# Patient Record
Sex: MALE | Race: WHITE | NOT HISPANIC OR LATINO | Employment: PART TIME | URBAN - METROPOLITAN AREA
[De-identification: names, ages, dates, MRNs, and addresses within clinical notes are randomized per-mention and may not be internally consistent; named-entity substitution may affect disease eponyms.]

---

## 2017-01-05 ENCOUNTER — TRANSFERRED RECORDS (OUTPATIENT)
Dept: HEALTH INFORMATION MANAGEMENT | Facility: CLINIC | Age: 37
End: 2017-01-05

## 2017-04-09 ENCOUNTER — TRANSFERRED RECORDS (OUTPATIENT)
Dept: HEALTH INFORMATION MANAGEMENT | Facility: CLINIC | Age: 37
End: 2017-04-09

## 2017-04-17 ENCOUNTER — TRANSFERRED RECORDS (OUTPATIENT)
Dept: HEALTH INFORMATION MANAGEMENT | Facility: CLINIC | Age: 37
End: 2017-04-17

## 2017-08-31 ENCOUNTER — TRANSFERRED RECORDS (OUTPATIENT)
Dept: HEALTH INFORMATION MANAGEMENT | Facility: CLINIC | Age: 37
End: 2017-08-31

## 2019-02-27 ENCOUNTER — APPOINTMENT (OUTPATIENT)
Dept: GENERAL RADIOLOGY | Facility: CLINIC | Age: 39
End: 2019-02-27
Attending: EMERGENCY MEDICINE
Payer: MEDICAID

## 2019-02-27 ENCOUNTER — APPOINTMENT (OUTPATIENT)
Dept: CT IMAGING | Facility: CLINIC | Age: 39
End: 2019-02-27
Attending: EMERGENCY MEDICINE
Payer: MEDICAID

## 2019-02-27 ENCOUNTER — HOSPITAL ENCOUNTER (INPATIENT)
Facility: CLINIC | Age: 39
LOS: 3 days | Discharge: HOME | End: 2019-03-02
Attending: EMERGENCY MEDICINE | Admitting: INTERNAL MEDICINE
Payer: MEDICAID

## 2019-02-27 DIAGNOSIS — F10.939 ALCOHOL WITHDRAWAL SYNDROME WITH COMPLICATION (H): ICD-10-CM

## 2019-02-27 DIAGNOSIS — G89.29 OTHER CHRONIC BACK PAIN: Primary | ICD-10-CM

## 2019-02-27 DIAGNOSIS — M54.89 OTHER CHRONIC BACK PAIN: Primary | ICD-10-CM

## 2019-02-27 DIAGNOSIS — A04.72 COLITIS DUE TO CLOSTRIDIUM DIFFICILE: ICD-10-CM

## 2019-02-27 DIAGNOSIS — R00.0 TACHYCARDIA: ICD-10-CM

## 2019-02-27 DIAGNOSIS — E87.6 HYPOKALEMIA: ICD-10-CM

## 2019-02-27 DIAGNOSIS — R07.9 CHEST PAIN, UNSPECIFIED TYPE: ICD-10-CM

## 2019-02-27 DIAGNOSIS — F10.930 ALCOHOL WITHDRAWAL SYNDROME WITHOUT COMPLICATION (H): ICD-10-CM

## 2019-02-27 LAB
ABO + RH BLD: NORMAL
ABO + RH BLD: NORMAL
ALBUMIN SERPL-MCNC: 3.5 G/DL (ref 3.4–5)
ALBUMIN UR-MCNC: 30 MG/DL
ALP SERPL-CCNC: 91 U/L (ref 40–150)
ALT SERPL W P-5'-P-CCNC: 26 U/L (ref 0–70)
AMMONIA PLAS-SCNC: 35 UMOL/L (ref 10–50)
ANION GAP SERPL CALCULATED.3IONS-SCNC: 16 MMOL/L (ref 3–14)
APPEARANCE UR: CLEAR
AST SERPL W P-5'-P-CCNC: 25 U/L (ref 0–45)
BASOPHILS # BLD AUTO: 0.1 10E9/L (ref 0–0.2)
BASOPHILS NFR BLD AUTO: 0.9 %
BILIRUB SERPL-MCNC: 0.2 MG/DL (ref 0.2–1.3)
BILIRUB UR QL STRIP: NEGATIVE
BLD GP AB SCN SERPL QL: NORMAL
BLOOD BANK CMNT PATIENT-IMP: NORMAL
BUN SERPL-MCNC: 11 MG/DL (ref 7–30)
CALCIUM SERPL-MCNC: 7.9 MG/DL (ref 8.5–10.1)
CHLORIDE SERPL-SCNC: 106 MMOL/L (ref 94–109)
CO2 SERPL-SCNC: 20 MMOL/L (ref 20–32)
COLOR UR AUTO: YELLOW
CREAT SERPL-MCNC: 0.55 MG/DL (ref 0.66–1.25)
DIFFERENTIAL METHOD BLD: NORMAL
EOSINOPHIL # BLD AUTO: 0 10E9/L (ref 0–0.7)
EOSINOPHIL NFR BLD AUTO: 0.1 %
ERYTHROCYTE [DISTWIDTH] IN BLOOD BY AUTOMATED COUNT: 13.9 % (ref 10–15)
ETHANOL SERPL-MCNC: 0.33 G/DL
GFR SERPL CREATININE-BSD FRML MDRD: >90 ML/MIN/{1.73_M2}
GLUCOSE SERPL-MCNC: 131 MG/DL (ref 70–99)
GLUCOSE UR STRIP-MCNC: NEGATIVE MG/DL
HCT VFR BLD AUTO: 49.9 % (ref 40–53)
HGB BLD-MCNC: 15.4 G/DL (ref 13.3–17.7)
HGB BLD-MCNC: 17 G/DL (ref 13.3–17.7)
HGB UR QL STRIP: NEGATIVE
HYALINE CASTS #/AREA URNS LPF: 41 /LPF (ref 0–2)
IMM GRANULOCYTES # BLD: 0 10E9/L (ref 0–0.4)
IMM GRANULOCYTES NFR BLD: 0.2 %
INTERPRETATION ECG - MUSE: NORMAL
INTERPRETATION ECG - MUSE: NORMAL
KETONES UR STRIP-MCNC: NEGATIVE MG/DL
LEUKOCYTE ESTERASE UR QL STRIP: NEGATIVE
LIPASE SERPL-CCNC: 194 U/L (ref 73–393)
LYMPHOCYTES # BLD AUTO: 5.1 10E9/L (ref 0.8–5.3)
LYMPHOCYTES NFR BLD AUTO: 47.1 %
MAGNESIUM SERPL-MCNC: 2 MG/DL (ref 1.6–2.3)
MCH RBC QN AUTO: 31.3 PG (ref 26.5–33)
MCHC RBC AUTO-ENTMCNC: 34.1 G/DL (ref 31.5–36.5)
MCV RBC AUTO: 92 FL (ref 78–100)
MONOCYTES # BLD AUTO: 0.5 10E9/L (ref 0–1.3)
MONOCYTES NFR BLD AUTO: 4.8 %
MUCOUS THREADS #/AREA URNS LPF: PRESENT /LPF
NEUTROPHILS # BLD AUTO: 5.1 10E9/L (ref 1.6–8.3)
NEUTROPHILS NFR BLD AUTO: 46.9 %
NITRATE UR QL: NEGATIVE
NRBC # BLD AUTO: 0 10*3/UL
NRBC BLD AUTO-RTO: 0 /100
PH UR STRIP: 6 PH (ref 5–7)
PLATELET # BLD AUTO: 416 10E9/L (ref 150–450)
PLATELET # BLD EST: NORMAL 10*3/UL
POTASSIUM SERPL-SCNC: 3 MMOL/L (ref 3.4–5.3)
POTASSIUM SERPL-SCNC: 4.4 MMOL/L (ref 3.4–5.3)
PROT SERPL-MCNC: 8 G/DL (ref 6.8–8.8)
RBC # BLD AUTO: 5.44 10E12/L (ref 4.4–5.9)
RBC #/AREA URNS AUTO: <1 /HPF (ref 0–2)
RBC MORPH BLD: NORMAL
SODIUM SERPL-SCNC: 142 MMOL/L (ref 133–144)
SOURCE: ABNORMAL
SP GR UR STRIP: 1.01 (ref 1–1.03)
SPECIMEN EXP DATE BLD: NORMAL
TROPONIN I SERPL-MCNC: <0.015 UG/L (ref 0–0.04)
TROPONIN I SERPL-MCNC: <0.015 UG/L (ref 0–0.04)
UROBILINOGEN UR STRIP-MCNC: 0 MG/DL (ref 0–2)
WBC # BLD AUTO: 10.8 10E9/L (ref 4–11)
WBC #/AREA URNS AUTO: 2 /HPF (ref 0–5)

## 2019-02-27 PROCEDURE — HZ2ZZZZ DETOXIFICATION SERVICES FOR SUBSTANCE ABUSE TREATMENT: ICD-10-PCS | Performed by: INTERNAL MEDICINE

## 2019-02-27 PROCEDURE — 36415 COLL VENOUS BLD VENIPUNCTURE: CPT | Performed by: EMERGENCY MEDICINE

## 2019-02-27 PROCEDURE — 25000128 H RX IP 250 OP 636: Performed by: EMERGENCY MEDICINE

## 2019-02-27 PROCEDURE — 25000132 ZZH RX MED GY IP 250 OP 250 PS 637: Performed by: EMERGENCY MEDICINE

## 2019-02-27 PROCEDURE — 70450 CT HEAD/BRAIN W/O DYE: CPT

## 2019-02-27 PROCEDURE — 96375 TX/PRO/DX INJ NEW DRUG ADDON: CPT

## 2019-02-27 PROCEDURE — 83690 ASSAY OF LIPASE: CPT | Performed by: EMERGENCY MEDICINE

## 2019-02-27 PROCEDURE — 84132 ASSAY OF SERUM POTASSIUM: CPT | Performed by: INTERNAL MEDICINE

## 2019-02-27 PROCEDURE — 36415 COLL VENOUS BLD VENIPUNCTURE: CPT | Performed by: INTERNAL MEDICINE

## 2019-02-27 PROCEDURE — 25000128 H RX IP 250 OP 636: Performed by: INTERNAL MEDICINE

## 2019-02-27 PROCEDURE — 25000128 H RX IP 250 OP 636

## 2019-02-27 PROCEDURE — 12000000 ZZH R&B MED SURG/OB

## 2019-02-27 PROCEDURE — 82140 ASSAY OF AMMONIA: CPT | Performed by: EMERGENCY MEDICINE

## 2019-02-27 PROCEDURE — 85025 COMPLETE CBC W/AUTO DIFF WBC: CPT | Performed by: EMERGENCY MEDICINE

## 2019-02-27 PROCEDURE — 80320 DRUG SCREEN QUANTALCOHOLS: CPT | Performed by: EMERGENCY MEDICINE

## 2019-02-27 PROCEDURE — 96366 THER/PROPH/DIAG IV INF ADDON: CPT

## 2019-02-27 PROCEDURE — 99285 EMERGENCY DEPT VISIT HI MDM: CPT | Mod: 25

## 2019-02-27 PROCEDURE — 84484 ASSAY OF TROPONIN QUANT: CPT | Performed by: EMERGENCY MEDICINE

## 2019-02-27 PROCEDURE — 96365 THER/PROPH/DIAG IV INF INIT: CPT

## 2019-02-27 PROCEDURE — 85018 HEMOGLOBIN: CPT | Performed by: EMERGENCY MEDICINE

## 2019-02-27 PROCEDURE — 71046 X-RAY EXAM CHEST 2 VIEWS: CPT

## 2019-02-27 PROCEDURE — 25000132 ZZH RX MED GY IP 250 OP 250 PS 637: Performed by: INTERNAL MEDICINE

## 2019-02-27 PROCEDURE — 86900 BLOOD TYPING SEROLOGIC ABO: CPT | Performed by: EMERGENCY MEDICINE

## 2019-02-27 PROCEDURE — 25000131 ZZH RX MED GY IP 250 OP 636 PS 637: Performed by: INTERNAL MEDICINE

## 2019-02-27 PROCEDURE — 86850 RBC ANTIBODY SCREEN: CPT | Performed by: EMERGENCY MEDICINE

## 2019-02-27 PROCEDURE — 25000125 ZZHC RX 250: Performed by: EMERGENCY MEDICINE

## 2019-02-27 PROCEDURE — 96361 HYDRATE IV INFUSION ADD-ON: CPT

## 2019-02-27 PROCEDURE — 83735 ASSAY OF MAGNESIUM: CPT | Performed by: INTERNAL MEDICINE

## 2019-02-27 PROCEDURE — 93005 ELECTROCARDIOGRAM TRACING: CPT | Mod: 76

## 2019-02-27 PROCEDURE — 80053 COMPREHEN METABOLIC PANEL: CPT | Performed by: EMERGENCY MEDICINE

## 2019-02-27 PROCEDURE — 81001 URINALYSIS AUTO W/SCOPE: CPT | Performed by: EMERGENCY MEDICINE

## 2019-02-27 PROCEDURE — C9113 INJ PANTOPRAZOLE SODIUM, VIA: HCPCS | Performed by: EMERGENCY MEDICINE

## 2019-02-27 PROCEDURE — 25800030 ZZH RX IP 258 OP 636: Performed by: EMERGENCY MEDICINE

## 2019-02-27 PROCEDURE — 96376 TX/PRO/DX INJ SAME DRUG ADON: CPT

## 2019-02-27 PROCEDURE — 99223 1ST HOSP IP/OBS HIGH 75: CPT | Mod: AI | Performed by: INTERNAL MEDICINE

## 2019-02-27 PROCEDURE — 93005 ELECTROCARDIOGRAM TRACING: CPT

## 2019-02-27 PROCEDURE — 86901 BLOOD TYPING SEROLOGIC RH(D): CPT | Performed by: EMERGENCY MEDICINE

## 2019-02-27 RX ORDER — LORAZEPAM 0.5 MG/1
0.5 TABLET ORAL EVERY 6 HOURS
Status: DISCONTINUED | OUTPATIENT
Start: 2019-02-27 | End: 2019-03-01

## 2019-02-27 RX ORDER — POTASSIUM CHLORIDE 7.45 MG/ML
10 INJECTION INTRAVENOUS
Status: DISCONTINUED | OUTPATIENT
Start: 2019-02-27 | End: 2019-03-02 | Stop reason: HOSPADM

## 2019-02-27 RX ORDER — ACETAMINOPHEN 325 MG/1
325-650 TABLET ORAL EVERY 12 HOURS PRN
COMMUNITY
End: 2019-03-12

## 2019-02-27 RX ORDER — SODIUM CHLORIDE, SODIUM LACTATE, POTASSIUM CHLORIDE, CALCIUM CHLORIDE 600; 310; 30; 20 MG/100ML; MG/100ML; MG/100ML; MG/100ML
1000 INJECTION, SOLUTION INTRAVENOUS CONTINUOUS
Status: DISCONTINUED | OUTPATIENT
Start: 2019-02-27 | End: 2019-02-27

## 2019-02-27 RX ORDER — POTASSIUM CL/LIDO/0.9 % NACL 10MEQ/0.1L
10 INTRAVENOUS SOLUTION, PIGGYBACK (ML) INTRAVENOUS
Status: DISCONTINUED | OUTPATIENT
Start: 2019-02-27 | End: 2019-03-02 | Stop reason: HOSPADM

## 2019-02-27 RX ORDER — NICOTINE 21 MG/24HR
1 PATCH, TRANSDERMAL 24 HOURS TRANSDERMAL ONCE
Status: COMPLETED | OUTPATIENT
Start: 2019-02-27 | End: 2019-02-27

## 2019-02-27 RX ORDER — PROCHLORPERAZINE MALEATE 5 MG
10 TABLET ORAL EVERY 6 HOURS PRN
Status: DISCONTINUED | OUTPATIENT
Start: 2019-02-27 | End: 2019-03-02 | Stop reason: HOSPADM

## 2019-02-27 RX ORDER — AMOXICILLIN 250 MG
2 CAPSULE ORAL 2 TIMES DAILY PRN
Status: DISCONTINUED | OUTPATIENT
Start: 2019-02-27 | End: 2019-03-02 | Stop reason: HOSPADM

## 2019-02-27 RX ORDER — ONDANSETRON 4 MG/1
4 TABLET, ORALLY DISINTEGRATING ORAL EVERY 6 HOURS PRN
Status: DISCONTINUED | OUTPATIENT
Start: 2019-02-27 | End: 2019-03-02 | Stop reason: HOSPADM

## 2019-02-27 RX ORDER — IBUPROFEN 600 MG/1
600 TABLET, FILM COATED ORAL EVERY 6 HOURS PRN
Status: DISCONTINUED | OUTPATIENT
Start: 2019-02-27 | End: 2019-03-02 | Stop reason: HOSPADM

## 2019-02-27 RX ORDER — LANOLIN ALCOHOL/MO/W.PET/CERES
100 CREAM (GRAM) TOPICAL DAILY
Status: DISCONTINUED | OUTPATIENT
Start: 2019-02-27 | End: 2019-03-02 | Stop reason: HOSPADM

## 2019-02-27 RX ORDER — POLYETHYLENE GLYCOL 3350 17 G/17G
17 POWDER, FOR SOLUTION ORAL DAILY PRN
Status: DISCONTINUED | OUTPATIENT
Start: 2019-02-27 | End: 2019-03-02 | Stop reason: HOSPADM

## 2019-02-27 RX ORDER — LOPERAMIDE HCL 2 MG
2 CAPSULE ORAL 4 TIMES DAILY PRN
Status: DISCONTINUED | OUTPATIENT
Start: 2019-02-27 | End: 2019-03-02 | Stop reason: HOSPADM

## 2019-02-27 RX ORDER — AMOXICILLIN 250 MG
1 CAPSULE ORAL 2 TIMES DAILY PRN
Status: DISCONTINUED | OUTPATIENT
Start: 2019-02-27 | End: 2019-03-02 | Stop reason: HOSPADM

## 2019-02-27 RX ORDER — ONDANSETRON 2 MG/ML
4 INJECTION INTRAMUSCULAR; INTRAVENOUS EVERY 6 HOURS PRN
Status: DISCONTINUED | OUTPATIENT
Start: 2019-02-27 | End: 2019-03-02 | Stop reason: HOSPADM

## 2019-02-27 RX ORDER — PROCHLORPERAZINE 25 MG
25 SUPPOSITORY, RECTAL RECTAL EVERY 12 HOURS PRN
Status: DISCONTINUED | OUTPATIENT
Start: 2019-02-27 | End: 2019-03-02 | Stop reason: HOSPADM

## 2019-02-27 RX ORDER — POTASSIUM CHLORIDE 1.5 G/1.58G
20-40 POWDER, FOR SOLUTION ORAL
Status: DISCONTINUED | OUTPATIENT
Start: 2019-02-27 | End: 2019-03-02 | Stop reason: HOSPADM

## 2019-02-27 RX ORDER — POTASSIUM CHLORIDE 1500 MG/1
40 TABLET, EXTENDED RELEASE ORAL ONCE
Status: COMPLETED | OUTPATIENT
Start: 2019-02-27 | End: 2019-02-27

## 2019-02-27 RX ORDER — LIDOCAINE 4 G/G
1 PATCH TOPICAL
Status: DISCONTINUED | OUTPATIENT
Start: 2019-02-27 | End: 2019-03-02 | Stop reason: HOSPADM

## 2019-02-27 RX ORDER — HYDRALAZINE HYDROCHLORIDE 20 MG/ML
10 INJECTION INTRAMUSCULAR; INTRAVENOUS EVERY 4 HOURS PRN
Status: DISCONTINUED | OUTPATIENT
Start: 2019-02-27 | End: 2019-02-27

## 2019-02-27 RX ORDER — ACETAMINOPHEN 500 MG
1000 TABLET ORAL ONCE
Status: COMPLETED | OUTPATIENT
Start: 2019-02-27 | End: 2019-02-27

## 2019-02-27 RX ORDER — LIDOCAINE 4 G/G
2 PATCH TOPICAL ONCE
Status: COMPLETED | OUTPATIENT
Start: 2019-02-27 | End: 2019-02-27

## 2019-02-27 RX ORDER — NALOXONE HYDROCHLORIDE 0.4 MG/ML
.1-.4 INJECTION, SOLUTION INTRAMUSCULAR; INTRAVENOUS; SUBCUTANEOUS
Status: DISCONTINUED | OUTPATIENT
Start: 2019-02-27 | End: 2019-03-02 | Stop reason: HOSPADM

## 2019-02-27 RX ORDER — LORAZEPAM 1 MG/1
1-2 TABLET ORAL EVERY 30 MIN PRN
Status: DISCONTINUED | OUTPATIENT
Start: 2019-02-27 | End: 2019-03-02 | Stop reason: HOSPADM

## 2019-02-27 RX ORDER — HYDRALAZINE HYDROCHLORIDE 20 MG/ML
10 INJECTION INTRAMUSCULAR; INTRAVENOUS EVERY 4 HOURS PRN
Status: DISCONTINUED | OUTPATIENT
Start: 2019-02-27 | End: 2019-03-02 | Stop reason: HOSPADM

## 2019-02-27 RX ORDER — LORAZEPAM 2 MG/ML
1 INJECTION INTRAMUSCULAR ONCE
Status: COMPLETED | OUTPATIENT
Start: 2019-02-27 | End: 2019-02-27

## 2019-02-27 RX ORDER — ACETAMINOPHEN 325 MG/1
650 TABLET ORAL EVERY 4 HOURS PRN
Status: DISCONTINUED | OUTPATIENT
Start: 2019-02-27 | End: 2019-03-02 | Stop reason: HOSPADM

## 2019-02-27 RX ORDER — LORAZEPAM 2 MG/ML
1-2 INJECTION INTRAMUSCULAR EVERY 30 MIN PRN
Status: DISCONTINUED | OUTPATIENT
Start: 2019-02-27 | End: 2019-03-02 | Stop reason: HOSPADM

## 2019-02-27 RX ORDER — ONDANSETRON 2 MG/ML
INJECTION INTRAMUSCULAR; INTRAVENOUS
Status: COMPLETED
Start: 2019-02-27 | End: 2019-02-27

## 2019-02-27 RX ORDER — POTASSIUM CHLORIDE 1500 MG/1
20-40 TABLET, EXTENDED RELEASE ORAL
Status: DISCONTINUED | OUTPATIENT
Start: 2019-02-27 | End: 2019-03-02 | Stop reason: HOSPADM

## 2019-02-27 RX ORDER — POTASSIUM CHLORIDE 29.8 MG/ML
20 INJECTION INTRAVENOUS
Status: DISCONTINUED | OUTPATIENT
Start: 2019-02-27 | End: 2019-03-02 | Stop reason: HOSPADM

## 2019-02-27 RX ORDER — LORAZEPAM 2 MG/ML
INJECTION INTRAMUSCULAR
Status: COMPLETED
Start: 2019-02-27 | End: 2019-02-27

## 2019-02-27 RX ORDER — MAGNESIUM SULFATE HEPTAHYDRATE 40 MG/ML
4 INJECTION, SOLUTION INTRAVENOUS EVERY 4 HOURS PRN
Status: DISCONTINUED | OUTPATIENT
Start: 2019-02-27 | End: 2019-03-02 | Stop reason: HOSPADM

## 2019-02-27 RX ORDER — LORAZEPAM 2 MG/ML
1 INJECTION INTRAMUSCULAR EVERY 30 MIN PRN
Status: DISCONTINUED | OUTPATIENT
Start: 2019-02-27 | End: 2019-02-27

## 2019-02-27 RX ORDER — ONDANSETRON 2 MG/ML
4 INJECTION INTRAMUSCULAR; INTRAVENOUS
Status: COMPLETED | OUTPATIENT
Start: 2019-02-27 | End: 2019-02-27

## 2019-02-27 RX ORDER — MULTIPLE VITAMINS W/ MINERALS TAB 9MG-400MCG
1 TAB ORAL DAILY
Status: DISCONTINUED | OUTPATIENT
Start: 2019-02-27 | End: 2019-03-02 | Stop reason: HOSPADM

## 2019-02-27 RX ORDER — FOLIC ACID 1 MG/1
1 TABLET ORAL DAILY
Status: DISCONTINUED | OUTPATIENT
Start: 2019-02-27 | End: 2019-03-02 | Stop reason: HOSPADM

## 2019-02-27 RX ORDER — IBUPROFEN 200 MG
200-400 TABLET ORAL EVERY 12 HOURS PRN
COMMUNITY
End: 2019-03-12

## 2019-02-27 RX ORDER — ONDANSETRON 2 MG/ML
4 INJECTION INTRAMUSCULAR; INTRAVENOUS ONCE
Status: COMPLETED | OUTPATIENT
Start: 2019-02-27 | End: 2019-02-27

## 2019-02-27 RX ORDER — SODIUM CHLORIDE AND POTASSIUM CHLORIDE 150; 900 MG/100ML; MG/100ML
INJECTION, SOLUTION INTRAVENOUS CONTINUOUS
Status: DISCONTINUED | OUTPATIENT
Start: 2019-02-27 | End: 2019-03-02

## 2019-02-27 RX ADMIN — LIDOCAINE 1 PATCH: 560 PATCH PERCUTANEOUS; TOPICAL; TRANSDERMAL at 19:45

## 2019-02-27 RX ADMIN — ACETAMINOPHEN 1000 MG: 500 TABLET, FILM COATED ORAL at 08:11

## 2019-02-27 RX ADMIN — LORAZEPAM 0.5 MG: 0.5 TABLET ORAL at 11:02

## 2019-02-27 RX ADMIN — ONDANSETRON 4 MG: 4 TABLET, ORALLY DISINTEGRATING ORAL at 14:26

## 2019-02-27 RX ADMIN — LORAZEPAM 1 MG: 2 INJECTION INTRAMUSCULAR; INTRAVENOUS at 04:08

## 2019-02-27 RX ADMIN — ACETAMINOPHEN 650 MG: 325 TABLET, FILM COATED ORAL at 14:26

## 2019-02-27 RX ADMIN — LORAZEPAM 1 MG: 2 INJECTION INTRAMUSCULAR; INTRAVENOUS at 10:04

## 2019-02-27 RX ADMIN — NICOTINE 1 PATCH: 21 PATCH, EXTENDED RELEASE TRANSDERMAL at 05:35

## 2019-02-27 RX ADMIN — LORAZEPAM 1 MG: 2 INJECTION INTRAMUSCULAR; INTRAVENOUS at 09:08

## 2019-02-27 RX ADMIN — FOLIC ACID 1 MG: 1 TABLET ORAL at 11:02

## 2019-02-27 RX ADMIN — SODIUM CHLORIDE, POTASSIUM CHLORIDE, SODIUM LACTATE AND CALCIUM CHLORIDE 1000 ML: 600; 310; 30; 20 INJECTION, SOLUTION INTRAVENOUS at 04:13

## 2019-02-27 RX ADMIN — ONDANSETRON 4 MG: 2 INJECTION INTRAMUSCULAR; INTRAVENOUS at 10:02

## 2019-02-27 RX ADMIN — FOLIC ACID: 5 INJECTION, SOLUTION INTRAMUSCULAR; INTRAVENOUS; SUBCUTANEOUS at 05:35

## 2019-02-27 RX ADMIN — Medication 100 MG: at 11:01

## 2019-02-27 RX ADMIN — LIDOCAINE 2 PATCH: 560 PATCH PERCUTANEOUS; TOPICAL; TRANSDERMAL at 08:11

## 2019-02-27 RX ADMIN — LORAZEPAM 1 MG: 1 TABLET ORAL at 10:44

## 2019-02-27 RX ADMIN — POTASSIUM CHLORIDE AND SODIUM CHLORIDE: 900; 150 INJECTION, SOLUTION INTRAVENOUS at 11:01

## 2019-02-27 RX ADMIN — LORAZEPAM 0.5 MG: 0.5 TABLET ORAL at 22:44

## 2019-02-27 RX ADMIN — FAMOTIDINE 20 MG: 10 INJECTION INTRAVENOUS at 07:42

## 2019-02-27 RX ADMIN — IBUPROFEN 600 MG: 600 TABLET ORAL at 19:45

## 2019-02-27 RX ADMIN — LORAZEPAM 1 MG: 1 TABLET ORAL at 18:22

## 2019-02-27 RX ADMIN — POTASSIUM CHLORIDE 40 MEQ: 1500 TABLET, EXTENDED RELEASE ORAL at 09:06

## 2019-02-27 RX ADMIN — POTASSIUM CHLORIDE 40 MEQ: 1500 TABLET, EXTENDED RELEASE ORAL at 07:41

## 2019-02-27 RX ADMIN — ACETAMINOPHEN 650 MG: 325 TABLET, FILM COATED ORAL at 16:32

## 2019-02-27 RX ADMIN — ONDANSETRON 4 MG: 2 INJECTION INTRAMUSCULAR; INTRAVENOUS at 04:07

## 2019-02-27 RX ADMIN — LORAZEPAM 0.5 MG: 0.5 TABLET ORAL at 16:32

## 2019-02-27 RX ADMIN — HYDRALAZINE HYDROCHLORIDE 10 MG: 20 INJECTION INTRAMUSCULAR; INTRAVENOUS at 12:33

## 2019-02-27 RX ADMIN — ONDANSETRON 4 MG: 2 INJECTION INTRAMUSCULAR; INTRAVENOUS at 18:21

## 2019-02-27 RX ADMIN — MULTIPLE VITAMINS W/ MINERALS TAB 1 TABLET: TAB at 11:01

## 2019-02-27 RX ADMIN — PANTOPRAZOLE SODIUM 40 MG: 40 INJECTION, POWDER, FOR SOLUTION INTRAVENOUS at 04:10

## 2019-02-27 RX ADMIN — SODIUM CHLORIDE, POTASSIUM CHLORIDE, SODIUM LACTATE AND CALCIUM CHLORIDE 1000 ML: 600; 310; 30; 20 INJECTION, SOLUTION INTRAVENOUS at 07:47

## 2019-02-27 RX ADMIN — IBUPROFEN 600 MG: 600 TABLET ORAL at 13:00

## 2019-02-27 ASSESSMENT — ACTIVITIES OF DAILY LIVING (ADL)
BATHING: 0-->INDEPENDENT
NUMBER_OF_TIMES_PATIENT_HAS_FALLEN_WITHIN_LAST_SIX_MONTHS: 1
SWALLOWING: 0-->SWALLOWS FOODS/LIQUIDS WITHOUT DIFFICULTY
DRESS: 0-->INDEPENDENT
RETIRED_COMMUNICATION: 0-->UNDERSTANDS/COMMUNICATES WITHOUT DIFFICULTY
ADLS_ACUITY_SCORE: 13
ADLS_ACUITY_SCORE: 13
AMBULATION: 0-->INDEPENDENT
TRANSFERRING: 0-->INDEPENDENT
WHICH_OF_THE_ABOVE_FUNCTIONAL_RISKS_HAD_A_RECENT_ONSET_OR_CHANGE?: AMBULATION;TRANSFERRING
TOILETING: 0-->INDEPENDENT
FALL_HISTORY_WITHIN_LAST_SIX_MONTHS: YES
COGNITION: 0 - NO COGNITION ISSUES REPORTED
ADLS_ACUITY_SCORE: 13
RETIRED_EATING: 0-->INDEPENDENT

## 2019-02-27 ASSESSMENT — ENCOUNTER SYMPTOMS
VOMITING: 1
BLOOD IN STOOL: 0
BACK PAIN: 1
FEVER: 0
HEADACHES: 1

## 2019-02-27 ASSESSMENT — MIFFLIN-ST. JEOR: SCORE: 1641.56

## 2019-02-27 NOTE — ED PROVIDER NOTES
Emergency department signout note    38-year-old male seen on the night shift by Dr. Bruce.  Signed out to me at shift change 7:30 AM    Has a history of alcoholism, with heavy drinking daily for years.  He did go through treatment and have a period of sobriety for a few months, last fall, approximately October.  This was apparently associated with it.  While he was on parole.  He does not use any other illicit drugs.  He typically drinks about 1.75 L of hard alcohol per day.  He has moved from his long-term home of [place] to [place] and is currently staying in a hotel with a girl.  He does not have contact with medical providers or any alcohol treatment here in [place].  He was apparently alone last night and drinking heavily.  He called 911 on himself because he desired to stop drinking and get treatment.    He has a history of seizures related to a cerebral aneurysm, but apparently has no longer had any seizures since repair of that aneurysm years ago.  No history of alcohol withdrawal seizures.    He also has chronic low back pain.    He had reported Dr. Fernandes that he had been vomiting some blood-tinged emesis, but serial hemoglobins are normal.    He also reported chest pain on the night shift but EKG and initial troponin are negative.  Signed out to me with his second troponin pending.  Second troponin came back at 808 and is normal at less than 0.015.    Patient also had intermittent sinus tachycardia.  He was not particularly tremulous or agitated but concern was that he might be developing early alcohol withdrawal.  He had received a dose of Ativan on the night shift, apparently with improvement in his heart rate.    I rechecked the patient at about 845.  He was awake.  He was complaining of worsening anxiety and feeling overall achy, especially his chronic low back pain.  His heart rate had come back up to the 120 range, maximum about 130 while I was talking with him.  He also had a mild tremor.      I have ordered additional benzodiazepines.    Patient was also found to be hypokalemic.  He had already received 40 hemoglobins of potassium on the night shift.  I ordered an additional 20.  Patient is tolerating his breakfast tray without vomiting.    Due to persistent, and increasing tachycardia as he is metabolizing his alcohol I have concerned that he is going into alcohol withdrawal.  With this degree of tachycardia I do not think he is a good candidate for transfer to detox or discharged home with plans for oral treatment.  We will admit him for monitoring and further alcohol treatment.  He will likely need psych evaluation and long-term, possibly inpatient or residential, alcohol treatment after he is through the withdrawal syndrome.    ***  ED Course:   0907 I spoke with Dr. Vicente of the hsopitalist service from [facility] regarding patient's presentation, findings, and plan of care.    Clinical impression   (F10.239) Alcohol withdrawal syndrome with complication (H)    (R00.0) Tachycardia    (R07.9) Chest pain, unspecified type    (E87.6) Hypokalemia

## 2019-02-27 NOTE — ED PROVIDER NOTES
History     Chief Complaint:  Alcohol Problem        HPI   Carlos Hamilton is a 38 year old male with a past medical history significant for alcohol abuse, chronic back pain and brain aneurysm who presents via EMS for evaluation of alcohol intoxication. Per the nurses report, the patient had an acquantance staying at a hotel and while he was visiting this acquaintance he called  911 stating that he would like help with his alcoholism and back pain. The patient reports that he is experiencing bilateral lower lumbar pain. He also states that he fell yesterday and now has a headache on the right side of his head. Patient has vomited twice and noticed some blood in his emesis as well.  No bright or dark blood in his stool. The patient denies fevers or any other drug use tonight. The patient has seen an Orthopedist for his back pain but does not have a primary care physician.      Allergies:  Toradol   Aleve  Naproxen    Medications:    The patient is not currently taking any prescribed medications.    Past Medical History:    The patient does not have any past pertinent medical history.    Past Surgical History:    History reviewed. No pertinent surgical history.    Family History:    History reviewed. No pertinent family history.     Social History:  Smoking status: Current every day smoker  Alcohol use: Yes  Marital Status:  Single       Review of Systems   Constitutional: Negative for fever.   Gastrointestinal: Positive for vomiting. Negative for blood in stool.   Musculoskeletal: Positive for back pain.   Neurological: Positive for headaches.   All other systems reviewed and are negative.  Although reliability uncertain due to confusion.     Physical Exam     Patient Vitals for the past 24 hrs:   BP Temp Temp src Pulse Heart Rate Resp SpO2 Weight   02/27/19 0645 (!) 161/96 -- -- 109 104 -- 98 % --   02/27/19 0630 132/87 -- -- 94 98 -- 96 % --   02/27/19 0620 (!) 130/96 -- -- 98 102 -- 98 % --   02/27/19 0545  147/88 -- -- 102 109 -- 95 % --   02/27/19 0515 119/54 -- -- 112 108 -- -- --   02/27/19 0505 (!) 146/95 -- -- 111 112 -- 96 % --   02/27/19 0445 (!) 147/107 -- -- 140 140 -- 97 % --   02/27/19 0435 (!) 154/106 -- -- 122 128 -- 96 % --   02/27/19 0324 (!) 161/117 98.4  F (36.9  C) Oral -- 123 20 96 % 70.3 kg (155 lb)         Physical Exam    Nursing note and vitals reviewed.    Constitutional: Pleasant and well groomed.          HENT:    Mouth/Throat: Oropharynx is without swelling or erythema. Oral mucosa moist.    Eyes: Conjunctivae are normal. No scleral icterus.    Neck: Neck supple. No midline cervical tenderness. Full range of motion.   Cardiovascular: Tachycardic regular rhythm  and intact distal pulses.    Pulmonary/Chest: Effort normal and breath sounds normal.   Abdominal: Soft.  No distension. There is no tenderness.   Musculoskeletal:  No edema, No calf tenderness. Chronic frontal skull deformity to the right side of his head.   Neurological:Alert. Coordination normal. Upper and lower extremity strength intact. Slurred speech. Not oriented to month/year. NO tremulousness/fasciculations.   Skin: Skin is warm and dry.   Psychiatric: Normal mood and affect.     Emergency Department Course   ECG (03:59:44):  Rate 115 bpm. TN interval 146. QRS duration 86. QT/QTc 342/448. P-R-T axes 60 59 55. Sinus tachycardia, Otherwise normal ECG,  Interpreted at 0419 by Gabby Bruce MD.    ECG (05:26:27):  Rate 114 bpm. TN interval 138. QRS duration 88. QT/QTc 342/471. P-R-T axes 63 71 56. Sinus tachycardia, otherwise normal ECG,  Interpreted at 0531 by Gabby Bruce MD.      Imaging:  Radiographic findings were communicated with the patient who voiced understanding of the findings.    CT head w/o contrast  IMPRESSION: No acute abnormality.  As read by radiology     Chest XR  IMPRESSION: No acute abnormality  As read by radiology     Laboratory:  Troponin I: <0.015  ABO/Rh type: A negative  CBC: WNL  (WBC 10.8, HGB 17.0, )  CMP: Potassium 3.0, Anion gap 16, Glucose 131, Creatinine 0.55,Calcium 7.9  Ammonia 35  Lipase 194  Alcohol ethyl 0.33    Interventions:  0535: Nicoderm patch  0410: Protonix 40 mg IV  0408: Ativan 1 mg IV  0407: Zofran 4 mg IV  0353: LR bolus 1000 ml IV  0741: Potassium chloride 40 mEq PO  0742: Pepcid 20 mg IV      Emergency Department Course:  Past medical records, nursing notes, and vitals reviewed.  0339: I performed an exam of the patient and obtained history, as documented above.    IV inserted and blood drawn.    The patient was sent for a CT head and Chest XR while in the emergency department, findings above.    0355:  The patient complained of chest pain so a EKG &  chest x-ray was obtained.     0600: Denies chest pain. Admits to some upper abdominal pain like when he has pancreatitis.  HR improved. Tolerating PO.     0738: Mental status improving as anticipated. The patient's heart rate has been improving but increased to the 120's when startled awake. Repeat trop, hemoglobin pending. Dr. Avelar has graciously agreed to follow up on lab results and vitals. No sign of alcohol withdrawal at this time but will monitor.     The patient was signed out to my partner, Dr. Avelar.       Impression & Plan      Medical Decision Making:  Carlos Hamilton is a 38 year old male who presents for evaluation of alcohol abuse and looking for help. On review of systems he reported vomiting and seeing some blood in his emesis.  He is intoxicated here in ED by blood work.  Blood work otherwise looks ok; no signs of alcoholic ketoacidosis, significant liver impairment or acute alcoholic hepatitis. He has no history of DT's or alcohol withdrawal seizures. There are no signs of co-ingestion including acetaminophen, drugs, medications, volatile alcohols. He reported head injury and some headache, although there were no acute signs of head injury a CT scan was obtained due to unreliable  history and mental status changes. This was negative for acute changes. During his time in the ED he reported chest pain. EKG was obtained which was negative for acute changes. Initial troponin negative. CP resolved after GI cocktail.  Consideration of GI bleed, dehydration, ACS, gastritis, PUD, alcohol intoxication, alcohol withdrawal. No emesis in the ED. No signs of GI bleeding. Awaiting sobriety and disposition planing. Dr. Avelar has graciously agreed to follow clinical course and repeat hgb and trop. REfer to Dr. Avelar's addendum for final diagnosis and disposition.         Preliminary Diagnosis:  1. Altered mental status  2. Alcohol intoxication    Disposition:  Patient signed out to my partner, Dr. Valentino Barnes  2/27/2019   [facility] EMERGENCY DEPARTMENT  Scribe Disclosure:  I, Vimal Barnes, am serving as a scribe at 3:39 AM on 2/27/2019 to document services personally performed by Gabby Bruce MD based on my observations and the provider's statements to me.          Gabby Bruce MD  03/01/19 [id number]

## 2019-02-27 NOTE — H&P
[facility]  Hospitalist Admission Note  Name: Carlos Hamilton    MRN: [id number]  YOB: 1980    Age: 38 year old  Date of admission: 2/27/2019  Primary care provider: No Ref-Primary, Physician    Chief Complaint:  Alcohol Abuse and Withdrawal    Assessment and Plan:     Carlos Hamilton is a 38 year old male with PMH including long standing alcohol dependence with many episodes of alcohol withdrawal requiring hospitalization (including alcohol withdrawal seizure), prior cocaine abuse, depression and prior brain aneurysm status post clipping who recently moved to [place] from [place] who was admitted 02/27/19 with acute alcohol withdrawal and a desire to quit drinking.    1.  Alcohol dependence with acute alcohol withdrawal: Patient has long-standing history of alcohol abuse.  He has been drinking heavily for the past week.  Drinking 1.75 L of vodka daily.  He called paramedics today because he wanted to quit drinking and does not feel that he can do this on his own.  Alcohol level was 0.33 today.  He Deonte has evidence of alcohol withdrawal including tachycardia and tremulousness.  He has been to treatment 3-4 times in the past.  His longest period of sobriety was 16 months.  -Oral vitamins  -CIWA protocol  -0.5 mg oral Ativan every 6 hours scheduled (hold for sedation) as he is at high risk for significant withdrawal  -Telemetry monitoring  -Consult to social work and chemical dependency    2.  Hypokalemia: Potassium was low at 3.  This is likely from alcohol intake, poor oral intake and vomiting.  Replace per protocol.    3.  Nausea and vomiting: Suspect this is due to alcohol intake.  We will continue on IV fluids and antiemetics will be available.    4.  Chronic back pain: Patient reports in the past he was on narcotics.  There is no indication for narcotics at this time, particularly given his alcohol abuse and withdrawal.  We will have Tylenol, ibuprofen, Voltaren gel  and Lidoderm patches available as needed.    5.  Depression: Patient reports that he was suicidal in December 2018 when he lost his job.  He does have fleeting thoughts of death but denies suicidal ideation or intent.  He is not on any medications for his depression at this time.  I suspect that his alcohol use is leading to worsening depression.    6.  History of cocaine abuse: Patient reports that he went on a villeda with cocaine when he lost his job at the end of last year.  He has not used cocaine since that time.    7.  History of brain aneurysm status post clipping: Patient reports this was done in 2013.  He does state he had a stroke after this but denies any deficits.  He is not on any medications for this and has not followed up since the clip.    8.  Chest pain: Patient has epigastric and chest pain.  EKG done in the emergency room showed no acute ischemic changes and troponin x2 is negative.  I suspect that his chest pain is related to emesis and/or gastritis related to drinking.  No further cardiac workup indicated.    Diet: Regular  DVT Prophylaxis: Pneumatic Compression Devices  Beach Catheter: not present  Code Status: Full code    Disposition Plan   Expected discharge: Admit inpatient status, recommended to Home versus treatment once Alcohol withdrawal has resolved.  Entered: Vandana Vicente MD 02/27/2019, 9:07 AM     The patient's care was discussed with the Patient.    Vandana Vicente MD  [facility]      History of Present Illness:  Carlos Hamilton is a 38 year old male with PMH including long standing alcohol dependence with many episodes of alcohol withdrawal requiring hospitalization (including alcohol withdrawal seizure), prior cocaine abuse, depression and prior brain aneurysm status post clipping who recently moved to [place] from [place] who was admitted 02/27/19 with acute alcohol withdrawal and a desire to quit drinking.  History was obtained through patient  interview, chart review and discussion with Dr. Avelar in the ER.    The patient reports that he moved from [place] on January 5.  He states that he moved to [place] because he wanted to go to rehab.  He had been working at [employer] and lost his job right before Christmas.  He reports that he lost his job because of alcohol issues.  At that time he was suicidal and reports that he tried to overdose with cocaine.  He was hospitalized and treated for withdrawal.  He states that since that time he has not used cocaine.  He had been drinking heavily about 1 day/week but over the past week has been drinking heavily on a daily basis.  He estimates that he drinks 1.75 L of vodka a day.  Last night he called the police because he wants to quit drinking and did not feel that he can do this on his own.  The paramedics brought him to the emergency room.    He states that he has chronic back pain and had been on pain medications for this.  He reports that he has had injections that have not helped.  He has not established care for his back pain since he came to [place].  There is no numbness or tingling or incontinence.  He has been taking ibuprofen, 6-8 tablets/day, for his pain.    Has been having nausea and vomiting.  He states he has seen a little bit of blood when he vomits.  He has some epigastric pain as well.  He reports that he has had pancreatitis and had to be hospitalized for this in the past.  He has not had diarrhea or constipation.    He states that he had a brain aneurysm clipped in 2013.  After that he reports he had a stroke.  When asked if he has any deficits related to this he states he was told by a physician that he lost 30% of the strength in his left leg from this.  He has not followed up with this and is not on any medications for this.    He states that he has been to treatment 3-4 times.  His last treatment was in December 2017.  He states the longest period of time he was sober was for 16  months.    He does want to quit drinking but does not feel that he can do this on his own.  He states he has passive thoughts of death but no suicidal ideation or intent.  He does feel safe at this time.     Past Medical History:  Past Medical History:   Diagnosis Date     Alcohol abuse      Brain aneurysm     s/p clip in 2013     Chronic back pain      Past Surgical History:  Past Surgical History:   Procedure Laterality Date     ARTHROPLASTY HIP Right      Social History:  Social History     Tobacco Use     Smoking status: Not on file   Substance Use Topics     Alcohol use: Not on file     Social History     Social History Narrative     Not on file   Patient moved from [place] in January of this year.  He is currently staying in Hotel.  He is drinking daily as above.    Family History:  Family History   Problem Relation Age of Onset     Alcoholism No family hx of      Allergies:  Allergies   Allergen Reactions     Naproxen Nausea and Vomiting     Medications:  1.  Ibuprofen    Review of Systems:  A Comprehensive greater than 10 system review of systems was carried out.  Pertinent positives and negatives are noted above.  Otherwise negative for contributory information.     Physical Exam:  Blood pressure 141/86, pulse 106, temperature 98.4  F (36.9  C), temperature source Oral, resp. rate 20, weight 70.3 kg (155 lb), SpO2 99 %.  Wt Readings from Last 1 Encounters:   02/27/19 70.3 kg (155 lb)     Exam:   General: Alert, awake, no acute distress.  HEENT: NC/AT, eyes anicteric and without injection, EOMI, face symmetric.  Dentition WNL, MMM.  Cardiac: Tachycardic with regular rhythm, normal S1, S2.  No murmurs/g/r.  No LE edema  Pulmonary: Normal chest rise, normal work of breathing.  Lungs CTAB without crackles or wheezing  Abdomen: soft, very mild tenderness in epigastric region, non-distended.  Normoactive BS.  No guarding or rebound tenderness.  Extremities: no deformities.  Warm, well perfused.  Skin: no rashes  or lesions noted.  Warm and Dry.  Neuro: No focal deficits noted.  Speech clear.  Coordination and strength grossly normal. Tremulous when hands are outstretched  Psych: Appropriate affect. Alert and oriented x3    Data Reviewed Today:  EKG:  No acute ischemic changes  Imaging:  Results for orders placed or performed during the hospital encounter of 02/27/19   CT Head w/o Contrast    Narrative    CT SCAN OF THE HEAD WITHOUT CONTRAST  2/27/2019 4:32 AM     HISTORY: Altered level of consciousness, unexplained.    TECHNIQUE: Axial images of the head and coronal reformations without  IV contrast material. Radiation dose for this scan was reduced using  automated exposure control, adjustment of the mA and/or kV according  to patient size, or iterative reconstruction technique.    COMPARISON: None.    FINDINGS: The ventricles are normal in size, shape and configuration.  The brain parenchyma and subarachnoid spaces are normal. There is no  evidence of intracranial hemorrhage, mass, acute infarct or anomaly.  Right temporal postoperative changes. Encephalomalacia in the right  parietal lobe.    Left maxillary sinus disease. There is no evidence of trauma.      Impression    IMPRESSION: No acute abnormality.    GLORIA GARCIA MD   Chest XR,  PA & LAT    Narrative    XR CHEST 2 VW   2/27/2019 6:07 AM     HISTORY: Chest pain.    COMPARISON: None.    FINDINGS: The heart size is normal. The lungs are clear. No  pneumothorax or pleural effusion.      Impression    IMPRESSION: No acute abnormality.    GLORIA GARCIA MD     Labs:  Recent Labs   Lab 02/27/19  0808 02/27/19  0358   WBC  --  10.8   HGB 15.4 17.0   HCT  --  49.9   MCV  --  92   PLT  --  416     Recent Labs   Lab 02/27/19  0358      POTASSIUM 3.0*   CHLORIDE 106   CO2 20   ANIONGAP 16*   *   BUN 11   CR 0.55*   GFRESTIMATED >90   GFRESTBLACK >90   TERESA 7.9*   PROTTOTAL 8.0   ALBUMIN 3.5   BILITOTAL 0.2   ALKPHOS 91   AST 25   ALT 26     Recent Labs   Lab  02/27/19  0358   LIPASE 194       Vandana Vicente MD  Hospitalist  [facility]

## 2019-02-27 NOTE — PHARMACY-ADMISSION MEDICATION HISTORY
Admission medication history interview status for this patient is complete. See [facility] admission navigator for allergy information, prior to admission medications and immunization status.     Medication history interview source(s):Patient  Medication history resources (including written lists, pill bottles, clinic record):None  Primary pharmacy:none at this time: he would prefer to use [facility] upon discharge    Changes made to PTA medication list:  Added: ibuprofen, acetaminophen  Deleted: none  Changed: none    Actions taken by pharmacist (provider contacted, etc):None     Additional medication history information:  Patient is suppose to be on Lisinopril (unknown dose) for blood pressure, however he stopped taking it about 1 year ago    Medication reconciliation/reorder completed by provider prior to medication history? No    Do you take OTC medications (eg tylenol, ibuprofen, fish oil, eye/ear drops, etc)? See list    For patients on insulin therapy:No      Prior to Admission medications    Medication Sig Last Dose Taking? Auth Provider   acetaminophen (TYLENOL) 325 MG tablet Take 325-650 mg by mouth every 12 hours as needed for mild pain Past Month at Unknown time Yes Unknown, Entered By History   ibuprofen (ADVIL/MOTRIN) 200 MG tablet Take 200-400 mg by mouth every 12 hours as needed for mild pain Past Month at Unknown time Yes Unknown, Entered By History

## 2019-02-27 NOTE — ED NOTES
[facility]  ED Nurse Handoff Report    Carlos Hamilton is a 38 year old male   ED Chief complaint: Alcohol Problem  . ED Diagnosis:   Final diagnoses:   Alcohol withdrawal syndrome with complication (H)   Tachycardia   Chest pain, unspecified type   Hypokalemia     Allergies:   Allergies   Allergen Reactions     Naproxen Nausea and Vomiting       Code Status: Full Code  Activity level - Baseline/Home:  Independent. Activity Level - Current:   Stand by assist. Lift room needed: No. Bariatric: No   Needed: No   Isolation: Yes. Infection: Not Applicable.     Vital Signs:   Vitals:    02/27/19 0715 02/27/19 0730 02/27/19 0745 02/27/19 0800   BP: 111/69 107/66 (!) 149/95 (!) 150/95   Pulse: 96 101 118 106   Resp:       Temp:       TempSrc:       SpO2:   97% 99%   Weight:           Cardiac Rhythm:  ,      Pain level: 0-10 Pain Scale: 5  Patient confused: Yes. Patient Falls Risk: Yes.   Elimination Status: Due to void  Patient Report - Initial Complaint: ETOH. Focused Assessment:  Psychiatric Symptoms / Stressors - Mood/Behavior: restless; cooperative  Chemical Abuse/Addictions - Alcohol: Daily Last Use:: 02/27/19 (0100 last use. Pt states he wants help for his drinking)  Security Measures - Patient Restraints:: None  Musculoskeletal - Musculoskeletal WDL: -WDL except Pain Body Location: back (low) CMS Intact: Yes Musculoskeletal Comment: pt has chronic back pain and states he wants help with the pain.    Tests Performed: Labs. Abnormal Results:   Labs Ordered and Resulted from Time of ED Arrival Up to the Time of Departure from the ED   ALCOHOL ETHYL - Abnormal; Notable for the following components:       Result Value    Ethanol g/dL 0.33 (*)     All other components within normal limits   COMPREHENSIVE METABOLIC PANEL - Abnormal; Notable for the following components:    Potassium 3.0 (*)     Anion Gap 16 (*)     Glucose 131 (*)     Creatinine 0.55 (*)     Calcium 7.9 (*)     All other  components within normal limits   ROUTINE UA WITH MICROSCOPIC - Abnormal; Notable for the following components:    Protein Albumin Urine 30 (*)     Mucous Urine Present (*)     Hyaline Casts 41 (*)     All other components within normal limits   CBC WITH PLATELETS DIFFERENTIAL   LIPASE   AMMONIA   TROPONIN I   HEMOGLOBIN   TROPONIN I   PULSE OXIMETRY NURSING   CARDIAC CONTINUOUS MONITORING   ABO/RH TYPE AND SCREEN        Treatments provided: See MAR  Family Comments: Not present  OBS brochure/video discussed/provided to patient:  TBD  ED Medications:   Medications   lactated ringers BOLUS 1,000 mL (0 mLs Intravenous Stopped 2/27/19 0535)     Followed by   lactated ringers infusion (not administered)   nicotine Patch in Place ( Transdermal Given 2/27/19 0536)   nicotine patch REMOVAL (not administered)   lactated ringers infusion (not administered)   Lidocaine (LIDOCARE) 4 % Patch 2 patch (2 patches Transdermal Given 2/27/19 0811)   dextrose 5% and 0.9% NaCl 1,000 mL with INFUVITE ADULT 10 mL, thiamine 100 mg, folic acid 1 mg, magnesium sulfate 2 g infusion ( Intravenous Stopped 2/27/19 0736)   ondansetron (ZOFRAN) injection 4 mg (4 mg Intravenous Given 2/27/19 0407)   LORazepam (ATIVAN) injection 1 mg (1 mg Intravenous Given 2/27/19 0408)   pantoprazole (PROTONIX) 40 mg IV push injection (40 mg Intravenous Given 2/27/19 0410)   nicotine (NICODERM CQ) 21 MG/24HR 24 hr patch 1 patch (1 patch Transdermal Given 2/27/19 0535)   potassium chloride ER (K-DUR/KLOR-CON M) CR tablet 40 mEq (40 mEq Oral Given 2/27/19 0741)   famotidine (PEPCID) injection 20 mg (20 mg Intravenous Given 2/27/19 0742)   lactated ringers BOLUS 1,000 mL (0 mLs Intravenous Stopped 2/27/19 0855)   acetaminophen (TYLENOL) tablet 1,000 mg (1,000 mg Oral Given 2/27/19 0811)     Drips infusing:  No  For the majority of the shift, the patient's behavior Green. Interventions performed were Call light within reach.     Severe Sepsis OR Septic Shock  Diagnosis Present: No      ED Nurse Name/Phone Number: Nadine Ingram,   8:56 AM    RECEIVING UNIT ED HANDOFF REVIEW    Above ED Nurse Handoff Report was reviewed: Yes  Reviewed by: Hanh Elaine on February 27, 2019 at 9:41 AM

## 2019-02-27 NOTE — ED TRIAGE NOTES
Pt arrives via EMS with back pain and wanting to get treatment for alcohol use.  Last drink 2 hours PTA.  Pt states he has chronic back pain

## 2019-02-28 LAB
ANION GAP SERPL CALCULATED.3IONS-SCNC: 5 MMOL/L (ref 3–14)
BUN SERPL-MCNC: 7 MG/DL (ref 7–30)
C DIFF TOX B STL QL: POSITIVE
CALCIUM SERPL-MCNC: 8.7 MG/DL (ref 8.5–10.1)
CHLORIDE SERPL-SCNC: 108 MMOL/L (ref 94–109)
CO2 SERPL-SCNC: 26 MMOL/L (ref 20–32)
CREAT SERPL-MCNC: 0.56 MG/DL (ref 0.66–1.25)
ERYTHROCYTE [DISTWIDTH] IN BLOOD BY AUTOMATED COUNT: 13.9 % (ref 10–15)
GFR SERPL CREATININE-BSD FRML MDRD: >90 ML/MIN/{1.73_M2}
GLUCOSE SERPL-MCNC: 92 MG/DL (ref 70–99)
HCT VFR BLD AUTO: 40.5 % (ref 40–53)
HGB BLD-MCNC: 13.6 G/DL (ref 13.3–17.7)
MCH RBC QN AUTO: 31.7 PG (ref 26.5–33)
MCHC RBC AUTO-ENTMCNC: 33.6 G/DL (ref 31.5–36.5)
MCV RBC AUTO: 94 FL (ref 78–100)
PLATELET # BLD AUTO: 253 10E9/L (ref 150–450)
POTASSIUM SERPL-SCNC: 4.2 MMOL/L (ref 3.4–5.3)
RBC # BLD AUTO: 4.29 10E12/L (ref 4.4–5.9)
SODIUM SERPL-SCNC: 139 MMOL/L (ref 133–144)
SPECIMEN SOURCE: ABNORMAL
WBC # BLD AUTO: 6.8 10E9/L (ref 4–11)

## 2019-02-28 PROCEDURE — 36415 COLL VENOUS BLD VENIPUNCTURE: CPT | Performed by: INTERNAL MEDICINE

## 2019-02-28 PROCEDURE — 87493 C DIFF AMPLIFIED PROBE: CPT | Performed by: INTERNAL MEDICINE

## 2019-02-28 PROCEDURE — 25000132 ZZH RX MED GY IP 250 OP 250 PS 637: Performed by: INTERNAL MEDICINE

## 2019-02-28 PROCEDURE — 80048 BASIC METABOLIC PNL TOTAL CA: CPT | Performed by: INTERNAL MEDICINE

## 2019-02-28 PROCEDURE — 85027 COMPLETE CBC AUTOMATED: CPT | Performed by: INTERNAL MEDICINE

## 2019-02-28 PROCEDURE — 12000000 ZZH R&B MED SURG/OB

## 2019-02-28 PROCEDURE — 99233 SBSQ HOSP IP/OBS HIGH 50: CPT | Performed by: INTERNAL MEDICINE

## 2019-02-28 PROCEDURE — 25000128 H RX IP 250 OP 636: Performed by: INTERNAL MEDICINE

## 2019-02-28 PROCEDURE — 25000132 ZZH RX MED GY IP 250 OP 250 PS 637: Performed by: STUDENT IN AN ORGANIZED HEALTH CARE EDUCATION/TRAINING PROGRAM

## 2019-02-28 RX ORDER — HYDROMORPHONE HYDROCHLORIDE 1 MG/ML
.3-.5 INJECTION, SOLUTION INTRAMUSCULAR; INTRAVENOUS; SUBCUTANEOUS EVERY 4 HOURS PRN
Status: DISCONTINUED | OUTPATIENT
Start: 2019-02-28 | End: 2019-03-01

## 2019-02-28 RX ORDER — POLYETHYLENE GLYCOL 3350 17 G
2-4 POWDER IN PACKET (EA) ORAL
Status: DISCONTINUED | OUTPATIENT
Start: 2019-02-28 | End: 2019-02-28 | Stop reason: ALTCHOICE

## 2019-02-28 RX ORDER — TRAMADOL HYDROCHLORIDE 50 MG/1
100 TABLET ORAL ONCE
Status: COMPLETED | OUTPATIENT
Start: 2019-02-28 | End: 2019-02-28

## 2019-02-28 RX ORDER — CLONIDINE HYDROCHLORIDE 0.1 MG/1
0.1 TABLET ORAL EVERY 4 HOURS PRN
Status: DISCONTINUED | OUTPATIENT
Start: 2019-02-28 | End: 2019-03-02 | Stop reason: HOSPADM

## 2019-02-28 RX ORDER — VANCOMYCIN HYDROCHLORIDE 50 MG/ML
125 KIT ORAL 4 TIMES DAILY
Status: DISCONTINUED | OUTPATIENT
Start: 2019-02-28 | End: 2019-03-02 | Stop reason: HOSPADM

## 2019-02-28 RX ORDER — NICOTINE 21 MG/24HR
1 PATCH, TRANSDERMAL 24 HOURS TRANSDERMAL DAILY
Status: DISCONTINUED | OUTPATIENT
Start: 2019-02-28 | End: 2019-03-02 | Stop reason: HOSPADM

## 2019-02-28 RX ORDER — LACTOBACILLUS RHAMNOSUS GG 10B CELL
1 CAPSULE ORAL 2 TIMES DAILY
Status: DISCONTINUED | OUTPATIENT
Start: 2019-02-28 | End: 2019-03-02 | Stop reason: HOSPADM

## 2019-02-28 RX ORDER — MIRTAZAPINE 15 MG/1
15 TABLET, FILM COATED ORAL
Status: COMPLETED | OUTPATIENT
Start: 2019-02-28 | End: 2019-02-28

## 2019-02-28 RX ORDER — PANTOPRAZOLE SODIUM 40 MG/1
40 TABLET, DELAYED RELEASE ORAL
Status: DISCONTINUED | OUTPATIENT
Start: 2019-02-28 | End: 2019-03-02 | Stop reason: HOSPADM

## 2019-02-28 RX ADMIN — VANCOMYCIN HYDROCHLORIDE 125 MG: KIT at 22:30

## 2019-02-28 RX ADMIN — HYDRALAZINE HYDROCHLORIDE 10 MG: 20 INJECTION INTRAMUSCULAR; INTRAVENOUS at 03:34

## 2019-02-28 RX ADMIN — Medication 0.5 MG: at 23:23

## 2019-02-28 RX ADMIN — IBUPROFEN 600 MG: 600 TABLET ORAL at 14:00

## 2019-02-28 RX ADMIN — HYDRALAZINE HYDROCHLORIDE 10 MG: 20 INJECTION INTRAMUSCULAR; INTRAVENOUS at 12:52

## 2019-02-28 RX ADMIN — Medication 0.5 MG: at 11:05

## 2019-02-28 RX ADMIN — VANCOMYCIN HYDROCHLORIDE 125 MG: KIT at 18:20

## 2019-02-28 RX ADMIN — CLONIDINE HYDROCHLORIDE 0.1 MG: 0.1 TABLET ORAL at 15:55

## 2019-02-28 RX ADMIN — FOLIC ACID 1 MG: 1 TABLET ORAL at 08:00

## 2019-02-28 RX ADMIN — Medication 0.5 MG: at 19:21

## 2019-02-28 RX ADMIN — NICOTINE POLACRILEX 4 MG: 2 GUM, CHEWING ORAL at 15:46

## 2019-02-28 RX ADMIN — POTASSIUM CHLORIDE AND SODIUM CHLORIDE: 900; 150 INJECTION, SOLUTION INTRAVENOUS at 04:03

## 2019-02-28 RX ADMIN — LORAZEPAM 0.5 MG: 0.5 TABLET ORAL at 15:37

## 2019-02-28 RX ADMIN — TRAMADOL HYDROCHLORIDE 100 MG: 50 TABLET, COATED ORAL at 18:20

## 2019-02-28 RX ADMIN — Medication 100 MG: at 08:00

## 2019-02-28 RX ADMIN — LORAZEPAM 0.5 MG: 0.5 TABLET ORAL at 09:28

## 2019-02-28 RX ADMIN — ACETAMINOPHEN 650 MG: 325 TABLET, FILM COATED ORAL at 14:00

## 2019-02-28 RX ADMIN — LORAZEPAM 0.5 MG: 0.5 TABLET ORAL at 04:40

## 2019-02-28 RX ADMIN — LORAZEPAM 0.5 MG: 0.5 TABLET ORAL at 22:30

## 2019-02-28 RX ADMIN — LOPERAMIDE HYDROCHLORIDE 2 MG: 2 CAPSULE ORAL at 08:00

## 2019-02-28 RX ADMIN — HYDRALAZINE HYDROCHLORIDE 10 MG: 20 INJECTION INTRAMUSCULAR; INTRAVENOUS at 08:00

## 2019-02-28 RX ADMIN — Medication 0.5 MG: at 15:37

## 2019-02-28 RX ADMIN — LORAZEPAM 1 MG: 2 INJECTION INTRAMUSCULAR; INTRAVENOUS at 03:30

## 2019-02-28 RX ADMIN — HYDRALAZINE HYDROCHLORIDE 10 MG: 20 INJECTION INTRAMUSCULAR; INTRAVENOUS at 15:55

## 2019-02-28 RX ADMIN — POTASSIUM CHLORIDE AND SODIUM CHLORIDE: 900; 150 INJECTION, SOLUTION INTRAVENOUS at 11:32

## 2019-02-28 RX ADMIN — LIDOCAINE 1 PATCH: 560 PATCH PERCUTANEOUS; TOPICAL; TRANSDERMAL at 19:23

## 2019-02-28 RX ADMIN — Medication 1 CAPSULE: at 22:30

## 2019-02-28 RX ADMIN — MULTIPLE VITAMINS W/ MINERALS TAB 1 TABLET: TAB at 08:00

## 2019-02-28 RX ADMIN — ONDANSETRON 4 MG: 2 INJECTION INTRAMUSCULAR; INTRAVENOUS at 17:36

## 2019-02-28 RX ADMIN — LORAZEPAM 2 MG: 2 INJECTION INTRAMUSCULAR; INTRAVENOUS at 17:36

## 2019-02-28 RX ADMIN — NICOTINE 1 PATCH: 14 PATCH, EXTENDED RELEASE TRANSDERMAL at 15:37

## 2019-02-28 RX ADMIN — PANTOPRAZOLE SODIUM 40 MG: 40 TABLET, DELAYED RELEASE ORAL at 09:27

## 2019-02-28 RX ADMIN — MIRTAZAPINE 15 MG: 15 TABLET, FILM COATED ORAL at 23:23

## 2019-02-28 RX ADMIN — DICLOFENAC 2 G: 10 GEL TOPICAL at 13:38

## 2019-02-28 RX ADMIN — IBUPROFEN 600 MG: 600 TABLET ORAL at 03:37

## 2019-02-28 ASSESSMENT — ACTIVITIES OF DAILY LIVING (ADL)
ADLS_ACUITY_SCORE: 12
ADLS_ACUITY_SCORE: 12
ADLS_ACUITY_SCORE: 13
ADLS_ACUITY_SCORE: 12

## 2019-02-28 NOTE — PLAN OF CARE
Presentation/Diagnosis:pt here for ETOH. Pt has struggled with this in the past. Has gone to many different treatments and plans to go to [facility] after discharge. Pt has Chronic back pain, clipped aneurism, and hip surgery.    Labs/Protocols:standard mag and K creat 0.56, K 4.2 Positive C-diff, pt was not having loose stools before coming to the hospital.   Vitals:BP is elevated, hydrolozine given   Cardiac:tachy   Telemetry:  Respiratory:WDL RA  Neuro:WDL   GI/ pt having frequent loose stools, sample sent down for c diff.   Skin:wdl   LDAs:PIV in left arm, NS 20K 125ml/hr  Diet:Reg.   Activity:SBA  Teaching:Pt educated on plan of care   Plan:CD, psych, SW are following pt. Plan is to continue to monitor and check CIWA scores.  CIWA scores were 3 and 4.

## 2019-02-28 NOTE — CONSULTS
"Care Transition Initial Assessment - SW     Met with: Patient  Active Problems:    Alcohol withdrawal (H)       DATA  Lives With: friend(s)      Quality of Family Relationships: helpful, supportive  Description of Support System: Supportive, Involved  Who is your support system?: Other (specify)(Friends, extended family)  Support Assessment: Limited social contact and support.   Identified issues/concerns regarding health management: Pt was admitted for alcohol withdrawal. He recently moved in January from [place] to [place]. He has been staying with friends since moving here. His friend/significant other Татьяна has been a support to pt, but pt reports that their relationship at times causes stress with her 3 children, mental health issues, and her occasional drinking. Pt had a history of suicidal thoughts in December 2018 due to a job loss which resulted from alcohol use concerns. Pt is not currently employed. He plans to get insurance through his employer once he finds a job, and will establish primary care after that. He has been residing with friends. Pt reports that when living in [place], he was drinking 1 liter to 1/2 gallon/day. Since moving to [place] in January, he reports only drinking 3-4 times since this \"villeda\", which he reports lasting for 3 days. He has a history of Cocaine use, but is not currently using. He reports being to treatment 3-4 times, both inpatient and outpatient, as well as AA. He reports that his longest stretch of sobriety has been 16 months, and he reports he's had the most success with remaining sober when not participating in any CD treatment. He has established AA meeting locations nearby and has bus tokens to get to these meetings. He reports currently searching for sober housing apartments that he can rent, and declined any additional resources/assistance with this. SW aware of CD consult placed. SW provided CD resources pamphlet to him.        Quality of Family Relationships: " helpful, supportive. Pt has family in [place], but reports some extended family in [place] that are helpful and supportive as needed.     ASSESSMENT  Cognitive Status:  awake, alert and oriented  Concerns to be addressed: CD resources.     PLAN  Financial costs for the patient includes: Undetermined at this time.  Patient given options and choices for discharge Yes.  Patient/family is agreeable to the plan?  N/A  Patient Goals and Preferences: Pt prefers to return home and attend AA meetings regularly.   Patient anticipates discharging to:  TBD-CD to see and assess patient.

## 2019-02-28 NOTE — PLAN OF CARE
BP elevated, received IV Hydralazine once this shift; tele displaying SR; continues to c/o low backpain, using  Lidocaine patch,heating pad, Ibuprofen and aromatherapy; scoring 5-10 on CIWA scale, received Ativan 1 mg IV x1 and the scheduled 0.5 po once this shift; bed alarm on for  safety

## 2019-02-28 NOTE — CONSULTS
CD consult acknowledged. Per chart review and ANTHONY pascal check, patient has no funding. He will need Rule 25 funding for chem dep services through his county of residence. Social work can provide resources.

## 2019-02-28 NOTE — PROGRESS NOTES
[facility]    Hospitalist Progress Note    Date of Service (when I saw the patient): 02/28/2019    Assessment & Plan   Carlos Hamilton is a 38 year old male who was admitted on 2/27/2019.  Assessment and Plan:      Carlos Hamilton is a 38 year old male with PMH including long standing alcohol dependence with many episodes of alcohol withdrawal requiring hospitalization (including alcohol withdrawal seizure), prior cocaine abuse, depression and prior brain aneurysm status post clipping who recently moved to [place] from [place] who was admitted 02/27/19 with acute alcohol withdrawal and a desire to quit drinking.     1.  Alcohol dependence with acute alcohol withdrawal: Patient has long-standing history of alcohol abuse.  He has been drinking heavily for the past week.  Drinking 1.75 L of vodka daily.  He called paramedics today because he wanted to quit drinking and does not feel that he can do this on his own.  Alcohol level was 0.33 today.  He Deonte has evidence of alcohol withdrawal including tachycardia and tremulousness.  He has been to treatment 3-4 times in the past.  His longest period of sobriety was 16 months.  -Oral vitamins  -CIWA protocol. Getting ativan PRn per CIWA protocol   -0.5 mg oral Ativan every 6 hours scheduled (hold for sedation) as he is at high risk for significant withdrawal  -Telemetry monitoring  -Consult placed social work and chemical dependency     2.  Hypokalemia: Potassium was low at 3.  This is likely from alcohol intake, poor oral intake and vomiting.  Replace per protocol.     3.  Nausea and vomiting: Suspect this is due to alcohol intake.  We will continue on IV fluids and antiemetics will be available.     4.  Chronic back pain: Patient reports in the past he was on narcotics.  There is no indication for narcotics at this time, particularly given his alcohol abuse and withdrawal.  We will have Tylenol, ibuprofen, Voltaren gel and Lidoderm patches  available as needed.     5.  Depression: Patient reports that he was suicidal in December 2018 when he lost his job.  He does have fleeting thoughts of death but denies suicidal ideation or intent.  He is not on any medications for his depression at this time.  I suspect that his alcohol use is leading to worsening depression.  Will place psych consult today      6.  History of cocaine abuse: Patient reports that he went on a villeda with cocaine when he lost his job at the end of last year.  He has not used cocaine since that time.     7.  History of brain aneurysm status post clipping: Patient reports this was done in 2013.  He does state he had a stroke after this but denies any deficits.  He is not on any medications for this and has not followed up since the clip.     8.  Chest pain: Patient has epigastric and chest pain.  EKG done in the emergency room showed no acute ischemic changes and troponin x2 is negative.  I suspect that his chest pain is related to emesis and/or gastritis related to drinking.  No further cardiac workup indicated.  Started on protonix for possible gastritis and GERD      Diet: Regular  DVT Prophylaxis: Pneumatic Compression Devices  Beach Catheter: not present  Code Status: Full code        Disposition Plan  In the next 1-2days once withdrawal improves       Cheri Bethea MD  [phone] (P)      Interval History   C/o back pain. No N/v. Going through withdrawal. Hypertensive due to withdrawal     -Data reviewed today: I reviewed all new labs and imaging results over the last 24 hours. I personally reviewed no images or EKG's today.    Physical Exam   Temp: 97.5  F (36.4  C) Temp src: Oral BP: (!) 172/118 Pulse: 104 Heart Rate: 100 Resp: 18 SpO2: 97 % O2 Device: None (Room air)    Vitals:    02/27/19 0324 02/27/19 1018   Weight: 70.3 kg (155 lb) 70.9 kg (156 lb 6.4 oz)     Vital Signs with Ranges  Temp:  [97.3  F (36.3  C)-98  F (36.7  C)] 97.5  F (36.4  C)  Pulse:  [104-109]  104  Heart Rate:  [] 100  Resp:  [16-18] 18  BP: (141-179)/() 172/118  SpO2:  [97 %-99 %] 97 %  I/O last 3 completed shifts:  In: 810 [P.O.:390; I.V.:420]  Out: -     Constitutional: Awake, alert, cooperative, no apparent distress  Respiratory: Clear to auscultation bilaterally, no crackles or wheezing  Cardiovascular: Regular rate and rhythm, normal S1 and S2, and no murmur noted  GI: Normal bowel sounds, soft, non-distended, non-tender  Skin/Integumen: No rashes, no cyanosis, no edema  Other:     Medications     0.9% sodium chloride + KCl 20 mEq/L 125 mL/hr at 02/28/19 1132       folic acid  1 mg Oral Daily     lidocaine  1 patch Transdermal Q24h    And     lidocaine   Transdermal Q24H    And     lidocaine   Transdermal Q8H     LORazepam  0.5 mg Oral Q6H     multivitamin w/minerals  1 tablet Oral Daily     pantoprazole  40 mg Oral QAM AC     vitamin B1  100 mg Oral Daily       Data   Recent Labs   Lab 02/28/19  0705 02/27/19  1257 02/27/19  0808 02/27/19  0537 02/27/19  0358   WBC 6.8  --   --   --  10.8   HGB 13.6  --  15.4  --  17.0   MCV 94  --   --   --  92     --   --   --  416     --   --   --  142   POTASSIUM 4.2 4.4  --   --  3.0*   CHLORIDE 108  --   --   --  106   CO2 26  --   --   --  20   BUN 7  --   --   --  11   CR 0.56*  --   --   --  0.55*   ANIONGAP 5  --   --   --  16*   TERESA 8.7  --   --   --  7.9*   GLC 92  --   --   --  131*   ALBUMIN  --   --   --   --  3.5   PROTTOTAL  --   --   --   --  8.0   BILITOTAL  --   --   --   --  0.2   ALKPHOS  --   --   --   --  91   ALT  --   --   --   --  26   AST  --   --   --   --  25   LIPASE  --   --   --   --  194   TROPI  --   --  <0.015 <0.015  --        No results found for this or any previous visit (from the past 24 hour(s)).

## 2019-02-28 NOTE — PLAN OF CARE
Pt received 2 mg of ativan for the shift. Is appropriate In his room and up independently. Complains of chronic pain to his back. Taking tylenol, ibuprofen and heating pad with minimal relief. Pt scored 5,13, and 4 for the CIWA.

## 2019-03-01 LAB
ANION GAP SERPL CALCULATED.3IONS-SCNC: 3 MMOL/L (ref 3–14)
BUN SERPL-MCNC: 10 MG/DL (ref 7–30)
CALCIUM SERPL-MCNC: 8.6 MG/DL (ref 8.5–10.1)
CHLORIDE SERPL-SCNC: 108 MMOL/L (ref 94–109)
CO2 SERPL-SCNC: 27 MMOL/L (ref 20–32)
CREAT SERPL-MCNC: 0.61 MG/DL (ref 0.66–1.25)
GFR SERPL CREATININE-BSD FRML MDRD: >90 ML/MIN/{1.73_M2}
GLUCOSE SERPL-MCNC: 85 MG/DL (ref 70–99)
POTASSIUM SERPL-SCNC: 4.4 MMOL/L (ref 3.4–5.3)
SODIUM SERPL-SCNC: 138 MMOL/L (ref 133–144)

## 2019-03-01 PROCEDURE — 80048 BASIC METABOLIC PNL TOTAL CA: CPT | Performed by: INTERNAL MEDICINE

## 2019-03-01 PROCEDURE — 25000132 ZZH RX MED GY IP 250 OP 250 PS 637: Performed by: INTERNAL MEDICINE

## 2019-03-01 PROCEDURE — 12000000 ZZH R&B MED SURG/OB

## 2019-03-01 PROCEDURE — 25000128 H RX IP 250 OP 636: Performed by: INTERNAL MEDICINE

## 2019-03-01 PROCEDURE — 90791 PSYCH DIAGNOSTIC EVALUATION: CPT | Performed by: PSYCHOLOGIST

## 2019-03-01 PROCEDURE — 99233 SBSQ HOSP IP/OBS HIGH 50: CPT | Performed by: INTERNAL MEDICINE

## 2019-03-01 PROCEDURE — 36415 COLL VENOUS BLD VENIPUNCTURE: CPT | Performed by: INTERNAL MEDICINE

## 2019-03-01 RX ORDER — HYDROMORPHONE HYDROCHLORIDE 1 MG/ML
0.2 INJECTION, SOLUTION INTRAMUSCULAR; INTRAVENOUS; SUBCUTANEOUS EVERY 6 HOURS PRN
Status: DISCONTINUED | OUTPATIENT
Start: 2019-03-01 | End: 2019-03-02 | Stop reason: HOSPADM

## 2019-03-01 RX ORDER — LISINOPRIL 10 MG/1
10 TABLET ORAL DAILY
Status: DISCONTINUED | OUTPATIENT
Start: 2019-03-01 | End: 2019-03-02 | Stop reason: HOSPADM

## 2019-03-01 RX ADMIN — MULTIPLE VITAMINS W/ MINERALS TAB 1 TABLET: TAB at 08:15

## 2019-03-01 RX ADMIN — LORAZEPAM 1 MG: 1 TABLET ORAL at 16:14

## 2019-03-01 RX ADMIN — LORAZEPAM 0.5 MG: 0.5 TABLET ORAL at 04:00

## 2019-03-01 RX ADMIN — Medication 0.5 MG: at 12:55

## 2019-03-01 RX ADMIN — LORAZEPAM 0.5 MG: 0.5 TABLET ORAL at 10:45

## 2019-03-01 RX ADMIN — VANCOMYCIN HYDROCHLORIDE 125 MG: KIT at 08:15

## 2019-03-01 RX ADMIN — Medication 1 CAPSULE: at 10:44

## 2019-03-01 RX ADMIN — Medication 0.2 MG: at 19:25

## 2019-03-01 RX ADMIN — POTASSIUM CHLORIDE AND SODIUM CHLORIDE: 900; 150 INJECTION, SOLUTION INTRAVENOUS at 11:37

## 2019-03-01 RX ADMIN — Medication 1 CAPSULE: at 19:26

## 2019-03-01 RX ADMIN — PANTOPRAZOLE SODIUM 40 MG: 40 TABLET, DELAYED RELEASE ORAL at 06:47

## 2019-03-01 RX ADMIN — VANCOMYCIN HYDROCHLORIDE 125 MG: KIT at 21:36

## 2019-03-01 RX ADMIN — HYDRALAZINE HYDROCHLORIDE 10 MG: 20 INJECTION INTRAMUSCULAR; INTRAVENOUS at 01:18

## 2019-03-01 RX ADMIN — FOLIC ACID 1 MG: 1 TABLET ORAL at 08:15

## 2019-03-01 RX ADMIN — Medication 1 MG: at 21:35

## 2019-03-01 RX ADMIN — Medication 0.5 MG: at 08:15

## 2019-03-01 RX ADMIN — Medication 100 MG: at 08:15

## 2019-03-01 RX ADMIN — VANCOMYCIN HYDROCHLORIDE 125 MG: KIT at 18:29

## 2019-03-01 RX ADMIN — LIDOCAINE 1 PATCH: 560 PATCH PERCUTANEOUS; TOPICAL; TRANSDERMAL at 21:35

## 2019-03-01 RX ADMIN — LISINOPRIL 10 MG: 10 TABLET ORAL at 16:14

## 2019-03-01 RX ADMIN — NICOTINE 1 PATCH: 14 PATCH, EXTENDED RELEASE TRANSDERMAL at 08:13

## 2019-03-01 RX ADMIN — LORAZEPAM 1 MG: 1 TABLET ORAL at 18:29

## 2019-03-01 RX ADMIN — IBUPROFEN 600 MG: 600 TABLET ORAL at 15:59

## 2019-03-01 RX ADMIN — NICOTINE POLACRILEX 4 MG: 2 GUM, CHEWING ORAL at 18:30

## 2019-03-01 RX ADMIN — VANCOMYCIN HYDROCHLORIDE 125 MG: KIT at 12:56

## 2019-03-01 RX ADMIN — Medication 0.5 MG: at 04:00

## 2019-03-01 RX ADMIN — ACETAMINOPHEN 650 MG: 325 TABLET, FILM COATED ORAL at 15:59

## 2019-03-01 RX ADMIN — POTASSIUM CHLORIDE AND SODIUM CHLORIDE: 900; 150 INJECTION, SOLUTION INTRAVENOUS at 03:08

## 2019-03-01 ASSESSMENT — ACTIVITIES OF DAILY LIVING (ADL)
ADLS_ACUITY_SCORE: 12

## 2019-03-01 NOTE — PROVIDER NOTIFICATION
Paged dr arce: pt has dilaudid ordered PRN and has been getting it, your note states he shouldn't be. please dc if you want to stop the dilaudid. Thanks.    Order modified.

## 2019-03-01 NOTE — PROGRESS NOTES
Infection Prevention:    Patient placed on Enteric precautions because of Cdiff. Please contact Infection Prevention with any questions/concerns at *[phone].    Heather Hawkins, ICP

## 2019-03-01 NOTE — PLAN OF CARE
Vss, no co cp/sob, PRN dilaudid given with reduction in back pain x2 this shift.   Tele ST.  CIWA 2, 4, 6.  Scheduled ativan given, K+ 4.4, IVF decreased to 75/hr, psyc seen, po vanco for cdiff, enteric isolation.  Possible dc in am. Continue poc and monitoring.

## 2019-03-01 NOTE — PROGRESS NOTES
**Brief sw note:    Sw aware of new consult ordered on 3/1 for financial/insurance issues.    Please see sw note from 2/28 for complete assessment and consult note.  Sw discussed insurance and CD with the pt.    Sw spoke with financial counseling who met with the pt and they filled out a MA river with the pt.

## 2019-03-01 NOTE — PROGRESS NOTES
[facility]    Hospitalist Progress Note    Date of Service (when I saw the patient): 03/01/2019    Assessment & Plan   Carlos Hamilton is a 38 year old male who was admitted on 2/27/2019.  Assessment and Plan:      Carlos Hamilton is a 38 year old male with PMH including long standing alcohol dependence with many episodes of alcohol withdrawal requiring hospitalization (including alcohol withdrawal seizure), prior cocaine abuse, depression and prior brain aneurysm status post clipping who recently moved to [place] from [place] who was admitted 02/27/19 with acute alcohol withdrawal and a desire to quit drinking.     1.  Alcohol dependence with acute alcohol withdrawal: Patient has long-standing history of alcohol abuse.  He has been drinking heavily for the past week.  Drinking 1.75 L of vodka daily.  He called paramedics today because he wanted to quit drinking and does not feel that he can do this on his own.  Alcohol level was 0.33 today.  He Deonte has evidence of alcohol withdrawal including tachycardia and tremulousness.  He has been to treatment 3-4 times in the past.  His longest period of sobriety was 16 months.  -Oral vitamins  -CIWA protocol. Getting ativan PRn per CIWA protocol   -0.5 mg oral Ativan every 6 hours scheduled . Will stop scheduled ativan today and use PRn ciwa protocol   -Telemetry monitoring  -Consult placed social work and chemical dependency  Chem dep has not seen him due to financial issues recommended Rule 25 on discharge      2.  Hypokalemia: Potassium was low at 3.  This is likely from alcohol intake, poor oral intake and vomiting.  Replace per protocol.     3.  Nausea and vomiting: Suspect this is due to alcohol intake.    Improved   4. C.diff colitis:  Pt was having diarrhoea on 2/28 so c.diff checked returned positive  Started on oral vancomycin 125mg PO QID   culturelle ordered      4.  Chronic back pain: Patient reports in the past he was on narcotics.   There is no indication for narcotics at this time, particularly given his alcohol abuse and withdrawal.  We will have Tylenol, ibuprofen, Voltaren gel and Lidoderm patches available as needed.     5.  Depression: Patient reports that he was suicidal in December 2018 when he lost his job.  He does have fleeting thoughts of death but denies suicidal ideation or intent.  He is not on any medications for his depression at this time.  I suspect that his alcohol use is leading to worsening depression.  Psychiatry consulted      6.  History of cocaine abuse: Patient reports that he went on a villeda with cocaine when he lost his job at the end of last year.  He has not used cocaine since that time.     7.  History of brain aneurysm status post clipping: Patient reports this was done in 2013.  He does state he had a stroke after this but denies any deficits.  He is not on any medications for this and has not followed up since the clip.     8.  Chest pain: Patient has epigastric and chest pain.  EKG done in the emergency room showed no acute ischemic changes and troponin x2 is negative.  I suspect that his chest pain is related to emesis and/or gastritis related to drinking.  No further cardiac workup indicated.  Started on protonix for possible gastritis and GERD      Diet: Regular  DVT Prophylaxis: Pneumatic Compression Devices  Beach Catheter: not present  Code Status: Full code        Disposition Plan  In the next 1-2days once withdrawal improves       Cheri Bethea MD  [phone] (P)      Interval History    No N/v. Going through withdrawal. diarrhoea improved.     -Data reviewed today: I reviewed all new labs and imaging results over the last 24 hours. I personally reviewed no images or EKG's today.    Physical Exam   Temp: 97.5  F (36.4  C) Temp src: Oral BP: (!) 141/103   Heart Rate: 93 Resp: 18 SpO2: 99 % O2 Device: None (Room air)    Vitals:    02/27/19 0324 02/27/19 1018   Weight: 70.3 kg (155 lb) 70.9 kg (156  lb 6.4 oz)     Vital Signs with Ranges  Temp:  [97.4  F (36.3  C)-97.7  F (36.5  C)] 97.5  F (36.4  C)  Heart Rate:  [83-93] 93  Resp:  [16-18] 18  BP: (132-184)/() 141/103  SpO2:  [99 %] 99 %  I/O last 3 completed shifts:  In: 550 [P.O.:550]  Out: -     Constitutional: Awake, alert, cooperative, no apparent distress  Respiratory: Clear to auscultation bilaterally, no crackles or wheezing  Cardiovascular: Regular rate and rhythm, normal S1 and S2, and no murmur noted  GI: Normal bowel sounds, soft, non-distended, non-tender  Skin/Integumen: No rashes, no cyanosis, no edema  Other:     Medications     0.9% sodium chloride + KCl 20 mEq/L 125 mL/hr at 03/01/19 1137       folic acid  1 mg Oral Daily     lactobacillus rhamnosus (GG)  1 capsule Oral BID     lidocaine  1 patch Transdermal Q24h    And     lidocaine   Transdermal Q24H    And     lidocaine   Transdermal Q8H     LORazepam  0.5 mg Oral Q6H     multivitamin w/minerals  1 tablet Oral Daily     nicotine  1 patch Transdermal Daily     nicotine   Transdermal Q8H     nicotine   Transdermal Daily     pantoprazole  40 mg Oral QAM AC     vancomycin  125 mg Oral 4x Daily     vitamin B1  100 mg Oral Daily       Data   Recent Labs   Lab 03/01/19  0643 02/28/19  0705 02/27/19  1257 02/27/19  0808 02/27/19  0537 02/27/19  0358   WBC  --  6.8  --   --   --  10.8   HGB  --  13.6  --  15.4  --  17.0   MCV  --  94  --   --   --  92   PLT  --  253  --   --   --  416    139  --   --   --  142   POTASSIUM 4.4 4.2 4.4  --   --  3.0*   CHLORIDE 108 108  --   --   --  106   CO2 27 26  --   --   --  20   BUN 10 7  --   --   --  11   CR 0.61* 0.56*  --   --   --  0.55*   ANIONGAP 3 5  --   --   --  16*   TERESA 8.6 8.7  --   --   --  7.9*   GLC 85 92  --   --   --  131*   ALBUMIN  --   --   --   --   --  3.5   PROTTOTAL  --   --   --   --   --  8.0   BILITOTAL  --   --   --   --   --  0.2   ALKPHOS  --   --   --   --   --  91   ALT  --   --   --   --   --  26   AST  --   --    --   --   --  25   LIPASE  --   --   --   --   --  194   TROPI  --   --   --  <0.015 <0.015  --        No results found for this or any previous visit (from the past 24 hour(s)).

## 2019-03-01 NOTE — CONSULTS
"Patient is a 38 year old male admitted with long standing alcohol dependence admitted with acute alcohol withdrawal and desire ot quit drinking.  Psychiatric consult requested by Dr. Bethea due to concerns regarding depression in addition to his alcohol dependency.  This is an initial consultation. Consultation, including meeting with patient and discussion with hospital staff totaled 45 minutes.    HPI: History and physical exam were reviewed prior to patient being seen.    Past psychiatric history:  Patient has extensive history of chemical dependency.  He also expressed that he experiences depression around the holiday season.  Most recently, he had suicidal ideation around December when he was working at [employer] in [place].  He states he was worked \"to death\", became depressed, started drinking and was terminated from his job.  He has a history of drug usage when he was a teenager and in his early 20's.  At present he is not on any psychotropic medication.      Medication history and current medications:  Not currently on any psychotropic medications.  He has been treated for pain in [place] and has been on medications for pain in the past.  He has a history of hip replacement.  He has been treated for chemical dependency on an in patient basis in the past, most recently in December, 2017.    Social and family history:  Patient was born in [place] and raised in [facility] through 8th grade.  He was living in [place] up until recently when he returned to [place].  He currently lives with a female friend who has three children.  He is unemployed but would like to find employment  He has aunts and uncles in [place].  His father has dementia and is in a nursing home in [place].  His mother lives in [place], and he has two brother there and one in [place].    Strengths and liabilities:  Patient related in an open manner and was agreeable to recommendation made to him.  Liabilities include lack of employment and " history of chemical dependency which recurs as stress increases.    Mental Status Exam:    Patient was oriented to time, place and person.  Both recent and past memory appear intact.  He was able to adequately attend to and concentrate to topics being discussed.  His language and fund of knowledge are average.  His mood was neutral and affect appropriate.  He expressed a desire to make changes in his life.  He denies current feelings of depression and rates his mood as 8 out of 10.    Diagnosis:    Alcoholism  Depression    Disposition:    Patient denies current thoughts of suicide or depression.  He has been through chemical dependency treatment several times in the past.  He is open to following through with attending AA after he is discharged.      Patient also expresses a need for pain management treatment once he leaves the hospital.    He feels he has a supportive environment he will return to and has some family members and friends who provide support to him.    It is felt that when he is medically cleared, he can return to his present living environment with the understanding that he will attend AA meetings and find a sponsor there. In addition, he will need to find a pain management clinic to receive services.  These recommendations were shared with him, and he agreed that they were appropriate and that he will follow through upon discharge.   No further mental health services are indicated.  However, he will monitor his feelings, especially around the holiday time next winter, and seek help if his depression recurs.

## 2019-03-01 NOTE — PLAN OF CARE
Tele SR hr 87.  A&O x4,  Independent in the room.  Enteric precautions for possible c-diff.  NS & 20k running at 125mL/hr.  CIWAS 7,6.  Lower back pain relieved with dilaudid x1.  's when ambulating.  Reg diet.  Continue plan of care.  Possible discharge in 1-2 days once withdrawal symptoms improve.

## 2019-03-02 VITALS
OXYGEN SATURATION: 99 % | BODY MASS INDEX: 21.9 KG/M2 | HEART RATE: 83 BPM | TEMPERATURE: 97.6 F | WEIGHT: 156.4 LBS | HEIGHT: 71 IN | DIASTOLIC BLOOD PRESSURE: 90 MMHG | SYSTOLIC BLOOD PRESSURE: 127 MMHG | RESPIRATION RATE: 16 BRPM

## 2019-03-02 PROCEDURE — 99207 ZZC CDG-CODE INCORRECT PER BILLING BASED ON TIME: CPT | Performed by: INTERNAL MEDICINE

## 2019-03-02 PROCEDURE — 25000132 ZZH RX MED GY IP 250 OP 250 PS 637: Performed by: INTERNAL MEDICINE

## 2019-03-02 PROCEDURE — 25000128 H RX IP 250 OP 636: Performed by: INTERNAL MEDICINE

## 2019-03-02 PROCEDURE — 99239 HOSP IP/OBS DSCHRG MGMT >30: CPT | Performed by: INTERNAL MEDICINE

## 2019-03-02 RX ORDER — VANCOMYCIN HYDROCHLORIDE 50 MG/ML
125 KIT ORAL 4 TIMES DAILY
Qty: 120 ML | Refills: 0 | Status: SHIPPED | OUTPATIENT
Start: 2019-03-02 | End: 2019-03-12

## 2019-03-02 RX ORDER — MULTIPLE VITAMINS W/ MINERALS TAB 9MG-400MCG
1 TAB ORAL DAILY
Qty: 30 TABLET | Refills: 0 | Status: SHIPPED | OUTPATIENT
Start: 2019-03-03 | End: 2019-03-12

## 2019-03-02 RX ADMIN — FOLIC ACID 1 MG: 1 TABLET ORAL at 08:27

## 2019-03-02 RX ADMIN — LISINOPRIL 10 MG: 10 TABLET ORAL at 08:27

## 2019-03-02 RX ADMIN — VANCOMYCIN HYDROCHLORIDE 125 MG: KIT at 08:27

## 2019-03-02 RX ADMIN — Medication 0.2 MG: at 15:01

## 2019-03-02 RX ADMIN — IBUPROFEN 600 MG: 600 TABLET ORAL at 08:27

## 2019-03-02 RX ADMIN — PANTOPRAZOLE SODIUM 40 MG: 40 TABLET, DELAYED RELEASE ORAL at 07:02

## 2019-03-02 RX ADMIN — MULTIPLE VITAMINS W/ MINERALS TAB 1 TABLET: TAB at 08:27

## 2019-03-02 RX ADMIN — LORAZEPAM 1 MG: 1 TABLET ORAL at 03:48

## 2019-03-02 RX ADMIN — POTASSIUM CHLORIDE AND SODIUM CHLORIDE: 900; 150 INJECTION, SOLUTION INTRAVENOUS at 03:03

## 2019-03-02 RX ADMIN — Medication 0.2 MG: at 02:57

## 2019-03-02 RX ADMIN — VANCOMYCIN HYDROCHLORIDE 125 MG: KIT at 12:43

## 2019-03-02 RX ADMIN — ACETAMINOPHEN 650 MG: 325 TABLET, FILM COATED ORAL at 12:43

## 2019-03-02 RX ADMIN — Medication 1 CAPSULE: at 07:02

## 2019-03-02 RX ADMIN — Medication 0.2 MG: at 09:01

## 2019-03-02 RX ADMIN — NICOTINE 1 PATCH: 14 PATCH, EXTENDED RELEASE TRANSDERMAL at 08:28

## 2019-03-02 RX ADMIN — Medication 100 MG: at 08:28

## 2019-03-02 ASSESSMENT — ACTIVITIES OF DAILY LIVING (ADL)
ADLS_ACUITY_SCORE: 12

## 2019-03-02 NOTE — PLAN OF CARE
Patient FRANCESWA scores this am 3-4. Scoring for tremor and mild anxiety. Does not receive ativan unless he scores a 7. Patient anxious to return home where he can take bathes for chronic pain. Plan for possible discharge to home if not requiring ativan for ETOH withdrawal. Ambulating in the halls IND. Tele SR. VSS

## 2019-03-02 NOTE — PLAN OF CARE
VSS. Alert and oriented x4. C/O back pain, PRN Ibuprofen and tylenol given with no relief, PRN IV Dilaudid given with some relief, pt now resting comfortably.Tele Sinus rhythm, can be Sinus tachycardic with activity. No complaints of SOB, on room air. On regular diet, good appetite; On PO vanco for cdiff. Voiding well. PIV running NS+20 mEq at 75 ml/hr. Walked the halls x2 with staff standby. K 4.4. CIWA scores 9, 6, 12, and 5; ativan PRN total this PM 2 mg.

## 2019-03-02 NOTE — PLAN OF CARE
Please see flowsheets for detailed vital signs and assessments.   Neuro: A&O, CIWAs 3, 10 & 2 this shift  Vital Signs: stable  Pain: c/o chronic back pain, gave IV dilaudid x1 and pt rested quietly  O2: mid 90s RA  Tele: SR  Lungs: WDL  GI: WDL  : voiding w/o difficulty    Skin: bruised  Activity: independent  IVF: NS + 20 K 75 ml/hour  Notable labs: K 4.4, trops  negative, c. Diff toxin  positive  Consults: SW, Psych, chemdep  Plan: CIWAs, fluids, multivitamin, PO vanco  Discharge: 1-2 days

## 2019-03-02 NOTE — DISCHARGE SUMMARY
[facility]    Discharge Summary  Hospitalist    Date of Admission:  2/27/2019  Date of Discharge:  3/2/2019  Discharging Provider: Vipul Fuller MD  Date of Service (when I saw the patient): 03/02/19    Discharge Diagnoses     1.  Alcohol dependence with acute alcohol withdrawal     2.  Hypokalemia: Replaced     3.  Nausea and vomiting    4.  Chronic back pain    5.  Depression     6.  History of cocaine abuse     7.  History of brain aneurysm status post clipping     8.  Chest pain: Patient has epigastric and chest pain.    History of Present Illness   Carlos Hamilton is an 38 year old male who presented with alcohol withdrawal symptoms.  He was treated for alcohol withdrawal during his hospital course.  Please see H&P and hospital course for details.    Hospital Course      Carlos Hamilton is a 38 year old male with PMH including long standing alcohol dependence with many episodes of alcohol withdrawal requiring hospitalization (including alcohol withdrawal seizure), prior cocaine abuse, depression and prior brain aneurysm status post clipping who recently moved to [place] from [place] who was admitted 02/27/19 with acute alcohol withdrawal and a desire to quit drinking.     1.  Alcohol dependence with acute alcohol withdrawal: Patient has long-standing history of alcohol abuse.  He has been drinking heavily for the past week.  Drinking 1.75 L of vodka daily.  He called paramedics today because he wanted to quit drinking and does not feel that he can do this on his own.  Alcohol level was 0.33 today.  He Deonte has evidence of alcohol withdrawal including tachycardia and tremulousness.  He has been to treatment 3-4 times in the past.  His longest period of sobriety was 16 months.  He was put on vitamins.  He was also started on CIWA protocol.  He was monitored on telemetry.   was consulted for chemical dependency and he was provided resources for chem dep treatment  as outpatient.  His withdrawal symptoms improved during hospital course.    2.  Hypokalemia: Potassium was low at 3.  This is likely from alcohol intake, poor oral intake and vomiting.  Replace per protocol.     3.  Nausea and vomiting: Suspect this is due to alcohol intake.  Improved     4. C.diff colitis:  Pt was having diarrhoea on 2/28 so c.diff checked returned positive  Started on oral vancomycin 125mg PO QID. on discharge, he was put on vancomycin 125 mg p.o. 4 times daily for 12 more days.      5.  Chronic back pain: Patient reports in the past he was on narcotics.  There is no indication for narcotics at this time, particularly given his alcohol abuse and withdrawal.  We will have Tylenol, ibuprofen, Voltaren gel and Lidoderm patches available as needed.  On discharge he was recommended to continue Voltaren gel as needed and Tylenol.  He was advised to follow-up with pain clinic as outpatient.     6.  Depression: Patient reports that he was suicidal in December 2018 when he lost his job.  He does have fleeting thoughts of death but denies suicidal ideation or intent.  He is not on any medications for his depression at this time.  I suspect that his alcohol use is leading to worsening depression.  He was seen by psychiatry and was recommended to follow-up as outpatient.     7.  History of cocaine abuse: Patient reports that he went on a villeda with cocaine when he lost his job at the end of last year.  He has not used cocaine since that time.     8.  History of brain aneurysm status post clipping: Patient reports this was done in 2013.  He does state he had a stroke after this but denies any deficits.  He is not on any medications for this and has not followed up since the clip.     9.  Epigastric pain: Improved.  Patient has epigastric and chest pain. EKG done in the emergency room showed no acute ischemic changes and troponin x2 is negative.  There was no evidence of acute coronary syndrome.  Was treated  with Protonix during hospital course.  He was advised to get Prilosec over-the-counter for 1 more month.    Remained stable during hospital course.  He was discharged in stable condition.    Significant Results and Procedures   Results for orders placed or performed during the hospital encounter of 02/27/19   CT Head w/o Contrast    Narrative    CT SCAN OF THE HEAD WITHOUT CONTRAST  2/27/2019 4:32 AM     HISTORY: Altered level of consciousness, unexplained.    TECHNIQUE: Axial images of the head and coronal reformations without  IV contrast material. Radiation dose for this scan was reduced using  automated exposure control, adjustment of the mA and/or kV according  to patient size, or iterative reconstruction technique.    COMPARISON: None.    FINDINGS: The ventricles are normal in size, shape and configuration.  The brain parenchyma and subarachnoid spaces are normal. There is no  evidence of intracranial hemorrhage, mass, acute infarct or anomaly.  Right temporal postoperative changes. Encephalomalacia in the right  parietal lobe.    Left maxillary sinus disease. There is no evidence of trauma.      Impression    IMPRESSION: No acute abnormality.    GLORIA GARCIA MD   Chest XR,  PA & LAT    Narrative    XR CHEST 2 VW   2/27/2019 6:07 AM     HISTORY: Chest pain.    COMPARISON: None.    FINDINGS: The heart size is normal. The lungs are clear. No  pneumothorax or pleural effusion.      Impression    IMPRESSION: No acute abnormality.    GLORIA GARCIA MD         Pending Results   None  Code Status   Full Code       Primary Care Physician   Physician No Ref-Primary        Discharge Disposition   Discharged to home  Condition at discharge: Stable    Consultations This Hospital Stay   SOCIAL WORK IP CONSULT  CHEMICAL DEPENDENCY IP CONSULT  PSYCHIATRY IP CONSULT  SOCIAL WORK IP CONSULT    Time Spent on this Encounter   Vipul BAILON MD, personally saw the patient today and spent greater than 30 minutes  discharging this patient.    Discharge Orders      Reason for your hospital stay    Alcohol withdrawal     Follow-up and recommended labs and tests     Follow up with primary care provider, Physician No Ref-Primary, within 7 days  Outpatient chem dep treatment  Outpatient pain management clinic follow up     Activity    Your activity upon discharge: activity as tolerated     Diet    Follow this diet upon discharge: Orders Placed This Encounter      Combination Diet Regular Diet Adult     Discharge Medications   Current Discharge Medication List      START taking these medications    Details   diclofenac (VOLTAREN) 1 % topical gel Place 2 g onto the skin 4 times daily as needed for moderate pain  Qty: 100 g, Refills: 0    Associated Diagnoses: Other chronic back pain      multivitamin w/minerals (THERA-VIT-M) tablet Take 1 tablet by mouth daily  Qty: 30 tablet, Refills: 0    Associated Diagnoses: Alcohol withdrawal syndrome without complication (H)      vancomycin (FIRVANQ) 50 MG/ML oral solution Take 2.5 mLs (125 mg) by mouth 4 times daily for 12 days  Qty: 120 mL, Refills: 0    Associated Diagnoses: Colitis due to Clostridium difficile         CONTINUE these medications which have NOT CHANGED    Details   acetaminophen (TYLENOL) 325 MG tablet Take 325-650 mg by mouth every 12 hours as needed for mild pain      ibuprofen (ADVIL/MOTRIN) 200 MG tablet Take 200-400 mg by mouth every 12 hours as needed for mild pain               Allergies   Allergies   Allergen Reactions     Naproxen Swelling     Toradol [Ketorolac] Itching     Data   Most Recent 3 CBC's:  Recent Labs   Lab Test 02/28/19  0705 02/27/19  0808 02/27/19  0358   WBC 6.8  --  10.8   HGB 13.6 15.4 17.0   MCV 94  --  92     --  416      Most Recent 3 BMP's:  Recent Labs   Lab Test 03/01/19  0643 02/28/19  0705 02/27/19  1257 02/27/19  0358    139  --  142   POTASSIUM 4.4 4.2 4.4 3.0*   CHLORIDE 108 108  --  106   CO2 27 26  --  20   BUN 10 7  --   11   CR 0.61* 0.56*  --  0.55*   ANIONGAP 3 5  --  16*   TERESA 8.6 8.7  --  7.9*   GLC 85 92  --  131*     Most Recent 2 LFT's:  Recent Labs   Lab Test 02/27/19  0358   AST 25   ALT 26   ALKPHOS 91   BILITOTAL 0.2     Most Recent INR's and Anticoagulation Dosing History:  Anticoagulation Dose History     There is no flowsheet data to display.        Most Recent 3 Troponin's:  Recent Labs   Lab Test 02/27/19  0808 02/27/19  0537   TROPI <0.015 <0.015     Most Recent Cholesterol Panel:No lab results found.  Most Recent 6 Bacteria Isolates From Any Culture (See EPIC Reports for Culture Details):No lab results found.  Most Recent TSH, T4 and A1c Labs:No lab results found.

## 2019-03-02 NOTE — PROVIDER NOTIFICATION
Web based page to hospitalist:   Pt removed IV on accident; DC 1-2 days, CIWA scoring 3-5, ok to leave IV out? If so, need PO pain medication--thank you!

## 2019-03-02 NOTE — PLAN OF CARE
Patient discharged to home. Verbalized understanding of discharge instructions. Meds sent with patient.

## 2019-03-12 ENCOUNTER — APPOINTMENT (OUTPATIENT)
Dept: GENERAL RADIOLOGY | Facility: CLINIC | Age: 39
End: 2019-03-12
Attending: NURSE PRACTITIONER
Payer: MEDICAID

## 2019-03-12 ENCOUNTER — HOSPITAL ENCOUNTER (EMERGENCY)
Facility: CLINIC | Age: 39
Discharge: OTHER INSTITUTION | End: 2019-03-12
Attending: NURSE PRACTITIONER | Admitting: NURSE PRACTITIONER
Payer: MEDICAID

## 2019-03-12 VITALS
OXYGEN SATURATION: 100 % | TEMPERATURE: 98.6 F | DIASTOLIC BLOOD PRESSURE: 101 MMHG | HEART RATE: 96 BPM | SYSTOLIC BLOOD PRESSURE: 147 MMHG

## 2019-03-12 DIAGNOSIS — F10.10 ALCOHOL ABUSE: ICD-10-CM

## 2019-03-12 DIAGNOSIS — F10.929 ALCOHOL INTOXICATION (H): ICD-10-CM

## 2019-03-12 LAB
ALBUMIN SERPL-MCNC: 3.8 G/DL (ref 3.4–5)
ALP SERPL-CCNC: 91 U/L (ref 40–150)
ALT SERPL W P-5'-P-CCNC: 38 U/L (ref 0–70)
ANION GAP SERPL CALCULATED.3IONS-SCNC: 13 MMOL/L (ref 3–14)
AST SERPL W P-5'-P-CCNC: 38 U/L (ref 0–45)
BILIRUB SERPL-MCNC: 0.2 MG/DL (ref 0.2–1.3)
BUN SERPL-MCNC: 11 MG/DL (ref 7–30)
CALCIUM SERPL-MCNC: 8.3 MG/DL (ref 8.5–10.1)
CHLORIDE SERPL-SCNC: 102 MMOL/L (ref 94–109)
CO2 SERPL-SCNC: 24 MMOL/L (ref 20–32)
CREAT SERPL-MCNC: 0.63 MG/DL (ref 0.66–1.25)
ERYTHROCYTE [DISTWIDTH] IN BLOOD BY AUTOMATED COUNT: 13.4 % (ref 10–15)
ETHANOL SERPL-MCNC: 0.35 G/DL
GFR SERPL CREATININE-BSD FRML MDRD: >90 ML/MIN/{1.73_M2}
GLUCOSE SERPL-MCNC: 145 MG/DL (ref 70–99)
HCT VFR BLD AUTO: 41.2 % (ref 40–53)
HGB BLD-MCNC: 14 G/DL (ref 13.3–17.7)
LIPASE SERPL-CCNC: 91 U/L (ref 73–393)
MCH RBC QN AUTO: 31.2 PG (ref 26.5–33)
MCHC RBC AUTO-ENTMCNC: 34 G/DL (ref 31.5–36.5)
MCV RBC AUTO: 92 FL (ref 78–100)
PLATELET # BLD AUTO: 384 10E9/L (ref 150–450)
POTASSIUM SERPL-SCNC: 3.1 MMOL/L (ref 3.4–5.3)
PROT SERPL-MCNC: 8.1 G/DL (ref 6.8–8.8)
RBC # BLD AUTO: 4.49 10E12/L (ref 4.4–5.9)
SODIUM SERPL-SCNC: 139 MMOL/L (ref 133–144)
TROPONIN I SERPL-MCNC: <0.015 UG/L (ref 0–0.04)
WBC # BLD AUTO: 8.2 10E9/L (ref 4–11)

## 2019-03-12 PROCEDURE — 99285 EMERGENCY DEPT VISIT HI MDM: CPT | Mod: 25

## 2019-03-12 PROCEDURE — 83690 ASSAY OF LIPASE: CPT | Performed by: NURSE PRACTITIONER

## 2019-03-12 PROCEDURE — 25000128 H RX IP 250 OP 636: Performed by: NURSE PRACTITIONER

## 2019-03-12 PROCEDURE — 93005 ELECTROCARDIOGRAM TRACING: CPT

## 2019-03-12 PROCEDURE — 84484 ASSAY OF TROPONIN QUANT: CPT | Performed by: NURSE PRACTITIONER

## 2019-03-12 PROCEDURE — 71046 X-RAY EXAM CHEST 2 VIEWS: CPT

## 2019-03-12 PROCEDURE — 85027 COMPLETE CBC AUTOMATED: CPT | Performed by: NURSE PRACTITIONER

## 2019-03-12 PROCEDURE — 80053 COMPREHEN METABOLIC PANEL: CPT | Performed by: NURSE PRACTITIONER

## 2019-03-12 PROCEDURE — 80320 DRUG SCREEN QUANTALCOHOLS: CPT | Performed by: NURSE PRACTITIONER

## 2019-03-12 RX ADMIN — SODIUM CHLORIDE 1000 ML: 9 INJECTION, SOLUTION INTRAVENOUS at 17:46

## 2019-03-12 ASSESSMENT — ENCOUNTER SYMPTOMS
COUGH: 0
CHILLS: 0
SEIZURES: 0
FEVER: 0

## 2019-03-12 NOTE — ED NOTES
Bed: ED19  Expected date: 3/12/19  Expected time: 4:00 PM  Means of arrival: Ambulance  Comments:  Huma Dao

## 2019-03-12 NOTE — ED TRIAGE NOTES
Patient alert and oriented. ABCs intact. Pt arrives via EMS after he was found at the laundry mat causing issues. Pt states to RN that he was kicked out of the hotel and that he needed somewhere to go so he went to the laundry mat. Pt also c/o right sided chest pain that started 2 hours ago. Pt states that it feels like someone is stabbing him in the chest. States that he had chest pain like this before.

## 2019-03-12 NOTE — ED PROVIDER NOTES
"  History     Chief Complaint:  Alcohol Intoxication    HPI   Carlos Hamilton is a 38 year old male with a history of alcohol abuse and alcohol withdrawal who presents to the Emergency Department today for evaluation of chest pain and alcohol intoxication. Patient reports a centralized area began several hours ago. He is also here because he drank \"too much\" today. He reports his last drink was several hours ago. Patient was last in treatment for his alcohol abuse three or four months ago and is looking to get help for his drinking again. No cough. No self-harm. No fall or use of street drugs.    Allergies:  Naproxen: swelling  Toradol: itching      Medications:    The patient is not currently taking any prescribed medications.     Past Medical History:    Alcohol abuse  Alcohol withdrawal  Brain aneurysm  Chronic back pain    Past Surgical History:    Right hip arthroplasty    Family History:    History reviewed. No pertinent family history.     Social History:  Smoking status: Current every day smoker, 1.00 packs/day  Alcohol use: Yes  Marital Status:  Single [1]     Review of Systems   Constitutional: Negative for chills and fever.   Respiratory: Negative for cough.    Cardiovascular: Positive for chest pain.   Neurological: Negative for seizures.   Psychiatric/Behavioral: Negative for self-injury and suicidal ideas.   All other systems reviewed and are negative.    Physical Exam     Patient Vitals for the past 24 hrs:   BP Temp Temp src Heart Rate SpO2   03/12/19 1700 (!) 137/97 -- -- 87 98 %   03/12/19 1619 (!) 163/121 98.6  F (37  C) Oral 104 98 %       Physical Exam    General: Alert, No obvious discomfort, well kept  Eyes: PERRL, conjunctivae pink no scleral icterus or conjunctival injection  ENT:   Moist mucus membranes, posterior oropharynx clear without erythema or exudates, No lymphadenopathy, Normal voice  Resp:  Lungs clear to auscultation bilaterally, no crackles/rubs/wheezes. Good air " movement  CV:  Normal rate and rhythm, no murmurs/rubs/gallops  GI:  Abdomen soft and non-distended.  Normoactive BS.  No tenderness, guarding or rebound, No masses  Skin:  Warm, dry.  No rashes or petechiae  Musculoskeletal: Point tenderness right upper rib just lateral to sternum. No peripheral edema or calf tenderness, Normal gross ROM   Neuro: Alert and oriented to person/place/time, normal sensation  Psychiatric: Normal affect, cooperative, good eye contact    Emergency Department Course     ECG (17:02:27):  Rate 90 bpm. OK interval 144. QRS duration 94. QT/QTc 354/433. P-R-T axes 58 68 60. Normal sinus rhythm. Normal ECG. Interpreted at 1719 by Reji Jasso MD.    Imaging:  Radiographic findings were communicated with the patient who voiced understanding of the findings.  XR Chest 2 Views  Pending    Laboratory:  CBC: WNL (WBC 8.2, HGB 14.0, )  CMP: Potassium 3.1 (L), Glucose 145 (H), Creatinine 0.63 (L), Calcium 8.3 (L) o/w WNL  Lipase: 91    Alcohol ethyl: 0.35 (H)    Troponin I: <0.015    Interventions:  1746: NS 1L IV Bolus    Emergency Department Course:  Past medical records, nursing notes, and vitals reviewed.  1653: I performed an exam of the patient and obtained history, as documented above.    IV inserted and blood drawn.    The patient was sent for a chest x-ray while in the emergency department, findings above.    1803: Patient signed out to my partner, Dr. Sevilla, for further evaluation and treatment.    Impression & Plan      Medical Decision Making:  Carlos Hamilton is a 38 year old male who presents today for evaluation of alcohol intoxication.  Patient reports that he has been drinking for several days that kicked out of the hotel he was staying at therefore went to a long treatment where he was causing a disturbance and police were called he was then transported here via EMS.  He complains of some chest wall pain.  His physical exam is consistent with chest wall pain.  I did  obtain x-ray and EKG as well as troponin that shows no signs of ACS, pneumothorax, rib injury, or other significant source of discomfort.  This appears to be musculoskeletal in nature.  Patient reports occasional history of alcohol withdrawal seizure and has had multiple courses of treatment for his alcohol abuse.  He would like help with his alcohol abuse.  His evaluation today shows significantly elevated alcohol level at 0.35.  The remainder of his laboratory studies are noncontributory.  He is ambulatory around the department.  He has not had any signs or symptoms of withdrawal at this point.  He appears to be safe and appropriate for detox.  He will be transported to [facility].  Please see my partner Dr. Sevilla's note for final disposition.    Diagnosis:    ICD-10-CM    1. Alcohol abuse F10.10    2. Alcohol intoxication (H) F10.929        Disposition:  Signed out to my partner    Mary Wong  3/12/2019   [facility] [place] EMERGENCY DEPARTMENT  Scribe Disclosure:  I, Mary Wong, am serving as a scribe at 4:53 PM on 3/12/2019 to document services personally performed by Jordan Monahan APRN based on my observations and the provider's statements to me.        Jordan Monahan APRN CNP  03/12/19 [id number]

## 2019-03-13 LAB — INTERPRETATION ECG - MUSE: NORMAL

## 2019-04-02 ENCOUNTER — HOSPITAL ENCOUNTER (OUTPATIENT)
Dept: ADMINISTRATIVE | Facility: OTHER | Age: 39
Discharge: HOME | End: 2019-04-02

## 2019-04-25 NOTE — PROGRESS NOTES
SUBJECTIVE:  Carlos Hamilton, a 38 year old male, here today for an appointment to establish care and to discuss the following issues:    1. Looking for referral for Suboxone    From [place], Tomorrow back to [facility] to  Suboxone until referral goes through    Living at treatment center [facility] in [facility], currently nurses giving his meds will be there for at least 90 days . Sober since 40 days     2. Anxiety, controlled with propanolol, noted depression and SI in his chart he denies currently  recently hospitalized in March noted pt was on a legal hold with alcohol and SI and history of substance abuse   Good support family in [place] and here  Current smoker    3. Insomnia well controlled with trazodone    Taking Tizanidine, wondering if he will have enough, has about 30 left he thinks   Pt reports care at a pain clinic in past, stopped opioids and takes tizanidine rarely for joint pain, history of aneurysm no HA or symptoms since had clipped but never did follow up     HPI:    Where was your previous physician's office?: [place]  Physician's Name: unsure  Last physical: Unsure, probably quite a while ago      Past Medical History:   Diagnosis Date     Alcohol abuse      Brain aneurysm     s/p clip in 2013     Chronic back pain        Past Surgical History:   Procedure Laterality Date     ARTHROPLASTY HIP Right        Family History   Problem Relation Age of Onset     Alcoholism No family hx of        Social History     Tobacco Use     Smoking status: Current Every Day Smoker     Packs/day: 1.00     Smokeless tobacco: Former User     Tobacco comment: get drinking under control first   Substance Use Topics     Alcohol use: Yes     Comment: Half gallon vodka per day, everyday of week       ROS:  Constitutional, eye, ENT, skin, respiratory, cardiac, GI, MSK, neuro, and allergy are normal except as otherwise noted.     OBJECTIVE:    /77   Pulse 72   Temp 97.9  F (36.6  C)  "(Oral)   Resp 14   Ht 1.803 m (5' 11\")   Wt 78 kg (171 lb 14.4 oz)   SpO2 99%   BMI 23.98 kg/m      EXAM:  GENERAL APPEARANCE: healthy, alert and no distress  EYES: EOMI,  PERRL  RESP: lungs clear to auscultation - no rales, rhonchi or wheezes  CV: regular rates and rhythm, normal S1 S2, no S3 or S4 and no murmur, click or rub -  ABDOMEN:  soft, nontender, no HSM or masses and bowel sounds normal  SKIN: no suspicious lesions or rashes  NEURO: Normal strength and tone, sensory exam grossly normal, mentation intact and speech normal  PSYCH: mentation appears normal and affect normal/bright    ASSESSMENT/PLAN:    ICD-10-CM    1. Alcohol dependence in remission (H) F10.21 ADDICTION MEDICINE REFERRAL   2. Insomnia, unspecified type G47.00 traZODone (DESYREL) 100 MG tablet   3. MARY (generalized anxiety disorder) F41.1 propranolol (INDERAL) 10 MG tablet   4. Substance abuse in remission (H) F19.11    5. Anxiety state F41.1     established care today, complex psych pt from [place] and recently hospitalized in March noted pt was on a legal hold with alcohol and SI and history of substance abuse and needing suboxone referral for continued care. Referral placed     Insomnia well controlled with trazodone and anxiety controlled with propanolol, will refill for 30 days and will need follow up visit to address in more detail, see patient instructions pt also has history of ashtma. Would recommend Psych and counseling if not yet set up where he is currently, pt will send us records     Pt reports care at a pain clinic in past, stopped opioids and takes tizanidine rarely, has at home. Would need visit for this if he wants to continue this    Also will need asthma and health maintenance exam recommended he schedule at his soonest convenience, denies any urgent issues or current symptoms, pt declines flu shot today, smoking cessation recommended and pt declined     Took chyna with him as he needs to get names of places he used to go " to, thinks shots are utd     Patient Instructions   Schedule an appointment with Addiction medicine and I'll see you for your physical we can  where we left off    I did refill trazodone and propanolol and we might need to revisit your medications if they get in the way of your asthma           Carol Serrano APRN CNP

## 2019-04-29 ENCOUNTER — TELEPHONE (OUTPATIENT)
Dept: FAMILY MEDICINE | Facility: CLINIC | Age: 39
End: 2019-04-29

## 2019-04-29 ENCOUNTER — OFFICE VISIT (OUTPATIENT)
Dept: FAMILY MEDICINE | Facility: CLINIC | Age: 39
End: 2019-04-29
Payer: MEDICAID

## 2019-04-29 VITALS
RESPIRATION RATE: 14 BRPM | BODY MASS INDEX: 24.06 KG/M2 | TEMPERATURE: 97.9 F | DIASTOLIC BLOOD PRESSURE: 77 MMHG | HEART RATE: 72 BPM | WEIGHT: 171.9 LBS | SYSTOLIC BLOOD PRESSURE: 122 MMHG | OXYGEN SATURATION: 99 % | HEIGHT: 71 IN

## 2019-04-29 DIAGNOSIS — F10.21 ALCOHOL DEPENDENCE IN REMISSION (H): Primary | ICD-10-CM

## 2019-04-29 DIAGNOSIS — M51.379 DEGENERATION OF LUMBAR/LUMBOSACRAL DISC WITHOUT MYELOPATHY: ICD-10-CM

## 2019-04-29 DIAGNOSIS — G47.00 INSOMNIA, UNSPECIFIED TYPE: ICD-10-CM

## 2019-04-29 DIAGNOSIS — F19.11 SUBSTANCE ABUSE IN REMISSION (H): ICD-10-CM

## 2019-04-29 DIAGNOSIS — M54.50 CHRONIC MIDLINE LOW BACK PAIN WITHOUT SCIATICA: Primary | ICD-10-CM

## 2019-04-29 DIAGNOSIS — G89.29 CHRONIC MIDLINE LOW BACK PAIN WITHOUT SCIATICA: Primary | ICD-10-CM

## 2019-04-29 DIAGNOSIS — F41.1 ANXIETY STATE: ICD-10-CM

## 2019-04-29 DIAGNOSIS — F41.1 GAD (GENERALIZED ANXIETY DISORDER): ICD-10-CM

## 2019-04-29 PROBLEM — F10.939 ALCOHOL WITHDRAWAL (H): Status: RESOLVED | Noted: 2019-02-27 | Resolved: 2019-04-29

## 2019-04-29 PROBLEM — F10.20 SEVERE ALCOHOL USE DISORDER (H): Status: ACTIVE | Noted: 2019-03-28

## 2019-04-29 PROBLEM — F10.20 SEVERE ALCOHOL USE DISORDER (H): Status: RESOLVED | Noted: 2019-03-28 | Resolved: 2019-04-29

## 2019-04-29 PROCEDURE — 99204 OFFICE O/P NEW MOD 45 MIN: CPT | Performed by: NURSE PRACTITIONER

## 2019-04-29 RX ORDER — TRAZODONE HYDROCHLORIDE 100 MG/1
100 TABLET ORAL
COMMUNITY
Start: 2019-04-25 | End: 2019-04-29

## 2019-04-29 RX ORDER — LIDOCAINE 50 MG/G
1 PATCH TOPICAL
COMMUNITY
End: 2019-07-10

## 2019-04-29 RX ORDER — ACETAMINOPHEN 325 MG/1
650 TABLET ORAL EVERY 6 HOURS PRN
COMMUNITY

## 2019-04-29 RX ORDER — PROPRANOLOL HYDROCHLORIDE 10 MG/1
10 TABLET ORAL
COMMUNITY
Start: 2019-04-25 | End: 2019-04-29

## 2019-04-29 RX ORDER — BUPRENORPHINE AND NALOXONE 2; .5 MG/1; MG/1
1 FILM, SOLUBLE BUCCAL; SUBLINGUAL
COMMUNITY
Start: 2019-04-25 | End: 2019-07-10

## 2019-04-29 RX ORDER — TRAZODONE HYDROCHLORIDE 100 MG/1
100 TABLET ORAL AT BEDTIME
Qty: 30 TABLET | Refills: 0 | Status: SHIPPED | OUTPATIENT
Start: 2019-04-29 | End: 2019-06-21

## 2019-04-29 RX ORDER — PROPRANOLOL HYDROCHLORIDE 10 MG/1
10 TABLET ORAL 3 TIMES DAILY
Qty: 90 TABLET | Refills: 0 | Status: SHIPPED | OUTPATIENT
Start: 2019-04-29 | End: 2019-06-21

## 2019-04-29 RX ORDER — ALBUTEROL SULFATE 90 UG/1
2 AEROSOL, METERED RESPIRATORY (INHALATION)
COMMUNITY
Start: 2019-04-25 | End: 2019-08-01

## 2019-04-29 RX ORDER — BUDESONIDE AND FORMOTEROL FUMARATE DIHYDRATE 160; 4.5 UG/1; UG/1
2 AEROSOL RESPIRATORY (INHALATION)
COMMUNITY
Start: 2019-04-25 | End: 2019-07-30

## 2019-04-29 ASSESSMENT — MIFFLIN-ST. JEOR: SCORE: 1711.86

## 2019-04-29 NOTE — PATIENT INSTRUCTIONS
Schedule an appointment with Addiction medicine and I'll see you for your physical we can  where we left off    I did refill trazodone and propanolol and we might need to revisit your medications if they get in the way of your asthma

## 2019-04-29 NOTE — TELEPHONE ENCOUNTER
Carol,    Patient is requesting a pain management referral. Referral cued. Please review/sign or advise.    Ena Zavala RN  [facility]

## 2019-04-29 NOTE — TELEPHONE ENCOUNTER
Reason for call:  Order   Order or referral being requested: pain management referral  Reason for request: pain  Date needed: as soon as possible  Has the patient been seen by the PCP for this problem? YES    Additional comments: n/a    Phone number to reach patient:  Home number on file [phone] (home)    Best Time:  n/a    Can we leave a detailed message on this number?  YES

## 2019-04-30 ENCOUNTER — OFFICE VISIT - HEALTHEAST (OUTPATIENT)
Dept: BEHAVIORAL HEALTH | Facility: CLINIC | Age: 39
End: 2019-04-30

## 2019-04-30 ENCOUNTER — TELEPHONE (OUTPATIENT)
Dept: FAMILY MEDICINE | Facility: CLINIC | Age: 39
End: 2019-04-30

## 2019-04-30 ENCOUNTER — TELEPHONE (OUTPATIENT)
Dept: ADDICTION MEDICINE | Facility: CLINIC | Age: 39
End: 2019-04-30

## 2019-04-30 DIAGNOSIS — G89.29 CHRONIC MIDLINE LOW BACK PAIN WITHOUT SCIATICA: ICD-10-CM

## 2019-04-30 DIAGNOSIS — F10.20 SEVERE ALCOHOL USE DISORDER (H): ICD-10-CM

## 2019-04-30 DIAGNOSIS — M54.50 CHRONIC MIDLINE LOW BACK PAIN WITHOUT SCIATICA: ICD-10-CM

## 2019-04-30 DIAGNOSIS — M51.379 DEGENERATION OF LUMBAR/LUMBOSACRAL DISC WITHOUT MYELOPATHY: ICD-10-CM

## 2019-04-30 DIAGNOSIS — F11.20 OPIOID USE DISORDER, MODERATE, DEPENDENCE (H): ICD-10-CM

## 2019-04-30 LAB
AMPHETAMINES UR QL SCN: NORMAL
BARBITURATES UR QL: NORMAL
BENZODIAZ UR QL: NORMAL
CANNABINOIDS UR QL SCN: NORMAL
COCAINE UR QL: NORMAL
CREAT UR-MCNC: 29 MG/DL
METHADONE UR QL SCN: NORMAL
OPIATES UR QL SCN: NORMAL
OXYCODONE UR QL: NORMAL
PCP UR QL SCN: NORMAL

## 2019-04-30 ASSESSMENT — MIFFLIN-ST. JEOR: SCORE: 1693.24

## 2019-04-30 NOTE — TELEPHONE ENCOUNTER
Called patient and notified him of provider message from Carol GAN Pt verbalized understanding. He stated that he will fax VIDAL to us, and will call back to schedule OV for physical with Carol.    Ena Zavala RN  [facility]

## 2019-04-30 NOTE — TELEPHONE ENCOUNTER
Reason for call:  Other   Patient called regarding (reason for call): Pt has a couple questions from yesterdays visit  Additional comments: n/a    Phone number to reach patient:  Home number on file [phone] (home)    Best Time:  anytime    Can we leave a detailed message on this number?  YES

## 2019-04-30 NOTE — TELEPHONE ENCOUNTER
Carol,  Patient returned call to clinic. Patient state he was seen at [facility] today. Patient states he was under the impression that he would only be getting a bridge of Suboxone.     Patient states when he was seen today, they stated they will continue to prescribe him medication. He has other follow up appointments scheduled there as well. Patient states they told him it would covered by his insurance.    Patient is wondering if he needs to establish with [facility] or if he can continue to see provider at [facility]?    Patient is also in the process of filling our VIDAL. Patient wondering what specific documentation you need    Patient does have follow up physical scheduled 05/06/2019.     Routed to referral specialist to help determine if coverage at [facility] is within patient's network    Please advise    Thank You!  Gina Burciaga RN  Triage Nurse

## 2019-04-30 NOTE — TELEPHONE ENCOUNTER
Writer reached out to pt. Pt stated that he was very confused and wanted to talk to Carol Serrano prior to scheduling anything with [facility]. Pt stated that if Carol wants him to come to [facility] that he will but he already has something established at [facility]. Pt stated he will call tomorrow and schedule an appt with Carol for a physical and will discuss this with her at that time. Writer will follow up with 2 more attempts to reach pt. Pt did request that calls to him be made after 12:00.    Courtney Almodovar  [facility]

## 2019-04-30 NOTE — TELEPHONE ENCOUNTER
Please schedule appointment for patient with Addiction Medicine provider for transfer of care for Suboxone

## 2019-04-30 NOTE — TELEPHONE ENCOUNTER
I am waiting for his records from [place], once they are in I can review for appropriateness of this request--otherwise pt could schedule an appointment to discuss testing he has had done and diagnoses of his pain, etc    Pt is planning on coming back for his health maintenance exam and might be able to do it then if we have his records by then    Thanks  Carol HERNANDEZ CNP

## 2019-05-01 NOTE — TELEPHONE ENCOUNTER
No we specifically discussed he will get in with addiction medicine whenever their next avail appointment is, and he will need to continue getting it where he does now until then.     They can help him with chyna there or can do at his next appointment here    Thanks  Carol HERNANDEZ CNP

## 2019-05-02 NOTE — TELEPHONE ENCOUNTER
Called patient. Notified pt of provider message. Pt verbalized understanding. He stated that he did not make appt with [facility], he has enough suboxone to last him for awhile. He will discuss and clarify with Carol at upcoming appt.    Ena Zavala RN  [facility]

## 2019-05-03 LAB
ETHYL GLUCURONIDE UR CFM-MCNC: <100 NG/ML
ETHYL SULFATE UR CFM-MCNC: <100 NG/ML

## 2019-05-03 NOTE — TELEPHONE ENCOUNTER
Writer reached out to pt and spoke with him. Pt is scheduled to see Carol Serrano ( Pt's PCP) 5/6/19 @ 2:40. Pt stated that he would talk w/ Carol and call [facility] after that appt. Writer will f/u w/ pt if he doesn't call on Monday.    Courtney Almodovar  [facility]

## 2019-05-06 ENCOUNTER — PATIENT OUTREACH (OUTPATIENT)
Dept: CARE COORDINATION | Facility: CLINIC | Age: 39
End: 2019-05-06

## 2019-05-06 ENCOUNTER — OFFICE VISIT (OUTPATIENT)
Dept: FAMILY MEDICINE | Facility: CLINIC | Age: 39
End: 2019-05-06
Payer: COMMERCIAL

## 2019-05-06 VITALS
TEMPERATURE: 97 F | DIASTOLIC BLOOD PRESSURE: 77 MMHG | BODY MASS INDEX: 23.88 KG/M2 | HEIGHT: 71 IN | OXYGEN SATURATION: 98 % | WEIGHT: 170.6 LBS | HEART RATE: 107 BPM | RESPIRATION RATE: 14 BRPM | SYSTOLIC BLOOD PRESSURE: 114 MMHG

## 2019-05-06 DIAGNOSIS — R10.13 EPIGASTRIC PAIN: ICD-10-CM

## 2019-05-06 DIAGNOSIS — M25.551 HIP PAIN, RIGHT: ICD-10-CM

## 2019-05-06 DIAGNOSIS — M22.2X1 PATELLOFEMORAL ARTHRALGIA OF BOTH KNEES: ICD-10-CM

## 2019-05-06 DIAGNOSIS — M54.41 BILATERAL LOW BACK PAIN WITH RIGHT-SIDED SCIATICA, UNSPECIFIED CHRONICITY: ICD-10-CM

## 2019-05-06 DIAGNOSIS — M22.2X2 PATELLOFEMORAL ARTHRALGIA OF BOTH KNEES: ICD-10-CM

## 2019-05-06 DIAGNOSIS — Z00.00 ROUTINE GENERAL MEDICAL EXAMINATION AT A HEALTH CARE FACILITY: Primary | ICD-10-CM

## 2019-05-06 DIAGNOSIS — F10.21 ALCOHOL DEPENDENCE IN REMISSION (H): ICD-10-CM

## 2019-05-06 DIAGNOSIS — F19.11 SUBSTANCE ABUSE IN REMISSION (H): ICD-10-CM

## 2019-05-06 PROCEDURE — 99396 PREV VISIT EST AGE 40-64: CPT | Performed by: NURSE PRACTITIONER

## 2019-05-06 PROCEDURE — 99214 OFFICE O/P EST MOD 30 MIN: CPT | Mod: 25 | Performed by: NURSE PRACTITIONER

## 2019-05-06 ASSESSMENT — ACTIVITIES OF DAILY LIVING (ADL): DEPENDENT_IADLS:: INDEPENDENT

## 2019-05-06 ASSESSMENT — MIFFLIN-ST. JEOR: SCORE: 1705.97

## 2019-05-06 NOTE — PROGRESS NOTES
SUBJECTIVE:   CC: Carlos Hamilton is an 38 year old male who presents for preventive health visit.     Healthy Habits:    Do you get at least three servings of calcium containing foods daily (dairy, green leafy vegetables, etc.)? yes    Amount of exercise or daily activities, outside of work: Walks every day. Not exerting himself    Problems taking medications regularly No    Medication side effects: Yes Sour stomach, appetite suppression, dry mouth    Have you had an eye exam in the past two years? yes    Do you see a dentist twice per year? no    Do you have sleep apnea, excessive snoring or daytime drowsiness? No        Pt with many things to discuss, his first priority is doing physical today     From [place],  Living at treatment center [facility] in [facility], currently nurses giving his meds will be there for at least 90 days .    Now taking 8mg twice daily on the Suboxone    C diff in Feb and was treated there and that's been fine and normal stools now      Anxiety, reports is controlled with propanolol, noted depression and SI in his chart he denies currently  recently hospitalized in March noted pt was on a legal hold with alcohol and SI and history of substance abuse   Good support family in [place] and here  Insomnia well controlled with trazodone     Taking Tizanidine, wondering if he will have enough, has about 30 left he thinks   Pt reports care at a pain clinic in past, 2 years ago, stopped opioids and takes tizanidine rarely for joint pain,   Nerve blocks for chronic pain due to djd back of hip right hip was redone and pain in deep right hip and crotch, bad knees now clicking and popping when doing stairs, hit his left knee and Xray  Stretching at home no knee work now      history of aneurysm no HA or symptoms since had clipped but never did follow up   Current smoker     GI upset, last time in rehab this happened too-low appetite. Sour stomach with eating pepcid, doesn't really help    Lipase in March was normal , does have   ECHO and leg testing of vessels no DVT    Pt very talkative and history tangential  Insurance issues?  campral they recommended at [facility] ....    EXAM: XR LUMBAR SPINE 2 OR 3 VWS  LOCATION:   DATE/TIME: 3/16/2019 3:12 AM    INDICATION: Fall, pain  COMPARISON: None.    FINDINGS: No fracture.  Normal vertebral heights and alignment.  Normal disc spaces and facets for age.  Normal extraspinal structures. A very slight rightward curve centered at L3-L4.   Other Result Information   Interface, Rad Results In - 03/16/2019  3:31 AM CDT  EXAM: XR LUMBAR SPINE 2 OR 3 VWS  LOCATION:   DATE/TIME: 3/16/2019 3:12 AM    INDICATION: Fall, pain  COMPARISON: None.    FINDINGS: No fracture.  Normal vertebral heights and alignment.  Normal disc spaces and facets for age.  Normal extraspinal structures. A very slight rightward curve centered at L3-L4.           Today's PHQ-2 Score:   PHQ-2 ( 1999 Pf[employer]) 5/6/2019   Q1: Little interest or pleasure in doing things 0   Q2: Feeling down, depressed or hopeless 0   PHQ-2 Score 0       Abuse: Current or Past(Physical, Sexual or Emotional)- No  Do you feel safe in your environment? Yes    Social History     Tobacco Use     Smoking status: Current Every Day Smoker     Packs/day: 1.00     Smokeless tobacco: Former User     Tobacco comment: get drinking under control first   Substance Use Topics     Alcohol use: Yes     Comment: Half gallon vodka per day, everyday of week     If you drink alcohol do you typically have >3 drinks per day or >7 drinks per week? No                      Last PSA: No results found for: PSA    Reviewed orders with patient. Reviewed health maintenance and updated orders accordingly - Yes  Lab work is in process  Labs reviewed in EPIC  BP Readings from Last 3 Encounters:   05/06/19 114/77   04/29/19 122/77   03/12/19 (!) 147/101    Wt Readings from Last 3 Encounters:   05/06/19 77.4 kg  (170 lb 9.6 oz)   04/29/19 78 kg (171 lb 14.4 oz)   02/27/19 70.9 kg (156 lb 6.4 oz)                  Patient Active Problem List   Diagnosis     Degeneration of lumbar/lumbosacral disc without myelopathy     Chronic midline low back pain without sciatica     Anxiety state     MARY (generalized anxiety disorder)     Insomnia, unspecified type     Alcohol dependence in remission (H)     Substance abuse in remission (H)     Past Surgical History:   Procedure Laterality Date     ARTHROPLASTY HIP Right        Social History     Tobacco Use     Smoking status: Current Every Day Smoker     Packs/day: 1.00     Smokeless tobacco: Former User     Tobacco comment: get drinking under control first   Substance Use Topics     Alcohol use: Yes     Comment: Half gallon vodka per day, everyday of week     Family History   Problem Relation Age of Onset     Alcoholism No family hx of          Current Outpatient Medications   Medication Sig Dispense Refill     acetaminophen (TYLENOL) 325 MG tablet Take 325-650 mg by mouth       albuterol (PROVENTIL HFA) 108 (90 Base) MCG/ACT inhaler Inhale 2 puffs into the lungs       budesonide-formoterol (SYMBICORT) 160-4.5 MCG/ACT Inhaler Inhale 2 puffs into the lungs       buprenorphine HCl-naloxone HCl (SUBOXONE) 2-0.5 MG per film Place 1 Film under the tongue       lidocaine (LIDODERM) 5 % patch Place 1 patch onto the skin       nicotine (NICODERM CQ) 7 MG/24HR 24 hr patch Place 1 patch onto the skin       propranolol (INDERAL) 10 MG tablet Take 1 tablet (10 mg) by mouth 3 times daily 90 tablet 0     tiZANidine (ZANAFLEX) 4 MG tablet Take 4 mg by mouth       traZODone (DESYREL) 100 MG tablet Take 1 tablet (100 mg) by mouth At Bedtime 30 tablet 0     Allergies   Allergen Reactions     Diphenhydramine Swelling     Per patient, tongue/lips swelling, itchy eyes with both generic diphenhydramine and brand Benadryl      Naproxen Swelling     Pistachio Nut Extract Skin Test      Toradol [Ketorolac]  "Itching     Recent Labs   Lab Test 03/12/19  1632 03/01/19  0643  02/27/19  0358   ALT 38  --   --  26   CR 0.63* 0.61*   < > 0.55*   GFRESTIMATED >90 >90   < > >90   GFRESTBLACK >90 >90   < > >90   POTASSIUM 3.1* 4.4   < > 3.0*    < > = values in this interval not displayed.        Reviewed and updated as needed this visit by clinical staff  Tobacco  Allergies  Meds         Reviewed and updated as needed this visit by Provider        Past Medical History:   Diagnosis Date     Alcohol abuse      Brain aneurysm     s/p clip in 2013     Chronic back pain       Past Surgical History:   Procedure Laterality Date     ARTHROPLASTY HIP Right        ROS:  Constitutional, eye, ENT, skin, respiratory, cardiac, GI, , MSK, neuro, Psych, and allergy are normal except as otherwise noted.     OBJECTIVE:   /77   Pulse 107   Temp 97  F (36.1  C) (Oral)   Resp 14   Ht 1.803 m (5' 11\")   Wt 77.4 kg (170 lb 9.6 oz)   SpO2 98%   BMI 23.79 kg/m    EXAM:  GENERAL: healthy, alert and no distress  EYES: Eyes grossly normal to inspection, PERRL and conjunctivae and sclerae normal  HENT: ear canals and TM's normal, nose and mouth without ulcers or lesions  NECK: no adenopathy, no asymmetry, masses, or scars and thyroid normal to palpation  RESP: lungs clear to auscultation - no rales, rhonchi or wheezes  CV: regular rate and rhythm, normal S1 S2, no S3 or S4, no murmur, click or rub, no peripheral edema and peripheral pulses strong  ABDOMEN: soft, mild epigastric tenderness, no hepatosplenomegaly, no masses and bowel sounds normal, no McBurney or rebound tenderness, negative Rahman's     (male): normal male genitalia without lesions or urethral discharge, no hernia  MS: no gross musculoskeletal defects noted, no edema, knees tender on extension and flexion otherwise normal appearing exam of joints with full rom and slightly painful on right hip adduction and abduction stable, paralumbar muscles and sciatic tenderness on " right. Normal rom of spine   SKIN: no suspicious lesions or rashes  NEURO: Normal strength and tone, mentation intact and speech normal  PSYCH: MENTAL STATUS EXAM  Appearance: appropriate  Attitude: cooperative  Behavior: very talkative   Eye Contact: normal  Speech: normal, fast sometimes pressured   Orientation: oriented to person , place, time and situation  Mood: states his mood has been stable/ good   Affect: Anxious otherwise appropriate  Thought Process: tangential     Diagnostic Test Results:  Future orders     ASSESSMENT/PLAN:       ICD-10-CM    1. Routine general medical examination at a health care facility Z00.00    2. Alcohol dependence in remission (H) F10.21 CARE COORDINATION REFERRAL     OFFICE/OUTPT VISIT,EST,LEVL IV   3. Substance abuse in remission (H) F19.11 CARE COORDINATION REFERRAL     OFFICE/OUTPT VISIT,EST,LEVL IV   4. Hip pain, right M25.551 [facility], [facility], AND CHIROPRACTIC REFERRAL     OFFICE/OUTPT VISIT,EST,LEVL IV   5. Bilateral low back pain with right-sided sciatica, unspecified chronicity M54.41 [facility], HAND, AND CHIROPRACTIC REFERRAL     CARE COORDINATION REFERRAL     OFFICE/OUTPT VISIT,EST,LEVL IV   6. Patellofemoral arthralgia of both knees M22.2X1 [facility], HAND, AND CHIROPRACTIC REFERRAL    M22.2X2 OFFICE/OUTPT VISIT,EST,LEVL IV   7. Epigastric pain R10.13 CARE COORDINATION REFERRAL     OFFICE/OUTPT VISIT,EST,LEVL IV     CANCELED: H Pylori antigen, stool   pt with substance and alcohol abuse from [place] originally established care last visit and wanted physical today; however had many things he did end up wanting addressed again this visit and is a difficult historian due to anxiety. We did do health maintenance exam as this was his first priority.   Needs another appointment, he agrees to give PT a try for hip and knee pain. Recommended conservative measures-RICE. May need pain clinic again or further work-up, will get records    Stomach pain likely exacerbated by anxiety and recent  "sobriety, rule out h pylori and schedule follow up visit, work on good self care and relaxation/meditation and treatment program with counseling and groups    Pt wants fasting lab work done, will do at next visit, come fasting     COUNSELING:  Reviewed preventive health counseling, as reflected in patient instructions    BP Readings from Last 1 Encounters:   05/06/19 114/77     Estimated body mass index is 23.79 kg/m  as calculated from the following:    Height as of this encounter: 1.803 m (5' 11\").    Weight as of this encounter: 77.4 kg (170 lb 9.6 oz).           reports that he has been smoking.  He has been smoking about 1.00 pack per day. He has quit using smokeless tobacco.  Tobacco Cessation Action Plan: Information offered: Patient not interested at this time    Counseling Resources:  ATP IV Guidelines  Pooled Cohorts Equation Calculator  FRAX Risk Assessment  ICSI Preventive Guidelines  Dietary Guidelines for Americans, 2010  USDA's MyPlate  ASA Prophylaxis  Lung CA Screening    DAVID Bernard CNP  [facility]  "

## 2019-05-07 ENCOUNTER — OFFICE VISIT - HEALTHEAST (OUTPATIENT)
Dept: BEHAVIORAL HEALTH | Facility: CLINIC | Age: 39
End: 2019-05-07

## 2019-05-07 DIAGNOSIS — F41.1 ANXIETY STATE: ICD-10-CM

## 2019-05-07 DIAGNOSIS — F11.20 OPIOID USE DISORDER, MODERATE, DEPENDENCE (H): ICD-10-CM

## 2019-05-07 DIAGNOSIS — G89.29 CHRONIC MIDLINE LOW BACK PAIN WITHOUT SCIATICA: ICD-10-CM

## 2019-05-07 DIAGNOSIS — F10.20 SEVERE ALCOHOL USE DISORDER (H): ICD-10-CM

## 2019-05-07 DIAGNOSIS — M54.50 CHRONIC MIDLINE LOW BACK PAIN WITHOUT SCIATICA: ICD-10-CM

## 2019-05-07 DIAGNOSIS — M51.379 DEGENERATION OF LUMBAR/LUMBOSACRAL DISC WITHOUT MYELOPATHY: ICD-10-CM

## 2019-05-07 LAB
AMPHETAMINES UR QL SCN: NORMAL
BARBITURATES UR QL: NORMAL
BENZODIAZ UR QL: NORMAL
BUPRENORPHINE QUAL URINE LHE: ABNORMAL
CANNABINOIDS UR QL SCN: NORMAL
COCAINE UR QL: NORMAL
CREAT UR-MCNC: 12.6 MG/DL
CREAT UR-MCNC: 12.8 MG/DL
METHADONE UR QL SCN: NORMAL
OPIATES UR QL SCN: NORMAL
OXYCODONE UR QL: NORMAL
PCP UR QL SCN: NORMAL

## 2019-05-07 ASSESSMENT — MIFFLIN-ST. JEOR: SCORE: 1697.78

## 2019-05-07 NOTE — PROGRESS NOTES
Clinic Care Coordination Contact    Clinic Care Coordination Contact  OUTREACH    Referral Information:  Referral Source: PCP         Chief Complaint   Patient presents with     Clinic Care Coordination - Face To Face     MAU     Clinic Care Coordination - Initial     MAU        Universal Utilization:   Clinic Utilization  Difficulty keeping appointments:: No  Compliance Concerns: No  No-Show Concerns: No  No PCP office visit in Past Year: No  Utilization    Last refreshed: 5/6/2019  6:47 PM:  Hospital Admissions 1           Last refreshed: 5/6/2019  6:47 PM:  ED Visits 1           Last refreshed: 5/6/2019  6:47 PM:  No Show Count (past year) 0              Current as of: 5/6/2019  6:47 PM              Clinical Concerns:  Current Medical Concerns:  Pt discussed that he was recently in treatment and now he is living in a [facility] house. Pt discussed that he is in need to find additional resources about getting glasses with his MA.   Current Behavioral Concerns: Pt discussed he would like to start getting back to work eventually as it is hard for him to just sit and not do anything. Pt reports treatment has been great for him but he reports each day continues to be challenging.   Education Provided to patient: MAU printed off a medical opinion form so Pt's PCP could fill out the form to apply for GA. Pt is hoping to start looking for employment soon. Pt is looking to find a place that donates toilet items as he repots he has very little finances to buy them. SW suggested a few churches in [place] that may offer these items. Pt asked SW if MA would cover getting prescription glasses. SW suggested contacting the  number on the back of his insurance card to discuss this with the insurance company.        Health Maintenance Reviewed: Due/Overdue   Health Maintenance Due   Topic Date Due     PREVENTIVE CARE VISIT  1978     URINE DRUG SCREEN Q1 YR  10/17/1993     HIV SCREEN (SYSTEM ASSIGNED)  10/17/1996      DTAP/TDAP/TD IMMUNIZATION (1 - Tdap) 10/17/2003     LIPID SCREEN Q5 YR MALE (SYSTEM ASSIGNED)  10/17/2013       Clinical Pathway: None    Medication Management:   Pt reports managing his own medications.     Functional Status:  Dependent ADLs:: Independent  Dependent IADLs:: Independent  Mobility Status: Independent    Living Situation:  Current living arrangement:: Other(jail house)  Type of residence:: Other    Diet/Exercise/Sleep:       Transportation:     Transportation means:: Medical transport     Psychosocial:  Mental health DX:: Yes  Mental health DX how managed:: Medication  Mental health management concern (GOAL):: No  Informal Support system:: Friends     Financial/Insurance:   Financial/Insurance concerns (GOAL):: Yes  Looking to receive GA until he can obtain a job.      Resources and Interventions:  Current Resources:    ;   Community Resources: Transportation Services, Drug Abuse  Supplies used at home:: None  Equipment Currently Used at Home: none               Goals:   Goals        General    #1 Financial Wellbeing (pt-stated)     Notes - Note created  5/7/2019  9:31 AM by Alia Saab BSW    Goal Statement: I need assistance applying for General Assistance   Measure of Success: Pt will apply for General Assistance   Supportive Steps to Achieve: care coordination  Barriers: unsure if he qualifies   Strengths: Asking questions regarding GA  Date to Achieve By: 5/31/19  Patient expressed understanding of goal: yes                    Patient/Caregiver understanding: Pt reports good understanding.     Outreach Frequency: monthly      Plan: 1) Pt will submit the medical opinion form when filling out the GA application.   2) Pt will contact churches to see if they have donations.   3) PT will contact [facility] to see if they cover prescription glasses.   4) SW will follow up with pt in 1 month.     MICHELLE Ambrosio  Clinic Care Coordinator   [facility]   [phone]

## 2019-05-08 NOTE — TELEPHONE ENCOUNTER
Second attempt to reach pt; no answer. LVM requesting a call back for an appt. A final attempt will be made.     Donnie Hodges

## 2019-05-11 LAB
ETHYL GLUCURONIDE UR CFM-MCNC: <100 NG/ML
ETHYL SULFATE UR CFM-MCNC: <100 NG/ML

## 2019-05-13 ENCOUNTER — APPOINTMENT (OUTPATIENT)
Dept: LAB | Facility: CLINIC | Age: 39
End: 2019-05-13
Attending: NURSE PRACTITIONER
Payer: COMMERCIAL

## 2019-05-13 NOTE — TELEPHONE ENCOUNTER
Pt has started care at [facility] and will continue care there. Pt informed that his referral is good for 1 year. Routing to referring provider. Closing encounter as no further f/u is needed.     Courtney Almodovar  [facility]

## 2019-05-14 ENCOUNTER — TELEPHONE (OUTPATIENT)
Dept: FAMILY MEDICINE | Facility: CLINIC | Age: 39
End: 2019-05-14

## 2019-05-14 ENCOUNTER — OFFICE VISIT - HEALTHEAST (OUTPATIENT)
Dept: BEHAVIORAL HEALTH | Facility: CLINIC | Age: 39
End: 2019-05-14

## 2019-05-14 DIAGNOSIS — F10.20 SEVERE ALCOHOL USE DISORDER (H): ICD-10-CM

## 2019-05-14 DIAGNOSIS — R10.13 EPIGASTRIC PAIN: Primary | ICD-10-CM

## 2019-05-14 DIAGNOSIS — R10.13 EPIGASTRIC PAIN: ICD-10-CM

## 2019-05-14 DIAGNOSIS — G89.29 CHRONIC MIDLINE LOW BACK PAIN WITHOUT SCIATICA: ICD-10-CM

## 2019-05-14 DIAGNOSIS — F11.20 OPIOID USE DISORDER, MODERATE, DEPENDENCE (H): ICD-10-CM

## 2019-05-14 DIAGNOSIS — F41.1 ANXIETY STATE: ICD-10-CM

## 2019-05-14 DIAGNOSIS — M54.50 CHRONIC MIDLINE LOW BACK PAIN WITHOUT SCIATICA: ICD-10-CM

## 2019-05-14 LAB
AMPHETAMINES UR QL SCN: NORMAL
BARBITURATES UR QL: NORMAL
BENZODIAZ UR QL: NORMAL
CANNABINOIDS UR QL SCN: NORMAL
COCAINE UR QL: NORMAL
CREAT UR-MCNC: 21.7 MG/DL
METHADONE UR QL SCN: NORMAL
OPIATES UR QL SCN: NORMAL
OXYCODONE UR QL: NORMAL
PCP UR QL SCN: NORMAL

## 2019-05-14 PROCEDURE — 87338 HPYLORI STOOL AG IA: CPT | Performed by: NURSE PRACTITIONER

## 2019-05-14 ASSESSMENT — MIFFLIN-ST. JEOR: SCORE: 1706.85

## 2019-05-14 NOTE — TELEPHONE ENCOUNTER
Left VM for pt to return call to clinic    Order entered into epic, verbal order from Carol SILVEIRA  Epigastric pain [R10.13]     Sharyn Mendiola RN   [facility]

## 2019-05-14 NOTE — TELEPHONE ENCOUNTER
I replied to lab yesterday and also to message below, not sure why staff message sent but starting encounter as well in case further questions arise.     Pt needs h pylori test NOT FIT test so please ensure pt received this message as well     Cinda Florentino, DAVID Jeter CNP   Cc: Maximiliano Lopez; Griselda Contreras   Phone Number: [phone]             We received a sample for the FIT([id number]) test for colorectal cancer screening on this patient.     The paperwork sent with was for the H Pylori stool test. We cannot run that test on this sample we received.     Did you want the colorectal cancer screen? If so, we need an order placed in Epic       Thank you

## 2019-05-14 NOTE — TELEPHONE ENCOUNTER
Spoke with pt, he had picked up the lab supplies and will drop the sample off to lab when he obtains it    Sharyn Mendiola RN   [facility]

## 2019-05-15 LAB
H PYLORI AG STL QL IA: NORMAL
SPECIMEN SOURCE: NORMAL

## 2019-05-19 LAB
ETHYL GLUCURONIDE UR CFM-MCNC: <100 NG/ML
ETHYL SULFATE UR CFM-MCNC: <100 NG/ML

## 2019-05-21 ENCOUNTER — OFFICE VISIT - HEALTHEAST (OUTPATIENT)
Dept: BEHAVIORAL HEALTH | Facility: CLINIC | Age: 39
End: 2019-05-21

## 2019-05-21 DIAGNOSIS — M51.379 DEGENERATION OF LUMBAR/LUMBOSACRAL DISC WITHOUT MYELOPATHY: ICD-10-CM

## 2019-05-21 DIAGNOSIS — G89.29 CHRONIC MIDLINE LOW BACK PAIN WITHOUT SCIATICA: ICD-10-CM

## 2019-05-21 DIAGNOSIS — F10.20 SEVERE ALCOHOL USE DISORDER (H): ICD-10-CM

## 2019-05-21 DIAGNOSIS — F41.1 ANXIETY STATE: ICD-10-CM

## 2019-05-21 DIAGNOSIS — F11.20 OPIOID USE DISORDER, MODERATE, DEPENDENCE (H): ICD-10-CM

## 2019-05-21 DIAGNOSIS — M54.50 CHRONIC MIDLINE LOW BACK PAIN WITHOUT SCIATICA: ICD-10-CM

## 2019-05-21 LAB
AMPHETAMINES UR QL SCN: NORMAL
BARBITURATES UR QL: NORMAL
BENZODIAZ UR QL: NORMAL
CANNABINOIDS UR QL SCN: NORMAL
COCAINE UR QL: NORMAL
CREAT UR-MCNC: 22.4 MG/DL
METHADONE UR QL SCN: NORMAL
OPIATES UR QL SCN: NORMAL
OXYCODONE UR QL: NORMAL
PCP UR QL SCN: NORMAL

## 2019-05-21 ASSESSMENT — MIFFLIN-ST. JEOR: SCORE: 1711.39

## 2019-05-23 ENCOUNTER — THERAPY VISIT (OUTPATIENT)
Dept: PHYSICAL THERAPY | Facility: CLINIC | Age: 39
End: 2019-05-23
Payer: COMMERCIAL

## 2019-05-23 DIAGNOSIS — M22.2X1 PATELLOFEMORAL ARTHRALGIA OF BOTH KNEES: ICD-10-CM

## 2019-05-23 DIAGNOSIS — M22.2X2 PATELLOFEMORAL ARTHRALGIA OF BOTH KNEES: ICD-10-CM

## 2019-05-23 DIAGNOSIS — M54.41 BILATERAL LOW BACK PAIN WITH RIGHT-SIDED SCIATICA, UNSPECIFIED CHRONICITY: Primary | ICD-10-CM

## 2019-05-23 DIAGNOSIS — M25.551 HIP PAIN, RIGHT: ICD-10-CM

## 2019-05-23 PROCEDURE — 97110 THERAPEUTIC EXERCISES: CPT | Mod: GP | Performed by: PHYSICAL THERAPIST

## 2019-05-23 PROCEDURE — 97162 PT EVAL MOD COMPLEX 30 MIN: CPT | Mod: GP | Performed by: PHYSICAL THERAPIST

## 2019-05-23 ASSESSMENT — ACTIVITIES OF DAILY LIVING (ADL)
WALKING_15_MINUTES_OR_GREATER: UNABLE TO DO
HOW_WOULD_YOU_RATE_THE_OVERALL_FUNCTION_OF_YOUR_KNEE_DURING_YOUR_USUAL_DAILY_ACTIVITIES?: NEARLY NORMAL
WEAKNESS: THE SYMPTOM AFFECTS MY ACTIVITY MODERATELY
GO UP STAIRS: ACTIVITY IS MINIMALLY DIFFICULT
LIMPING: I DO NOT HAVE THE SYMPTOM
HOS_ADL_COUNT: 16
DEEP_SQUATTING: MODERATE DIFFICULTY
SIT WITH YOUR KNEE BENT: ACTIVITY IS NOT DIFFICULT
GIVING WAY, BUCKLING OR SHIFTING OF KNEE: I HAVE THE SYMPTOM BUT IT DOES NOT AFFECT MY ACTIVITY
STANDING_FOR_15_MINUTES: UNABLE TO DO
SQUAT: ACTIVITY IS MINIMALLY DIFFICULT
PUTTING_ON_SOCKS_AND_SHOES: EXTREME DIFFICULTY
HOS_ADL_SCORE(%): 15.62
TWISTING/PIVOTING_ON_INVOLVED_LEG: MODERATE DIFFICULTY
WALKING_APPROXIMATELY_10_MINUTES: UNABLE TO DO
GO DOWN STAIRS: ACTIVITY IS MINIMALLY DIFFICULT
GETTING_INTO_AND_OUT_OF_A_BATHTUB: UNABLE TO DO
GOING_UP_1_FLIGHT_OF_STAIRS: UNABLE TO DO
ROLLING_OVER_IN_BED: EXTREME DIFFICULTY
GOING_DOWN_1_FLIGHT_OF_STAIRS: UNABLE TO DO
HOS_ADL_HIGHEST_POTENTIAL_SCORE: 64
AS_A_RESULT_OF_YOUR_KNEE_INJURY,_HOW_WOULD_YOU_RATE_YOUR_CURRENT_LEVEL_OF_DAILY_ACTIVITY?: NEARLY NORMAL
WALKING_DOWN_STEEP_HILLS: UNABLE TO DO
HOS_ADL_ITEM_SCORE_TOTAL: 10
RAW_SCORE: 58
WALKING_UP_STEEP_HILLS: UNABLE TO DO
STEPPING_UP_AND_DOWN_CURBS: UNABLE TO DO
RECREATIONAL_ACTIVITIES: SLIGHT DIFFICULTY
STAND: ACTIVITY IS MINIMALLY DIFFICULT
LIGHT_TO_MODERATE_WORK: EXTREME DIFFICULTY
GETTING_INTO_AND_OUT_OF_AN_AVERAGE_CAR: EXTREME DIFFICULTY
RISE FROM A CHAIR: ACTIVITY IS MINIMALLY DIFFICULT
KNEE_ACTIVITY_OF_DAILY_LIVING_SUM: 58
KNEEL ON THE FRONT OF YOUR KNEE: ACTIVITY IS MINIMALLY DIFFICULT
PAIN: I HAVE THE SYMPTOM BUT IT DOES NOT AFFECT MY ACTIVITY
HOW_WOULD_YOU_RATE_THE_CURRENT_FUNCTION_OF_YOUR_KNEE_DURING_YOUR_USUAL_DAILY_ACTIVITIES_ON_A_SCALE_FROM_0_TO_100_WITH_100_BEING_YOUR_LEVEL_OF_KNEE_FUNCTION_PRIOR_TO_YOUR_INJURY_AND_0_BEING_THE_INABILITY_TO_PERFORM_ANY_OF_YOUR_USUAL_DAILY_ACTIVITIES?: 75
SITTING_FOR_15_MINUTES: MODERATE DIFFICULTY
SWELLING: I DO NOT HAVE THE SYMPTOM
WALKING_INITIALLY: UNABLE TO DO
WALK: ACTIVITY IS NOT DIFFICULT
STIFFNESS: I HAVE THE SYMPTOM BUT IT DOES NOT AFFECT MY ACTIVITY
KNEE_ACTIVITY_OF_DAILY_LIVING_SCORE: 82.86

## 2019-05-23 NOTE — PROGRESS NOTES
"[facility] Initial Evaluation  Subjective:    Carlos Hamilton is a 38 year old male with a lumbar condition.  Condition occurred with:  Insidious onset.    This is a chronic and recurrent condition     Patient reports pain:  Lumbar spine right and upper lumbar spine.  Radiates to: pain radiates to the R groin area.  Pain is described as aching and sharp and is constant and reported as 3/10.  Associated with: reports some numbness occasionally in both feet and hands, feels this is more due to smoking. Pain is worse during the night.  Exacerbated by: sitting down, sleeping. and relieved by activity/movement (standing, walking).            Pertinent medical history includes:  Asthma, high blood pressure, implanted device, migraines/headaches, numbness/tingling, osteoporosis, seizures and stroke (chemical dependency, chest pain, cold/hot extremity, severe headaches, pain at night/rest, significant weakness).    Surgical history: R hip JESSICA, L knee scope, aneurysm clip.  Current medications:  Anti-inflammatory, muscle relaxants, pain medication and sleep medication.      Primary job tasks include:  Prolonged sitting.            History of LBP for about 20 years, would like to avoid taking medication for his pain. Nothing recent for the low back with regards to trauma. His R hip occasionally gets an immediate sharp pain that goes away occasionally. He is getting some pain on the L side as well. He has a history of a R hip microfracture which led to a total hip replacement. No surgery on the L hip, but a similar type of pain. R knee history of arthroscopic surgery. He used to have a lot of popping in his knees bilaterally, but now they are feeling stiff and \"snapping\" in both hips when going up and down stairs. The pain is not significant in the knees, but more pressure.     Objective:    Gait:  Lateral trunk lean to R, limited trunk and lumbar rotation during gait    Gait Type:  Antalgic   "                  Lumbar/SI Evaluation  ROM:    AROM Lumbar:   Flexion:        Hands to mid tibia, limited, no increase in pain  Ext:                    Very limited, no increase in pain   Side Bend:        Left:     Right:   Rotation:           Left:     Right:   Side Glide:        Left:     Right:               Cord Signs:  normal    Lumbar Dermtomes:  normal                Neural Tension/Mobility:    Left side:  SLR and SLR w/DF positive.   Right side:   SLR w/DF and SLR positive.    Functional Tests:  Core strength and proprioception lumbar: Pain with squatting - pt utilizes hip hinge rather than segmental lumbar recrutment.                                              Hip Evaluation  HIP AROM:    Flexion: Left: 90    Right:  90          Internal Rotation: Left: 38    Right: 18  External Rotation: Left: 5    Right: 36        Hip Strength:    Flexion:   Left: 3-/5   Pain:  Right: 3-/5   Pain:                                        Hip Special Testing:    Left hip positive for the following special tests:  SLR and Andrae   Right hip positive for the following special tests:  LUCIANR and Andrae                 General     ROS    Assessment/Plan:    Patient is a 38 year old male with thoracic, lumbar and both sides hip complaints.    Patient has the following significant findings with corresponding treatment plan.                Diagnosis 1:  Low back pain  Diagnosis 2:  BL hip pain    Diagnosis 3:  R knee pain  Pain -  hot/cold therapy  Decreased ROM/flexibility - manual therapy and therapeutic exercise  Decreased joint mobility - manual therapy and therapeutic exercise  Decreased strength - therapeutic exercise and therapeutic activities  Impaired gait - gait training  Impaired muscle performance - neuro re-education  Decreased function - therapeutic activities  Impaired posture - neuro re-education     Therapy Evaluation Codes:   1) History comprised of:   Personal factors that impact the plan of care:      Time since  onset of symptoms.    Comorbidity factors that impact the plan of care are:      See list of comorbidities.     Medications impacting care: Anti-inflammatory, Muscle relaxant, Pain and Sleep.  2) Examination of Body Systems comprised of:   Body structures and functions that impact the plan of care:      Hip, Knee and Lumbar spine.   Activity limitations that impact the plan of care are:      Sitting.  3) Clinical presentation characteristics are:   Stable/Uncomplicated.  4) Decision-Making    Moderate complexity using standardized patient assessment instrument and/or measureable assessment of functional outcome.  Cumulative Therapy Evaluation is: Moderate complexity.    Previous and current functional limitations:  (See Goal Flow Sheet for this information)    Short term and Long term goals: (See Goal Flow Sheet for this information)     Communication ability:  Patient appears to be able to clearly communicate and understand verbal and written communication and follow directions correctly.  Treatment Explanation - The following has been discussed with the patient:   RX ordered/plan of care  Anticipated outcomes  Possible risks and side effects  This patient would benefit from PT intervention to resume normal activities.   Rehab potential is fair.    Frequency:  1 X week, once daily  Duration:  for 12 weeks  Discharge Plan:  Achieve all LTG.  Independent in home treatment program.  Reach maximal therapeutic benefit.    Please refer to the daily flowsheet for treatment today, total treatment time and time spent performing 1:1 timed codes.

## 2019-05-25 LAB
ETHYL GLUCURONIDE UR CFM-MCNC: <100 NG/ML
ETHYL SULFATE UR CFM-MCNC: <100 NG/ML

## 2019-06-05 ENCOUNTER — THERAPY VISIT (OUTPATIENT)
Dept: PHYSICAL THERAPY | Facility: CLINIC | Age: 39
End: 2019-06-05
Payer: COMMERCIAL

## 2019-06-05 DIAGNOSIS — M54.41 BILATERAL LOW BACK PAIN WITH RIGHT-SIDED SCIATICA, UNSPECIFIED CHRONICITY: Primary | ICD-10-CM

## 2019-06-05 DIAGNOSIS — M25.551 HIP PAIN, RIGHT: ICD-10-CM

## 2019-06-05 PROCEDURE — 97110 THERAPEUTIC EXERCISES: CPT | Mod: GP | Performed by: PHYSICAL THERAPIST

## 2019-06-11 ENCOUNTER — OFFICE VISIT - HEALTHEAST (OUTPATIENT)
Dept: BEHAVIORAL HEALTH | Facility: CLINIC | Age: 39
End: 2019-06-11

## 2019-06-11 DIAGNOSIS — K21.9 GASTROESOPHAGEAL REFLUX DISEASE, ESOPHAGITIS PRESENCE NOT SPECIFIED: ICD-10-CM

## 2019-06-11 DIAGNOSIS — M54.50 CHRONIC MIDLINE LOW BACK PAIN WITHOUT SCIATICA: ICD-10-CM

## 2019-06-11 DIAGNOSIS — F10.20 SEVERE ALCOHOL USE DISORDER (H): ICD-10-CM

## 2019-06-11 DIAGNOSIS — F41.1 ANXIETY STATE: ICD-10-CM

## 2019-06-11 DIAGNOSIS — G89.29 CHRONIC MIDLINE LOW BACK PAIN WITHOUT SCIATICA: ICD-10-CM

## 2019-06-11 DIAGNOSIS — F11.20 OPIOID USE DISORDER, MODERATE, DEPENDENCE (H): ICD-10-CM

## 2019-06-11 DIAGNOSIS — M51.379 DEGENERATION OF LUMBAR/LUMBOSACRAL DISC WITHOUT MYELOPATHY: ICD-10-CM

## 2019-06-11 LAB
AMPHETAMINES UR QL SCN: NORMAL
BARBITURATES UR QL: NORMAL
BENZODIAZ UR QL: NORMAL
CANNABINOIDS UR QL SCN: NORMAL
COCAINE UR QL: NORMAL
CREAT UR-MCNC: 23.5 MG/DL
METHADONE UR QL SCN: NORMAL
OPIATES UR QL SCN: NORMAL
OXYCODONE UR QL: NORMAL
PCP UR QL SCN: NORMAL

## 2019-06-11 ASSESSMENT — MIFFLIN-ST. JEOR: SCORE: 1747.68

## 2019-06-14 ENCOUNTER — PATIENT OUTREACH (OUTPATIENT)
Dept: CARE COORDINATION | Facility: CLINIC | Age: 39
End: 2019-06-14

## 2019-06-14 ASSESSMENT — ACTIVITIES OF DAILY LIVING (ADL): DEPENDENT_IADLS:: INDEPENDENT

## 2019-06-14 NOTE — PROGRESS NOTES
Clinic Care Coordination Contact    Clinic Care Coordination Contact  OUTREACH    Referral Information:  Referral Source: PCP         Chief Complaint   Patient presents with     Clinic Care Coordination - Follow-up             [place] Utilization: [facility], [facility], [facility]   Clinic Utilization  Difficulty keeping appointments:: No  Compliance Concerns: No  No-Show Concerns: No  No PCP office visit in Past Year: No  Utilization    Last refreshed: 6/14/2019  2:25 AM:  Hospital Admissions 1           Last refreshed: 6/14/2019  2:25 AM:  ED Visits 1           Last refreshed: 6/14/2019  2:25 AM:  No Show Count (past year) 0              Current as of: 6/14/2019  2:25 AM              Clinical Concerns:  Current Medical Concerns: No reported medical concerns   Current Behavioral Concerns: Pt stated he is feeling much better about things. He stated the General Assistance was approved and he feels good.       Education Provided to patient: SW called to check in on what has changed over the last month. Pt discussed that he is going to take his prescription to [facility]       Health Maintenance Reviewed: Due/Overdue   Health Maintenance Due   Topic Date Due     URINE DRUG SCREEN  1978     HIV SCREENING  10/17/1993     DTAP/TDAP/TD IMMUNIZATION (1 - Tdap) 10/17/2003     LIPID  10/17/2013       Clinical Pathway: None    Medication Management:  Independently.     Functional Status:  Dependent ADLs:: Independent  Dependent IADLs:: Independent  Mobility Status: Independent    Living Situation:  Current living arrangement:: Other([facility] [facility])  Type of residence:: Other    Diet/Exercise/Sleep:       Transportation:  Transportation concerns (GOAL):: No  Transportation means:: Medical transport     Psychosocial:  Mental health DX:: Yes  Mental health DX how managed:: Medication  Mental health management concern (GOAL):: No  Informal Support system:: Friends     Financial/Insurance:    Financial/Insurance concerns (GOAL):: Yes       Resources and Interventions:  Current Resources:    ;   Community Resources: Transportation Services, Drug Abuse  Supplies used at home:: None  Equipment Currently Used at Home: none               Goals:   Goals        General    #1 Financial Wellbeing (pt-stated)     Notes - Note created  5/7/2019  9:31 AM by Alia Saab BSW    Goal Statement: I need assistance applying for General Assistance   Measure of Success: Pt will apply for General Assistance   Supportive Steps to Achieve: care coordination  Barriers: unsure if he qualifies   Strengths: Asking questions regarding GA  Date to Achieve By: 5/31/19  Patient expressed understanding of goal: yes                    Patient/Caregiver understanding: Pt reports good understanding.     Outreach Frequency: monthly  Future Appointments              In 6 days Eric Decker, PT [facility] [place], [facility]          Plan: 1) PT will contact SW with any other needs.   2) SW will continue to follow up with SW in 1 month.   3) PT will go to [facility] and get new glasses.     MICHELLE Ambrosio  Clinic Care Coordinator   [facility]   [phone]

## 2019-06-20 ENCOUNTER — THERAPY VISIT (OUTPATIENT)
Dept: PHYSICAL THERAPY | Facility: CLINIC | Age: 39
End: 2019-06-20
Payer: COMMERCIAL

## 2019-06-20 ENCOUNTER — MYC REFILL (OUTPATIENT)
Dept: FAMILY MEDICINE | Facility: CLINIC | Age: 39
End: 2019-06-20

## 2019-06-20 DIAGNOSIS — M54.41 BILATERAL LOW BACK PAIN WITH RIGHT-SIDED SCIATICA, UNSPECIFIED CHRONICITY: Primary | ICD-10-CM

## 2019-06-20 DIAGNOSIS — M25.551 HIP PAIN, RIGHT: ICD-10-CM

## 2019-06-20 DIAGNOSIS — F10.21 ALCOHOL DEPENDENCE IN REMISSION (H): ICD-10-CM

## 2019-06-20 DIAGNOSIS — F41.1 GAD (GENERALIZED ANXIETY DISORDER): ICD-10-CM

## 2019-06-20 DIAGNOSIS — F19.11 SUBSTANCE ABUSE IN REMISSION (H): Primary | ICD-10-CM

## 2019-06-20 DIAGNOSIS — G47.00 INSOMNIA, UNSPECIFIED TYPE: ICD-10-CM

## 2019-06-20 PROCEDURE — 97110 THERAPEUTIC EXERCISES: CPT | Mod: GP | Performed by: PHYSICAL THERAPIST

## 2019-06-20 PROCEDURE — 97112 NEUROMUSCULAR REEDUCATION: CPT | Mod: GP | Performed by: PHYSICAL THERAPIST

## 2019-06-20 RX ORDER — PROPRANOLOL HYDROCHLORIDE 10 MG/1
10 TABLET ORAL 3 TIMES DAILY
Qty: 90 TABLET | Refills: 0 | Status: CANCELLED | OUTPATIENT
Start: 2019-06-20

## 2019-06-20 RX ORDER — TRAZODONE HYDROCHLORIDE 100 MG/1
100 TABLET ORAL AT BEDTIME
Qty: 30 TABLET | Refills: 0 | Status: CANCELLED | OUTPATIENT
Start: 2019-06-20

## 2019-06-20 NOTE — TELEPHONE ENCOUNTER
"Requested Prescriptions   Pending Prescriptions Disp Refills     propranolol (INDERAL) 10 MG tablet 90 tablet 0     Sig: Take 1 tablet (10 mg) by mouth 3 times daily       Beta-Blockers Protocol Passed - 6/20/2019  9:23 AM        Passed - Blood pressure under 140/90 in past 12 months     BP Readings from Last 3 Encounters:   05/06/19 114/77   04/29/19 122/77   03/12/19 (!) 147/101                 Passed - Patient is age 6 or older        Passed - Recent (12 mo) or future (30 days) visit within the authorizing provider's specialty     Patient had office visit in the last 12 months or has a visit in the next 30 days with authorizing provider or within the authorizing provider's specialty.  See \"Patient Info\" tab in inbasket, or \"Choose Columns\" in Meds & Orders section of the refill encounter.              Passed - Medication is active on med list        traZODone (DESYREL) 100 MG tablet 30 tablet 0     Sig: Take 1 tablet (100 mg) by mouth At Bedtime       Serotonin Modulators Passed - 6/20/2019  9:23 AM        Passed - Recent (12 mo) or future (30 days) visit within the authorizing provider's specialty     Patient had office visit in the last 12 months or has a visit in the next 30 days with authorizing provider or within the authorizing provider's specialty.  See \"Patient Info\" tab in inbasket, or \"Choose Columns\" in Meds & Orders section of the refill encounter.              Passed - Medication is active on med list        Passed - Patient is age 18 or older          "

## 2019-06-20 NOTE — TELEPHONE ENCOUNTER
See 05/06/2019 office visit note, patient due for follow up    MyChart message sent to patient    Gina Burciaga, RN  Triage Nurse

## 2019-06-20 NOTE — TELEPHONE ENCOUNTER
Carol,    Spoke with patient over the phone. He requested that we confirm with you that he needs an OV for further refills of his medications. He said that he was under the impression that he did not. Please advise.    Pt stated that he will be out of his medications in the next day or two, but if it is necessary, he can try to make it in next week. Advised pt that once we have a response, we will notify him and can send a nguyễn refill if needed. Pt verbalized understanding.    Ena Zavala RN  [facility]

## 2019-06-20 NOTE — TELEPHONE ENCOUNTER
Pt can also see Psych for complete med review, will refer to collaborative care psych and I can refill after that.     Can bridge these until then if needed, ask which pharmacy    Thanks,  ,janme

## 2019-06-21 RX ORDER — TRAZODONE HYDROCHLORIDE 100 MG/1
100 TABLET ORAL AT BEDTIME
Qty: 30 TABLET | Refills: 0 | Status: SHIPPED | OUTPATIENT
Start: 2019-06-21 | End: 2019-07-02

## 2019-06-21 RX ORDER — PROPRANOLOL HYDROCHLORIDE 10 MG/1
10 TABLET ORAL 3 TIMES DAILY
Qty: 90 TABLET | Refills: 0 | Status: SHIPPED | OUTPATIENT
Start: 2019-06-21 | End: 2019-07-02

## 2019-06-21 NOTE — TELEPHONE ENCOUNTER
VINCENT Medina: Spoke with patient, patient states he does not feel he needs to be referred elsewhere.    Patient states none of these things were discussed with him at his office visit. Patient states he will make an appointment in early July (was in class at the moment so unable to schedule) and will discuss further medication management with you    Yovana fill provided as patient is out of medication    Thank You!  Gina Burciaga, RN  Triage Nurse

## 2019-06-27 ENCOUNTER — THERAPY VISIT (OUTPATIENT)
Dept: PHYSICAL THERAPY | Facility: CLINIC | Age: 39
End: 2019-06-27
Payer: COMMERCIAL

## 2019-06-27 DIAGNOSIS — M22.2X2 PATELLOFEMORAL ARTHRALGIA OF BOTH KNEES: ICD-10-CM

## 2019-06-27 DIAGNOSIS — M22.2X1 PATELLOFEMORAL ARTHRALGIA OF BOTH KNEES: ICD-10-CM

## 2019-06-27 DIAGNOSIS — M25.551 HIP PAIN, RIGHT: ICD-10-CM

## 2019-06-27 DIAGNOSIS — M54.41 BILATERAL LOW BACK PAIN WITH RIGHT-SIDED SCIATICA, UNSPECIFIED CHRONICITY: Primary | ICD-10-CM

## 2019-06-27 PROCEDURE — 97112 NEUROMUSCULAR REEDUCATION: CPT | Mod: GP | Performed by: PHYSICAL THERAPIST

## 2019-06-27 PROCEDURE — 97110 THERAPEUTIC EXERCISES: CPT | Mod: GP | Performed by: PHYSICAL THERAPIST

## 2019-07-01 RX ORDER — OMEPRAZOLE 40 MG/1
CAPSULE, DELAYED RELEASE ORAL
Refills: 2 | COMMUNITY
Start: 2019-06-11 | End: 2022-07-27

## 2019-07-01 RX ORDER — ATORVASTATIN CALCIUM 10 MG/1
10 TABLET, FILM COATED ORAL
COMMUNITY
End: 2019-07-10

## 2019-07-01 NOTE — PROGRESS NOTES
Subjective     Carlos Hamilton is a 38 year old male who presents to clinic today for the following health issues:    HPI   Coming back to get blood work. Fasted over night, only drinking water    Wants to talk about where he is suppose to go for care for mental health    Wants to let Carol know his left hip has been hurting, nothing bad. It locks up. Started about a month and a half ago.  Pain in left groin, left sciatica and has had MRI right hip [facility]  Now left is worse, did seem to worsen as his alcohol use was more severe but also wasn't taking care of himself that well   Did not do chiro, did PT in the past   Osteonecrosis avascular, right one tried to save with surgery drilled hole up to the ball  Hip PT had every week and doing exercises at home sporadically    Set up an appointment for yesterday will be next week [facility], going in for an evaluation   Sober few months living [facility], out 7/28 at least cpoule week extension hopefully. Wants to get ack to work and live at sober living will   No CM or LSW now    Alia from here checked in last week  No [facility] worker   Thinks mental health was alcohol induced  Feels more calm   M_F mornings  Going well      Trazodone working well for sleep   propanolol works well for anxiety   At end of visit, mentions will run out of asthma medications    Is fasting for lab work     Patient Active Problem List   Diagnosis     Degeneration of lumbar/lumbosacral disc without myelopathy     Chronic midline low back pain without sciatica     Anxiety state     MARY (generalized anxiety disorder)     Insomnia, unspecified type     Alcohol dependence in remission (H)     Substance abuse in remission (H)     Mixed dyslipidemia     Mild persistent asthma, unspecified whether complicated     Acute pain of both hips     Hx of avascular necrosis of capital femoral epiphysis     Iron deficiency anemia secondary to inadequate  dietary iron intake     Past Surgical History:   Procedure Laterality Date     ARTHROPLASTY HIP Right        Social History     Tobacco Use     Smoking status: Current Every Day Smoker     Packs/day: 1.00     Smokeless tobacco: Former User     Tobacco comment: get drinking under control first   Substance Use Topics     Alcohol use: Yes     Comment: Half gallon vodka per day, everyday of week     Family History   Problem Relation Age of Onset     Alcoholism No family hx of          Current Outpatient Medications   Medication Sig Dispense Refill     albuterol (PROVENTIL HFA) 108 (90 Base) MCG/ACT inhaler Inhale 2 puffs into the lungs       budesonide-formoterol (SYMBICORT) 160-4.5 MCG/ACT Inhaler Inhale 2 puffs into the lungs       omeprazole (PRILOSEC) 40 MG DR capsule Take 1 capsule (40 mg total) by mouth daily before breakfast.  2     tiZANidine (ZANAFLEX) 4 MG tablet Take 4 mg by mouth       traZODone (DESYREL) 100 MG tablet Take 1 tablet (100 mg) by mouth At Bedtime 90 tablet 0     acetaminophen (TYLENOL) 325 MG tablet Take 325-650 mg by mouth       atorvastatin (LIPITOR) 10 MG tablet Take 1 tablet (10 mg) by mouth daily 90 tablet 3     buprenorphine HCl-naloxone HCl (SUBOXONE) 8-2 MG per film Place 1 Film under the tongue       cyanocobalamin (VITAMIN B-12) 100 MCG tablet Take 1 tablet (100 mcg) by mouth daily 90 tablet 0     ferrous sulfate (FEROSUL) 325 (65 Fe) MG tablet Take 1 tablet (325 mg) by mouth daily (with breakfast) 90 tablet 0     gabapentin (NEURONTIN) 300 MG capsule Take 300 mg by mouth       vitamin B-12 (CYANOCOBALAMIN) 50 MCG tablet Take 1 tablet (50 mcg) by mouth daily 90 tablet 0     Allergies   Allergen Reactions     Diphenhydramine Swelling     Per patient, tongue/lips swelling, itchy eyes with both generic diphenhydramine and brand Benadryl      Naproxen Swelling     P[place]io Nut Extract Skin Test      Toradol [Ketorolac] Itching       PROBLEMS TO ADD ON...above   Reviewed and updated as  "needed this visit by Provider         Review of Systems   ROS COMP: Constitutional, HEENT, cardiovascular, pulmonary, GI, , musculoskeletal, neuro, skin, endocrine and psych systems are negative, except as otherwise noted.      Objective    /60   Pulse 78   Temp 97.5  F (36.4  C) (Temporal)   Resp 18   Ht 1.803 m (5' 11\")   Wt 82.7 kg (182 lb 4.8 oz)   SpO2 98%   BMI 25.43 kg/m    Body mass index is 25.43 kg/m .  Physical Exam   GENERAL: healthy, alert and no distress  RESP: lungs clear to auscultation - no rales, rhonchi or wheezes  CV: regular rate and rhythm, normal S1 S2, no S3 or S4, no murmur, click or rub, no peripheral edema and peripheral pulses strong  ABDOMEN: soft, nontender, no hepatosplenomegaly, no masses and bowel sounds normal  MS: no gross musculoskeletal defects noted, no edema  SKIN: no suspicious lesions or rashes  NEURO: Normal strength and tone, mentation intact and speech normal  Comprehensive back pain exam:  Tenderness of right sciatic, right anterior hip, Range of motion not limited by pain, Lower extremity strength functional and equal on both sides, Lower extremity reflexes within normal limits bilaterally, Lower extremity sensation normal and equal on both sides and Straight leg raise negative bilaterally  PSYCH: mentation appears normal, affect normal/bright and anxious, tangential but not as severe as last visit     Diagnostic Test Results:  Labs reviewed in Epic  No results found for this or any previous visit (from the past 24 hour(s)).        Assessment & Plan       ICD-10-CM    1. History of avascular necrosis of capital femoral epiphysis Z87.39 ORTHOPEDICS ADULT REFERRAL   2. Bilateral hip pain M25.551 ORTHOPEDICS ADULT REFERRAL    M25.552    3. Alcohol dependence in remission (H) F10.21    4. Substance abuse in remission (H) F19.11    5. Insomnia, unspecified type G47.00 TSH with free T4 reflex     CBC with platelets differential     traZODone (DESYREL) 100 MG " tablet   6. MARY (generalized anxiety disorder) F41.1 Comprehensive metabolic panel     TSH with free T4 reflex     CBC with platelets differential     DISCONTINUED: propranolol (INDERAL) 10 MG tablet   7. Screen for STD (sexually transmitted disease) Z11.3 HIV Antigen Antibody Combo     Treponema Abs w Reflex to RPR and Titer     NEISSERIA GONORRHOEA PCR     CHLAMYDIA TRACHOMATIS PCR   8. Screening for diabetes mellitus Z13.1 Comprehensive metabolic panel   9. Lipid screening Z13.220 Lipid panel reflex to direct LDL Fasting   pt with ongoing hip pain and history above, will refer to specialist to get appointment within the next 1-2 weeks. No new injuries or symptoms but does have history of avascular necrosis and is sober now from alcohol, pt agrees with plan. Will leave up to specialist to decide if/when can restart therapies. Continue NSAID prn as tolerated, ice and heat, rest when painful.     Lab work done, pt will Return to Clinic for results review, checking CBC for possible infection or anemai markers that could be contributing to hip pain or mental health, thyroid hasn't been checked for sleep or anxiety that could be symptoms of this.     Insomnia controlled on trazodone, refilled continue.     Not clear if pt is going to see Psych or counseling, might need referral, he will send us reports after his visit, Care Coordination involved, might need [facility] worker or other resources to help him navigate care    Will need asthma visit, we had too many things to discussed today and will again request records. Of note pt taking propanolol for anxiety, see patient instructions          Tobacco Cessation:   reports that he has been smoking.  He has been smoking about 1.00 pack per day. He has quit using smokeless tobacco.  Tobacco Cessation Action Plan: Information offered: Patient not interested at this time      BMI:   Estimated body mass index is 25.43 kg/m  as calculated from the following:    Height as of this  "encounter: 1.803 m (5' 11\").    Weight as of this encounter: 82.7 kg (182 lb 4.8 oz).           Patient Instructions   Let me know if your Appointment is for counseling and assessment only or if it is a Psychiatrist who will be prescribing your medications. Send the records with the result please (Fax)    Schedule the Ortho appointment     Please return at your soonest convenience for the asthma visit.     As we discussed today, propanolol may not be the best medication for anxiety if you have asthma--because it can interact with your asthma medications (can make them not work)    We did lab work today and results will be in mychart, we can also go over them at your next visit with me here.                       Return in about 1 week (around 7/9/2019) for asthma visit .    DAVID Bernard [facility]      "

## 2019-07-02 ENCOUNTER — OFFICE VISIT (OUTPATIENT)
Dept: FAMILY MEDICINE | Facility: CLINIC | Age: 39
End: 2019-07-02
Payer: COMMERCIAL

## 2019-07-02 VITALS
HEIGHT: 71 IN | WEIGHT: 182.3 LBS | DIASTOLIC BLOOD PRESSURE: 60 MMHG | OXYGEN SATURATION: 98 % | BODY MASS INDEX: 25.52 KG/M2 | SYSTOLIC BLOOD PRESSURE: 102 MMHG | RESPIRATION RATE: 18 BRPM | HEART RATE: 78 BPM | TEMPERATURE: 97.5 F

## 2019-07-02 DIAGNOSIS — G47.00 INSOMNIA, UNSPECIFIED TYPE: ICD-10-CM

## 2019-07-02 DIAGNOSIS — F19.11 SUBSTANCE ABUSE IN REMISSION (H): ICD-10-CM

## 2019-07-02 DIAGNOSIS — Z13.1 SCREENING FOR DIABETES MELLITUS: ICD-10-CM

## 2019-07-02 DIAGNOSIS — Z11.3 SCREEN FOR STD (SEXUALLY TRANSMITTED DISEASE): ICD-10-CM

## 2019-07-02 DIAGNOSIS — M25.551 BILATERAL HIP PAIN: ICD-10-CM

## 2019-07-02 DIAGNOSIS — M25.552 BILATERAL HIP PAIN: ICD-10-CM

## 2019-07-02 DIAGNOSIS — Z87.39 HISTORY OF AVASCULAR NECROSIS OF CAPITAL FEMORAL EPIPHYSIS: Primary | ICD-10-CM

## 2019-07-02 DIAGNOSIS — F10.21 ALCOHOL DEPENDENCE IN REMISSION (H): ICD-10-CM

## 2019-07-02 DIAGNOSIS — F41.1 GAD (GENERALIZED ANXIETY DISORDER): ICD-10-CM

## 2019-07-02 DIAGNOSIS — Z13.220 LIPID SCREENING: ICD-10-CM

## 2019-07-02 LAB
BASOPHILS # BLD AUTO: 0.1 10E9/L (ref 0–0.2)
BASOPHILS NFR BLD AUTO: 0.8 %
DIFFERENTIAL METHOD BLD: ABNORMAL
EOSINOPHIL # BLD AUTO: 0.1 10E9/L (ref 0–0.7)
EOSINOPHIL NFR BLD AUTO: 1.1 %
ERYTHROCYTE [DISTWIDTH] IN BLOOD BY AUTOMATED COUNT: 14.1 % (ref 10–15)
HCT VFR BLD AUTO: 40.6 % (ref 40–53)
HGB BLD-MCNC: 13.2 G/DL (ref 13.3–17.7)
LYMPHOCYTES # BLD AUTO: 3.1 10E9/L (ref 0.8–5.3)
LYMPHOCYTES NFR BLD AUTO: 33.7 %
MCH RBC QN AUTO: 29.3 PG (ref 26.5–33)
MCHC RBC AUTO-ENTMCNC: 32.5 G/DL (ref 31.5–36.5)
MCV RBC AUTO: 90 FL (ref 78–100)
MONOCYTES # BLD AUTO: 0.7 10E9/L (ref 0–1.3)
MONOCYTES NFR BLD AUTO: 7.4 %
NEUTROPHILS # BLD AUTO: 5.2 10E9/L (ref 1.6–8.3)
NEUTROPHILS NFR BLD AUTO: 57 %
PLATELET # BLD AUTO: 345 10E9/L (ref 150–450)
RBC # BLD AUTO: 4.51 10E12/L (ref 4.4–5.9)
WBC # BLD AUTO: 9 10E9/L (ref 4–11)

## 2019-07-02 PROCEDURE — 86780 TREPONEMA PALLIDUM: CPT | Performed by: NURSE PRACTITIONER

## 2019-07-02 PROCEDURE — 87591 N.GONORRHOEAE DNA AMP PROB: CPT | Performed by: NURSE PRACTITIONER

## 2019-07-02 PROCEDURE — 87491 CHLMYD TRACH DNA AMP PROBE: CPT | Performed by: NURSE PRACTITIONER

## 2019-07-02 PROCEDURE — 99214 OFFICE O/P EST MOD 30 MIN: CPT | Performed by: NURSE PRACTITIONER

## 2019-07-02 PROCEDURE — 85025 COMPLETE CBC W/AUTO DIFF WBC: CPT | Performed by: NURSE PRACTITIONER

## 2019-07-02 PROCEDURE — 84443 ASSAY THYROID STIM HORMONE: CPT | Performed by: NURSE PRACTITIONER

## 2019-07-02 PROCEDURE — 36415 COLL VENOUS BLD VENIPUNCTURE: CPT | Performed by: NURSE PRACTITIONER

## 2019-07-02 PROCEDURE — 80053 COMPREHEN METABOLIC PANEL: CPT | Performed by: NURSE PRACTITIONER

## 2019-07-02 PROCEDURE — 80061 LIPID PANEL: CPT | Performed by: NURSE PRACTITIONER

## 2019-07-02 PROCEDURE — 87389 HIV-1 AG W/HIV-1&-2 AB AG IA: CPT | Performed by: NURSE PRACTITIONER

## 2019-07-02 RX ORDER — PROPRANOLOL HYDROCHLORIDE 10 MG/1
10 TABLET ORAL 3 TIMES DAILY
Qty: 90 TABLET | Refills: 0 | Status: SHIPPED | OUTPATIENT
Start: 2019-07-02 | End: 2019-07-10

## 2019-07-02 RX ORDER — TRAZODONE HYDROCHLORIDE 100 MG/1
100 TABLET ORAL AT BEDTIME
Qty: 90 TABLET | Refills: 0 | Status: SHIPPED | OUTPATIENT
Start: 2019-07-02 | End: 2019-10-24

## 2019-07-02 ASSESSMENT — MIFFLIN-ST. JEOR: SCORE: 1759.04

## 2019-07-02 NOTE — PATIENT INSTRUCTIONS
Let me know if your Appointment is for counseling and assessment only or if it is a Psychiatrist who will be prescribing your medications. Send the records with the result please (Fax)    Schedule the Ortho appointment     Please return at your soonest convenience for the asthma visit.     As we discussed today, propanolol may not be the best medication for anxiety if you have asthma--because it can interact with your asthma medications (can make them not work)    We did lab work today and results will be in [facility], we can also go over them at your next visit with me here.

## 2019-07-03 ENCOUNTER — THERAPY VISIT (OUTPATIENT)
Dept: PHYSICAL THERAPY | Facility: CLINIC | Age: 39
End: 2019-07-03
Payer: COMMERCIAL

## 2019-07-03 DIAGNOSIS — M54.41 BILATERAL LOW BACK PAIN WITH RIGHT-SIDED SCIATICA, UNSPECIFIED CHRONICITY: Primary | ICD-10-CM

## 2019-07-03 DIAGNOSIS — M25.551 HIP PAIN, RIGHT: ICD-10-CM

## 2019-07-03 LAB
ALBUMIN SERPL-MCNC: 3.8 G/DL (ref 3.4–5)
ALP SERPL-CCNC: 107 U/L (ref 40–150)
ALT SERPL W P-5'-P-CCNC: 27 U/L (ref 0–70)
ANION GAP SERPL CALCULATED.3IONS-SCNC: 10 MMOL/L (ref 3–14)
AST SERPL W P-5'-P-CCNC: 20 U/L (ref 0–45)
BILIRUB SERPL-MCNC: 0.5 MG/DL (ref 0.2–1.3)
BUN SERPL-MCNC: 8 MG/DL (ref 7–30)
CALCIUM SERPL-MCNC: 9.2 MG/DL (ref 8.5–10.1)
CHLORIDE SERPL-SCNC: 101 MMOL/L (ref 94–109)
CHOLEST SERPL-MCNC: 220 MG/DL
CO2 SERPL-SCNC: 25 MMOL/L (ref 20–32)
CREAT SERPL-MCNC: 0.62 MG/DL (ref 0.66–1.25)
GFR SERPL CREATININE-BSD FRML MDRD: >90 ML/MIN/{1.73_M2}
GLUCOSE SERPL-MCNC: 72 MG/DL (ref 70–99)
HDLC SERPL-MCNC: 38 MG/DL
HIV 1+2 AB+HIV1 P24 AG SERPL QL IA: NONREACTIVE
LDLC SERPL CALC-MCNC: 150 MG/DL
NONHDLC SERPL-MCNC: 182 MG/DL
POTASSIUM SERPL-SCNC: 3.9 MMOL/L (ref 3.4–5.3)
PROT SERPL-MCNC: 8.5 G/DL (ref 6.8–8.8)
SODIUM SERPL-SCNC: 136 MMOL/L (ref 133–144)
TRIGL SERPL-MCNC: 158 MG/DL
TSH SERPL DL<=0.005 MIU/L-ACNC: 1.45 MU/L (ref 0.4–4)

## 2019-07-03 PROCEDURE — 97110 THERAPEUTIC EXERCISES: CPT | Mod: GP | Performed by: PHYSICAL THERAPIST

## 2019-07-03 ASSESSMENT — ACTIVITIES OF DAILY LIVING (ADL)
WALKING_15_MINUTES_OR_GREATER: NO DIFFICULTY AT ALL
HOS_ADL_HIGHEST_POTENTIAL_SCORE: 64
PUTTING_ON_SOCKS_AND_SHOES: MODERATE DIFFICULTY
WALKING_UP_STEEP_HILLS: NO DIFFICULTY AT ALL
HOW_WOULD_YOU_RATE_YOUR_CURRENT_LEVEL_OF_FUNCTION_DURING_YOUR_USUAL_ACTIVITIES_OF_DAILY_LIVING_FROM_0_TO_100_WITH_100_BEING_YOUR_LEVEL_OF_FUNCTION_PRIOR_TO_YOUR_HIP_PROBLEM_AND_0_BEING_THE_INABILITY_TO_PERFORM_ANY_OF_YOUR_USUAL_DAILY_ACTIVITIES?: 75
HEAVY_WORK: SLIGHT DIFFICULTY
DEEP_SQUATTING: MODERATE DIFFICULTY
STANDING_FOR_15_MINUTES: NO DIFFICULTY AT ALL
GETTING_INTO_AND_OUT_OF_AN_AVERAGE_CAR: SLIGHT DIFFICULTY
RECREATIONAL_ACTIVITIES: NO DIFFICULTY AT ALL
HOS_ADL_ITEM_SCORE_TOTAL: 57
SITTING_FOR_15_MINUTES: SLIGHT DIFFICULTY
WALKING_DOWN_STEEP_HILLS: NO DIFFICULTY AT ALL
HOS_ADL_SCORE(%): 89.06
GETTING_INTO_AND_OUT_OF_A_BATHTUB: NO DIFFICULTY AT ALL
WALKING_APPROXIMATELY_10_MINUTES: NO DIFFICULTY AT ALL
WALKING_INITIALLY: NO DIFFICULTY AT ALL
HOS_ADL_COUNT: 16
ROLLING_OVER_IN_BED: SLIGHT DIFFICULTY
STEPPING_UP_AND_DOWN_CURBS: NO DIFFICULTY AT ALL
TWISTING/PIVOTING_ON_INVOLVED_LEG: SLIGHT DIFFICULTY
LIGHT_TO_MODERATE_WORK: NO DIFFICULTY AT ALL
GOING_UP_1_FLIGHT_OF_STAIRS: SLIGHT DIFFICULTY

## 2019-07-03 NOTE — PROGRESS NOTES
"Subjective:  HPI                    Objective:  System    Physical Exam    General     ROS    Assessment/Plan:    PROGRESS  REPORT    Progress reporting period is from 5/23/19 to 7/3/19.       SUBJECTIVE  Subjective changes noted by patient:  Pt says that he feels there is some improvement, but with that there have been some additional aches and pains. It has not been significant pain, but expected pinches that come with new exercises and stretches. Overall he feels he is about 50-60% back to a \"normal,\" or being able to do some weight lifting. He also feels like he has more mental apprehension that limits him from getting back to more physical activity.          Current pain level is 4/10  .     Previous pain level was  6/10  .   Changes in function:  Yes (See Goal flowsheet attached for changes in current functional level)  Adverse reaction to treatment or activity: None    OBJECTIVE  Changes noted in objective findings:     *Lumbar AROM:  -FB: Hands to floor, no pain  -BB: 50%, small pinch, no pain  -SBR/L: hands to latera joint line, no pain    *Hip AROM:  -Flexion: 100 R, 110 L  -ER: 10 R, 33 L  -IR: 50 R, 39 L    *Andrae Test:  + BL - hip flexors     *Hip Strength (via manual muscle testing):  -Abduction: 3+/5 BL, no pain  -Extension: 4/5 BL    ASSESSMENT/PLAN  Updated problem list and treatment plan: Diagnosis 1:  Low back and hip pain  Pain -  hot/cold therapy  Decreased ROM/flexibility - manual therapy and therapeutic exercise  Decreased joint mobility - manual therapy and therapeutic exercise  Decreased strength - therapeutic exercise and therapeutic activities  Decreased proprioception - neuro re-education and therapeutic activities  Impaired muscle performance - neuro re-education  Decreased function - therapeutic activities  Impaired posture - neuro re-education  STG/LTGs have been met or progress has been made towards goals:  Yes (See Goal flow sheet completed today.)  Assessment of Progress: The " patient's condition is improving.  The patient's condition has potential to improve.  Self Management Plans:  Patient has been instructed in a home treatment program.  I have re-evaluated this patient and find that the nature, scope, duration and intensity of the therapy is appropriate for the medical condition of the patient.  Carlos continues to require the following intervention to meet STG and LTG's:  PT    Recommendations:  This patient would benefit from continued therapy.     Frequency:  2 X a month, once daily  Duration:  for 2 months    Please refer to the daily flowsheet for treatment today, total treatment time and time spent performing 1:1 timed codes.

## 2019-07-04 LAB
C TRACH DNA SPEC QL NAA+PROBE: NEGATIVE
N GONORRHOEA DNA SPEC QL NAA+PROBE: NEGATIVE
SPECIMEN SOURCE: NORMAL
SPECIMEN SOURCE: NORMAL

## 2019-07-05 LAB — T PALLIDUM AB SER QL: NONREACTIVE

## 2019-07-09 ENCOUNTER — OFFICE VISIT - HEALTHEAST (OUTPATIENT)
Dept: BEHAVIORAL HEALTH | Facility: CLINIC | Age: 39
End: 2019-07-09

## 2019-07-09 DIAGNOSIS — G89.29 CHRONIC MIDLINE LOW BACK PAIN WITHOUT SCIATICA: ICD-10-CM

## 2019-07-09 DIAGNOSIS — F11.20 OPIOID USE DISORDER, MODERATE, DEPENDENCE (H): ICD-10-CM

## 2019-07-09 DIAGNOSIS — F10.20 SEVERE ALCOHOL USE DISORDER (H): ICD-10-CM

## 2019-07-09 DIAGNOSIS — M54.50 CHRONIC MIDLINE LOW BACK PAIN WITHOUT SCIATICA: ICD-10-CM

## 2019-07-09 DIAGNOSIS — F41.1 ANXIETY STATE: ICD-10-CM

## 2019-07-09 LAB
AMPHETAMINES UR QL SCN: NORMAL
BARBITURATES UR QL: NORMAL
BENZODIAZ UR QL: NORMAL
CANNABINOIDS UR QL SCN: NORMAL
COCAINE UR QL: NORMAL
CREAT UR-MCNC: 12.7 MG/DL
METHADONE UR QL SCN: NORMAL
OPIATES UR QL SCN: NORMAL
OXYCODONE UR QL: NORMAL
PCP UR QL SCN: NORMAL

## 2019-07-09 ASSESSMENT — MIFFLIN-ST. JEOR: SCORE: 1765.82

## 2019-07-10 ENCOUNTER — OFFICE VISIT (OUTPATIENT)
Dept: FAMILY MEDICINE | Facility: CLINIC | Age: 39
End: 2019-07-10
Payer: COMMERCIAL

## 2019-07-10 ENCOUNTER — TELEPHONE (OUTPATIENT)
Dept: FAMILY MEDICINE | Facility: CLINIC | Age: 39
End: 2019-07-10

## 2019-07-10 VITALS
TEMPERATURE: 98.5 F | WEIGHT: 187.6 LBS | BODY MASS INDEX: 26.26 KG/M2 | OXYGEN SATURATION: 97 % | SYSTOLIC BLOOD PRESSURE: 103 MMHG | HEART RATE: 64 BPM | DIASTOLIC BLOOD PRESSURE: 71 MMHG | HEIGHT: 71 IN | RESPIRATION RATE: 16 BRPM

## 2019-07-10 DIAGNOSIS — M25.551 ACUTE PAIN OF BOTH HIPS: ICD-10-CM

## 2019-07-10 DIAGNOSIS — F19.11 SUBSTANCE ABUSE IN REMISSION (H): ICD-10-CM

## 2019-07-10 DIAGNOSIS — J45.30 MILD PERSISTENT ASTHMA, UNSPECIFIED WHETHER COMPLICATED: Primary | ICD-10-CM

## 2019-07-10 DIAGNOSIS — F10.21 ALCOHOL DEPENDENCE IN REMISSION (H): ICD-10-CM

## 2019-07-10 DIAGNOSIS — G47.00 INSOMNIA, UNSPECIFIED TYPE: ICD-10-CM

## 2019-07-10 DIAGNOSIS — D50.8 IRON DEFICIENCY ANEMIA SECONDARY TO INADEQUATE DIETARY IRON INTAKE: ICD-10-CM

## 2019-07-10 DIAGNOSIS — F41.1 GAD (GENERALIZED ANXIETY DISORDER): ICD-10-CM

## 2019-07-10 DIAGNOSIS — E78.2 MIXED DYSLIPIDEMIA: ICD-10-CM

## 2019-07-10 DIAGNOSIS — M25.552 ACUTE PAIN OF BOTH HIPS: ICD-10-CM

## 2019-07-10 DIAGNOSIS — Z87.39 HX OF AVASCULAR NECROSIS OF CAPITAL FEMORAL EPIPHYSIS: ICD-10-CM

## 2019-07-10 PROCEDURE — 99214 OFFICE O/P EST MOD 30 MIN: CPT | Performed by: NURSE PRACTITIONER

## 2019-07-10 RX ORDER — MULTIVITAMIN WITH IRON
50 TABLET ORAL DAILY
Qty: 90 TABLET | Refills: 0 | Status: SHIPPED | OUTPATIENT
Start: 2019-07-10 | End: 2021-08-16

## 2019-07-10 RX ORDER — GABAPENTIN 300 MG/1
300 CAPSULE ORAL
COMMUNITY
Start: 2019-07-09 | End: 2022-02-23

## 2019-07-10 RX ORDER — FERROUS SULFATE 325(65) MG
325 TABLET ORAL
Qty: 90 TABLET | Refills: 0 | Status: SHIPPED | OUTPATIENT
Start: 2019-07-10 | End: 2019-10-07

## 2019-07-10 RX ORDER — ATORVASTATIN CALCIUM 10 MG/1
10 TABLET, FILM COATED ORAL DAILY
Qty: 90 TABLET | Refills: 3 | Status: SHIPPED | OUTPATIENT
Start: 2019-07-10 | End: 2021-10-11

## 2019-07-10 RX ORDER — BUPRENORPHINE AND NALOXONE 8; 2 MG/1; MG/1
1 FILM, SOLUBLE BUCCAL; SUBLINGUAL
COMMUNITY
Start: 2019-07-09 | End: 2021-08-02

## 2019-07-10 ASSESSMENT — MIFFLIN-ST. JEOR: SCORE: 1783.08

## 2019-07-10 NOTE — PROGRESS NOTES
Subjective     Carlos Hamilton is a 38 year old male who presents to clinic today for the following health issues:    HPI   Asthma Follow-Up    Was ACT completed today?  Yes    ACT Total Scores 7/10/2019   ACT TOTAL SCORE (Goal Greater than or Equal to 20) 12   In the past 12 months, how many times did you visit the emergency room for your asthma without being admitted to the hospital? 0   In the past 12 months, how many times were you hospitalized overnight because of your asthma? 0       How many days per week do you miss taking your asthma controller medication?  Takes occ. Thought Symbicort was for just when sick. Started doing it twice daily for about 2 weeks    Please describe any recent triggers for your asthma: allergies    Have you had any Emergency Room Visits, Urgent Care Visits, or Hospital Admissions since your last office visit?  No     Didn't want to do prednisone so they did symbicort   Asthma has been worse since moving her thinks due to allergies different than [place]  Pt with limited asthma knowledge, little knowledge of initial diagnosis     Smoking, not ready to quit, goal for next year    Sobriety continues    Gabapentin made him feel really weird this morning like crawling out of skin after taking this    Taking propanolol for anxiety but willing to trial other, anxiety has been much improved     Also wants to go over lab results from last visit  Anxiety follow up     Results for orders placed or performed in visit on 07/02/19   Lipid panel reflex to direct LDL Fasting   Result Value Ref Range    Cholesterol 220 (H) <200 mg/dL    Triglycerides 158 (H) <150 mg/dL    HDL Cholesterol 38 (L) >39 mg/dL    LDL Cholesterol Calculated 150 (H) <100 mg/dL    Non HDL Cholesterol 182 (H) <130 mg/dL   Comprehensive metabolic panel   Result Value Ref Range    Sodium 136 133 - 144 mmol/L    Potassium 3.9 3.4 - 5.3 mmol/L    Chloride 101 94 - 109 mmol/L    Carbon Dioxide 25 20 - 32 mmol/L    Anion Gap 10 3 -  14 mmol/L    Glucose 72 70 - 99 mg/dL    Urea Nitrogen 8 7 - 30 mg/dL    Creatinine 0.62 (L) 0.66 - 1.25 mg/dL    GFR Estimate >90 >60 mL/min/[1.73_m2]    GFR Estimate If Black >90 >60 mL/min/[1.73_m2]    Calcium 9.2 8.5 - 10.1 mg/dL    Bilirubin Total 0.5 0.2 - 1.3 mg/dL    Albumin 3.8 3.4 - 5.0 g/dL    Protein Total 8.5 6.8 - 8.8 g/dL    Alkaline Phosphatase 107 40 - 150 U/L    ALT 27 0 - 70 U/L    AST 20 0 - 45 U/L   TSH with free T4 reflex   Result Value Ref Range    TSH 1.45 0.40 - 4.00 mU/L   CBC with platelets differential   Result Value Ref Range    WBC 9.0 4.0 - 11.0 10e9/L    RBC Count 4.51 4.4 - 5.9 10e12/L    Hemoglobin 13.2 (L) 13.3 - 17.7 g/dL    Hematocrit 40.6 40.0 - 53.0 %    MCV 90 78 - 100 fl    MCH 29.3 26.5 - 33.0 pg    MCHC 32.5 31.5 - 36.5 g/dL    RDW 14.1 10.0 - 15.0 %    Platelet Count 345 150 - 450 10e9/L    % Neutrophils 57.0 %    % Lymphocytes 33.7 %    % Monocytes 7.4 %    % Eosinophils 1.1 %    % Basophils 0.8 %    Absolute Neutrophil 5.2 1.6 - 8.3 10e9/L    Absolute Lymphocytes 3.1 0.8 - 5.3 10e9/L    Absolute Monocytes 0.7 0.0 - 1.3 10e9/L    Absolute Eosinophils 0.1 0.0 - 0.7 10e9/L    Absolute Basophils 0.1 0.0 - 0.2 10e9/L    Diff Method Automated Method    HIV Antigen Antibody Combo   Result Value Ref Range    HIV Antigen Antibody Combo Nonreactive NR^Nonreactive       Treponema Abs w Reflex to RPR and Titer   Result Value Ref Range    Treponema Antibodies Nonreactive NR^Nonreactive   NEISSERIA GONORRHOEA PCR   Result Value Ref Range    Specimen Descrip Urine     N Gonorrhea PCR Negative NEG^Negative   CHLAMYDIA TRACHOMATIS PCR   Result Value Ref Range    Specimen Description Urine     Chlamydia Trachomatis PCR Negative NEG^Negative      Patient Active Problem List   Diagnosis     Degeneration of lumbar/lumbosacral disc without myelopathy     Chronic midline low back pain without sciatica     Anxiety state     MARY (generalized anxiety disorder)     Insomnia, unspecified type      Alcohol dependence in remission (H)     Substance abuse in remission (H)     Mixed dyslipidemia     Mild persistent asthma, unspecified whether complicated     Acute pain of both hips     Hx of avascular necrosis of capital femoral epiphysis     Iron deficiency anemia secondary to inadequate dietary iron intake     Past Surgical History:   Procedure Laterality Date     ARTHROPLASTY HIP Right        Social History     Tobacco Use     Smoking status: Current Every Day Smoker     Packs/day: 1.00     Smokeless tobacco: Former User     Tobacco comment: get drinking under control first   Substance Use Topics     Alcohol use: Yes     Comment: Half gallon vodka per day, everyday of week     Family History   Problem Relation Age of Onset     Alcoholism No family hx of          Current Outpatient Medications   Medication Sig Dispense Refill     acetaminophen (TYLENOL) 325 MG tablet Take 325-650 mg by mouth       albuterol (PROVENTIL HFA) 108 (90 Base) MCG/ACT inhaler Inhale 2 puffs into the lungs       atorvastatin (LIPITOR) 10 MG tablet Take 1 tablet (10 mg) by mouth daily 90 tablet 3     budesonide-formoterol (SYMBICORT) 160-4.5 MCG/ACT Inhaler Inhale 2 puffs into the lungs       buprenorphine HCl-naloxone HCl (SUBOXONE) 8-2 MG per film Place 1 Film under the tongue       ferrous sulfate (FEROSUL) 325 (65 Fe) MG tablet Take 1 tablet (325 mg) by mouth daily (with breakfast) 90 tablet 0     gabapentin (NEURONTIN) 300 MG capsule Take 300 mg by mouth       omeprazole (PRILOSEC) 40 MG DR capsule Take 1 capsule (40 mg total) by mouth daily before breakfast.  2     tiZANidine (ZANAFLEX) 4 MG tablet Take 4 mg by mouth       traZODone (DESYREL) 100 MG tablet Take 1 tablet (100 mg) by mouth At Bedtime 90 tablet 0     vitamin B-12 (CYANOCOBALAMIN) 50 MCG tablet Take 1 tablet (50 mcg) by mouth daily 90 tablet 0     cyanocobalamin (VITAMIN B-12) 100 MCG tablet Take 1 tablet (100 mcg) by mouth daily 90 tablet 0     Allergies   Allergen  "Reactions     Diphenhydramine Swelling     Per patient, tongue/lips swelling, itchy eyes with both generic diphenhydramine and brand Benadryl      Naproxen Swelling     Pistachio Nut Extract Skin Test      Toradol [Ketorolac] Itching       PROBLEMS TO ADD ON...  Reviewed and updated as needed this visit by Provider         Review of Systems   ROS COMP: Constitutional, HEENT, cardiovascular, pulmonary, GI, , musculoskeletal, neuro, skin, endocrine and psych systems are negative, except as otherwise noted.      Objective    /71   Pulse 64   Temp 98.5  F (36.9  C) (Oral)   Resp 16   Ht 1.803 m (5' 11\")   Wt 85.1 kg (187 lb 9.6 oz)   SpO2 97%   BMI 26.16 kg/m    Body mass index is 26.16 kg/m .  Physical Exam   GENERAL: healthy, alert and no distress  EYES: Eyes grossly normal to inspection, PERRL and conjunctivae and sclerae normal  HENT: ear canals and TM's normal, nose and mouth without ulcers or lesions  NECK: no adenopathy, no asymmetry, masses, or scars and thyroid normal to palpation  RESP: lungs clear to auscultation - no rales, rhonchi or wheezes  CV: regular rate and rhythm, normal S1 S2, no S3 or S4, no murmur, click or rub, no peripheral edema and peripheral pulses strong  SKIN: no suspicious lesions or rashes  NEURO: Normal strength and tone, mentation intact and speech normal  PSYCH: mentation appears normal, affect normal/bright seems more calm     Diagnostic Test Results:  Labs reviewed in Epic        Assessment & Plan       ICD-10-CM    1. Mild persistent asthma, unspecified whether complicated J45.30    2. Mixed dyslipidemia E78.2 atorvastatin (LIPITOR) 10 MG tablet   3. Iron deficiency anemia secondary to inadequate dietary iron intake D50.8 ferrous sulfate (FEROSUL) 325 (65 Fe) MG tablet   4. Hx of avascular necrosis of capital femoral epiphysis Z87.39 ORTHOPEDICS ADULT REFERRAL     vitamin B-12 (CYANOCOBALAMIN) 50 MCG tablet   5. Acute pain of both hips M25.551 ORTHOPEDICS ADULT " REFERRAL    M25.552 vitamin B-12 (CYANOCOBALAMIN) 50 MCG tablet   6. MARY (generalized anxiety disorder) F41.1 MENTAL HEALTH REFERRAL  - Adult; Psychiatry and Medication Management; Psychiatry; [facility]: [facility] [phone].  Medication management & future refills will be returned to [facility] PCP upon completion of evaluation; We lynette...   7. Insomnia, unspecified type G47.00 MENTAL HEALTH REFERRAL  - Adult; Psychiatry and Medication Management; Psychiatry; [facility]: [facility] [phone].  Medication management & future refills will be returned to [facility] PCP upon completion of evaluation; We lynette...   8. Substance abuse in remission (H) F19.11 MENTAL HEALTH REFERRAL  - Adult; Psychiatry and Medication Management; Psychiatry; [facility]: [facility] [phone].  Medication management & future refills will be returned to [facility] PCP upon completion of evaluation; We lynette...   9. Alcohol dependence in remission (H) F10.21 MENTAL HEALTH REFERRAL  - Adult; Psychiatry and Medication Management; Psychiatry; [facility]: [facility] [phone].  Medication management & future refills will be returned to [facility] PCP upon completion of evaluation; We lynette...   here for asthma visit and results visit  Unclear asthma history and pt needed much teaching today, reviewed basic asthma medications and how to take, monitor for triggers, push water intake, smoking cessation strongly recommended. Will need follow up within 2-3 months    Discussed dyslipidemia and pt prefers to do statin, will start low dose and notify if any side effects    Pt needed new ortho referral for hip pain, done, start B12 and iron taking discussed for iron deficiency anemia     Continue sobriety and treatment. Referred to psych for med review, stopped propanolol since pt with asthma. May need to consider an alternative.     Insomnia controlled on trazodone, continue. Work on good  "self care       Tobacco Cessation:   reports that he has been smoking.  He has been smoking about 1.00 pack per day. He has quit using smokeless tobacco.  Tobacco Cessation Action Plan: Self help information given to patient      BMI:   Estimated body mass index is 26.16 kg/m  as calculated from the following:    Height as of this encounter: 1.803 m (5' 11\").    Weight as of this encounter: 85.1 kg (187 lb 9.6 oz).   Weight management plan: Discussed healthy diet and exercise guidelines        Patient Instructions       Patient Education     Medicine for Cholesterol Control  Cholesterol is a waxy substance in your bloodstream. If there is too much of it in your blood, it can build up in the walls of your arteries. Over time, this buildup can lead to coronary disease. Coronary disease can put you at risk for a heart attack or stroke. It can also put you at risk for disease of the arteries in your legs and other places in your body. Medicine can give you the extra help you need to control your cholesterol.    How medicine helps  Different kinds of medicines help with cholesterol levels. Some help lower your LDL (bad cholesterol). Some help raise your HDL (good cholesterol). Other medicines lower your triglyceride levels. And some do all three. It may take some time to find the right medicine for you. Taking medicine will be only one part of your cholesterol control plan. You will still need to eat right and get regular exercise.  Talk with your healthcare provider to find out your risks for having a heart attack. Your provider can tell you what goals to use to see if your treatment is working. These goals may vary based on your health issues or family history. Also ask your provider how often your cholesterol should be checked as part of your treatment plan. You may need to fast before getting your cholesterol checked.  Taking your medicine  It is important to:    Tell your healthcare provider about any other " medicines you take. This includes over-the-counter medicines. It also includes vitamins and herbs.    Take your medicine exactly as directed. This helps make sure that it works as it should.    Don't skip a dose.    Don't stop taking it if you feel better.    Don't stop taking it when your cholesterol numbers improve.    Order your refill before your medicine runs out.  Side effects  Medicines can cause side effects. These often occur at the start of taking a new medicine. Side effects can include headache and upset stomach. Rarely you can have muscle aches. Tell your healthcare provider about any side effects you have.  When to call your healthcare provider  When taking your medicine, let your healthcare provider know if you have:    Yellowing of the whites of eyes    Blurred vision    Muscle aches    Trouble breathing   High-risk groups  Some people may need to take medicines called statins to control their cholesterol. This is in addition to eating a healthy diet and getting regular exercise.  Statins can help you stay healthy. They can also help prevent a heart attack or stroke. You may need to take a statin if you are in one of these groups:    Adults who have had a heart attack or stroke. Or adults who have had peripheral vascular disease, a ministroke (transient ischemic attack), or stable or unstable angina. This group also includes people who have had a procedure to restore blood flow through a blocked artery. These procedures include percutaneous coronary intervention, angioplasty, stent, and open-heart bypass surgery.    Adults who have diabetes. Or adults who are at higher risk of having a heart attack or stroke and have an LDL cholesterol level of 70 to 189 mg/dL    Adults who are 21 years old or older and have an LDL cholesterol level of 190 mg/dL or higher.  If you are in a high-risk group, talk with your healthcare provider about your treatment goals. Make sure you understand why these goals are  important, based on your own health history and your family history of heart disease or high cholesterol.  Make a plan to have regular cholesterol checks. You may need to fast before getting this test. Also ask your provider about any side effects your medicines may cause. Let your provider know about any side effects you have. You may need to take more than one medicine to reach the cholesterol goals that you and your provider decide on.  Date Last Reviewed: 10/1/2016    8353-2148 [employer]. [address] [place] [address]. All rights reserved. This information is not intended as a substitute for professional medical care. Always follow your healthcare professional's instructions.           Patient Education     Lifestyle Changes to Control Cholesterol  You can control your cholesterol through diet, exercise, weight management, quitting smoking, stress management, and taking your medicines right. These things can also lower your risk for cardiovascular disease.    Eating healthy  Your healthcare provider will give you information on diet changes you may need to make. Your provider may recommend that you see a registered dietitian for help with diet changes. Changes may include:    Cutting back on the amount of fat and cholesterol in your meals    Eating less salt (sodium). This is especially important if you have high blood pressure.    Eating more fresh vegetables and fruits    Eating lean proteins such as fish, poultry, beans, and peas    Eating less red meat and processed meats    Using low-fat dairy products    Using vegetable and nut oils in limited amounts    Limiting how many sweets and processed foods like chips, cookies, and baked goods that you eat     Limiting how many sugar-sweetened beverages you drink    Limiting how often you eat out  Getting exercise  Regular exercise is a good way to help your body control cholesterol. Regular exercise can help in many ways. It  can:    Raise your good cholesterol    Help lower your bad cholesterol    Let blood flow better through your body    Give more oxygen to your muscles and tissues    Help you manage your weight    Help your heart pump better    Lower your blood pressure  Your healthcare provider may recommend that you get more physical activity if you haven't been active. Your provider may recommend that you get moderate to vigorous physical activity for at least 40 minutes each day. You should do this for at least 3 to 4 days each week. A few examples of moderate to vigorous activity are:    Walking at a brisk pace. This is about 3 to 4 miles per hour.    Jogging or running    Swimming or water aerobics    Hiking    Dancing    Martial arts    Tennis    Riding a bicycle or stationary bike    Dancing  Managing your weight  If you are overweight or obese, your healthcare provider will work with you to help you lose weight and lower your BMI (body mass index). Making diet changes and getting more physical activity can help. Changing your diet will help you lose weight more easily than adding exercise.  Quitting smoking  Smoking and other tobacco use can raise cholesterol and make it harder to control. Quitting is tough. But millions of people have given up tobacco for good. You can quit, too! Think about some of the reasons below to quit smoking. Do any of them make you think twice about your smoking habit?  Stop smoking because it:    Keeps your cholesterol high, even if you make all the other changes you re supposed to    Damages your body. It especially harms your heart, lungs, skin, and blood vessels.    Makes you more likely to have a heart attack (acute myocardial infarction), stroke, or cancer    Stains your teeth    Makes your skin, clothes, and breath smell bad    Costs a lot of money  Controlling stress   Learn ways to control stress. This will help you deal with stress in your home and work life. Controlling stress can  greatly lower your risk of getting cardiovascular disease.  Making the most of medicines  Healthy eating and exercise are a good start to keeping your cholesterol down. But you may need some extra help from medicine. If your doctor prescribes medicine, be sure to take it exactly as directed. Remember:    Tell your healthcare provider about all other medicines you take. This includes vitamins and herbs.    Tell your healthcare provider if you have any side effects after starting to take a medicine. Examples of side effects to watch for include muscle aches, weakness, blurred vision, rust-colored urine, yellowing of eyes or skin (jaundice), and headache.    Don t skip a dose or stop taking your medicine because you feel better or because your cholesterol numbers go down. Never stop taking your medicine unless your healthcare provider has told you it s OK.    Ask your healthcare provider if you have any questions about your medicines.  High risk groups  Some people may need to take medicines called statins to control their cholesterol. This is in addition to eating a healthy diet and getting regular exercise.  Statins can help you stay healthy. They can also help prevent a heart attack or stroke. You may need to take a statin if you are in one of these groups:    Adults who have had a heart attack or stroke. Or adults who have had peripheral vascular disease, a ministroke (transient ischemic attack), or stable or unstable angina. This group also includes people who have had a procedure to restore blood flow through a blocked artery. These procedures include percutaneous coronary intervention, angioplasty, stent, and open-heart bypass surgery.    Adults who have diabetes. Or adults who are at higher risk of having a heart attack or stroke and have an LDL cholesterol level of 70 to 189 mg/dL    Adults who are 21 years old or older and have an LDL cholesterol level of 190 mg/dL or higher.  If you are in a high-risk group,  talk with your healthcare provider about your treatment goals. Make sure you understand why these goals are important, based on your own health history and your family history of heart disease or high cholesterol.  Make a plan to have regular cholesterol checks. You may need to fast before getting this test. Also ask your provider about any side effects your medicines may cause. Let your provider know about any side effects you have. You may need to take more than one medicine to reach the cholesterol goals that you and your provider decide on.  Date Last Reviewed: 10/1/2016    2921-1984 [employer]. [address] [place] [address]. All rights reserved. This information is not intended as a substitute for professional medical care. Always follow your healthcare professional's instructions.           Patient Education     Iron-Deficiency Anemia (Adult)  Red blood cells carry oxygen to the tissues of your body. Anemia is a condition in which you have too few red blood cells. You need iron to make red cells. Anemia makes you feel tired and run down. When anemia becomes severe, your skin becomes pale. You may feel short of breath after physical activity. Other symptoms include:    Headaches    Dizziness    Leg cramps with physical activity    Drowsiness    Restless legs  Your anemia is caused by not having enough iron in your body. This may be because of:    Loss of blood. This can be caused by heavy menstrual periods. It can also be caused by bleeding from the stomach or intestines.    Poor diet. You may not be eating enough foods that contain iron.    Inability to absorb iron from the foods you eat    Pregnancy  If your blood count is low enough, your healthcare provider may prescribe an iron supplement. It usually takes about 2 to 3 months of treatment with iron supplements to correct anemia. Severe cases of anemia need a blood transfusion to quickly ease symptoms and deliver more oxygen to  the cells.  Home care  Follow these guidelines when caring for yourself at home:    Eat foods high in iron. This will boost the amount of iron stored in your body. It is a natural way to build up the number of blood cells. Good sources of iron include beef, liver, spinach and other dark green leafy vegetables, whole grains, beans, and nuts.    Don't overexert yourself.    Talk with your healthcare provider before traveling by air or traveling to high altitudes.  Follow-up care  Follow up with your healthcare provider in 2 months, or as advised. This is to have another red blood cell count to be sure your anemia has been fixed.  Call 911  Call 911 or seek immediate medical care if any of these occur:    Shortness of breath or chest pain    Dizziness or fainting    Vomiting blood or passing red or black-colored stool   Date Last Reviewed: 3/1/2018    3709-2070 [employer]: turn off the lights. [address] [place] [address]. All rights reserved. This information is not intended as a substitute for professional medical care. Always follow your healthcare professional's instructions.               Return in about 3 months (around 10/10/2019) for next annual exam or as needed.    DAVID Bernard [facility]

## 2019-07-10 NOTE — PATIENT INSTRUCTIONS
Patient Education     Medicine for Cholesterol Control  Cholesterol is a waxy substance in your bloodstream. If there is too much of it in your blood, it can build up in the walls of your arteries. Over time, this buildup can lead to coronary disease. Coronary disease can put you at risk for a heart attack or stroke. It can also put you at risk for disease of the arteries in your legs and other places in your body. Medicine can give you the extra help you need to control your cholesterol.    How medicine helps  Different kinds of medicines help with cholesterol levels. Some help lower your LDL (bad cholesterol). Some help raise your HDL (good cholesterol). Other medicines lower your triglyceride levels. And some do all three. It may take some time to find the right medicine for you. Taking medicine will be only one part of your cholesterol control plan. You will still need to eat right and get regular exercise.  Talk with your healthcare provider to find out your risks for having a heart attack. Your provider can tell you what goals to use to see if your treatment is working. These goals may vary based on your health issues or family history. Also ask your provider how often your cholesterol should be checked as part of your treatment plan. You may need to fast before getting your cholesterol checked.  Taking your medicine  It is important to:    Tell your healthcare provider about any other medicines you take. This includes over-the-counter medicines. It also includes vitamins and herbs.    Take your medicine exactly as directed. This helps make sure that it works as it should.    Don't skip a dose.    Don't stop taking it if you feel better.    Don't stop taking it when your cholesterol numbers improve.    Order your refill before your medicine runs out.  Side effects  Medicines can cause side effects. These often occur at the start of taking a new medicine. Side effects can include headache and upset stomach.  Rarely you can have muscle aches. Tell your healthcare provider about any side effects you have.  When to call your healthcare provider  When taking your medicine, let your healthcare provider know if you have:    Yellowing of the whites of eyes    Blurred vision    Muscle aches    Trouble breathing   High-risk groups  Some people may need to take medicines called statins to control their cholesterol. This is in addition to eating a healthy diet and getting regular exercise.  Statins can help you stay healthy. They can also help prevent a heart attack or stroke. You may need to take a statin if you are in one of these groups:    Adults who have had a heart attack or stroke. Or adults who have had peripheral vascular disease, a ministroke (transient ischemic attack), or stable or unstable angina. This group also includes people who have had a procedure to restore blood flow through a blocked artery. These procedures include percutaneous coronary intervention, angioplasty, stent, and open-heart bypass surgery.    Adults who have diabetes. Or adults who are at higher risk of having a heart attack or stroke and have an LDL cholesterol level of 70 to 189 mg/dL    Adults who are 21 years old or older and have an LDL cholesterol level of 190 mg/dL or higher.  If you are in a high-risk group, talk with your healthcare provider about your treatment goals. Make sure you understand why these goals are important, based on your own health history and your family history of heart disease or high cholesterol.  Make a plan to have regular cholesterol checks. You may need to fast before getting this test. Also ask your provider about any side effects your medicines may cause. Let your provider know about any side effects you have. You may need to take more than one medicine to reach the cholesterol goals that you and your provider decide on.  Date Last Reviewed: 10/1/2016    2381-0602 [employer]. [address] [place] [address]  [facility] [place] [address]. All rights reserved. This information is not intended as a substitute for professional medical care. Always follow your healthcare professional's instructions.           Patient Education     Lifestyle Changes to Control Cholesterol  You can control your cholesterol through diet, exercise, weight management, quitting smoking, stress management, and taking your medicines right. These things can also lower your risk for cardiovascular disease.    Eating healthy  Your healthcare provider will give you information on diet changes you may need to make. Your provider may recommend that you see a registered dietitian for help with diet changes. Changes may include:    Cutting back on the amount of fat and cholesterol in your meals    Eating less salt (sodium). This is especially important if you have high blood pressure.    Eating more fresh vegetables and fruits    Eating lean proteins such as fish, poultry, beans, and peas    Eating less red meat and processed meats    Using low-fat dairy products    Using vegetable and nut oils in limited amounts    Limiting how many sweets and processed foods like chips, cookies, and baked goods that you eat     Limiting how many sugar-sweetened beverages you drink    Limiting how often you eat out  Getting exercise  Regular exercise is a good way to help your body control cholesterol. Regular exercise can help in many ways. It can:    Raise your good cholesterol    Help lower your bad cholesterol    Let blood flow better through your body    Give more oxygen to your muscles and tissues    Help you manage your weight    Help your heart pump better    Lower your blood pressure  Your healthcare provider may recommend that you get more physical activity if you haven't been active. Your provider may recommend that you get moderate to vigorous physical activity for at least 40 minutes each day. You should do this for at least 3 to 4 days each week. A few examples of  moderate to vigorous activity are:    Walking at a brisk pace. This is about 3 to 4 miles per hour.    Jogging or running    Swimming or water aerobics    Hiking    Dancing    Martial arts    Tennis    Riding a bicycle or stationary bike    Dancing  Managing your weight  If you are overweight or obese, your healthcare provider will work with you to help you lose weight and lower your BMI (body mass index). Making diet changes and getting more physical activity can help. Changing your diet will help you lose weight more easily than adding exercise.  Quitting smoking  Smoking and other tobacco use can raise cholesterol and make it harder to control. Quitting is tough. But millions of people have given up tobacco for good. You can quit, too! Think about some of the reasons below to quit smoking. Do any of them make you think twice about your smoking habit?  Stop smoking because it:    Keeps your cholesterol high, even if you make all the other changes you re supposed to    Damages your body. It especially harms your heart, lungs, skin, and blood vessels.    Makes you more likely to have a heart attack (acute myocardial infarction), stroke, or cancer    Stains your teeth    Makes your skin, clothes, and breath smell bad    Costs a lot of money  Controlling stress   Learn ways to control stress. This will help you deal with stress in your home and work life. Controlling stress can greatly lower your risk of getting cardiovascular disease.  Making the most of medicines  Healthy eating and exercise are a good start to keeping your cholesterol down. But you may need some extra help from medicine. If your doctor prescribes medicine, be sure to take it exactly as directed. Remember:    Tell your healthcare provider about all other medicines you take. This includes vitamins and herbs.    Tell your healthcare provider if you have any side effects after starting to take a medicine. Examples of side effects to watch for  include muscle aches, weakness, blurred vision, rust-colored urine, yellowing of eyes or skin (jaundice), and headache.    Don t skip a dose or stop taking your medicine because you feel better or because your cholesterol numbers go down. Never stop taking your medicine unless your healthcare provider has told you it s OK.    Ask your healthcare provider if you have any questions about your medicines.  High risk groups  Some people may need to take medicines called statins to control their cholesterol. This is in addition to eating a healthy diet and getting regular exercise.  Statins can help you stay healthy. They can also help prevent a heart attack or stroke. You may need to take a statin if you are in one of these groups:    Adults who have had a heart attack or stroke. Or adults who have had peripheral vascular disease, a ministroke (transient ischemic attack), or stable or unstable angina. This group also includes people who have had a procedure to restore blood flow through a blocked artery. These procedures include percutaneous coronary intervention, angioplasty, stent, and open-heart bypass surgery.    Adults who have diabetes. Or adults who are at higher risk of having a heart attack or stroke and have an LDL cholesterol level of 70 to 189 mg/dL    Adults who are 21 years old or older and have an LDL cholesterol level of 190 mg/dL or higher.  If you are in a high-risk group, talk with your healthcare provider about your treatment goals. Make sure you understand why these goals are important, based on your own health history and your family history of heart disease or high cholesterol.  Make a plan to have regular cholesterol checks. You may need to fast before getting this test. Also ask your provider about any side effects your medicines may cause. Let your provider know about any side effects you have. You may need to take more than one medicine to reach the cholesterol goals that you and your provider  decide on.  Date Last Reviewed: 10/1/2016    0526-7637 [employer]. [address] [facility], [place] [address]. All rights reserved. This information is not intended as a substitute for professional medical care. Always follow your healthcare professional's instructions.           Patient Education     Iron-Deficiency Anemia (Adult)  Red blood cells carry oxygen to the tissues of your body. Anemia is a condition in which you have too few red blood cells. You need iron to make red cells. Anemia makes you feel tired and run down. When anemia becomes severe, your skin becomes pale. You may feel short of breath after physical activity. Other symptoms include:    Headaches    Dizziness    Leg cramps with physical activity    Drowsiness    Restless legs  Your anemia is caused by not having enough iron in your body. This may be because of:    Loss of blood. This can be caused by heavy menstrual periods. It can also be caused by bleeding from the stomach or intestines.    Poor diet. You may not be eating enough foods that contain iron.    Inability to absorb iron from the foods you eat    Pregnancy  If your blood count is low enough, your healthcare provider may prescribe an iron supplement. It usually takes about 2 to 3 months of treatment with iron supplements to correct anemia. Severe cases of anemia need a blood transfusion to quickly ease symptoms and deliver more oxygen to the cells.  Home care  Follow these guidelines when caring for yourself at home:    Eat foods high in iron. This will boost the amount of iron stored in your body. It is a natural way to build up the number of blood cells. Good sources of iron include beef, liver, spinach and other dark green leafy vegetables, whole grains, beans, and nuts.    Don't overexert yourself.    Talk with your healthcare provider before traveling by air or traveling to high altitudes.  Follow-up care  Follow up with your healthcare provider in 2 months, or as  advised. This is to have another red blood cell count to be sure your anemia has been fixed.  Call 911  Call 911 or seek immediate medical care if any of these occur:    Shortness of breath or chest pain    Dizziness or fainting    Vomiting blood or passing red or black-colored stool   Date Last Reviewed: 3/1/2018    2552-9135 The [employer]. [address] [place] [address]. All rights reserved. This information is not intended as a substitute for professional medical care. Always follow your healthcare professional's instructions.

## 2019-07-10 NOTE — TELEPHONE ENCOUNTER
Carol,    Please clarify the vit d order did you want just 50mcg daily for pt, [facility] pharmacy      [facility]     Pharmacy cued    Sharyn Mendiola, RN   [facility][note 3]

## 2019-07-11 RX ORDER — UBIDECARENONE 75 MG
100 CAPSULE ORAL DAILY
Qty: 90 TABLET | Refills: 0 | Status: SHIPPED | OUTPATIENT
Start: 2019-07-11 | End: 2019-10-08

## 2019-07-11 RX ORDER — MULTIVITAMIN WITH IRON
TABLET ORAL DAILY
Refills: 0 | Status: CANCELLED | OUTPATIENT
Start: 2019-07-11

## 2019-07-11 ASSESSMENT — ASTHMA QUESTIONNAIRES: ACT_TOTALSCORE: 12

## 2019-07-11 NOTE — TELEPHONE ENCOUNTER
Called [facility] pharmacy and informed them new Rx has been updated and sent to pharmacy    Gina Burciaga, RN  Triage Nurse

## 2019-07-11 NOTE — TELEPHONE ENCOUNTER
Carol,  Spoke with pharmacy. They need clarification on vitamin B-12 (CYANOCOBALAMIN) 50 MCG tablet    Pharmacist was questioning as dose prescribed is very low. Usual doses of vitamin B-12 include: 100 mcg; 250 mcg; 500 mcg; 1000 mcg    Please advise    Thank You!  Gina Burciaga, RN  Triage Nurse

## 2019-07-11 NOTE — TELEPHONE ENCOUNTER
Sure that is fine, it equals 2000 international unit(s) just FYI they are slowly changing over how we order Vit D     Thanks  Carol HERNANDEZ CNP

## 2019-07-15 NOTE — TELEPHONE ENCOUNTER
RECORDS RECEIVED FROM: bilat hip pain/hx of AVN rt hip, JESSICA on rt side/left side now showing same symptoms/referred by Dr Carol Serrano at [facility], recs and referral internal/appt sched per pt   DATE RECEIVED: Aug 1, 2019    NOTES STATUS DETAILS   OFFICE NOTE from referring provider Internal 7/10/19 Zach GAMBOA   OFFICE NOTE from other specialist N/A    DISCHARGE SUMMARY from hospital N/A    DISCHARGE REPORT from the ER Care Everywhere 3/16/19   OPERATIVE REPORT N/A    MEDICATION LIST Internal    IMPLANT RECORD/STICKER N/A    LABS     CBC/DIFF N/A    CULTURES N/A    INJECTIONS DONE IN RADIOLOGY N/A    MRI Received  [facility] 5/12/16, 9/26/16   CT SCAN Received  4/9/17   XRAYS (IMAGES & REPORTS) Received  [facility] 3/16/19     [facility] 4/9/17   TUMOR     PATHOLOGY  Slides & report N/A      07/15/19   3:06 PM  LVM with [facility] film room requesting images/call back.  3:07 PM Called [facility]    07/17/19   10:58 AM  Called [facility] and faxed request for images  12:19 PM per fax from ANTHONY Driver mailed today.    07/18/19   3:36 PM  Disc received from [facility]

## 2019-07-24 ENCOUNTER — PATIENT OUTREACH (OUTPATIENT)
Dept: CARE COORDINATION | Facility: CLINIC | Age: 39
End: 2019-07-24

## 2019-07-24 ASSESSMENT — ACTIVITIES OF DAILY LIVING (ADL): DEPENDENT_IADLS:: INDEPENDENT

## 2019-07-24 NOTE — PROGRESS NOTES
Clinic Care Coordination Contact  Follow Up Progress Note      Assessment: PT reports that everything is going well. He stated that he has been in treatment and would like to continue sober living. Pt stated that he need to make another appointment with his primary care provider. Pt stated he is in the works of figuring out if he needs to apply for disability or figure out how he can get a decent paying job with his disability.      Goals:   Goals        General    #1 Financial Wellbeing (pt-stated)     Notes - Note created  5/7/2019  9:31 AM by Alia Saab BSW    Goal Statement: I need assistance applying for General Assistance   Measure of Success: Pt will apply for General Assistance   Supportive Steps to Achieve: care coordination  Barriers: unsure if he qualifies   Strengths: Asking questions regarding GA  Date to Achieve By: 5/31/19  Patient expressed understanding of goal: yes                    Goal Progression: As of today's date 7/24/2019 goal is met at 51 - 75%.   Goal Status:  Ongoing      Intervention/Education provided during outreach: SW talked with Pt over the phone. Pt discussed interest in getting help from SW at next in visit appointment to talk about disability.      Outreach Frequency: monthly.      Plan:   Patient will make an appointment with his PCP.    Care Coordinator will follow up in one month and discuss options of disability with pt.     MICHELLE Ambrosio  Clinic Care Coordinator   [facility]   [phone]

## 2019-07-29 ENCOUNTER — TELEPHONE (OUTPATIENT)
Dept: FAMILY MEDICINE | Facility: CLINIC | Age: 39
End: 2019-07-29

## 2019-07-29 ENCOUNTER — MYC MEDICAL ADVICE (OUTPATIENT)
Dept: FAMILY MEDICINE | Facility: CLINIC | Age: 39
End: 2019-07-29

## 2019-07-29 NOTE — TELEPHONE ENCOUNTER
Panel Management Review      Patient has the following on his problem list:     Asthma review     ACT Total Scores 7/10/2019   ACT TOTAL SCORE (Goal Greater than or Equal to 20) 12   In the past 12 months, how many times did you visit the emergency room for your asthma without being admitted to the hospital? 0   In the past 12 months, how many times were you hospitalized overnight because of your asthma? 0      1. Is Asthma diagnosis on the Problem List? Yes    2. Is Asthma listed on Health Maintenance? Yes    3. Patient is due for:  ACT      Composite cancer screening  Chart review shows that this patient is due/due soon for the following None  Summary:    Patient is due/failing the following:   ACT    Action needed:   Patient needs to do ACT.    Type of outreach:    Sent [facility] [facility] message.    Questions for provider review:    None                                                                                                                                    Derian Granda    [facility]     Chart routed to Care Team .

## 2019-07-30 DIAGNOSIS — J45.30 MILD PERSISTENT ASTHMA, UNSPECIFIED WHETHER COMPLICATED: Primary | ICD-10-CM

## 2019-07-30 ASSESSMENT — ENCOUNTER SYMPTOMS
EYE WATERING: 0
INSOMNIA: 0
SORE THROAT: 0
SMELL DISTURBANCE: 0
ORTHOPNEA: 0
HEADACHES: 0
SEIZURES: 0
EXERCISE INTOLERANCE: 1
TASTE DISTURBANCE: 0
MEMORY LOSS: 0
DISTURBANCES IN COORDINATION: 0
EYE PAIN: 0
NUMBNESS: 1
DYSURIA: 0
LIGHT-HEADEDNESS: 0
HOARSE VOICE: 1
HYPERTENSION: 0
COUGH DISTURBING SLEEP: 0
DIFFICULTY URINATING: 0
MUSCLE CRAMPS: 0
LEG PAIN: 1
NECK PAIN: 0
EYE REDNESS: 0
EYE IRRITATION: 0
SPUTUM PRODUCTION: 1
JOINT SWELLING: 0
COUGH: 0
SINUS CONGESTION: 1
DOUBLE VISION: 0
TREMORS: 0
DYSPNEA ON EXERTION: 1
NERVOUS/ANXIOUS: 1
DEPRESSION: 0
STIFFNESS: 1
HEMATURIA: 0
BACK PAIN: 1
NECK MASS: 0
ARTHRALGIAS: 1
DECREASED CONCENTRATION: 1
PANIC: 0
TINGLING: 1
SNORES LOUDLY: 0
DIZZINESS: 0
WEAKNESS: 1
PALPITATIONS: 0
MUSCLE WEAKNESS: 1
POSTURAL DYSPNEA: 0
LOSS OF CONSCIOUSNESS: 0
SLEEP DISTURBANCES DUE TO BREATHING: 0
SHORTNESS OF BREATH: 1
MYALGIAS: 1
WHEEZING: 0
SYNCOPE: 0
HEMOPTYSIS: 0
SPEECH CHANGE: 0
PARALYSIS: 0
HYPOTENSION: 0
FLANK PAIN: 0

## 2019-07-30 ASSESSMENT — ACTIVITIES OF DAILY LIVING (ADL)
ADL_MEAN: 1.7
ADL_SUM: 29
ADL_SUBSCALE_SCORE: 57.35

## 2019-07-30 ASSESSMENT — HOOS S4: HOW SEVERE IS YOUR HIP JOINT STIFFNESS AFTER FIRST WAKENING IN THE MORNING?: MODERATE

## 2019-07-30 NOTE — TELEPHONE ENCOUNTER
"Requested Prescriptions   Pending Prescriptions Disp Refills     albuterol (PROVENTIL HFA) 108 (90 Base) MCG/ACT inhaler       Sig: Inhale 2 puffs into the lungs       Asthma Maintenance Inhalers - Anticholinergics Failed - 7/30/2019 12:55 PM        Failed - Asthma control assessment score within normal limits in last 6 months     Please review ACT score.           Passed - Patient is age 12 years or older        Passed - Medication is active on med list        Passed - Recent (6 mo) or future (30 days) visit within the authorizing provider's specialty     Patient had office visit in the last 6 months or has a visit in the next 30 days with authorizing provider or within the authorizing provider's specialty.  See \"Patient Info\" tab in inbasket, or \"Choose Columns\" in Meds & Orders section of the refill encounter.            budesonide-formoterol (SYMBICORT) 160-4.5 MCG/ACT Inhaler       Sig: Inhale 2 puffs into the lungs       Inhaled Steroids Protocol Failed - 7/30/2019 12:55 PM        Failed - Asthma control assessment score within normal limits in last 6 months     Please review ACT score.           Passed - Patient is age 12 or older        Passed - Medication is active on med list        Passed - Recent (6 mo) or future (30 days) visit within the authorizing provider's specialty     Patient had office visit in the last 6 months or has a visit in the next 30 days with authorizing provider or within the authorizing provider's specialty.  See \"Patient Info\" tab in inbasket, or \"Choose Columns\" in Meds & Orders section of the refill encounter.              "

## 2019-07-30 NOTE — TELEPHONE ENCOUNTER
Per LOV notes 07/10/19 when last ACT done, pt to f/u 10/2019.    Routing to PCP because meds listed as historical only. (no active prescription on file) so Triage not able to sign.    Ena Zavala RN  [facility]

## 2019-08-01 ENCOUNTER — ANCILLARY PROCEDURE (OUTPATIENT)
Dept: GENERAL RADIOLOGY | Facility: CLINIC | Age: 39
End: 2019-08-01
Attending: ORTHOPAEDIC SURGERY
Payer: COMMERCIAL

## 2019-08-01 ENCOUNTER — PRE VISIT (OUTPATIENT)
Dept: ORTHOPEDICS | Facility: CLINIC | Age: 39
End: 2019-08-01

## 2019-08-01 ENCOUNTER — OFFICE VISIT (OUTPATIENT)
Dept: ORTHOPEDICS | Facility: CLINIC | Age: 39
End: 2019-08-01
Attending: NURSE PRACTITIONER
Payer: COMMERCIAL

## 2019-08-01 VITALS — BODY MASS INDEX: 26.18 KG/M2 | WEIGHT: 187 LBS | HEIGHT: 71 IN

## 2019-08-01 DIAGNOSIS — M25.552 LEFT HIP PAIN: ICD-10-CM

## 2019-08-01 DIAGNOSIS — M25.552 LEFT HIP PAIN: Primary | ICD-10-CM

## 2019-08-01 RX ORDER — DIAZEPAM 5 MG
TABLET ORAL
Qty: 2 TABLET | Refills: 0 | Status: SHIPPED | OUTPATIENT
Start: 2019-08-01 | End: 2021-08-16

## 2019-08-01 RX ORDER — BUDESONIDE AND FORMOTEROL FUMARATE DIHYDRATE 160; 4.5 UG/1; UG/1
2 AEROSOL RESPIRATORY (INHALATION) DAILY
Qty: 10 G | Refills: 1 | Status: SHIPPED | OUTPATIENT
Start: 2019-08-01 | End: 2022-11-17

## 2019-08-01 RX ORDER — ALBUTEROL SULFATE 90 UG/1
2 AEROSOL, METERED RESPIRATORY (INHALATION) EVERY 4 HOURS PRN
Qty: 2 INHALER | Refills: 1 | Status: SHIPPED | OUTPATIENT
Start: 2019-08-01 | End: 2022-06-20

## 2019-08-01 ASSESSMENT — MIFFLIN-ST. JEOR: SCORE: 1780.36

## 2019-08-01 NOTE — PROGRESS NOTES
I have personally examined this patient and have reviewed the clinical presentation and progress note with the resident. I agree with the treatment plan as outlined. The plan was formulated with the resident on the day of the resident's dictation.      Answers for HPI/ROS submitted by the patient on 7/30/2019   General Symptoms: No  Skin Symptoms: No  HENT Symptoms: Yes  EYE SYMPTOMS: Yes  HEART SYMPTOMS: Yes  LUNG SYMPTOMS: Yes  INTESTINAL SYMPTOMS: No  URINARY SYMPTOMS: Yes  REPRODUCTIVE SYMPTOMS: No  SKELETAL SYMPTOMS: Yes  BLOOD SYMPTOMS: No  NERVOUS SYSTEM SYMPTOMS: Yes  MENTAL HEALTH SYMPTOMS: Yes  Ear pain: No  Ear discharge: No  Hearing loss: No  Tinnitus: No  Nosebleeds: No  Congestion: Yes   Voice hoarseness: Yes  Mouth sores: No  Sore throat: No  Tooth pain: Yes  Gum tenderness: No  Bleeding gums: No  Change in taste: No  Change in sense of smell: No  Dry mouth: Yes  Hearing aid used: No  Neck lump: No  Eye pain: No  Vision loss: No  Dry eyes: Yes  Watery eyes: No  Eye bulging: No  Double vision: No  Flashing of lights: No  Spots: No  Floaters: Yes  Redness: No  Crossed eyes: No  Tunnel Vision: No  Yellowing of eyes: No  Eye irritation: No  Cough: No  Sputum or phlegm: Yes  Coughing up blood: No  Difficulty breating or shortness of breath: Yes  Snoring: No  Wheezing: No  Difficulty breathing on exertion: Yes  Nighttime Cough: No  Difficulty breathing when lying flat: No  Chest pain or pressure: No  Fast or irregular heartbeat: No  Pain in legs with walking: Yes  Trouble breathing while lying down: No  Fingers or toes appear blue: No  High blood pressure: No  Low blood pressure: No  Fainting: No  Murmurs: No  Pacemaker: No  Varicose veins: No  Edema or swelling: No  Wake up at night with shortness of breath: No  Light-headedness: No  Exercise intolerance: Yes  Trouble holding urine or incontinence: Yes  Pain or burning: No  Trouble starting or stopping: No  Increased frequency of urination: No  Blood in  urine: No  Decreased frequency of urination: No  Frequent nighttime urination: Yes  Flank pain: No  Difficulty emptying bladder: No  Back pain: Yes  Muscle aches: Yes  Neck pain: No  Swollen joints: No  Joint pain: Yes  Bone pain: Yes  Muscle cramps: No  Muscle weakness: Yes  Joint stiffness: Yes  Bone fracture: No  Trouble with coordination: No  Dizziness or trouble with balance: No  Fainting or black-out spells: No  Memory loss: No  Headache: No  Seizures: No  Speech problems: No  Tingling: Yes  Tremor: No  Weakness: Yes  Difficulty walking: Yes  Paralysis: No  Numbness: Yes  Nervous or Anxious: Yes  Depression: No  Trouble sleeping: No  Trouble thinking or concentrating: Yes  Mood changes: No  Panic attacks: No

## 2019-08-01 NOTE — LETTER
8/1/2019       RE: Carlos Hamilton  [address]  [place] [address]     Dear Colleague,    Thank you for referring your patient, Carlos Hamilton, [facility] Please see a copy of my visit note below.    Chief Complaint: Consult (bilateral hip pain)    Physician:  Carol Serrano    HPI: Carlos Hamilton is a 38 year old male who presents today for evaluation of his left hip, concern for AVN. 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 Believes he is having similar symptoms with his left hip.     Location of symptoms:  groin  Onset:insidious  Duration of symptoms: 1 year  Quality of symptoms: progressive, constant   Severity: moderate, non-radiating   Alleviating: activity modification  Exacerbating: activities  Previous Treatments: Previous treatments include activity modification, oral pain medication, physical therapy (1.5-2 months). Never had an injection     Current Status:  Results of the patient s Hip Disability and Osteoarthritis Outcome Score (HOOS)  are as follows (0-100 scales with 100 being the theoretical best):  Pain: 62.5  Symptoms: 60  ADLs: 57.35  Sports/Recreation: 56.25  Quality of Life: 31.25  (http://koos.nu/)  [facility] Activity Score: 4    MEDICAL HISTORY:   Past Medical History:   Diagnosis Date     Alcohol abuse      Brain aneurysm     s/p clip in 2013     Chronic back pain        Pertinent negatives:  Patient has no history of DVT or PE. Discussed risk factors.    Medications:     Current Outpatient Medications:      acetaminophen (TYLENOL) 325 MG tablet, Take 325-650 mg by mouth, Disp: , Rfl:      albuterol (PROVENTIL HFA) 108 (90 Base) MCG/ACT inhaler, Inhale 2 puffs into the lungs every 4 hours as needed for shortness of breath / dyspnea or wheezing, Disp: 2 Inhaler, Rfl: 1     atorvastatin (LIPITOR) 10 MG tablet, Take 1 tablet (10 mg) by mouth  daily, Disp: 90 tablet, Rfl: 3     budesonide-formoterol (SYMBICORT) 160-4.5 MCG/ACT Inhaler, Inhale 2 puffs into the lungs daily, Disp: 10 g, Rfl: 1     buprenorphine HCl-naloxone HCl (SUBOXONE) 8-2 MG per film, Place 1 Film under the tongue, Disp: , Rfl:      cyanocobalamin (VITAMIN B-12) 100 MCG tablet, Take 1 tablet (100 mcg) by mouth daily, Disp: 90 tablet, Rfl: 0     ferrous sulfate (FEROSUL) 325 (65 Fe) MG tablet, Take 1 tablet (325 mg) by mouth daily (with breakfast), Disp: 90 tablet, Rfl: 0     gabapentin (NEURONTIN) 300 MG capsule, Take 300 mg by mouth, Disp: , Rfl:      omeprazole (PRILOSEC) 40 MG DR capsule, Take 1 capsule (40 mg total) by mouth daily before breakfast., Disp: , Rfl: 2     tiZANidine (ZANAFLEX) 4 MG tablet, Take 4 mg by mouth, Disp: , Rfl:      traZODone (DESYREL) 100 MG tablet, Take 1 tablet (100 mg) by mouth At Bedtime, Disp: 90 tablet, Rfl: 0     vitamin B-12 (CYANOCOBALAMIN) 50 MCG tablet, Take 1 tablet (50 mcg) by mouth daily, Disp: 90 tablet, Rfl: 0    Allergies: Diphenhydramine; Naproxen; Pistachio nut extract skin test; and Toradol [ketorolac]    SURGICAL HISTORY:   Past Surgical History:   Procedure Laterality Date     ARTHROPLASTY HIP Right        HISTORY:   Family History   Problem Relation Age of Onset     Alcoholism No family hx of        SOCIAL HISTORY:   Social History     Tobacco Use     Smoking status: Current Every Day Smoker     Packs/day: 1.00     Smokeless tobacco: Former User     Tobacco comment: get drinking under control first   Substance Use Topics     Alcohol use: Yes     Comment: Half gallon vodka per day, everyday of week   Currently looking for a job  Recently moved from [place] 1/2019  Has not used alcohol since 4/2019     REVIEW OF SYSTEMS:  The comprehensive review of systems from the intake form was reviewed with the patient.  No fever, weight change or fatigue. No dry eyes. No oral ulcers, sore throat or voice change. No palpitations, syncope, angina or  "edema.  No chest pain, excessive sleepiness, shortness of breath or hemoptysis.   No abdominal pain, nausea, vomiting, diarrhea or heartburn.  No skin rash. No focal weakness or numbness. No bleeding or lymphadenopathy. No rhinitis or hives.     Exam:  On physical examination the patient appears the stated age, is in no acute distress, alert and oriented, affect is appropriate, and breathing is non-labored.  Vitals are documented in the EMR and have been reviewed:    Ht 1.803 m (5' 11\")   Wt 84.8 kg (187 lb)   BMI 26.08 kg/m     5' 11\"  Body mass index is 26.08 kg/m .    Rises from chair: with use of assistance   Gait: mildly antalgic   Gains the exam table: with use of assistance    RIGHT hip subjective:  Abd: 25   Add: 10  PFC:  Flexion: 100 sore (back)  IRF: 10  ERF: 30  Impingement test: negative     LEFT hip subjective:  Abd: 30   Add: 10   PFC:  Flexion: 110 sore (back)  IRF: 15   ERF: 30   Impingement test: negative     Distal the circulatory, motor, and sensation exam is intact with 5/5 EHL, gastroc-soleus, and tibialis anterior.  Sensation to light touch is intact.  Dorsalis pedis and posterior tibialis pulses are palpable.  There are no sores on the feet, no bruising, and no lymphedema.    X-rays:   XR HIP LT 1 VW  8/1/2019 2:19 PM   FINDINGS: Single frog-leg lateral view of the left hip was obtained.  Radiograph were compared to the prior examination dated 3/16/2019. The  femoral acetabular joint space appears preserved. There is no evidence  of acute osseous abnormalities.     The pubic symphysis is intact. The SI joint appears unremarkable.                                                                    IMPRESSION:   1. Preserved joint space of the left hip.   2. No evidence of acute osseous abnormalities.    Assessment: This is a 38 year old who presents with concern for AVN of his left hip. 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 However, he has substantial back pain which does make it difficult to completely evaluate his hip.    Plan:    To further evaluate his left hip for AVN we will obtain a MRI. We will call him with the results. In the instance he does not have AVN, we will refer him to non-operative spine clinic for his back pain. Return to clinic as needed.    I have personally examined this patient and have reviewed the clinical presentation and progress note with the resident. I agree with the treatment plan as outlined. The plan was formulated with the resident on the day of the resident's dictation.    Again, thank you for allowing me to participate in the care of your patient.      Sincerely,    Alex Ordoñez MD

## 2019-08-01 NOTE — NURSING NOTE
"Reason For Visit:   Chief Complaint   Patient presents with     Consult     bilateral hip pain       Ht 1.803 m (5' 11\")   Wt 84.8 kg (187 lb)   BMI 26.08 kg/m      Pain Assessment  Patient Currently in Pain: Yes  0-10 Pain Scale: 1(adductor )    Bg Causey ATC  "

## 2019-08-01 NOTE — TELEPHONE ENCOUNTER
Done thanks! Not sure if nurses manage his meds, give them heads up at his treatment center if appropriate

## 2019-08-01 NOTE — PROGRESS NOTES
Chief Complaint: Consult (bilateral hip pain)      Physician:  Carol Serrano    HPI: Carlos Hamilton is a 38 year old male who presents today for evaluation of his left hip, concern for AVN. 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 Believes he is having similar symptoms with his left hip.     Location of symptoms:  groin  Onset:insidious  Duration of symptoms: 1 year  Quality of symptoms: progressive, constant   Severity: moderate, non-radiating   Alleviating: activity modification  Exacerbating: activities  Previous Treatments: Previous treatments include activity modification, oral pain medication, physical therapy (1.5-2 months). Never had an injection     Current Status:  Results of the patient s Hip Disability and Osteoarthritis Outcome Score (HOOS)  are as follows (0-100 scales with 100 being the theoretical best):  Pain: 62.5  Symptoms: 60  ADLs: 57.35  Sports/Recreation: 56.25  Quality of Life: 31.25  (http://koos.nu/)  [facility] Activity Score: 4    MEDICAL HISTORY:   Past Medical History:   Diagnosis Date     Alcohol abuse      Brain aneurysm     s/p clip in 2013     Chronic back pain        Pertinent negatives:  Patient has no history of DVT or PE. Discussed risk factors.    Medications:     Current Outpatient Medications:      acetaminophen (TYLENOL) 325 MG tablet, Take 325-650 mg by mouth, Disp: , Rfl:      albuterol (PROVENTIL HFA) 108 (90 Base) MCG/ACT inhaler, Inhale 2 puffs into the lungs every 4 hours as needed for shortness of breath / dyspnea or wheezing, Disp: 2 Inhaler, Rfl: 1     atorvastatin (LIPITOR) 10 MG tablet, Take 1 tablet (10 mg) by mouth daily, Disp: 90 tablet, Rfl: 3     budesonide-formoterol (SYMBICORT) 160-4.5 MCG/ACT Inhaler, Inhale 2 puffs into the lungs daily, Disp: 10 g, Rfl: 1     buprenorphine HCl-naloxone HCl (SUBOXONE) 8-2 MG per film, Place 1 Film under the tongue, Disp: , Rfl:      cyanocobalamin (VITAMIN B-12)  100 MCG tablet, Take 1 tablet (100 mcg) by mouth daily, Disp: 90 tablet, Rfl: 0     ferrous sulfate (FEROSUL) 325 (65 Fe) MG tablet, Take 1 tablet (325 mg) by mouth daily (with breakfast), Disp: 90 tablet, Rfl: 0     gabapentin (NEURONTIN) 300 MG capsule, Take 300 mg by mouth, Disp: , Rfl:      omeprazole (PRILOSEC) 40 MG DR capsule, Take 1 capsule (40 mg total) by mouth daily before breakfast., Disp: , Rfl: 2     tiZANidine (ZANAFLEX) 4 MG tablet, Take 4 mg by mouth, Disp: , Rfl:      traZODone (DESYREL) 100 MG tablet, Take 1 tablet (100 mg) by mouth At Bedtime, Disp: 90 tablet, Rfl: 0     vitamin B-12 (CYANOCOBALAMIN) 50 MCG tablet, Take 1 tablet (50 mcg) by mouth daily, Disp: 90 tablet, Rfl: 0    Allergies: Diphenhydramine; Naproxen; Pistachio nut extract skin test; and Toradol [ketorolac]    SURGICAL HISTORY:   Past Surgical History:   Procedure Laterality Date     ARTHROPLASTY HIP Right        HISTORY:   Family History   Problem Relation Age of Onset     Alcoholism No family hx of        SOCIAL HISTORY:   Social History     Tobacco Use     Smoking status: Current Every Day Smoker     Packs/day: 1.00     Smokeless tobacco: Former User     Tobacco comment: get drinking under control first   Substance Use Topics     Alcohol use: Yes     Comment: Half gallon vodka per day, everyday of week   Currently looking for a job  Recently moved from [place] 1/2019  Has not used alcohol since 4/2019     REVIEW OF SYSTEMS:  The comprehensive review of systems from the intake form was reviewed with the patient.  No fever, weight change or fatigue. No dry eyes. No oral ulcers, sore throat or voice change. No palpitations, syncope, angina or edema.  No chest pain, excessive sleepiness, shortness of breath or hemoptysis.   No abdominal pain, nausea, vomiting, diarrhea or heartburn.  No skin rash. No focal weakness or numbness. No bleeding or lymphadenopathy. No rhinitis or hives.     Exam:  On physical examination the patient  "appears the stated age, is in no acute distress, alert and oriented, affect is appropriate, and breathing is non-labored.  Vitals are documented in the EMR and have been reviewed:    Ht 1.803 m (5' 11\")   Wt 84.8 kg (187 lb)   BMI 26.08 kg/m    5' 11\"  Body mass index is 26.08 kg/m .    Rises from chair: with use of assistance   Gait: mildly antalgic   Gains the exam table: with use of assistance    RIGHT hip subjective:  Abd: 25   Add: 10  PFC:  Flexion: 100 sore (back)  IRF: 10  ERF: 30  Impingement test: negative     LEFT hip subjective:  Abd: 30   Add: 10   PFC:  Flexion: 110 sore (back)  IRF: 15   ERF: 30   Impingement test: negative     Distal the circulatory, motor, and sensation exam is intact with 5/5 EHL, gastroc-soleus, and tibialis anterior.  Sensation to light touch is intact.  Dorsalis pedis and posterior tibialis pulses are palpable.  There are no sores on the feet, no bruising, and no lymphedema.    X-rays:   XR HIP LT 1 VW  8/1/2019 2:19 PM   FINDINGS: Single frog-leg lateral view of the left hip was obtained.  Radiograph were compared to the prior examination dated 3/16/2019. The  femoral acetabular joint space appears preserved. There is no evidence  of acute osseous abnormalities.     The pubic symphysis is intact. The SI joint appears unremarkable.                                                                    IMPRESSION:   1. Preserved joint space of the left hip.   2. No evidence of acute osseous abnormalities.    Assessment: This is a 38 year old who presents with concern for AVN of his left hip. 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 However, he has substantial back pain which does make it difficult to completely evaluate his hip.    Plan:    To further evaluate his left hip for AVN we will obtain a MRI. We will call him with the results. In the instance he does not " have AVN, we will refer him to non-operative spine clinic for his back pain. Return to clinic as needed.

## 2019-08-06 ENCOUNTER — OFFICE VISIT - HEALTHEAST (OUTPATIENT)
Dept: BEHAVIORAL HEALTH | Facility: CLINIC | Age: 39
End: 2019-08-06

## 2019-08-06 ENCOUNTER — COMMUNICATION - HEALTHEAST (OUTPATIENT)
Dept: BEHAVIORAL HEALTH | Facility: CLINIC | Age: 39
End: 2019-08-06

## 2019-08-06 DIAGNOSIS — F11.20 OPIOID USE DISORDER, MODERATE, DEPENDENCE (H): ICD-10-CM

## 2019-08-06 DIAGNOSIS — F41.1 ANXIETY STATE: ICD-10-CM

## 2019-08-06 DIAGNOSIS — M54.50 CHRONIC MIDLINE LOW BACK PAIN WITHOUT SCIATICA: ICD-10-CM

## 2019-08-06 DIAGNOSIS — F10.20 SEVERE ALCOHOL USE DISORDER (H): ICD-10-CM

## 2019-08-06 DIAGNOSIS — G89.29 CHRONIC MIDLINE LOW BACK PAIN WITHOUT SCIATICA: ICD-10-CM

## 2019-08-06 LAB
AMPHETAMINES UR QL SCN: NORMAL
BARBITURATES UR QL: NORMAL
BENZODIAZ UR QL: NORMAL
BUPRENORPHINE QUAL URINE LHE: ABNORMAL
CANNABINOIDS UR QL SCN: NORMAL
COCAINE UR QL: NORMAL
CREAT UR-MCNC: 17.6 MG/DL
CREAT UR-MCNC: 18.7 MG/DL
METHADONE UR QL SCN: NORMAL
OPIATES UR QL SCN: NORMAL
OXYCODONE UR QL: NORMAL
PCP UR QL SCN: NORMAL

## 2019-08-06 ASSESSMENT — MIFFLIN-ST. JEOR: SCORE: 1783.96

## 2019-08-07 ENCOUNTER — ANCILLARY PROCEDURE (OUTPATIENT)
Dept: MRI IMAGING | Facility: CLINIC | Age: 39
End: 2019-08-07
Attending: ORTHOPAEDIC SURGERY
Payer: COMMERCIAL

## 2019-08-07 DIAGNOSIS — M25.552 LEFT HIP PAIN: ICD-10-CM

## 2019-08-12 ENCOUNTER — TELEPHONE (OUTPATIENT)
Dept: FAMILY MEDICINE | Facility: CLINIC | Age: 39
End: 2019-08-12

## 2019-08-12 ENCOUNTER — OFFICE VISIT (OUTPATIENT)
Dept: FAMILY MEDICINE | Facility: CLINIC | Age: 39
End: 2019-08-12
Payer: COMMERCIAL

## 2019-08-12 VITALS
WEIGHT: 191 LBS | TEMPERATURE: 98.1 F | BODY MASS INDEX: 26.74 KG/M2 | SYSTOLIC BLOOD PRESSURE: 108 MMHG | HEIGHT: 71 IN | HEART RATE: 74 BPM | DIASTOLIC BLOOD PRESSURE: 68 MMHG | OXYGEN SATURATION: 98 %

## 2019-08-12 DIAGNOSIS — F10.21 ALCOHOL DEPENDENCE IN REMISSION (H): ICD-10-CM

## 2019-08-12 DIAGNOSIS — G89.29 CHRONIC MIDLINE LOW BACK PAIN WITHOUT SCIATICA: ICD-10-CM

## 2019-08-12 DIAGNOSIS — M51.379 DEGENERATION OF LUMBAR/LUMBOSACRAL DISC WITHOUT MYELOPATHY: Primary | ICD-10-CM

## 2019-08-12 DIAGNOSIS — J45.30 MILD PERSISTENT ASTHMA, UNSPECIFIED WHETHER COMPLICATED: ICD-10-CM

## 2019-08-12 DIAGNOSIS — M54.50 CHRONIC MIDLINE LOW BACK PAIN WITHOUT SCIATICA: ICD-10-CM

## 2019-08-12 PROCEDURE — 99214 OFFICE O/P EST MOD 30 MIN: CPT | Performed by: NURSE PRACTITIONER

## 2019-08-12 ASSESSMENT — MIFFLIN-ST. JEOR: SCORE: 1798.5

## 2019-08-12 NOTE — PROGRESS NOTES
Subjective     Carlos Hamilton is a 38 year old male who presents to clinic today for the following health issues:    Medical Opinion Form, would like to look over medications to see if he needs refills.    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 No new symptoms     HPI     PROBLEMS TO ADD ON...  above    Patient Active Problem List   Diagnosis     Degeneration of lumbar/lumbosacral disc without myelopathy     Chronic midline low back pain without sciatica     Anxiety state     MARY (generalized anxiety disorder)     Insomnia, unspecified type     Alcohol dependence in remission (H)     Substance abuse in remission (H)     Mixed dyslipidemia     Mild persistent asthma, unspecified whether complicated     Acute pain of both hips     Hx of avascular necrosis of capital femoral epiphysis     Iron deficiency anemia secondary to inadequate dietary iron intake     Past Surgical History:   Procedure Laterality Date     ARTHROPLASTY HIP Right        Social History     Tobacco Use     Smoking status: Current Every Day Smoker     Packs/day: 1.00     Smokeless tobacco: Former User     Tobacco comment: get drinking under control first   Substance Use Topics     Alcohol use: Yes     Comment: Half gallon vodka per day, everyday of week     Family History   Problem Relation Age of Onset     Alcoholism No family hx of          Current Outpatient Medications   Medication Sig Dispense Refill     acetaminophen (TYLENOL) 325 MG tablet Take 325-650 mg by mouth       albuterol  (PROVENTIL HFA) 108 (90 Base) MCG/ACT inhaler Inhale 2 puffs into the lungs every 4 hours as needed for shortness of breath / dyspnea or wheezing 2 Inhaler 1     atorvastatin (LIPITOR) 10 MG tablet Take 1 tablet (10 mg) by mouth daily 90 tablet 3     budesonide-formoterol (SYMBICORT) 160-4.5 MCG/ACT Inhaler Inhale 2 puffs into the lungs daily 10 g 1     buprenorphine HCl-naloxone HCl (SUBOXONE) 8-2 MG per film Place 1 Film under the tongue       cyanocobalamin (VITAMIN B-12) 100 MCG tablet Take 1 tablet (100 mcg) by mouth daily 90 tablet 0     ferrous sulfate (FEROSUL) 325 (65 Fe) MG tablet Take 1 tablet (325 mg) by mouth daily (with breakfast) 90 tablet 0     gabapentin (NEURONTIN) 300 MG capsule Take 300 mg by mouth       omeprazole (PRILOSEC) 40 MG DR capsule Take 1 capsule (40 mg total) by mouth daily before breakfast.  2     order for DME Equipment being ordered: adult mask for neb machine with tubing 1 each 0     tiZANidine (ZANAFLEX) 4 MG tablet Take 4 mg by mouth       traZODone (DESYREL) 100 MG tablet Take 1 tablet (100 mg) by mouth At Bedtime 90 tablet 0     vitamin B-12 (CYANOCOBALAMIN) 50 MCG tablet Take 1 tablet (50 mcg) by mouth daily 90 tablet 0     diazepam (VALIUM) 5 MG tablet Take 1 tablet (5 mg) by mouth at the direction of the radiologist. May repeat x1 per radiology discretion. (Patient not taking: Reported on 8/12/2019) 2 tablet 0     Allergies   Allergen Reactions     Diphenhydramine Swelling     Per patient, tongue/lips swelling, itchy eyes with both generic diphenhydramine and brand Benadryl      Naproxen Swelling     Pistachio Nut Extract Skin Test      Toradol [Ketorolac] Itching     BP Readings from Last 3 Encounters:   08/12/19 108/68   07/10/19 103/71   07/02/19 102/60    Wt Readings from Last 3 Encounters:   08/12/19 86.6 kg (191 lb)   08/01/19 84.8 kg (187 lb)   07/10/19 85.1 kg (187 lb 9.6 oz)                      Reviewed and updated as needed this visit by Provider         Review  "of Systems   ROS COMP: Constitutional, HEENT, cardiovascular, pulmonary, GI, , musculoskeletal, neuro, skin, endocrine and psych systems are negative, except as otherwise noted.      Objective    /68   Pulse 74   Temp 98.1  F (36.7  C) (Temporal)   Ht 1.803 m (5' 11\")   Wt 86.6 kg (191 lb)   SpO2 98%   BMI 26.64 kg/m    Body mass index is 26.64 kg/m .  Physical Exam   GENERAL: healthy, alert and no distress  EYES: Eyes grossly normal to inspection, PERRL and conjunctivae and sclerae normal  NECK: no adenopathy, no asymmetry, masses, or scars and thyroid normal to palpation  RESP: lungs clear to auscultation - no rales, rhonchi or wheezes  CV: regular rate and rhythm, normal S1 S2, no S3 or S4, no murmur, click or rub, no peripheral edema and peripheral pulses strong  ABDOMEN: soft, nontender, no hepatosplenomegaly, no masses and bowel sounds normal  MS: no gross musculoskeletal defects noted, no edema, painful rom bilateral hips and low back, tenderness to palpation of lumbar and paralumbar spine, normal gait  SKIN: no suspicious lesions or rashes  NEURO: Normal strength and tone, mentation intact and speech normal  PSYCH: mentation appears normal, affect normal/bright    Diagnostic Test Results:  Labs reviewed in Epic        Assessment & Plan       ICD-10-CM    1. Degeneration of lumbar/lumbosacral disc without myelopathy M51.37 [facility]   2. Chronic midline low back pain without sciatica M54.5 [facility]    G89.29    3. Mild persistent asthma, unspecified whether complicated J45.30 order for DME   4. Alcohol dependence in remission (H) F10.21    start PT, medical opinion form signed and good ergonomics at work, don't do anything that cuases pain until PT can help guide him  Continue treatment  Mask for neb use, suspect COPD vs asthma is more severe than pt was aware of, declines quit plan at this time, strongly recommended cessation      " "  Tobacco Cessation:   reports that he has been smoking.  He has been smoking about 1.00 pack per day. He has quit using smokeless tobacco.  Tobacco Cessation Action Plan: Self help information given to patient      BMI:   Estimated body mass index is 26.64 kg/m  as calculated from the following:    Height as of this encounter: 1.803 m (5' 11\").    Weight as of this encounter: 86.6 kg (191 lb).   Weight management plan: Discussed healthy diet and exercise guidelines        There are no Patient Instructions on file for this visit.    No follow-ups on file.    DAVID Bernard [facility]        "

## 2019-08-12 NOTE — TELEPHONE ENCOUNTER
Patient called back. Went over ACT via phone and [employer]. Score was 23. Has OV later today with JJ. Pt also requesting nebulizer mask. Will discuss with provider in visit.    Yogesh White CMA.

## 2019-08-13 ENCOUNTER — PATIENT OUTREACH (OUTPATIENT)
Dept: CARE COORDINATION | Facility: CLINIC | Age: 39
End: 2019-08-13

## 2019-08-13 ASSESSMENT — ASTHMA QUESTIONNAIRES: ACT_TOTALSCORE: 23

## 2019-08-14 ASSESSMENT — ACTIVITIES OF DAILY LIVING (ADL): DEPENDENT_IADLS:: INDEPENDENT

## 2019-08-14 NOTE — PROGRESS NOTES
Clinic Care Coordination Contact    Follow Up Progress Note      Assessment: Pt stated that he continues to live in sober housing. Pt reports that he is overall doing well, yet is still financially struggling. Pt stated that he would like help looking into applying for disability. Pt discussed that he was able to apply for General Assistance, but is still unsure what the outcome is on it.     Goals addressed this encounter:   Goals Addressed                 This Visit's Progress       Patient Stated      COMPLETED: #1 Financial Wellbeing (pt-stated)        Goal Statement: I need assistance applying for General Assistance   Measure of Success: Pt will apply for General Assistance   Supportive Steps to Achieve: care coordination  Barriers: unsure if he qualifies   Strengths: Asking questions regarding GA  Date to Achieve By: 5/31/19  Patient expressed understanding of goal: yes              #1 Financial Wellbeing (pt-stated)        Goal Statement: I want to apply for disability   Measure of Success: PT will apply for disability.   Supportive Steps to Achieve: care coordination/county   Barriers: Not being accepted the first time   Strengths: Filling out paperwork independently   Date to Achieve By: 9/30/19  Patient expressed understanding of goal: yes                 Intervention/Education provided during outreach: SW provided pt with information on where to go fill out paperwork for disability. SW suggested filling out the application either over the phone or in the county office. SW will continue to follow up with Pt to assist in achieving goals.     Outreach Frequency: monthly    Plan:   Pt will call or go to the county office to apply for disability.   Pt will contact SW with any questions or concerns.   Care Coordinator will follow up with pt in 3 weeks.     MICHELLE Ambrosio  Clinic Care Coordinator   [facility]   [phone]

## 2019-08-18 ENCOUNTER — COMMUNICATION - HEALTHEAST (OUTPATIENT)
Dept: BEHAVIORAL HEALTH | Facility: CLINIC | Age: 39
End: 2019-08-18

## 2019-08-22 ENCOUNTER — COMMUNICATION - HEALTHEAST (OUTPATIENT)
Dept: BEHAVIORAL HEALTH | Facility: CLINIC | Age: 39
End: 2019-08-22

## 2019-08-26 ENCOUNTER — COMMUNICATION - HEALTHEAST (OUTPATIENT)
Dept: BEHAVIORAL HEALTH | Facility: CLINIC | Age: 39
End: 2019-08-26

## 2019-08-26 DIAGNOSIS — F11.20 OPIOID USE DISORDER, MODERATE, DEPENDENCE (H): ICD-10-CM

## 2019-09-10 ENCOUNTER — OFFICE VISIT - HEALTHEAST (OUTPATIENT)
Dept: BEHAVIORAL HEALTH | Facility: CLINIC | Age: 39
End: 2019-09-10

## 2019-09-10 ENCOUNTER — COMMUNICATION - HEALTHEAST (OUTPATIENT)
Dept: BEHAVIORAL HEALTH | Facility: CLINIC | Age: 39
End: 2019-09-10

## 2019-09-10 DIAGNOSIS — G89.29 CHRONIC MIDLINE LOW BACK PAIN WITHOUT SCIATICA: ICD-10-CM

## 2019-09-10 DIAGNOSIS — K21.9 GASTROESOPHAGEAL REFLUX DISEASE, ESOPHAGITIS PRESENCE NOT SPECIFIED: ICD-10-CM

## 2019-09-10 DIAGNOSIS — F11.20 OPIOID USE DISORDER, MODERATE, DEPENDENCE (H): ICD-10-CM

## 2019-09-10 DIAGNOSIS — F41.1 ANXIETY STATE: ICD-10-CM

## 2019-09-10 DIAGNOSIS — M54.50 CHRONIC MIDLINE LOW BACK PAIN WITHOUT SCIATICA: ICD-10-CM

## 2019-09-10 DIAGNOSIS — F10.20 SEVERE ALCOHOL USE DISORDER (H): ICD-10-CM

## 2019-09-10 LAB
AMPHETAMINES UR QL SCN: NORMAL
BARBITURATES UR QL: NORMAL
BENZODIAZ UR QL: NORMAL
CANNABINOIDS UR QL SCN: NORMAL
COCAINE UR QL: NORMAL
CREAT UR-MCNC: 123.7 MG/DL
METHADONE UR QL SCN: NORMAL
OPIATES UR QL SCN: NORMAL
OXYCODONE UR QL: NORMAL
PCP UR QL SCN: NORMAL

## 2019-09-10 ASSESSMENT — ANXIETY QUESTIONNAIRES
IF YOU CHECKED OFF ANY PROBLEMS ON THIS QUESTIONNAIRE, HOW DIFFICULT HAVE THESE PROBLEMS MADE IT FOR YOU TO DO YOUR WORK, TAKE CARE OF THINGS AT HOME, OR GET ALONG WITH OTHER PEOPLE: NOT DIFFICULT AT ALL
GAD7 TOTAL SCORE: 6
4. TROUBLE RELAXING: SEVERAL DAYS
2. NOT BEING ABLE TO STOP OR CONTROL WORRYING: SEVERAL DAYS
5. BEING SO RESTLESS THAT IT IS HARD TO SIT STILL: SEVERAL DAYS
1. FEELING NERVOUS, ANXIOUS, OR ON EDGE: SEVERAL DAYS
7. FEELING AFRAID AS IF SOMETHING AWFUL MIGHT HAPPEN: NOT AT ALL
6. BECOMING EASILY ANNOYED OR IRRITABLE: NOT AT ALL
3. WORRYING TOO MUCH ABOUT DIFFERENT THINGS: MORE THAN HALF THE DAYS

## 2019-09-10 ASSESSMENT — PATIENT HEALTH QUESTIONNAIRE - PHQ9: SUM OF ALL RESPONSES TO PHQ QUESTIONS 1-9: 2

## 2019-09-10 ASSESSMENT — MIFFLIN-ST. JEOR: SCORE: 1774.89

## 2019-09-12 ENCOUNTER — COMMUNICATION - HEALTHEAST (OUTPATIENT)
Dept: BEHAVIORAL HEALTH | Facility: CLINIC | Age: 39
End: 2019-09-12

## 2019-09-12 DIAGNOSIS — K21.9 GASTROESOPHAGEAL REFLUX DISEASE, ESOPHAGITIS PRESENCE NOT SPECIFIED: ICD-10-CM

## 2019-09-13 ENCOUNTER — PATIENT OUTREACH (OUTPATIENT)
Dept: CARE COORDINATION | Facility: CLINIC | Age: 39
End: 2019-09-13

## 2019-09-13 NOTE — LETTER
Health Care Home - Access Care Plan    About Me:    Patient Name:  Carlos Hamilton    YOB: 1980  Age:                      38 year old   Pat MRN:     [id number] Telephone Information:   Home Phone [phone]   Mobile [phone]       Address:  [address]  [place] [address] Email address:  [email or web address]      Emergency Contact(s)   Name Relationship Lgl Grd Work Phone Home Phone Mobile Phone   1. NONE PER PT 3/* Other No [phone]             Health Maintenance: Routine Health maintenance Reviewed: Due/Overdue   Health Maintenance Due   Topic Date Due     URINE DRUG SCREEN  1980     ASTHMA ACTION PLAN  1980     DTAP/TDAP/TD IMMUNIZATION (1 - Tdap) 10/17/2003     INFLUENZA VACCINE (1) 09/01/2019         My Access Plan  Medical Emergency 911   Questions or concerns during clinic hours Primary Clinic Line, I will call the clinic directly: [facility] [phone]   24 Hour Appointment Line [phone] or  [phone] [phone]) (toll free)   24 Hour Nurse Line [phone] (toll free)   Questions or concerns outside clinic hours 24 Hour Appointment Line, I will call the after-hours on-call line:   [facility] [phone] or [phone]) (toll-free)   Preferred Urgent Care     Preferred Hospital     Preferred Pharmacy [address]     Behavioral Health Crisis Line The National Suicide Prevention Lifeline at 1-110.176.1512 or 911                     My Care Team Members  Patient Care Team       Relationship Specialty Notifications Start End    Carol Serrano APRN CNP PCP - General Nurse Practitioner - Family  4/30/19     Phone: [phone] Fax: [phone] [address] [address]    Carol Serrano APRN CNP Assigned PCP   4/5/19     Phone: [phone] Fax: [phone] [address] [facility] [address]    Alia Saab, BSW Lead Care Coordinator Primary Care - CC Admissions 5/6/19     Phone: [phone]

## 2019-09-13 NOTE — PROGRESS NOTES
Clinic Care Coordination Contact  [facility]/Voicemail       Clinical Data: Care Coordinator Outreach  Outreach attempted x 1.  Left message on patient's voicemail with call back information and requested return call.  Plan: Care Coordinator sent care coordination introduction letter on 9/13/19 via mail. Care Coordinator will try to reach patient again in 3-5 business days.    MICHELLE Ambrosio  Clinic Care Coordinator   [facility]   [phone]

## 2019-09-13 NOTE — LETTER
[place] [facility]ARE COORDIN[facility]  [address]   [facility] [address]  September 13, 2019    Carlos Hamilton  [address]  [place] [address]      Dear Carlos,    I am a clinic care coordinator who works with DAVID Bernard CNP at [facility]. I recently tried to call and was unable to reach you. I wanted to introduce myself and provide you with my contact information so that you can call me with questions or concerns about your health care. Below is a description of clinic care coordination and how I can further assist you.     The clinic care coordinator is a registered nurse and/or  who understand the health care system. The goal of clinic care coordination is to help you manage your health and improve access to the [facility] system in the most efficient manner. The registered nurse can assist you in meeting your health care goals by providing education, coordinating services, and strengthening the communication among your providers. The  can assist you with financial, behavioral, psychosocial, chemical dependency, counseling, and/or psychiatric resources.    Please feel free to contact me at [phone], with any questions or concerns. We at [facility] are focused on providing you with the highest-quality healthcare experience possible and that all starts with you.     Sincerely,     Alia Saab, BSW    Enclosed: I have enclosed a copy of a 24 Hour Access Plan. This has helpful phone numbers for you to call when needed. Please keep this in an easy to access place to use as needed.

## 2019-09-17 ENCOUNTER — THERAPY VISIT (OUTPATIENT)
Dept: PHYSICAL THERAPY | Facility: CLINIC | Age: 39
End: 2019-09-17
Attending: NURSE PRACTITIONER
Payer: COMMERCIAL

## 2019-09-17 DIAGNOSIS — M54.50 CHRONIC MIDLINE LOW BACK PAIN WITHOUT SCIATICA: ICD-10-CM

## 2019-09-17 DIAGNOSIS — G89.29 CHRONIC MIDLINE LOW BACK PAIN WITHOUT SCIATICA: ICD-10-CM

## 2019-09-17 DIAGNOSIS — M51.379 DEGENERATION OF LUMBAR/LUMBOSACRAL DISC WITHOUT MYELOPATHY: ICD-10-CM

## 2019-09-17 PROCEDURE — 97530 THERAPEUTIC ACTIVITIES: CPT | Mod: GP | Performed by: PHYSICAL THERAPIST

## 2019-09-17 PROCEDURE — 97110 THERAPEUTIC EXERCISES: CPT | Mod: GP | Performed by: PHYSICAL THERAPIST

## 2019-09-17 NOTE — PROGRESS NOTES
Subjective:  HPI                    Objective:  System    Physical Exam    General     ROS    Assessment/Plan:    PROGRESS  REPORT       SUBJECTIVE  Pt presents to PT with persistent back pain.  It's been 2 months since he has last followed up for this problem.  It reports the back locks up every morning around 4am.    Pt reports things feel worse since the last time he was in.     Pt reports moderate compliance with the HEP.   He reports he would like to focus more on strengthening.    He reports he is afraid of doing something that could mess up his hip replacement.    OBJECTIVE  Lumbar motion painful all directions    Hip motion WFL.    Moderate R hip weakness throughout      ASSESSMENT/PLAN  Updated problem list and treatment plan: Diagnosis 1:  Back and hip pain  Pain -  hot/cold therapy  Decreased ROM/flexibility - manual therapy and therapeutic exercise  Decreased joint mobility - manual therapy and therapeutic exercise  Decreased strength - therapeutic exercise and therapeutic activities  Impaired muscle performance - neuro re-education  Decreased function - therapeutic activities  Impaired posture - neuro re-education  STG/LTGs have been met or progress has been made towards goals:  Yes (See Goal flow sheet completed today.)  Assessment of Progress: The patient's condition has potential to improve.  Self Management Plans:  Patient has been instructed in a home treatment program.  I have re-evaluated this patient and find that the nature, scope, duration and intensity of the therapy is appropriate for the medical condition of the patient.  Carlos continues to require the following intervention to meet STG and LTG's:  PT    Recommendations:  This patient would benefit from continued therapy.     Frequency:  1 X week, once daily  Duration:  for 6 weeks        Please refer to the daily flowsheet for treatment today, total treatment time and time spent performing 1:1 timed codes.

## 2019-09-20 ENCOUNTER — COMMUNICATION - HEALTHEAST (OUTPATIENT)
Dept: BEHAVIORAL HEALTH | Facility: CLINIC | Age: 39
End: 2019-09-20

## 2019-09-20 DIAGNOSIS — G89.29 CHRONIC MIDLINE LOW BACK PAIN WITHOUT SCIATICA: ICD-10-CM

## 2019-09-20 DIAGNOSIS — M54.50 CHRONIC MIDLINE LOW BACK PAIN WITHOUT SCIATICA: ICD-10-CM

## 2019-10-02 ENCOUNTER — PATIENT OUTREACH (OUTPATIENT)
Dept: CARE COORDINATION | Facility: CLINIC | Age: 39
End: 2019-10-02

## 2019-10-02 ASSESSMENT — ACTIVITIES OF DAILY LIVING (ADL): DEPENDENT_IADLS:: INDEPENDENT

## 2019-10-02 NOTE — PROGRESS NOTES
Clinic Care Coordination Contact    Follow Up Progress Note      Assessment: SW talked with Pt over the phone. Pt stated that he has been doing well. Pt reports having a medication change and states it has been a positive change for him. Pt stated he started working two part time jobs and is really excited about this opportunity. Pt stated he has some concerns about losing his MA, but he stated if he has questions regarding his insurance coverage he will contact SW.     Goals addressed this encounter:   Goals Addressed                 This Visit's Progress       Patient Stated      COMPLETED: #1 Financial Wellbeing (pt-stated)        Goal Statement: I want to apply for disability   Measure of Success: PT will apply for disability.   Supportive Steps to Achieve: care coordination/county   Barriers: Not being accepted the first time   Strengths: Filling out paperwork independently   Date to Achieve By: 9/30/19  Patient expressed understanding of goal: yes                 Intervention/Education provided during outreach: SW provided intervention by letting pt know he can continue to reach out with any other questions or concerns. Pt stated he doesn't feel he needs anything else at this time, but will reach out as needed.             Plan:   Pt will contact SW as needed.   Care Coordinator will no longer follow up with Pt.     MICHELLE Ambrosio  Clinic Care Coordinator   [facility]   [phone]

## 2019-10-07 DIAGNOSIS — Z87.39 HX OF AVASCULAR NECROSIS OF CAPITAL FEMORAL EPIPHYSIS: ICD-10-CM

## 2019-10-07 DIAGNOSIS — D50.8 IRON DEFICIENCY ANEMIA SECONDARY TO INADEQUATE DIETARY IRON INTAKE: ICD-10-CM

## 2019-10-07 RX ORDER — FERROUS SULFATE 325(65) MG
325 TABLET ORAL
Qty: 90 TABLET | Refills: 0 | Status: SHIPPED | OUTPATIENT
Start: 2019-10-07 | End: 2021-08-16

## 2019-10-07 NOTE — TELEPHONE ENCOUNTER
"Requested Prescriptions   Pending Prescriptions Disp Refills     ferrous sulfate (FEROSUL) 325 (65 Fe) MG tablet 90 tablet 0     Sig: Take 1 tablet (325 mg) by mouth daily (with breakfast)  Last Written Prescription Date:  07/10/2019  Last Fill Quantity: 90,  # refills: 0   Last office visit: 8/12/2019 with prescribing provider:  08/12/2019   Future Office Visit:         Iron Supplements Passed - 10/7/2019 10:46 AM        Passed - Patient is 12 years of age or older        Passed - Recent (12 mo) or future (30 days) visit within the authorizing provider's specialty     Patient has had an office visit with the authorizing provider or a provider within the authorizing providers department within the previous 12 mos or has a future within next 30 days. See \"Patient Info\" tab in inbasket, or \"Choose Columns\" in Meds & Orders section of the refill encounter.              Passed - Hgb OR Hct on record within the past 12 mos.     Patient need only have had a HGB or HCT on file in the past 12 mos. That result does not need to be normal.    Recent Labs   Lab Test 07/02/19  1546 03/12/19  1632 02/28/19  0705   HGB 13.2* 14.0 13.6     Recent Labs   Lab Test 07/02/19  1546 03/12/19  1632 02/28/19  0705   HCT 40.6 41.2 40.5       Please verify a HGB or HCT has been checked SINCE THE LAST DOSE CHANGE.            Passed - Medication is active on med list        cyanocobalamin (VITAMIN B-12) 100 MCG tablet 90 tablet 0     Sig: Take 1 tablet (100 mcg) by mouth daily  Last Written Prescription Date:  07/11/2019  Last Fill Quantity: 90,  # refills: 0   Last office visit: 8/12/2019 with prescribing provider:  08/12/2019   Future Office Visit:         Vitamin Supplements (Adult) Protocol Failed - 10/7/2019 10:46 AM        Failed - Normal Hgb on file in past 12 mos     Recent Labs   Lab Test 07/02/19  1546   HGB 13.2*               Passed - High dose Vitamin D not ordered        Passed - Recent (12 mo) or future (30 days) visit within " "the authorizing provider's specialty     Patient has had an office visit with the authorizing provider or a provider within the authorizing providers department within the previous 12 mos or has a future within next 30 days. See \"Patient Info\" tab in inbasket, or \"Choose Columns\" in Meds & Orders section of the refill encounter.              Passed - Medication is active on med list        "

## 2019-10-07 NOTE — TELEPHONE ENCOUNTER
Carol    Med failed protocol, cyanocobalamin  HGB 13.2    Prescription approved per [facility] Refill Protocol. Ferrous sulfate    Sharyn Mendiola RN   [facility]

## 2019-10-08 ENCOUNTER — OFFICE VISIT - HEALTHEAST (OUTPATIENT)
Dept: BEHAVIORAL HEALTH | Facility: CLINIC | Age: 39
End: 2019-10-08

## 2019-10-08 DIAGNOSIS — G89.29 CHRONIC MIDLINE LOW BACK PAIN WITHOUT SCIATICA: ICD-10-CM

## 2019-10-08 DIAGNOSIS — F11.20 OPIOID USE DISORDER, MODERATE, DEPENDENCE (H): ICD-10-CM

## 2019-10-08 DIAGNOSIS — K21.9 GASTROESOPHAGEAL REFLUX DISEASE, ESOPHAGITIS PRESENCE NOT SPECIFIED: ICD-10-CM

## 2019-10-08 DIAGNOSIS — M54.50 CHRONIC MIDLINE LOW BACK PAIN WITHOUT SCIATICA: ICD-10-CM

## 2019-10-08 DIAGNOSIS — F41.1 ANXIETY STATE: ICD-10-CM

## 2019-10-08 DIAGNOSIS — F10.20 SEVERE ALCOHOL USE DISORDER (H): ICD-10-CM

## 2019-10-08 LAB
AMPHETAMINES UR QL SCN: NORMAL
BARBITURATES UR QL: NORMAL
BENZODIAZ UR QL: NORMAL
CANNABINOIDS UR QL SCN: NORMAL
COCAINE UR QL: NORMAL
CREAT UR-MCNC: 19.8 MG/DL
METHADONE UR QL SCN: NORMAL
OPIATES UR QL SCN: NORMAL
OXYCODONE UR QL: NORMAL
PCP UR QL SCN: NORMAL

## 2019-10-08 RX ORDER — UBIDECARENONE 75 MG
100 CAPSULE ORAL DAILY
Qty: 90 TABLET | Refills: 0 | Status: SHIPPED | OUTPATIENT
Start: 2019-10-08 | End: 2021-08-16

## 2019-10-08 ASSESSMENT — ANXIETY QUESTIONNAIRES
GAD7 TOTAL SCORE: 3
1. FEELING NERVOUS, ANXIOUS, OR ON EDGE: SEVERAL DAYS
5. BEING SO RESTLESS THAT IT IS HARD TO SIT STILL: SEVERAL DAYS
IF YOU CHECKED OFF ANY PROBLEMS ON THIS QUESTIONNAIRE, HOW DIFFICULT HAVE THESE PROBLEMS MADE IT FOR YOU TO DO YOUR WORK, TAKE CARE OF THINGS AT HOME, OR GET ALONG WITH OTHER PEOPLE: NOT DIFFICULT AT ALL
2. NOT BEING ABLE TO STOP OR CONTROL WORRYING: NOT AT ALL
6. BECOMING EASILY ANNOYED OR IRRITABLE: NOT AT ALL
7. FEELING AFRAID AS IF SOMETHING AWFUL MIGHT HAPPEN: NOT AT ALL
4. TROUBLE RELAXING: NOT AT ALL
3. WORRYING TOO MUCH ABOUT DIFFERENT THINGS: SEVERAL DAYS

## 2019-10-08 ASSESSMENT — PATIENT HEALTH QUESTIONNAIRE - PHQ9: SUM OF ALL RESPONSES TO PHQ QUESTIONS 1-9: 1

## 2019-10-08 ASSESSMENT — MIFFLIN-ST. JEOR: SCORE: 1770.36

## 2019-10-16 ENCOUNTER — COMMUNICATION - HEALTHEAST (OUTPATIENT)
Dept: BEHAVIORAL HEALTH | Facility: CLINIC | Age: 39
End: 2019-10-16

## 2019-10-16 DIAGNOSIS — M54.50 CHRONIC MIDLINE LOW BACK PAIN WITHOUT SCIATICA: ICD-10-CM

## 2019-10-16 DIAGNOSIS — G89.29 CHRONIC MIDLINE LOW BACK PAIN WITHOUT SCIATICA: ICD-10-CM

## 2019-10-24 DIAGNOSIS — G47.00 INSOMNIA, UNSPECIFIED TYPE: ICD-10-CM

## 2019-10-24 RX ORDER — TRAZODONE HYDROCHLORIDE 100 MG/1
100 TABLET ORAL AT BEDTIME
Qty: 90 TABLET | Refills: 2 | Status: SHIPPED | OUTPATIENT
Start: 2019-10-24 | End: 2021-08-16

## 2019-10-24 NOTE — TELEPHONE ENCOUNTER
Trazodone used for insomnia.   Refilled per [facility] refill policy.    Lilian Avelar RN

## 2019-10-24 NOTE — TELEPHONE ENCOUNTER
"Requested Prescriptions   Pending Prescriptions Disp Refills     traZODone (DESYREL) 100 MG tablet 90 tablet 0     Sig: Take 1 tablet (100 mg) by mouth At Bedtime  Last Written Prescription Date:  07/02/2019  Last Fill Quantity: 90,  # refills: 0   Last office visit: 8/12/2019 with prescribing provider:  08/12/2019   Future Office Visit:         Serotonin Modulators Passed - 10/24/2019 12:21 PM        Passed - Recent (12 mo) or future (30 days) visit within the authorizing provider's specialty     Patient has had an office visit with the authorizing provider or a provider within the authorizing providers department within the previous 12 mos or has a future within next 30 days. See \"Patient Info\" tab in inbasket, or \"Choose Columns\" in Meds & Orders section of the refill encounter.              Passed - Medication is active on med list        Passed - Patient is age 18 or older        "

## 2019-11-05 ENCOUNTER — OFFICE VISIT - HEALTHEAST (OUTPATIENT)
Dept: BEHAVIORAL HEALTH | Facility: CLINIC | Age: 39
End: 2019-11-05

## 2019-11-05 ENCOUNTER — HEALTH MAINTENANCE LETTER (OUTPATIENT)
Age: 39
End: 2019-11-05

## 2019-11-05 DIAGNOSIS — F41.1 ANXIETY STATE: ICD-10-CM

## 2019-11-05 DIAGNOSIS — F10.20 SEVERE ALCOHOL USE DISORDER (H): ICD-10-CM

## 2019-11-05 DIAGNOSIS — M54.50 CHRONIC MIDLINE LOW BACK PAIN WITHOUT SCIATICA: ICD-10-CM

## 2019-11-05 DIAGNOSIS — K21.9 GASTROESOPHAGEAL REFLUX DISEASE, ESOPHAGITIS PRESENCE NOT SPECIFIED: ICD-10-CM

## 2019-11-05 DIAGNOSIS — G89.29 CHRONIC MIDLINE LOW BACK PAIN WITHOUT SCIATICA: ICD-10-CM

## 2019-11-05 DIAGNOSIS — F11.20 OPIOID USE DISORDER, MODERATE, DEPENDENCE (H): ICD-10-CM

## 2019-11-05 LAB
AMPHETAMINES UR QL SCN: NORMAL
BARBITURATES UR QL: NORMAL
BENZODIAZ UR QL: NORMAL
CANNABINOIDS UR QL SCN: NORMAL
COCAINE UR QL: NORMAL
CREAT UR-MCNC: 12 MG/DL
METHADONE UR QL SCN: NORMAL
OPIATES UR QL SCN: NORMAL
OXYCODONE UR QL: NORMAL
PCP UR QL SCN: NORMAL

## 2019-11-05 ASSESSMENT — MIFFLIN-ST. JEOR: SCORE: 1793.04

## 2019-11-05 ASSESSMENT — ANXIETY QUESTIONNAIRES
IF YOU CHECKED OFF ANY PROBLEMS ON THIS QUESTIONNAIRE, HOW DIFFICULT HAVE THESE PROBLEMS MADE IT FOR YOU TO DO YOUR WORK, TAKE CARE OF THINGS AT HOME, OR GET ALONG WITH OTHER PEOPLE: NOT DIFFICULT AT ALL
GAD7 TOTAL SCORE: 4
4. TROUBLE RELAXING: SEVERAL DAYS
6. BECOMING EASILY ANNOYED OR IRRITABLE: NOT AT ALL
3. WORRYING TOO MUCH ABOUT DIFFERENT THINGS: NOT AT ALL
7. FEELING AFRAID AS IF SOMETHING AWFUL MIGHT HAPPEN: NOT AT ALL
1. FEELING NERVOUS, ANXIOUS, OR ON EDGE: SEVERAL DAYS
5. BEING SO RESTLESS THAT IT IS HARD TO SIT STILL: MORE THAN HALF THE DAYS
2. NOT BEING ABLE TO STOP OR CONTROL WORRYING: NOT AT ALL

## 2019-11-05 ASSESSMENT — PATIENT HEALTH QUESTIONNAIRE - PHQ9: SUM OF ALL RESPONSES TO PHQ QUESTIONS 1-9: 2

## 2019-12-03 ENCOUNTER — OFFICE VISIT - HEALTHEAST (OUTPATIENT)
Dept: BEHAVIORAL HEALTH | Facility: CLINIC | Age: 39
End: 2019-12-03

## 2019-12-03 DIAGNOSIS — M54.50 CHRONIC MIDLINE LOW BACK PAIN WITHOUT SCIATICA: ICD-10-CM

## 2019-12-03 DIAGNOSIS — F10.20 SEVERE ALCOHOL USE DISORDER (H): ICD-10-CM

## 2019-12-03 DIAGNOSIS — F41.1 ANXIETY STATE: ICD-10-CM

## 2019-12-03 DIAGNOSIS — G89.29 CHRONIC MIDLINE LOW BACK PAIN WITHOUT SCIATICA: ICD-10-CM

## 2019-12-03 DIAGNOSIS — F11.20 OPIOID USE DISORDER, MODERATE, DEPENDENCE (H): ICD-10-CM

## 2019-12-03 DIAGNOSIS — K21.9 GASTROESOPHAGEAL REFLUX DISEASE, ESOPHAGITIS PRESENCE NOT SPECIFIED: ICD-10-CM

## 2019-12-03 LAB
AMPHETAMINES UR QL SCN: NORMAL
BARBITURATES UR QL: NORMAL
BENZODIAZ UR QL: NORMAL
BUPRENORPHINE QUAL URINE LHE: ABNORMAL
CANNABINOIDS UR QL SCN: NORMAL
COCAINE UR QL: NORMAL
CREAT UR-MCNC: 19.6 MG/DL
CREAT UR-MCNC: 20.1 MG/DL
METHADONE UR QL SCN: NORMAL
OPIATES UR QL SCN: NORMAL
OXYCODONE UR QL: NORMAL
PCP UR QL SCN: NORMAL

## 2019-12-03 ASSESSMENT — MIFFLIN-ST. JEOR: SCORE: 1806.64

## 2019-12-03 ASSESSMENT — ANXIETY QUESTIONNAIRES
2. NOT BEING ABLE TO STOP OR CONTROL WORRYING: SEVERAL DAYS
3. WORRYING TOO MUCH ABOUT DIFFERENT THINGS: SEVERAL DAYS
4. TROUBLE RELAXING: SEVERAL DAYS
5. BEING SO RESTLESS THAT IT IS HARD TO SIT STILL: SEVERAL DAYS
IF YOU CHECKED OFF ANY PROBLEMS ON THIS QUESTIONNAIRE, HOW DIFFICULT HAVE THESE PROBLEMS MADE IT FOR YOU TO DO YOUR WORK, TAKE CARE OF THINGS AT HOME, OR GET ALONG WITH OTHER PEOPLE: NOT DIFFICULT AT ALL
6. BECOMING EASILY ANNOYED OR IRRITABLE: NOT AT ALL
1. FEELING NERVOUS, ANXIOUS, OR ON EDGE: SEVERAL DAYS
7. FEELING AFRAID AS IF SOMETHING AWFUL MIGHT HAPPEN: NOT AT ALL
GAD7 TOTAL SCORE: 5

## 2019-12-03 ASSESSMENT — PATIENT HEALTH QUESTIONNAIRE - PHQ9: SUM OF ALL RESPONSES TO PHQ QUESTIONS 1-9: 4

## 2019-12-05 LAB
ETHYL GLUCURONIDE UR CFM-MCNC: <100 NG/ML
ETHYL SULFATE UR CFM-MCNC: <100 NG/ML

## 2019-12-06 ENCOUNTER — COMMUNICATION - HEALTHEAST (OUTPATIENT)
Dept: BEHAVIORAL HEALTH | Facility: CLINIC | Age: 39
End: 2019-12-06

## 2019-12-09 ENCOUNTER — COMMUNICATION - HEALTHEAST (OUTPATIENT)
Dept: BEHAVIORAL HEALTH | Facility: CLINIC | Age: 39
End: 2019-12-09

## 2019-12-10 ENCOUNTER — COMMUNICATION - HEALTHEAST (OUTPATIENT)
Dept: BEHAVIORAL HEALTH | Facility: CLINIC | Age: 39
End: 2019-12-10

## 2019-12-12 ENCOUNTER — COMMUNICATION - HEALTHEAST (OUTPATIENT)
Dept: BEHAVIORAL HEALTH | Facility: CLINIC | Age: 39
End: 2019-12-12

## 2019-12-12 ENCOUNTER — AMBULATORY - HEALTHEAST (OUTPATIENT)
Dept: BEHAVIORAL HEALTH | Facility: CLINIC | Age: 39
End: 2019-12-12

## 2019-12-12 DIAGNOSIS — G89.29 CHRONIC MIDLINE LOW BACK PAIN WITHOUT SCIATICA: ICD-10-CM

## 2019-12-12 DIAGNOSIS — M54.50 CHRONIC MIDLINE LOW BACK PAIN WITHOUT SCIATICA: ICD-10-CM

## 2019-12-30 ENCOUNTER — COMMUNICATION - HEALTHEAST (OUTPATIENT)
Dept: BEHAVIORAL HEALTH | Facility: CLINIC | Age: 39
End: 2019-12-30

## 2019-12-30 ENCOUNTER — OFFICE VISIT - HEALTHEAST (OUTPATIENT)
Dept: BEHAVIORAL HEALTH | Facility: CLINIC | Age: 39
End: 2019-12-30

## 2019-12-30 DIAGNOSIS — F11.20 OPIOID USE DISORDER, MODERATE, DEPENDENCE (H): ICD-10-CM

## 2019-12-30 DIAGNOSIS — K21.9 GASTROESOPHAGEAL REFLUX DISEASE, ESOPHAGITIS PRESENCE NOT SPECIFIED: ICD-10-CM

## 2019-12-30 DIAGNOSIS — M54.50 CHRONIC MIDLINE LOW BACK PAIN WITHOUT SCIATICA: ICD-10-CM

## 2019-12-30 DIAGNOSIS — F10.20 SEVERE ALCOHOL USE DISORDER (H): ICD-10-CM

## 2019-12-30 DIAGNOSIS — G89.29 CHRONIC MIDLINE LOW BACK PAIN WITHOUT SCIATICA: ICD-10-CM

## 2019-12-30 LAB
AMPHETAMINES UR QL SCN: NORMAL
BARBITURATES UR QL: NORMAL
BENZODIAZ UR QL: NORMAL
CANNABINOIDS UR QL SCN: NORMAL
COCAINE UR QL: NORMAL
CREAT UR-MCNC: 71.3 MG/DL
METHADONE UR QL SCN: NORMAL
OPIATES UR QL SCN: NORMAL
OXYCODONE UR QL: NORMAL
PCP UR QL SCN: NORMAL

## 2019-12-30 ASSESSMENT — ANXIETY QUESTIONNAIRES
2. NOT BEING ABLE TO STOP OR CONTROL WORRYING: SEVERAL DAYS
4. TROUBLE RELAXING: SEVERAL DAYS
5. BEING SO RESTLESS THAT IT IS HARD TO SIT STILL: SEVERAL DAYS
6. BECOMING EASILY ANNOYED OR IRRITABLE: SEVERAL DAYS
1. FEELING NERVOUS, ANXIOUS, OR ON EDGE: SEVERAL DAYS
GAD7 TOTAL SCORE: 6
IF YOU CHECKED OFF ANY PROBLEMS ON THIS QUESTIONNAIRE, HOW DIFFICULT HAVE THESE PROBLEMS MADE IT FOR YOU TO DO YOUR WORK, TAKE CARE OF THINGS AT HOME, OR GET ALONG WITH OTHER PEOPLE: NOT DIFFICULT AT ALL
7. FEELING AFRAID AS IF SOMETHING AWFUL MIGHT HAPPEN: NOT AT ALL
3. WORRYING TOO MUCH ABOUT DIFFERENT THINGS: SEVERAL DAYS

## 2019-12-30 ASSESSMENT — PATIENT HEALTH QUESTIONNAIRE - PHQ9: SUM OF ALL RESPONSES TO PHQ QUESTIONS 1-9: 2

## 2019-12-30 ASSESSMENT — MIFFLIN-ST. JEOR: SCORE: 1820.25

## 2020-01-01 LAB
ETHYL GLUCURONIDE UR CFM-MCNC: <100 NG/ML
ETHYL SULFATE UR CFM-MCNC: <100 NG/ML

## 2020-01-27 ENCOUNTER — OFFICE VISIT - HEALTHEAST (OUTPATIENT)
Dept: BEHAVIORAL HEALTH | Facility: CLINIC | Age: 40
End: 2020-01-27

## 2020-01-27 DIAGNOSIS — F10.20 SEVERE ALCOHOL USE DISORDER (H): ICD-10-CM

## 2020-01-27 DIAGNOSIS — K21.9 GASTROESOPHAGEAL REFLUX DISEASE, ESOPHAGITIS PRESENCE NOT SPECIFIED: ICD-10-CM

## 2020-01-27 DIAGNOSIS — M54.50 CHRONIC MIDLINE LOW BACK PAIN WITHOUT SCIATICA: ICD-10-CM

## 2020-01-27 DIAGNOSIS — F11.20 OPIOID USE DISORDER, MODERATE, DEPENDENCE (H): ICD-10-CM

## 2020-01-27 DIAGNOSIS — G89.29 CHRONIC MIDLINE LOW BACK PAIN WITHOUT SCIATICA: ICD-10-CM

## 2020-01-27 DIAGNOSIS — F41.1 ANXIETY STATE: ICD-10-CM

## 2020-01-27 LAB
AMPHETAMINES UR QL SCN: NORMAL
BARBITURATES UR QL: NORMAL
BENZODIAZ UR QL: NORMAL
CANNABINOIDS UR QL SCN: NORMAL
COCAINE UR QL: NORMAL
CREAT UR-MCNC: 127.7 MG/DL
METHADONE UR QL SCN: NORMAL
OPIATES UR QL SCN: NORMAL
OXYCODONE UR QL: NORMAL
PCP UR QL SCN: NORMAL

## 2020-01-27 ASSESSMENT — ANXIETY QUESTIONNAIRES
4. TROUBLE RELAXING: SEVERAL DAYS
3. WORRYING TOO MUCH ABOUT DIFFERENT THINGS: SEVERAL DAYS
1. FEELING NERVOUS, ANXIOUS, OR ON EDGE: SEVERAL DAYS
5. BEING SO RESTLESS THAT IT IS HARD TO SIT STILL: MORE THAN HALF THE DAYS
GAD7 TOTAL SCORE: 7
7. FEELING AFRAID AS IF SOMETHING AWFUL MIGHT HAPPEN: NOT AT ALL
6. BECOMING EASILY ANNOYED OR IRRITABLE: SEVERAL DAYS
2. NOT BEING ABLE TO STOP OR CONTROL WORRYING: SEVERAL DAYS

## 2020-01-27 ASSESSMENT — MIFFLIN-ST. JEOR: SCORE: 1829.32

## 2020-01-27 ASSESSMENT — PATIENT HEALTH QUESTIONNAIRE - PHQ9: SUM OF ALL RESPONSES TO PHQ QUESTIONS 1-9: 5

## 2020-02-24 ENCOUNTER — OFFICE VISIT - HEALTHEAST (OUTPATIENT)
Dept: BEHAVIORAL HEALTH | Facility: CLINIC | Age: 40
End: 2020-02-24

## 2020-02-24 ENCOUNTER — COMMUNICATION - HEALTHEAST (OUTPATIENT)
Dept: BEHAVIORAL HEALTH | Facility: CLINIC | Age: 40
End: 2020-02-24

## 2020-02-24 DIAGNOSIS — F11.20 OPIOID USE DISORDER, MODERATE, DEPENDENCE (H): ICD-10-CM

## 2020-02-24 DIAGNOSIS — F10.20 SEVERE ALCOHOL USE DISORDER (H): ICD-10-CM

## 2020-02-24 DIAGNOSIS — G89.29 CHRONIC MIDLINE LOW BACK PAIN WITHOUT SCIATICA: ICD-10-CM

## 2020-02-24 DIAGNOSIS — M54.50 CHRONIC MIDLINE LOW BACK PAIN WITHOUT SCIATICA: ICD-10-CM

## 2020-02-24 DIAGNOSIS — K21.9 GASTROESOPHAGEAL REFLUX DISEASE, ESOPHAGITIS PRESENCE NOT SPECIFIED: ICD-10-CM

## 2020-02-24 LAB
AMPHETAMINES UR QL SCN: NORMAL
BARBITURATES UR QL: NORMAL
BENZODIAZ UR QL: NORMAL
CANNABINOIDS UR QL SCN: NORMAL
COCAINE UR QL: NORMAL
CREAT UR-MCNC: 116.4 MG/DL
METHADONE UR QL SCN: NORMAL
OPIATES UR QL SCN: NORMAL
OXYCODONE UR QL: NORMAL
PCP UR QL SCN: NORMAL

## 2020-02-24 ASSESSMENT — ANXIETY QUESTIONNAIRES
2. NOT BEING ABLE TO STOP OR CONTROL WORRYING: SEVERAL DAYS
6. BECOMING EASILY ANNOYED OR IRRITABLE: NOT AT ALL
4. TROUBLE RELAXING: SEVERAL DAYS
7. FEELING AFRAID AS IF SOMETHING AWFUL MIGHT HAPPEN: NOT AT ALL
5. BEING SO RESTLESS THAT IT IS HARD TO SIT STILL: SEVERAL DAYS
IF YOU CHECKED OFF ANY PROBLEMS ON THIS QUESTIONNAIRE, HOW DIFFICULT HAVE THESE PROBLEMS MADE IT FOR YOU TO DO YOUR WORK, TAKE CARE OF THINGS AT HOME, OR GET ALONG WITH OTHER PEOPLE: NOT DIFFICULT AT ALL
3. WORRYING TOO MUCH ABOUT DIFFERENT THINGS: SEVERAL DAYS
1. FEELING NERVOUS, ANXIOUS, OR ON EDGE: SEVERAL DAYS
GAD7 TOTAL SCORE: 5

## 2020-02-24 ASSESSMENT — PATIENT HEALTH QUESTIONNAIRE - PHQ9: SUM OF ALL RESPONSES TO PHQ QUESTIONS 1-9: 6

## 2020-02-27 LAB
ETHYL GLUCURONIDE UR CFM-MCNC: 170 NG/ML
ETHYL SULFATE UR CFM-MCNC: <100 NG/ML

## 2020-03-23 ENCOUNTER — OFFICE VISIT - HEALTHEAST (OUTPATIENT)
Dept: BEHAVIORAL HEALTH | Facility: CLINIC | Age: 40
End: 2020-03-23

## 2020-03-23 DIAGNOSIS — F10.20 SEVERE ALCOHOL USE DISORDER (H): ICD-10-CM

## 2020-03-23 DIAGNOSIS — G89.29 CHRONIC MIDLINE LOW BACK PAIN WITHOUT SCIATICA: ICD-10-CM

## 2020-03-23 DIAGNOSIS — F41.1 ANXIETY STATE: ICD-10-CM

## 2020-03-23 DIAGNOSIS — F11.20 OPIOID USE DISORDER, MODERATE, DEPENDENCE (H): ICD-10-CM

## 2020-03-23 DIAGNOSIS — M54.50 CHRONIC MIDLINE LOW BACK PAIN WITHOUT SCIATICA: ICD-10-CM

## 2020-03-23 DIAGNOSIS — J44.9 CHRONIC OBSTRUCTIVE PULMONARY DISEASE, UNSPECIFIED COPD TYPE (H): ICD-10-CM

## 2020-03-23 DIAGNOSIS — K21.9 GASTROESOPHAGEAL REFLUX DISEASE, ESOPHAGITIS PRESENCE NOT SPECIFIED: ICD-10-CM

## 2020-03-24 ENCOUNTER — COMMUNICATION - HEALTHEAST (OUTPATIENT)
Dept: BEHAVIORAL HEALTH | Facility: CLINIC | Age: 40
End: 2020-03-24

## 2020-03-24 DIAGNOSIS — G89.29 CHRONIC MIDLINE LOW BACK PAIN WITHOUT SCIATICA: ICD-10-CM

## 2020-03-24 DIAGNOSIS — M54.50 CHRONIC MIDLINE LOW BACK PAIN WITHOUT SCIATICA: ICD-10-CM

## 2020-03-30 ENCOUNTER — COMMUNICATION - HEALTHEAST (OUTPATIENT)
Dept: BEHAVIORAL HEALTH | Facility: CLINIC | Age: 40
End: 2020-03-30

## 2020-03-30 DIAGNOSIS — M54.50 CHRONIC MIDLINE LOW BACK PAIN WITHOUT SCIATICA: ICD-10-CM

## 2020-03-30 DIAGNOSIS — G89.29 CHRONIC MIDLINE LOW BACK PAIN WITHOUT SCIATICA: ICD-10-CM

## 2020-04-20 ENCOUNTER — COMMUNICATION - HEALTHEAST (OUTPATIENT)
Dept: BEHAVIORAL HEALTH | Facility: CLINIC | Age: 40
End: 2020-04-20

## 2020-04-20 ENCOUNTER — OFFICE VISIT - HEALTHEAST (OUTPATIENT)
Dept: BEHAVIORAL HEALTH | Facility: CLINIC | Age: 40
End: 2020-04-20

## 2020-04-20 DIAGNOSIS — M54.50 CHRONIC MIDLINE LOW BACK PAIN WITHOUT SCIATICA: ICD-10-CM

## 2020-04-20 DIAGNOSIS — F11.20 OPIOID USE DISORDER, MODERATE, DEPENDENCE (H): ICD-10-CM

## 2020-04-20 DIAGNOSIS — F10.20 SEVERE ALCOHOL USE DISORDER (H): ICD-10-CM

## 2020-04-20 DIAGNOSIS — F41.1 ANXIETY STATE: ICD-10-CM

## 2020-04-20 DIAGNOSIS — G89.29 CHRONIC MIDLINE LOW BACK PAIN WITHOUT SCIATICA: ICD-10-CM

## 2020-04-20 ASSESSMENT — ANXIETY QUESTIONNAIRES
7. FEELING AFRAID AS IF SOMETHING AWFUL MIGHT HAPPEN: NOT AT ALL
5. BEING SO RESTLESS THAT IT IS HARD TO SIT STILL: MORE THAN HALF THE DAYS
IF YOU CHECKED OFF ANY PROBLEMS ON THIS QUESTIONNAIRE, HOW DIFFICULT HAVE THESE PROBLEMS MADE IT FOR YOU TO DO YOUR WORK, TAKE CARE OF THINGS AT HOME, OR GET ALONG WITH OTHER PEOPLE: NOT DIFFICULT AT ALL
1. FEELING NERVOUS, ANXIOUS, OR ON EDGE: SEVERAL DAYS
6. BECOMING EASILY ANNOYED OR IRRITABLE: NOT AT ALL
GAD7 TOTAL SCORE: 4
4. TROUBLE RELAXING: NOT AT ALL
3. WORRYING TOO MUCH ABOUT DIFFERENT THINGS: SEVERAL DAYS
2. NOT BEING ABLE TO STOP OR CONTROL WORRYING: NOT AT ALL

## 2020-04-20 ASSESSMENT — PATIENT HEALTH QUESTIONNAIRE - PHQ9: SUM OF ALL RESPONSES TO PHQ QUESTIONS 1-9: 2

## 2020-05-18 ENCOUNTER — OFFICE VISIT - HEALTHEAST (OUTPATIENT)
Dept: BEHAVIORAL HEALTH | Facility: CLINIC | Age: 40
End: 2020-05-18

## 2020-05-18 DIAGNOSIS — G89.29 CHRONIC MIDLINE LOW BACK PAIN WITHOUT SCIATICA: ICD-10-CM

## 2020-05-18 DIAGNOSIS — F10.20 SEVERE ALCOHOL USE DISORDER (H): ICD-10-CM

## 2020-05-18 DIAGNOSIS — M54.50 CHRONIC MIDLINE LOW BACK PAIN WITHOUT SCIATICA: ICD-10-CM

## 2020-05-18 DIAGNOSIS — F11.20 OPIOID USE DISORDER, MODERATE, DEPENDENCE (H): ICD-10-CM

## 2020-05-18 ASSESSMENT — ANXIETY QUESTIONNAIRES
4. TROUBLE RELAXING: NOT AT ALL
1. FEELING NERVOUS, ANXIOUS, OR ON EDGE: NOT AT ALL
2. NOT BEING ABLE TO STOP OR CONTROL WORRYING: NOT AT ALL
5. BEING SO RESTLESS THAT IT IS HARD TO SIT STILL: SEVERAL DAYS
6. BECOMING EASILY ANNOYED OR IRRITABLE: NOT AT ALL
GAD7 TOTAL SCORE: 2
3. WORRYING TOO MUCH ABOUT DIFFERENT THINGS: SEVERAL DAYS
7. FEELING AFRAID AS IF SOMETHING AWFUL MIGHT HAPPEN: NOT AT ALL

## 2020-05-18 ASSESSMENT — MIFFLIN-ST. JEOR: SCORE: 1829.32

## 2020-05-18 ASSESSMENT — PATIENT HEALTH QUESTIONNAIRE - PHQ9: SUM OF ALL RESPONSES TO PHQ QUESTIONS 1-9: 2

## 2020-06-19 ENCOUNTER — COMMUNICATION - HEALTHEAST (OUTPATIENT)
Dept: BEHAVIORAL HEALTH | Facility: CLINIC | Age: 40
End: 2020-06-19

## 2020-06-19 DIAGNOSIS — K21.9 GASTROESOPHAGEAL REFLUX DISEASE, ESOPHAGITIS PRESENCE NOT SPECIFIED: ICD-10-CM

## 2020-07-08 ENCOUNTER — OFFICE VISIT (OUTPATIENT)
Dept: OPHTHALMOLOGY | Facility: CLINIC | Age: 40
End: 2020-07-08
Attending: OPHTHALMOLOGY
Payer: COMMERCIAL

## 2020-07-08 ENCOUNTER — COMMUNICATION - HEALTHEAST (OUTPATIENT)
Dept: SCHEDULING | Facility: CLINIC | Age: 40
End: 2020-07-08

## 2020-07-08 ENCOUNTER — TELEPHONE (OUTPATIENT)
Dept: OPHTHALMOLOGY | Facility: CLINIC | Age: 40
End: 2020-07-08

## 2020-07-08 DIAGNOSIS — H20.9 UVEITIS OF LEFT EYE: Primary | ICD-10-CM

## 2020-07-08 PROCEDURE — G0463 HOSPITAL OUTPT CLINIC VISIT: HCPCS | Mod: ZF

## 2020-07-08 RX ORDER — TROPICAMIDE 10 MG/ML
1-2 SOLUTION/ DROPS OPHTHALMIC 4 TIMES DAILY
Qty: 2 ML | Refills: 0 | Status: SHIPPED | OUTPATIENT
Start: 2020-07-08 | End: 2021-08-16

## 2020-07-08 RX ORDER — PREDNISOLONE ACETATE 10 MG/ML
1-2 SUSPENSION/ DROPS OPHTHALMIC 4 TIMES DAILY
Qty: 5 ML | Refills: 0 | Status: SHIPPED | OUTPATIENT
Start: 2020-07-08 | End: 2020-07-11

## 2020-07-08 ASSESSMENT — CONF VISUAL FIELD
OD_NORMAL: 1
METHOD: COUNTING FINGERS
OS_NORMAL: 1

## 2020-07-08 ASSESSMENT — EXTERNAL EXAM - RIGHT EYE: OD_EXAM: NORMAL

## 2020-07-08 ASSESSMENT — TONOMETRY
OD_IOP_MMHG: 14
IOP_METHOD: ICARE
OS_IOP_MMHG: 09

## 2020-07-08 ASSESSMENT — REFRACTION_WEARINGRX
OS_AXIS: 095
OS_SPHERE: -4.25
SPECS_TYPE: SVL
OS_CYLINDER: +0.75
OD_AXIS: 079
OD_CYLINDER: +0.75
OD_SPHERE: -4.00

## 2020-07-08 ASSESSMENT — VISUAL ACUITY
CORRECTION_TYPE: GLASSES
OS_CC: 20/25
OS_CC+: +1
OD_CC: 20/20
METHOD: SNELLEN - LINEAR

## 2020-07-08 ASSESSMENT — EXTERNAL EXAM - LEFT EYE: OS_EXAM: NORMAL

## 2020-07-08 ASSESSMENT — SLIT LAMP EXAM - LIDS
COMMENTS: NORMAL
COMMENTS: NORMAL

## 2020-07-08 NOTE — PROGRESS NOTES
CC -  Left eye pain and redness    INTERVAL HISTORY - Initial visit    HPI -   Carlos Hamilton is a  39 year old year-old patient presenting for left eye pain, redness, FBS, light sensitivity since 2-3 days ago. Getting worse. Wears CL, now wearing glasses.  Needs to take frequent pain meds for some relief.  Hx of back pain and stiffness. Multiple painful joints.     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 Uveitis of left eye     Presented with pain and light sensitivity   2+ flare in AC but no significant cells   Small fibrin coagulum in inferior chamber; no hx of eye  Trauma. 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 Ingram      Complete documentation of historical and exam elements from today's encounter can be found in the full encounter summary report (not reduplicated in this progress note). I personally obtained the chief complaint(s) and history of present illness.  I confirmed and edited as necessary the review of systems, past medical/surgical history, family history, social history, and examination findings as documented by others; and I examined the patient myself. I personally reviewed the relevant tests, images, and reports as documented above. I formulated and edited as necessary the assessment and plan and discussed the findings and management plan with the patient and family.     Patrick Odell MD  Department of  Ophthalmology  [facility]

## 2020-07-08 NOTE — NURSING NOTE
Chief Complaints and History of Present Illnesses   Patient presents with     Eye Pain Left Eye     Chief Complaint(s) and History of Present Illness(es)     Eye Pain Left Eye     Laterality: In left eye              Comments     Stabbing eye pain LE since last Thursday. Pt tried AT's with no relief.  Pt tried washing out LE with saline solution but pain did not improve.  Increased redness and light sensitivity in LE. Vision appearing more blurry/ cloudy in LE over the past week.  Pt denies any trauma or injury to LE.  No flashes or floaters.    Pt also notes that he wears SCL's full time. Stopped wearing SCL's Friday due to eye pain.    MAKAYLA Zee July 8, 2020 2:44 PM

## 2020-07-08 NOTE — TELEPHONE ENCOUNTER
Spoke to pt at 1105  Last Thursday felt something was in eye  Friday new photophobia    Blurred vision today, redness worsened, photophobia.    Constant blurred vision    Pt was walking when felt something blow in eye    Pt wears contacts and has been out of contacts on Friday afternoon    Scheduled this afternoon with Dr. Jose R Taylor, RN 11:39 AM 07/08/20            [facility]    Phone Message    May a detailed message be left on voicemail: yes     Reason for Call: Symptoms or Concerns     If patient has red-flag symptoms, warm transfer to triage line    Current symptom or concern: something Sharp in Lt Eye , vision loss and painful    Symptoms have been present for:  ? day(s)    Has patient previously been seen for this? No    By : N/A    Date: N/A    Are there any new or worsening symptoms? Yes      Action Taken: Message routed to:  [facility] Eye    Travel Screening: Not Applicable

## 2020-07-09 ENCOUNTER — TELEPHONE (OUTPATIENT)
Dept: OPHTHALMOLOGY | Facility: CLINIC | Age: 40
End: 2020-07-09

## 2020-07-09 NOTE — TELEPHONE ENCOUNTER
Prior Authorization Retail Medication Request    Medication/Dose: Tropicamide OP 1% SOL AKOR  ICD code (if different than what is on RX):  SEE CHART  Previously Tried and Failed:  SEE CHART  Rationale:  SEE CHART    Insurance Name:  [facility]  Insurance ID:  [id number]      Pharmacy Information (if different than what is on RX)  Name:  [facility]  Phone:  [phone]

## 2020-07-09 NOTE — TELEPHONE ENCOUNTER
Central Prior Authorization Team   Phone: [phone]      PA Initiation    Medication: Tropicamide OP 1% SOL -PA Initiated  Insurance Company: [employer] - Phone [phone] Fax [phone]  Pharmacy Filling the Rx: [id number] - [place], [place] - [address]  Filling Pharmacy Phone: [phone]  Filling Pharmacy Fax:    Start Date: 7/9/2020

## 2020-07-10 ENCOUNTER — COMMUNICATION - HEALTHEAST (OUTPATIENT)
Dept: BEHAVIORAL HEALTH | Facility: CLINIC | Age: 40
End: 2020-07-10

## 2020-07-11 ENCOUNTER — TELEPHONE (OUTPATIENT)
Dept: OPHTHALMOLOGY | Facility: CLINIC | Age: 40
End: 2020-07-11

## 2020-07-11 DIAGNOSIS — H20.9 UVEITIS OF LEFT EYE: Primary | ICD-10-CM

## 2020-07-11 RX ORDER — PREDNISOLONE ACETATE 10 MG/ML
1-2 SUSPENSION/ DROPS OPHTHALMIC
Qty: 5 ML | Refills: 1 | Status: SHIPPED | OUTPATIENT
Start: 2020-07-11 | End: 2020-07-20

## 2020-07-11 RX ORDER — ATROPINE SULFATE 10 MG/ML
1-2 SOLUTION/ DROPS OPHTHALMIC 2 TIMES DAILY
Qty: 1 BOTTLE | Refills: 0 | Status: SHIPPED | OUTPATIENT
Start: 2020-07-11 | End: 2020-07-20

## 2020-07-11 NOTE — TELEPHONE ENCOUNTER
Mr. Hamiltno is a 39M recently seen for first episode non-granulomatous anterior uveitis with possible episcleritis overlay.  Associated with joint pain.  He was started on pred QID.  Tropicamide TID prescribed but patient insurance requires prior auth so hasn't picked it up.  Calls in with persistent/unchanged pain and hazy vision, slightly dimmer like looking through a fog but no worsening of acuity and still able to read with that eye.  We discussed options and will plan to increase pred drop to Q2H while awake (sent refill).  Also sent atropine BID as alternative cycloplegia.  Patient understands and agrees.  Prompt return precautions discussed for any worsening despite treatment.  Patient comfortable with plan and has follow up scheduled on 7/14/2020 (sooner if needed).      Discussed briefly with Dr. Ingram.    Dave Aparicio, PGY3  Ophthalmology Resident

## 2020-07-13 ENCOUNTER — OFFICE VISIT - HEALTHEAST (OUTPATIENT)
Dept: BEHAVIORAL HEALTH | Facility: CLINIC | Age: 40
End: 2020-07-13

## 2020-07-13 DIAGNOSIS — F11.20 OPIOID USE DISORDER, MODERATE, DEPENDENCE (H): ICD-10-CM

## 2020-07-13 ASSESSMENT — PATIENT HEALTH QUESTIONNAIRE - PHQ9: SUM OF ALL RESPONSES TO PHQ QUESTIONS 1-9: 5

## 2020-07-13 ASSESSMENT — ANXIETY QUESTIONNAIRES
1. FEELING NERVOUS, ANXIOUS, OR ON EDGE: MORE THAN HALF THE DAYS
4. TROUBLE RELAXING: MORE THAN HALF THE DAYS
6. BECOMING EASILY ANNOYED OR IRRITABLE: NOT AT ALL
5. BEING SO RESTLESS THAT IT IS HARD TO SIT STILL: SEVERAL DAYS
7. FEELING AFRAID AS IF SOMETHING AWFUL MIGHT HAPPEN: NEARLY EVERY DAY
3. WORRYING TOO MUCH ABOUT DIFFERENT THINGS: SEVERAL DAYS
2. NOT BEING ABLE TO STOP OR CONTROL WORRYING: NEARLY EVERY DAY
GAD7 TOTAL SCORE: 12

## 2020-07-13 NOTE — TELEPHONE ENCOUNTER
Prior Authorization Approval    Authorization Effective Date: 6/9/2020  Authorization Expiration Date: 7/9/2021  Medication: Tropicamide OP 1% SOL -PA approved  Approved Dose/Quantity:   Reference #: [id number]   Insurance Company: [employer] - Phone [phone] Fax [phone]  Expected CoPay:       CoPay Card Available:      Foundation Assistance Needed:    Which Pharmacy is filling the prescription (Not needed for infusion/clinic administered): [id number] - [place], [place] - [address]  Pharmacy Notified: Yes  Patient Notified: No-Pharmacy will contact

## 2020-07-14 ENCOUNTER — OFFICE VISIT (OUTPATIENT)
Dept: OPHTHALMOLOGY | Facility: CLINIC | Age: 40
End: 2020-07-14
Attending: OPHTHALMOLOGY
Payer: COMMERCIAL

## 2020-07-14 DIAGNOSIS — H20.9 UVEITIS OF LEFT EYE: ICD-10-CM

## 2020-07-14 DIAGNOSIS — H20.9 UVEITIS OF LEFT EYE: Primary | ICD-10-CM

## 2020-07-14 DIAGNOSIS — H20.9 IRIDOCYCLITIS OF LEFT EYE: ICD-10-CM

## 2020-07-14 PROCEDURE — G0463 HOSPITAL OUTPT CLINIC VISIT: HCPCS | Mod: ZF

## 2020-07-14 RX ORDER — DIFLUPREDNATE OPHTHALMIC 0.5 MG/ML
EMULSION OPHTHALMIC
Qty: 1 BOTTLE | Refills: 0 | Status: SHIPPED | OUTPATIENT
Start: 2020-07-14 | End: 2020-08-04

## 2020-07-14 RX ORDER — DIFLUPREDNATE OPHTHALMIC 0.5 MG/ML
EMULSION OPHTHALMIC
Qty: 1 BOTTLE | Refills: 0 | Status: SHIPPED | OUTPATIENT
Start: 2020-07-14 | End: 2020-07-14

## 2020-07-14 ASSESSMENT — REFRACTION_WEARINGRX
OS_AXIS: 095
OD_CYLINDER: +0.75
OD_AXIS: 079
SPECS_TYPE: SVL
OS_CYLINDER: +0.75
OD_SPHERE: -4.00
OS_SPHERE: -4.25

## 2020-07-14 ASSESSMENT — CONF VISUAL FIELD
OS_NORMAL: 1
OD_NORMAL: 1

## 2020-07-14 ASSESSMENT — VISUAL ACUITY
OS_PH_CC: 20/25
OS_CC+: -2
CORRECTION_TYPE: GLASSES
OD_CC: 20/20
METHOD: SNELLEN - LINEAR
OS_CC: 20/40

## 2020-07-14 ASSESSMENT — TONOMETRY
OD_IOP_MMHG: 11
OS_IOP_MMHG: 07
IOP_METHOD: ICARE

## 2020-07-14 ASSESSMENT — SLIT LAMP EXAM - LIDS
COMMENTS: NORMAL
COMMENTS: NORMAL

## 2020-07-14 ASSESSMENT — EXTERNAL EXAM - RIGHT EYE: OD_EXAM: NORMAL

## 2020-07-14 ASSESSMENT — EXTERNAL EXAM - LEFT EYE: OS_EXAM: NORMAL

## 2020-07-14 NOTE — PROGRESS NOTES
CC -  Left eye pain and redness    INTERVAL HISTORY - follow up after one week of PF drop use; vision is worse and eye is more painful and irritated. Is using PF q2h while awake.    HPI -   Carlos Hamilton is a  39 year old year-old patient presenting for left eye pain, redness, FBS, light sensitivity since 2-3 days ago. Getting worse. Wears CL, now wearing glasses.  Needs to take frequent pain meds for some relief.  Hx of back pain and stiffness. Multiple painful joints.     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 Iridocyclitis, left eye   Presented 1 week ago with pain and light sensitivity and had flare with fibrin coagulum and minimal cellular reaction   Inflammation increased despite frequent topical PF; now with 3+ flare in AC but no significant cells and larger fibrin coagulum   FH (brother with uveitis); no hx of eye trauma; hx of musculoskeletal pain    Retinal exam within normal limits; no retinitis or vasculitis   Hx of joint pain and swelling; back pain and morning stiffness      DDx HLA B27 associated uveitis vs reactive arthritis associated vs idiopathic vs diffuse episcleritis (conjunctiva blanched after phenylephrine drop)   Switch PF to Durezol 6 times a day for 3 days and then QID left eye    Continue torpicamide TID left eye    Tylenol for pain   Lab tests ordered: CBC diff, BMP, CRP, ESR, Quantiferon, Syphilis test, ACE, HLA B27   RTC 1 week to see Dr. Ingram    Complete documentation of historical and exam elements from today's encounter can be found in the full encounter summary report (not reduplicated in this progress  note). I personally obtained the chief complaint(s) and history of present illness.  I confirmed and edited as necessary the review of systems, past medical/surgical history, family history, social history, and examination findings as documented by others; and I examined the patient myself. I personally reviewed the relevant tests, images, and reports as documented above. I formulated and edited as necessary the assessment and plan and discussed the findings and management plan with the patient and family.     Patrick Odell MD  [facility]

## 2020-07-14 NOTE — NURSING NOTE
Chief Complaints and History of Present Illnesses   Patient presents with     Uveitis Follow-Up     Chief Complaint(s) and History of Present Illness(es)     Uveitis Follow-Up     Laterality: left eye    Onset: 1 week ago    Pain scale: 3/10              Comments     Pt. States that LE is still red and painful. Did find out a brother has uveitis as well. Has been treated with durezol which is the only thing that has helped with flares. His brother is also taking an arthritis medication. No change in VA BE.  Patricia Hunt COT 10:22 AM July 14, 2020

## 2020-07-15 DIAGNOSIS — H20.9 UVEITIS OF LEFT EYE: ICD-10-CM

## 2020-07-15 DIAGNOSIS — H20.9 IRIDOCYCLITIS OF LEFT EYE: ICD-10-CM

## 2020-07-15 LAB
ALBUMIN SERPL-MCNC: 3.8 G/DL (ref 3.4–5)
ALP SERPL-CCNC: 131 U/L (ref 40–150)
ALT SERPL W P-5'-P-CCNC: 34 U/L (ref 0–70)
ANION GAP SERPL CALCULATED.3IONS-SCNC: 5 MMOL/L (ref 3–14)
AST SERPL W P-5'-P-CCNC: 19 U/L (ref 0–45)
BILIRUB SERPL-MCNC: 0.3 MG/DL (ref 0.2–1.3)
BUN SERPL-MCNC: 9 MG/DL (ref 7–30)
CALCIUM SERPL-MCNC: 9.3 MG/DL (ref 8.5–10.1)
CHLORIDE SERPL-SCNC: 100 MMOL/L (ref 94–109)
CO2 SERPL-SCNC: 30 MMOL/L (ref 20–32)
CREAT SERPL-MCNC: 0.59 MG/DL (ref 0.66–1.25)
CRP SERPL-MCNC: 24.7 MG/L (ref 0–8)
ERYTHROCYTE [SEDIMENTATION RATE] IN BLOOD BY WESTERGREN METHOD: 31 MM/H (ref 0–15)
GFR SERPL CREATININE-BSD FRML MDRD: >90 ML/MIN/{1.73_M2}
GLUCOSE SERPL-MCNC: 86 MG/DL (ref 70–99)
POTASSIUM SERPL-SCNC: 4.6 MMOL/L (ref 3.4–5.3)
PROT SERPL-MCNC: 8.8 G/DL (ref 6.8–8.8)
SODIUM SERPL-SCNC: 135 MMOL/L (ref 133–144)
T PALLIDUM AB SER QL: NONREACTIVE

## 2020-07-16 LAB
ACE SERPL-CCNC: 13 U/L (ref 9–67)
GAMMA INTERFERON BACKGROUND BLD IA-ACNC: 0.01 IU/ML
M TB IFN-G CD4+ BCKGRND COR BLD-ACNC: 9.99 IU/ML
M TB TUBERC IFN-G BLD QL: NEGATIVE
MITOGEN IGNF BCKGRD COR BLD-ACNC: 0 IU/ML
MITOGEN IGNF BCKGRD COR BLD-ACNC: 0.01 IU/ML
T GONDII IGG SER-ACNC: <3 IU/ML
T GONDII IGM SER-ACNC: <3 AU/ML

## 2020-07-19 DIAGNOSIS — H20.9 UVEITIS OF LEFT EYE: ICD-10-CM

## 2020-07-19 PROBLEM — R70.0 ELEVATED ERYTHROCYTE SEDIMENTATION RATE: Status: ACTIVE | Noted: 2020-07-19

## 2020-07-19 PROBLEM — H20.00 ACUTE ANTERIOR UVEITIS OF LEFT EYE: Status: ACTIVE | Noted: 2020-07-19

## 2020-07-20 ENCOUNTER — TELEPHONE (OUTPATIENT)
Dept: OPHTHALMOLOGY | Facility: CLINIC | Age: 40
End: 2020-07-20

## 2020-07-20 ENCOUNTER — OFFICE VISIT (OUTPATIENT)
Dept: OPHTHALMOLOGY | Facility: CLINIC | Age: 40
End: 2020-07-20
Attending: OPHTHALMOLOGY
Payer: COMMERCIAL

## 2020-07-20 DIAGNOSIS — Z79.899 HIGH RISK MEDICATION USE: ICD-10-CM

## 2020-07-20 DIAGNOSIS — H20.9 UVEITIS OF LEFT EYE: ICD-10-CM

## 2020-07-20 DIAGNOSIS — Z15.89 HLA B27 (HLA B27 POSITIVE): ICD-10-CM

## 2020-07-20 DIAGNOSIS — R70.0 ELEVATED ERYTHROCYTE SEDIMENTATION RATE: ICD-10-CM

## 2020-07-20 DIAGNOSIS — H20.00 ACUTE ANTERIOR UVEITIS OF LEFT EYE: Primary | ICD-10-CM

## 2020-07-20 LAB
B LOCUS: NORMAL
B27TEST METHOD: NORMAL

## 2020-07-20 PROCEDURE — G0463 HOSPITAL OUTPT CLINIC VISIT: HCPCS | Mod: ZF

## 2020-07-20 RX ORDER — ATROPINE SULFATE 10 MG/ML
1 SOLUTION/ DROPS OPHTHALMIC DAILY
Qty: 1 BOTTLE | Refills: 1 | Status: SHIPPED | OUTPATIENT
Start: 2020-07-20 | End: 2020-08-04

## 2020-07-20 RX ORDER — PREDNISONE 10 MG/1
TABLET ORAL
Qty: 50 TABLET | Refills: 0 | Status: SHIPPED | OUTPATIENT
Start: 2020-07-20 | End: 2020-08-04

## 2020-07-20 RX ORDER — IBUPROFEN 200 MG
600 TABLET ORAL EVERY 6 HOURS PRN
COMMUNITY
End: 2024-04-14

## 2020-07-20 RX ORDER — ATROPINE SULFATE 10 MG/ML
1-2 SOLUTION/ DROPS OPHTHALMIC 2 TIMES DAILY
Qty: 1 BOTTLE | Status: CANCELLED | OUTPATIENT
Start: 2020-07-20

## 2020-07-20 RX ORDER — PREDNISOLONE ACETATE 10 MG/ML
SUSPENSION/ DROPS OPHTHALMIC
Qty: 5 ML | Refills: 2 | Status: SHIPPED | OUTPATIENT
Start: 2020-07-20 | End: 2021-08-16

## 2020-07-20 ASSESSMENT — CONF VISUAL FIELD
OS_NORMAL: 1
OD_NORMAL: 1

## 2020-07-20 ASSESSMENT — VISUAL ACUITY
OS_CC+: -1
METHOD: SNELLEN - LINEAR
OD_CC: 20/20
OD_CC+: -1
OS_CC: 20/30
CORRECTION_TYPE: GLASSES

## 2020-07-20 ASSESSMENT — REFRACTION_WEARINGRX
OD_CYLINDER: +0.75
OD_AXIS: 079
OS_SPHERE: -4.25
OD_SPHERE: -4.00
OS_AXIS: 095
OS_CYLINDER: +0.75
SPECS_TYPE: SVL

## 2020-07-20 ASSESSMENT — TONOMETRY
OS_IOP_MMHG: 10
IOP_METHOD: TONOPEN
OD_IOP_MMHG: 15

## 2020-07-20 ASSESSMENT — EXTERNAL EXAM - LEFT EYE: OS_EXAM: NORMAL

## 2020-07-20 ASSESSMENT — CUP TO DISC RATIO
OS_RATIO: 0.4
OD_RATIO: 0.3

## 2020-07-20 ASSESSMENT — SLIT LAMP EXAM - LIDS: COMMENTS: NORMAL

## 2020-07-20 ASSESSMENT — EXTERNAL EXAM - RIGHT EYE: OD_EXAM: NORMAL

## 2020-07-20 NOTE — PROGRESS NOTES
"Carlos Hamilton is a 39 year old male who is being evaluated via a billable telephone visit.      The patient has been notified of following:     \"This telephone visit will be conducted via a call between you and your physician/provider. We have found that certain health care needs can be provided without the need for a physical exam.  This service lets us provide the care you need with a short phone conversation.  If a prescription is necessary we can send it directly to your pharmacy.  If lab work is needed we can place an order for that and you can then stop by our lab to have the test done at a later time.    Telephone visits are billed at different rates depending on your insurance coverage. During this emergency period, for some insurers they may be billed the same as an in-person visit.  Please reach out to your insurance provider with any questions.    If during the course of the call the physician/provider feels a telephone visit is not appropriate, you will not be charged for this service.\"    Patient has given verbal consent for Telephone visit?  Yes    What phone number would you like to be contacted at? [phone]    How would you like to obtain your AVS? Teddy Cesar     Carlos Hamilton is a 39 year old male who presents via phone visit today for the following health issues: Patient may have been around someone who possibly had Covid in a meeting and was informed he needed a Covid testing prior to returning to work.   HPI      Concern for COVID-19  About how many days ago did these symptoms start? None  Is this your first visit for this illness? Yes  In the 14 days before your symptoms started, have you had close contact with someone with COVID-19 (Coronavirus)? I do not know, no sx  Do you have a fever or chills? No  Are you having new or worsening difficulty breathing? No  Do you have new or worsening cough? No  Have you had any new or unexplained body aches? No    Have you " experienced any of the following NEW symptoms?    Headache: No    Sore throat: No    Loss of taste or smell: No    Chest pain: No    Diarrhea: No    Rash: No  What treatments have you tried? nothing  Who do you live with? [facility]  Are you, or a household member, a healthcare worker or a ? No  Do you live in a nursing home, group home, or shelter? No  Do you have a way to get food/medications if quarantined? unclear        Patient Active Problem List   Diagnosis     Degeneration of lumbar/lumbosacral disc without myelopathy     Chronic midline low back pain without sciatica     Anxiety state     MARY (generalized anxiety disorder)     Insomnia, unspecified type     Alcohol dependence in remission (H)     Substance abuse in remission (H)     Mixed dyslipidemia     Mild persistent asthma, unspecified whether complicated     Acute pain of both hips     Hx of avascular necrosis of capital femoral epiphysis     Iron deficiency anemia secondary to inadequate dietary iron intake     Acute anterior uveitis of left eye     Elevated erythrocyte sedimentation rate     HLA B27 (HLA B27 positive)     High risk medication use     Past Surgical History:   Procedure Laterality Date     aneursym clipping      Brain aneursym in 2013     ARTHROPLASTY HIP Right        Social History     Tobacco Use     Smoking status: Current Every Day Smoker     Packs/day: 1.00     Smokeless tobacco: Former User     Tobacco comment: get drinking under control first   Substance Use Topics     Alcohol use: Yes     Comment: Half gallon vodka per day, everyday of week     Family History   Problem Relation Age of Onset     Glaucoma Mother      Uveitis Brother      Alcoholism No family hx of      Macular Degeneration No family hx of          Current Outpatient Medications   Medication Sig Dispense Refill     acetaminophen (TYLENOL) 325 MG tablet Take 325-650 mg by mouth       albuterol (PROVENTIL HFA) 108 (90 Base) MCG/ACT inhaler Inhale 2  puffs into the lungs every 4 hours as needed for shortness of breath / dyspnea or wheezing 2 Inhaler 1     atorvastatin (LIPITOR) 10 MG tablet Take 1 tablet (10 mg) by mouth daily 90 tablet 3     atropine 1 % ophthalmic solution Place 1 drop Into the left eye daily 1 Bottle 1     budesonide-formoterol (SYMBICORT) 160-4.5 MCG/ACT Inhaler Inhale 2 puffs into the lungs daily 10 g 1     buprenorphine HCl-naloxone HCl (SUBOXONE) 8-2 MG per film Place 1 Film under the tongue       cyanocobalamin (VITAMIN B-12) 100 MCG tablet Take 1 tablet (100 mcg) by mouth daily 90 tablet 0     diazepam (VALIUM) 5 MG tablet Take 1 tablet (5 mg) by mouth at the direction of the radiologist. May repeat x1 per radiology discretion. 2 tablet 0     difluprednate (DUREZOL) 0.05 % ophthalmic emulsion One drop to left eye 6 times a day for 3 days and then continue four times a day 1 Bottle 0     ferrous sulfate (FEROSUL) 325 (65 Fe) MG tablet Take 1 tablet (325 mg) by mouth daily (with breakfast) 90 tablet 0     gabapentin (NEURONTIN) 300 MG capsule Take 300 mg by mouth       IBUPROFEN PO        omeprazole (PRILOSEC) 40 MG DR capsule Take 1 capsule (40 mg total) by mouth daily before breakfast.  2     order for DME Equipment being ordered: adult mask for neb machine with tubing 1 each 0     prednisoLONE acetate (PRED FORTE) 1 % ophthalmic suspension Every 2 hours this week, then starting 7/27/20 reduce to 4x/day. 5 mL 2     predniSONE (DELTASONE) 10 MG tablet Take 3 pills (30 mg) every AM x 1 week. On 7/27/20, reduce to 20 mg (2 pills) each AM until next visit. 50 tablet 0     tiZANidine (ZANAFLEX) 4 MG tablet Take 4 mg by mouth       traZODone (DESYREL) 100 MG tablet Take 1 tablet (100 mg) by mouth At Bedtime 90 tablet 2     tropicamide (MYDRIACYL) 1 % ophthalmic solution Place 1-2 drops Into the left eye 4 times daily 2 mL 0     vitamin B-12 (CYANOCOBALAMIN) 50 MCG tablet Take 1 tablet (50 mcg) by mouth daily 90 tablet 0     BP Readings from  Last 3 Encounters:   08/12/19 108/68   07/10/19 103/71   07/02/19 102/60    Wt Readings from Last 3 Encounters:   08/12/19 86.6 kg (191 lb)   08/01/19 84.8 kg (187 lb)   07/10/19 85.1 kg (187 lb 9.6 oz)           Reviewed and updated as needed this visit by Provider         Review of Systems   Constitutional, HEENT, cardiovascular, pulmonary, gi and gu systems are negative, except as otherwise noted.       Objective   Reported vitals:  There were no vitals taken for this visit.   healthy, alert and no distress  PSYCH: Alert and oriented times 3; coherent speech, normal   rate and volume, able to articulate logical thoughts, able   to abstract reason, no tangential thoughts, no hallucinations   or delusions  His affect is normal  RESP: No cough, no audible wheezing, able to talk in full sentences  Remainder of exam unable to be completed due to telephone visits    Diagnostic Test Results:  Labs reviewed in Epic  No results found for any visits on 07/21/20.        Assessment/Plan:    1. Exposure to COVID-19 virus  No known exposure, no symptoms, not a essential employee, work requesting a test to return, MD information given for possible testing       Patient Instructions   Call MD to find possible sites testing all asymptomatic patients, [phone]        Phone call duration:  10 minutes    Margarita Killian, [place], APRN CNP

## 2020-07-20 NOTE — PROGRESS NOTES
Chief Complaint/Presenting Concern: Uveitis evaluation    History of Present Illness:   Carlos Hamilton is a 39 year old patient who presents for evaluation of acute anterior uveitis of the left eye from Dr. Odell.    The history started suddenly a few weeks ago with pain and light sensitivity in the left eye. Mr. Hamilton saw Dr. Odell on 7/8/20 who identified acute anterior uveitis of the left eye with flare with fibrin coagulum but minimal cellular reaction. The patient was started topical prednisolone acetate eye drops initially four times daily and then every 2 hours without much change in symptoms or improvement by exam at follow up on 7/14/20. At that visit, laboratory testing for causes of uveitis was initiated and topical prednisolone was switched to the stronger Durezol eye drop. A change was also made in the dilating eye drop from tropicamide to atropine. Due to insurance issues, Durezol was not able to be started and Atropine was received, but last used few days ago as this ran out.     In the interim, Mr. Hamilton reports that things are better but not totally back to normal. There are no issues with the right eye.     Additional Ocular History:   1. No prior episodes of uveitis  2. Prior possible eye infection treated with drops for few days, then cleared up on its own    Relevant Past Medical/Family/Social History:  1. History of Cerebral aneursym, s/p clipping in 2013  2. History of AVN of his right hip possibly secondary to alcohol use for which he underwent a right JESSICA 7/2017  3. Hip pain left sided evaluated by Orthopedics 8/2019 with normal left hip MRI  4. Chronic back pain on Tizanadine and Suboxone, reported secondary to disc degeneration. No plans for surgery at this time.    5. GERD on Omeprazole    Family history significant for Glaucoma in the patient's Mother and possible uveitis in the patient's Brother including use of Durezol drops and back/knee pain. Currently working in [employer]. Smoking daily but not drinking.     Relevant Review of Systems: Back pain in low to mid back partially relieved by medications. Acid reflux on medication. No fevers, rashes, mouth sores, no cough/trouble breathing.    Laboratory Testing July 2020  Normal/negative: Labs: Normal/negative: ACE, CMP, Treponema, Quantiferon, Toxoplasma IgM and IgG. Abnormal: ESR 31, CRP 24. HLA-B27 positive     Current eye related medications: Prednisolone Q1 hour left eye, ran out dilating drops    Retina/Uveitis Imaging: None today    Assessment:    1. Acute anterior uveitis of left eye  Improving but not resolved. No synechiae, normal IOP.    2. HLA B27 (HLA B27 positive)  May explain low back pain and     3. Elevated erythrocyte sedimentation rate  Identified with lab testing     4. High risk medication use  Prednisone previously.    Plan/Recommendations:    Discussed findings with patient. The inflammation is improved but not resolved    Recommend low back x-rays, but will defer at this time. This may help to identify ankylosing spondylitis given chronic low back pain, elevated ESR and CRP    Continue prednisolone but reduce to every 2 hours for the rest of this week, then reduce to every 4 hours starting Monday, 7/27/20 as we will be starting oral prednisone    Start oral prednisone 30 mg daily for one week, then starting 7/27/20, reduce to 20 mg daily until next visit. Written instructions given and discussed possible side effects. Will be curious to see if this helps low back pain as well. If so, might consider Rheumatology consultation, also discussed.    Use Atropine daily until it runs out. No need to refill unless there is more pain    RTC 2 weeks vision, no dilation, no testing.     Physician Attestation     Attending Physician Attestation:  Complete documentation of historical and exam elements from today's encounter can be found in the full encounter summary report (not reduplicated in this progress note). I  personally obtained the chief complaint(s) and history of present illness. I confirmed and edited as necessary the review of systems, past medical/surgical history, family history, social history, and examination findings as documented by others; and I examined the patient myself. I personally reviewed the relevant tests, images, and reports as documented above. I formulated and edited as necessary the assessment and plan and discussed the findings and management plan with the patient and any family members present at the time of the visit.  Bernardo Ingram M.D., Uveitis and Medical Retina, July 20, 2020

## 2020-07-20 NOTE — TELEPHONE ENCOUNTER
[facility] Call Center    Phone Message    May a detailed message be left on voicemail: yes     Reason for Call: Medication Question or concern regarding medication   Prescription Clarification  Name of Medication: prednisoLONE acetate (PRED FORTE) 1 % ophthalmic suspension   Prescribing Provider: Juan Jose   Pharmacy: [id number] - [place], [place] - [address]    What on the order needs clarification? How many drops and which eye?          Action Taken: Message routed to:  [facility] ([facility] EYE    Travel Screening: Not Applicable

## 2020-07-20 NOTE — TELEPHONE ENCOUNTER
atropine 1 % ophthalmic solution     Requested directions: Place 1-2 drops Into the left eye 2 times daily - Left Eye   Current directions on the medication list: Place 1-2 drops Into the left eye 2 times daily - Left Eye     Last Written Prescription Date:  7/11/2020  Last Fill Quantity: 1,   # refills: 0    Last Office Visit: 7/14/2020  Future Office visit: 7/20/2020    Attending Provider:    Patrick Luong MD   Ophthalmology      Last Clinic Note: 7/14/2020       ASSESSMENT & PLAN     1. Iridocyclitis, left eye   Presented 1 week ago with pain and light sensitivity and had flare with fibrin coagulum and minimal cellular reaction              Inflammation increased despite frequent topical PF; now with 3+ flare in AC but no significant cells and larger fibrin coagulum              FH (brother with uveitis); no hx of eye trauma; hx of musculoskeletal pain               Retinal exam within normal limits; no retinitis or vasculitis              Hx of joint pain and swelling; back pain and morning stiffness                            DDx HLA B27 associated uveitis vs reactive arthritis associated vs idiopathic vs diffuse episcleritis (conjunctiva blanched after phenylephrine drop)              Switch PF to Durezol 6 times a day for 3 days and then QID left eye               Continue torpicamide TID left eye               Tylenol for pain              Lab tests ordered: CBC diff, BMP, CRP, ESR, Quantiferon, Syphilis test, ACE, HLA B27              RTC 1 week to see Dr. Ingram    Routing refill request to provider for review/approval because  It is ok to add refills to the current order for Pt care.   Last order sent on July 11, 2020 had not refills added for Pt care.    Refer to Provider for review.       Fabiola Montes RN  Central Triage Red Flags/Med Refills

## 2020-07-20 NOTE — NURSING NOTE
Chief Complaints and History of Present Illnesses   Patient presents with     Uveitis Evaluation     Chief Complaint(s) and History of Present Illness(es)     Uveitis Evaluation     Laterality: left eye    Onset: gradual    Onset: weeks ago    Quality: Blurriness and darkness is still there.    Associated symptoms: redness (has decreased)    Pain scale: 0/10              Comments     Has not started Durezol due to the ins. not covering   PF qh to every couple hrs  Cyclo last taken on Saturday  Shivani Ward COT 9:18 AM July 20, 2020

## 2020-07-20 NOTE — LETTER
7/20/2020       RE: Carlos Hamilton  [address] [place] [address]     Dear Colleague,    Thank you for referring your patient, Carlos Hamilton, to the EYE CLINIC at [facility]. Please see a copy of my visit note below.    Chief Complaint/Presenting Concern: Uveitis evaluation    History of Present Illness:   Carlos Hamilton is a 39 year old patient who presents for evaluation of acute anterior uveitis of the left eye from Dr. Odell.    The history started suddenly a few weeks ago with pain and light sensitivity in the left eye. Mr. Hamilton saw Dr. Odell on 7/8/20 who identified acute anterior uveitis of the left eye with flare with fibrin coagulum but minimal cellular reaction. The patient was started topical prednisolone acetate eye drops initially four times daily and then every 2 hours without much change in symptoms or improvement by exam at follow up on 7/14/20. At that visit, laboratory testing for causes of uveitis was initiated and topical prednisolone was switched to the stronger Durezol eye drop. A change was also made in the dilating eye drop from tropicamide to atropine. Due to insurance issues, Durezol was not able to be started and Atropine was received, but last used few days ago as this ran out.     In the interim, Mr. Hamilton reports that things are better but not totally back to normal. There are no issues with the right eye.     Additional Ocular History:   1. No prior episodes of uveitis  2. Prior possible eye infection treated with drops for few days, then cleared up on its own    Relevant Past Medical/Family/Social History:  1. History of Cerebral aneursym, s/p clipping in 2013  2. History of AVN of his right hip possibly secondary to alcohol use for which he underwent a right JESSICA 7/2017  3. Hip pain left sided evaluated by Orthopedics 8/2019 with normal left hip MRI  4. Chronic back pain on Tizanadine and Suboxone, reported  secondary to disc degeneration. No plans for surgery at this time.    5. GERD on Omeprazole    Family history significant for Glaucoma in the patient's mother and possible uveitis in the patient's brother including use of Durezol drops and back/knee pain. Currently working in [employer]. Smoking daily but not drinking.     Relevant Review of Systems: Back pain in low to mid back partially relieved by medications. Acid reflux on medication. No fevers, rashes, mouth sores, no cough/trouble breathing.    Laboratory Testing July 2020  Normal/negative: Labs: Normal/negative: ACE, CMP, Treponema, Quantiferon, Toxoplasma IgM and IgG. Abnormal: ESR 31, CRP 24. HLA-B27 positive     Current eye related medications: Prednisolone Q1 hour left eye, ran out dilating drops    Retina/Uveitis Imaging: None today    Assessment:    1. Acute anterior uveitis of left eye  Improving but not resolved. No synechiae, normal IOP.    2. HLA B27 (HLA B27 positive)  May explain low back pain and     3. Elevated erythrocyte sedimentation rate  Identified with lab testing     4. High risk medication use  Prednisone previously.    Plan/Recommendations:    Discussed findings with patient. The inflammation is improved but not resolved    Recommend low back x-rays, but will defer at this time. This may help to identify ankylosing spondylitis given chronic low back pain, elevated ESR and CRP    Continue prednisolone but reduce to every 2 hours for the rest of this week, then reduce to every 4 hours starting Monday, 7/27/20 as we will be starting oral prednisone    Start oral prednisone 30 mg daily for one week, then starting 7/27/20, reduce to 20 mg daily until next visit. Written instructions given and discussed possible side effects. Will be curious to see if this helps low back pain as well. If so, might consider Rheumatology consultation, also discussed.    Use Atropine daily until it runs out. No need to refill unless there is more pain    RTC 2  weeks vision, no dilation, no testing.     Attending Physician Attestation:  Complete documentation of historical and exam elements from today's encounter can be found in the full encounter summary report (not reduplicated in this progress note). I personally obtained the chief complaint(s) and history of present illness. I confirmed and edited as necessary the review of systems, past medical/surgical history, family history, social history, and examination findings as documented by others; and I examined the patient myself. I personally reviewed the relevant tests, images, and reports as documented above. I formulated and edited as necessary the assessment and plan and discussed the findings and management plan with the patient and family.  Bernardo Ingram MD.    Bernardo Ingram M.D., Uveitis and Medical Retina, July 20, 2020     Sincerely,    Bernardo Ingram MD  [facility] Dept of Ophthalmology  Uveitis and Medical Retina

## 2020-07-20 NOTE — PATIENT INSTRUCTIONS
Continue prednisolone but reduce to every 2 hours for the rest of this week, then reduce to every 4 hours starting Monday, 7/27/20 as we will be starting oral prednisone    Use Atropine daily until it runs out. No need to refill unless there is more pain    Start oral prednisone 30 mg daily for one week, then starting 7/27/20, reduce to 20 mg daily until next visit. Written instructions given.    Here is information about prednisone:    Prednisone is a steroid pill. It can be used for many different conditions and usually for short periods of time (a few weeks to a few months), but some people may take it for years. Sometimes we might get blood tests before starting this medication, but not always.    Prednisone is often prescribed as 10 mg pills. It is usually easiest to take the whole dose (all the pills) with breakfast because it can minimize side effects. A very common side effect is stomach discomfort and some people take antacid medications to help with these symptoms. Other side effects which can occur include raising the blood pressure, blood sugar, and weight. It can also cause irritability and trouble sleeping. For these reasons, we will try to limit the amount of time that prednisone is taken.

## 2020-07-21 ENCOUNTER — VIRTUAL VISIT (OUTPATIENT)
Dept: FAMILY MEDICINE | Facility: CLINIC | Age: 40
End: 2020-07-21
Payer: COMMERCIAL

## 2020-07-21 DIAGNOSIS — Z20.822 EXPOSURE TO COVID-19 VIRUS: Primary | ICD-10-CM

## 2020-07-21 PROCEDURE — 99212 OFFICE O/P EST SF 10 MIN: CPT | Mod: 95 | Performed by: NURSE PRACTITIONER

## 2020-07-21 NOTE — TELEPHONE ENCOUNTER
CC:  Transfer OB    Rina Hurtado is a 38 y.o. female .  She is not established.  Patient had care this pregnancy at [facility].  Per patient her due date is dated from her last menstrual period that was consistent with an 8 week ultrasound.  Per patient she had a negative maternity 21 test.  This pregnancy has been uncomplicated besides an incidental finding of a velamentous cord insert at her 20 week ultrasound. She is due for DM screen.     Patient had a prior pregnancy without any complications during the pregnancy and without complications at delivery.  She delivered at [facility] in [facility] at 41 weeks.  She did natural childbirth without anesthesia.  She did require labor augmentation.    She works at [employer].  She has moved to [place] approximately year and half ago.  Her daughter is at [facility].  She is from [place] and her  is from [place].    Past Medical History:   Diagnosis Date    AMA (advanced maternal age) multigravida 35+        History reviewed. No pertinent surgical history.    OB History    Para Term  AB Living   3 1 1   1 1   SAB TAB Ectopic Multiple Live Births   1       1      # Outcome Date GA Lbr Alexis/2nd Weight Sex Delivery Anes PTL Lv   3 Current            2 Term 12/08/15 41w0d  3.374 kg (7 lb 7 oz) F Vag-Spont None N LUCIA      Birth Comments: NCB, labor with pitocin augmentation   1 SAB  9w0d             Birth Comments: SAB -- No D&C       Family History   Problem Relation Age of Onset    No Known Problems Father     Diabetes Mother     No Known Problems Brother     Breast cancer Neg Hx     Colon cancer Neg Hx     Stroke Neg Hx     Ovarian cancer Neg Hx        Social History     Tobacco Use    Smoking status: Former Smoker    Smokeless tobacco: Never Used   Substance Use Topics    Alcohol use: Not Currently    Drug use: Never       BP 98/60   Wt 70.7 kg (155 lb 13.8 oz)     ROS:  GENERAL: Denies weight gain or weight loss.  Pt seen yesterday 7- and pt to use atropine until empty-- no refill unless has new pain per note    Mitchel Taylor RN 11:02 AM 07/21/20       Feeling well overall.   SKIN: Denies rash or lesions.   HEAD: Denies head injury or headache.   NODES: Denies enlarged lymph nodes.   CHEST: Denies chest pain or shortness of breath.   CARDIOVASCULAR: Denies palpitations or left sided chest pain.   ABDOMEN: No abdominal pain, constipation, diarrhea, nausea, vomiting or rectal bleeding.   URINARY: No frequency, dysuria, hematuria, or burning on urination.  REPRODUCTIVE: See HPI.   BREASTS: denies pain, lumps, or nipple discharge.   HEMATOLOGIC: No easy bruisability or excessive bleeding.  MUSCULOSKELETAL: Denies joint pain or swelling.   NEUROLOGIC: Denies syncope or weakness.   PSYCHIATRIC: Denies depression, anxiety or mood swings.    Physical Exam:    APPEARANCE: Well nourished, well developed, in no acute distress.  AFFECT: WNL, alert and oriented x 3  SKIN: No acne or hirsutism  NECK: Neck symmetric without masses or thyromegaly  NODES: No inguinal, cervical, axillary, or femoral lymph node enlargement  CHEST: Good respiratory effect  ABDOMEN: Soft.  No tenderness or masses.  No hepatosplenomegaly.  No hernias.  PELVIC: Normal external genitalia without lesions.  Normal hair distribution.  Adequate perineal body, normal urethral meatus.  Vagina moist and well rugated without lesions or discharge.  Cervix pink, without lesions, discharge or tenderness.  No significant cystocele or rectocele.  Bimanual exam shows uterus to be normal size, regular, mobile and nontender.  Adnexa without masses or tenderness.    EXTREMITIES: No edema.    ASSESSMENT AND PLAN    Rina was seen today for routine prenatal visit.    Diagnoses and all orders for this visit:    Velamentous insertion of umbilical cord in third trimester  -     OB Glucose Screen; Future  -     CBC auto differential; Future  -     ABO/Rh; Future  -     US OB/GYN Procedure ([facility]) - Extended List - Future; Future    28 weeks gestation of pregnancy  -     OB Glucose Screen; Future  -     CBC auto  differential; Future  -     ABO/Rh; Future  -     US OB/GYN Procedure ([facility]) - Extended List - Future; Future    Elderly multigravida in third trimester     Repeat ultrasound for growth and evaluation next visit.  Discussed with patient need for weekly testing starting at 32 weeks due to her age.  Only have partial labs/records manually placed in epic, rest info will be scanned in.  No u/s records.  Half of her labs.  Did not get copy of cffDNA but was neg per pt.     Follow up in about 2 weeks (around 10/1/2019).

## 2020-07-21 NOTE — PATIENT INSTRUCTIONS
Call [facility] to find possible sites testing all asymptomatic patients, [phone]

## 2020-08-03 ENCOUNTER — OFFICE VISIT - HEALTHEAST (OUTPATIENT)
Dept: BEHAVIORAL HEALTH | Facility: CLINIC | Age: 40
End: 2020-08-03

## 2020-08-03 DIAGNOSIS — G89.29 CHRONIC MIDLINE LOW BACK PAIN WITHOUT SCIATICA: ICD-10-CM

## 2020-08-03 DIAGNOSIS — F11.20 OPIOID USE DISORDER, MODERATE, DEPENDENCE (H): ICD-10-CM

## 2020-08-03 DIAGNOSIS — M54.50 CHRONIC MIDLINE LOW BACK PAIN WITHOUT SCIATICA: ICD-10-CM

## 2020-08-03 ASSESSMENT — ANXIETY QUESTIONNAIRES
6. BECOMING EASILY ANNOYED OR IRRITABLE: NOT AT ALL
GAD7 TOTAL SCORE: 1
5. BEING SO RESTLESS THAT IT IS HARD TO SIT STILL: SEVERAL DAYS
7. FEELING AFRAID AS IF SOMETHING AWFUL MIGHT HAPPEN: NOT AT ALL
2. NOT BEING ABLE TO STOP OR CONTROL WORRYING: NOT AT ALL
3. WORRYING TOO MUCH ABOUT DIFFERENT THINGS: NOT AT ALL
1. FEELING NERVOUS, ANXIOUS, OR ON EDGE: NOT AT ALL
4. TROUBLE RELAXING: NOT AT ALL

## 2020-08-03 ASSESSMENT — PATIENT HEALTH QUESTIONNAIRE - PHQ9: SUM OF ALL RESPONSES TO PHQ QUESTIONS 1-9: 1

## 2020-08-04 ENCOUNTER — OFFICE VISIT (OUTPATIENT)
Dept: OPHTHALMOLOGY | Facility: CLINIC | Age: 40
End: 2020-08-04
Attending: OPHTHALMOLOGY
Payer: COMMERCIAL

## 2020-08-04 DIAGNOSIS — Z15.89 HLA B27 (HLA B27 POSITIVE): ICD-10-CM

## 2020-08-04 DIAGNOSIS — Z79.899 HIGH RISK MEDICATION USE: ICD-10-CM

## 2020-08-04 DIAGNOSIS — H20.00 ACUTE ANTERIOR UVEITIS OF LEFT EYE: Primary | ICD-10-CM

## 2020-08-04 PROCEDURE — G0463 HOSPITAL OUTPT CLINIC VISIT: HCPCS | Mod: ZF

## 2020-08-04 RX ORDER — PREDNISONE 10 MG/1
TABLET ORAL
Qty: 10 TABLET | Refills: 0 | Status: SHIPPED | OUTPATIENT
Start: 2020-08-04 | End: 2021-08-16

## 2020-08-04 ASSESSMENT — SLIT LAMP EXAM - LIDS: COMMENTS: NORMAL

## 2020-08-04 ASSESSMENT — CUP TO DISC RATIO
OD_RATIO: 0.2
OS_RATIO: 0.3

## 2020-08-04 ASSESSMENT — REFRACTION_WEARINGRX
OD_CYLINDER: +0.75
OS_AXIS: 095
OD_AXIS: 079
SPECS_TYPE: SVL
OS_SPHERE: -4.25
OD_SPHERE: -4.00
OS_CYLINDER: +0.75

## 2020-08-04 ASSESSMENT — TONOMETRY
OS_IOP_MMHG: 14
OD_IOP_MMHG: 15
IOP_METHOD: TONOPEN

## 2020-08-04 ASSESSMENT — CONF VISUAL FIELD
OS_NORMAL: 1
METHOD: COUNTING FINGERS
OD_NORMAL: 1

## 2020-08-04 ASSESSMENT — VISUAL ACUITY
METHOD: SNELLEN - LINEAR
OS_PH_CC: 20/25
OS_CC: 20/30
CORRECTION_TYPE: GLASSES
OD_CC: 20/20
OD_CC+: -2

## 2020-08-04 ASSESSMENT — EXTERNAL EXAM - LEFT EYE: OS_EXAM: NORMAL

## 2020-08-04 ASSESSMENT — EXTERNAL EXAM - RIGHT EYE: OD_EXAM: NORMAL

## 2020-08-04 NOTE — NURSING NOTE
Chief Complaints and History of Present Illnesses   Patient presents with     Uveitis Follow-Up     2 week follow up  Acute anterior uveitis of left eye     Chief Complaint(s) and History of Present Illness(es)     Uveitis Follow-Up     Comments: 2 week follow up  Acute anterior uveitis of left eye              Comments     Pt states vision is improving slightly. Vision in LE appears Darker than RE.  No eye pain today. No flashes or floaters.   No redness or dryness.    MAKAYLA Zee August 4, 2020 9:22 AM

## 2020-08-04 NOTE — LETTER
8/4/2020       RE: Carlos Hamilton  [address]  [place] [address]     Dear Colleague,    Thank you for referring your patient, Carlos Hamilton, to the EYE CLINIC at [facility]. Please see a copy of my visit note below.    Chief Complaint/Presenting Concern:  Uveitis follow up    Interval History of Present Ocular Illness:  Carlos Hamilton is a 39 year old patient who returns for follow up of his acute anterior uveitis of the left eye. Since last visit, he started the oral prednisone at 30 mg daily and reduced the topical steroid drop as recommended. He feels things are feeling better but vision still a little dark in the left eye. Tolerated the prednisone well. Right eye still without symptoms    Interval Updates to Medical/Family/Social History:  No other health or medicine issues.    Relevant Review of Systems Updates:  No major change in back pain. No fevers, rashes, mouth sores.     Laboratory Testing: None since last visit    Current eye related medications: Prednisone 20 mg daily, Prednisolone 4x/day left eye (ran out three days ago)    Retina/Uveitis Imaging: None today    Assessment:     1. Acute anterior uveitis of left eye  Nearly resolved with addition of oral prednisone, just few WBC on endothelium. IOP normal    2. HLA B27 (HLA B27 positive)  With possible joint associations      3. High risk medication use  Tolerating prednisone now 20 mg daily.     Plan/Recommendations:      Discussed findings with patient. The inflammation in the left eye has nearly resolved.    No additional testing or new medications needed at this time.    Continue Prednisone, but reduce to 10 mg (one pill) each AM tomorrow for one week, then stop on 8/12/20.     No steroid drop needed at this time. If you start to notice more changes in either the left eye or right eye, start the steroid drop 3x/day for 3 days. If this doesn't help, call us and we can come up with a  plan.    Will hold off on Rheumatology now, but can consider later.    Seen with Dr. Aparicio.    RTC 5 months Vision IOP, no dilation , no testing    Physician Attestation     Attending Physician Attestation:  Complete documentation of historical and exam elements from today's encounter can be found in the full encounter summary report (not reduplicated in this progress note). I personally obtained the chief complaint(s) and history of present illness. I confirmed and edited as necessary the review of systems, past medical/surgical history, family history, social history, and examination findings as documented by others; and I examined the patient myself. I personally reviewed the relevant tests, images, and reports as documented above. I formulated and edited as necessary the assessment and plan and discussed the findings and management plan with the patient and family members present at the time of this visit.  Bernardo Ingram M.D., Uveitis and Medical Retina, August 4, 2020     Sincerely,    Bernardo Ingram MD  [facility] Dept of Ophthalmology  Uveitis and Medical Retina

## 2020-08-04 NOTE — PATIENT INSTRUCTIONS
Continue Prednisone, but reduce to 10 mg (one pill) each AM tomorrow for one week, then stop on 8/12/20.       No steroid drop needed at this time. If you start to notice more changes in either the left eye or right eye, start the steroid drop 3x/day for 3 days. If this doesn't help, call us and we can come up with a plan.    Please send me a My Chart message if you have any issues between now and January

## 2020-08-04 NOTE — PROGRESS NOTES
Chief Complaint/Presenting Concern:  Uveitis follow up    Interval History of Present Ocular Illness:  Carlos Hamilton is a 39 year old patient who returns for follow up of his acute anterior uveitis of the left eye. Since last visit, he started the oral prednisone at 30 mg daily and reduced the topical steroid drop as recommended. He feels things are feeling better but vision still a little dark in the left eye. Tolerated the prednisone well. Right eye still without symptoms    Interval Updates to Medical/Family/Social History:  No other health or medicine issues.    Relevant Review of Systems Updates:  No major change in back pain. No fevers, rashes, mouth sores.     Laboratory Testing: None since last visit    Current eye related medications: Prednisone 20 mg daily, Prednisolone 4x/day left eye (ran out three days ago)    Retina/Uveitis Imaging: None today    Assessment:     1. Acute anterior uveitis of left eye  Nearly resolved with addition of oral prednisone, just few WBC on endothelium. IOP normal    2. HLA B27 (HLA B27 positive)  With possible joint associations    3. High risk medication use  Tolerating prednisone now 20 mg daily.     Plan/Recommendations:      Discussed findings with patient. The inflammation in the left eye has nearly resolved.    No additional testing or new medications needed at this time.    Continue Prednisone, but reduce to 10 mg (one pill) each AM tomorrow for one week, then stop on 8/12/20.     No steroid drop needed at this time. If you start to notice more changes in either the left eye or right eye, start the steroid drop 3x/day for 3 days. If this doesn't help, call us and we can come up with a plan.    Will hold off on Rheumatology now, but can consider later.    Seen with Dr. Aparicio.    RTC 5 months Vision IOP, no dilation , no testing    Physician Attestation     Attending Physician Attestation:  Complete documentation of historical and exam elements from today's  encounter can be found in the full encounter summary report (not reduplicated in this progress note). I personally obtained the chief complaint(s) and history of present illness. I confirmed and edited as necessary the review of systems, past medical/surgical history, family history, social history, and examination findings as documented by others; and I examined the patient myself. I personally reviewed the relevant tests, images, and reports as documented above. I formulated and edited as necessary the assessment and plan and discussed the findings and management plan with the patient and family members present at the time of this visit.  Bernardo Ingram M.D., Uveitis and Medical Retina, August 4, 2020

## 2020-08-31 ENCOUNTER — OFFICE VISIT - HEALTHEAST (OUTPATIENT)
Dept: BEHAVIORAL HEALTH | Facility: CLINIC | Age: 40
End: 2020-08-31

## 2020-08-31 DIAGNOSIS — F41.1 ANXIETY STATE: ICD-10-CM

## 2020-08-31 DIAGNOSIS — F11.20 OPIOID USE DISORDER, MODERATE, DEPENDENCE (H): ICD-10-CM

## 2020-08-31 DIAGNOSIS — K21.9 GASTROESOPHAGEAL REFLUX DISEASE, ESOPHAGITIS PRESENCE NOT SPECIFIED: ICD-10-CM

## 2020-08-31 ASSESSMENT — ANXIETY QUESTIONNAIRES
7. FEELING AFRAID AS IF SOMETHING AWFUL MIGHT HAPPEN: NOT AT ALL
4. TROUBLE RELAXING: NOT AT ALL
2. NOT BEING ABLE TO STOP OR CONTROL WORRYING: NOT AT ALL
1. FEELING NERVOUS, ANXIOUS, OR ON EDGE: SEVERAL DAYS
IF YOU CHECKED OFF ANY PROBLEMS ON THIS QUESTIONNAIRE, HOW DIFFICULT HAVE THESE PROBLEMS MADE IT FOR YOU TO DO YOUR WORK, TAKE CARE OF THINGS AT HOME, OR GET ALONG WITH OTHER PEOPLE: NOT DIFFICULT AT ALL
6. BECOMING EASILY ANNOYED OR IRRITABLE: NOT AT ALL
5. BEING SO RESTLESS THAT IT IS HARD TO SIT STILL: NOT AT ALL
3. WORRYING TOO MUCH ABOUT DIFFERENT THINGS: SEVERAL DAYS
GAD7 TOTAL SCORE: 2

## 2020-08-31 ASSESSMENT — PATIENT HEALTH QUESTIONNAIRE - PHQ9: SUM OF ALL RESPONSES TO PHQ QUESTIONS 1-9: 3

## 2020-09-12 ENCOUNTER — COMMUNICATION - HEALTHEAST (OUTPATIENT)
Dept: BEHAVIORAL HEALTH | Facility: CLINIC | Age: 40
End: 2020-09-12

## 2020-09-12 DIAGNOSIS — K21.9 GASTROESOPHAGEAL REFLUX DISEASE, ESOPHAGITIS PRESENCE NOT SPECIFIED: ICD-10-CM

## 2020-09-28 ENCOUNTER — OFFICE VISIT - HEALTHEAST (OUTPATIENT)
Dept: BEHAVIORAL HEALTH | Facility: CLINIC | Age: 40
End: 2020-09-28

## 2020-09-28 DIAGNOSIS — F11.20 OPIOID USE DISORDER, MODERATE, DEPENDENCE (H): ICD-10-CM

## 2020-09-28 DIAGNOSIS — M54.50 CHRONIC MIDLINE LOW BACK PAIN WITHOUT SCIATICA: ICD-10-CM

## 2020-09-28 DIAGNOSIS — F41.1 ANXIETY STATE: ICD-10-CM

## 2020-09-28 DIAGNOSIS — G89.29 CHRONIC MIDLINE LOW BACK PAIN WITHOUT SCIATICA: ICD-10-CM

## 2020-09-28 DIAGNOSIS — F10.20 SEVERE ALCOHOL USE DISORDER (H): ICD-10-CM

## 2020-09-28 ASSESSMENT — ANXIETY QUESTIONNAIRES
7. FEELING AFRAID AS IF SOMETHING AWFUL MIGHT HAPPEN: NOT AT ALL
3. WORRYING TOO MUCH ABOUT DIFFERENT THINGS: NOT AT ALL
2. NOT BEING ABLE TO STOP OR CONTROL WORRYING: NOT AT ALL
5. BEING SO RESTLESS THAT IT IS HARD TO SIT STILL: NOT AT ALL
GAD7 TOTAL SCORE: 1
4. TROUBLE RELAXING: SEVERAL DAYS
1. FEELING NERVOUS, ANXIOUS, OR ON EDGE: NOT AT ALL
6. BECOMING EASILY ANNOYED OR IRRITABLE: NOT AT ALL

## 2020-09-28 ASSESSMENT — PATIENT HEALTH QUESTIONNAIRE - PHQ9: SUM OF ALL RESPONSES TO PHQ QUESTIONS 1-9: 3

## 2020-10-08 ENCOUNTER — COMMUNICATION - HEALTHEAST (OUTPATIENT)
Dept: BEHAVIORAL HEALTH | Facility: CLINIC | Age: 40
End: 2020-10-08

## 2020-10-26 ENCOUNTER — OFFICE VISIT - HEALTHEAST (OUTPATIENT)
Dept: BEHAVIORAL HEALTH | Facility: CLINIC | Age: 40
End: 2020-10-26

## 2020-10-26 DIAGNOSIS — K21.9 GASTROESOPHAGEAL REFLUX DISEASE: ICD-10-CM

## 2020-10-26 DIAGNOSIS — F41.1 ANXIETY STATE: ICD-10-CM

## 2020-10-26 DIAGNOSIS — G89.29 CHRONIC MIDLINE LOW BACK PAIN WITHOUT SCIATICA: ICD-10-CM

## 2020-10-26 DIAGNOSIS — F11.20 OPIOID USE DISORDER, MODERATE, DEPENDENCE (H): ICD-10-CM

## 2020-10-26 DIAGNOSIS — F10.20 SEVERE ALCOHOL USE DISORDER (H): ICD-10-CM

## 2020-10-26 DIAGNOSIS — M54.50 CHRONIC MIDLINE LOW BACK PAIN WITHOUT SCIATICA: ICD-10-CM

## 2020-10-26 ASSESSMENT — PATIENT HEALTH QUESTIONNAIRE - PHQ9: SUM OF ALL RESPONSES TO PHQ QUESTIONS 1-9: 3

## 2020-10-26 ASSESSMENT — ANXIETY QUESTIONNAIRES
IF YOU CHECKED OFF ANY PROBLEMS ON THIS QUESTIONNAIRE, HOW DIFFICULT HAVE THESE PROBLEMS MADE IT FOR YOU TO DO YOUR WORK, TAKE CARE OF THINGS AT HOME, OR GET ALONG WITH OTHER PEOPLE: NOT DIFFICULT AT ALL
6. BECOMING EASILY ANNOYED OR IRRITABLE: NOT AT ALL
GAD7 TOTAL SCORE: 3
5. BEING SO RESTLESS THAT IT IS HARD TO SIT STILL: SEVERAL DAYS
2. NOT BEING ABLE TO STOP OR CONTROL WORRYING: NOT AT ALL
4. TROUBLE RELAXING: SEVERAL DAYS
1. FEELING NERVOUS, ANXIOUS, OR ON EDGE: SEVERAL DAYS
3. WORRYING TOO MUCH ABOUT DIFFERENT THINGS: NOT AT ALL
7. FEELING AFRAID AS IF SOMETHING AWFUL MIGHT HAPPEN: NOT AT ALL

## 2020-11-11 ENCOUNTER — COMMUNICATION - HEALTHEAST (OUTPATIENT)
Dept: FAMILY MEDICINE | Facility: CLINIC | Age: 40
End: 2020-11-11

## 2020-11-22 ENCOUNTER — HEALTH MAINTENANCE LETTER (OUTPATIENT)
Age: 40
End: 2020-11-22

## 2021-01-06 ENCOUNTER — OFFICE VISIT - HEALTHEAST (OUTPATIENT)
Dept: FAMILY MEDICINE | Facility: CLINIC | Age: 41
End: 2021-01-06

## 2021-01-06 DIAGNOSIS — F10.21 ALCOHOL USE DISORDER, SEVERE, IN SUSTAINED REMISSION (H): ICD-10-CM

## 2021-01-06 DIAGNOSIS — F11.11 OPIOID USE DISORDER, MILD, IN SUSTAINED REMISSION, ON MAINTENANCE THERAPY (H): ICD-10-CM

## 2021-01-06 DIAGNOSIS — G89.29 CHRONIC MIDLINE LOW BACK PAIN WITHOUT SCIATICA: ICD-10-CM

## 2021-01-06 DIAGNOSIS — F11.21 OPIOID USE DISORDER, MODERATE, IN SUSTAINED REMISSION, ON MAINTENANCE THERAPY (H): ICD-10-CM

## 2021-01-06 DIAGNOSIS — M54.50 CHRONIC MIDLINE LOW BACK PAIN WITHOUT SCIATICA: ICD-10-CM

## 2021-01-06 DIAGNOSIS — F17.200 TOBACCO DEPENDENCE SYNDROME: ICD-10-CM

## 2021-01-06 LAB
AMPHETAMINE-CLINIC: ABNORMAL
BARBITURATES-CLINIC: ABNORMAL
BENZODIAZEPINES-CLINIC: ABNORMAL
BUPRENORPHINE-CLINIC: ABNORMAL
COCAINE-CLINIC: ABNORMAL
ECSTASY-CLINIC: ABNORMAL
MARIJUANA-CLINIC: ABNORMAL
METHADONE METABOLITE-CLINIC: ABNORMAL
METHAMPHETAMINE-CLINIC: ABNORMAL
OPIATES-CLINIC: ABNORMAL
OXIDANTS: NORMAL
OXYCODONE-CLINIC: ABNORMAL
PCP 25-CLINIC: ABNORMAL
PH-CLINIC: 4 (ref 5–8)
SP GR UR STRIP: 1.01 (ref 1–1.03)

## 2021-01-06 ASSESSMENT — MIFFLIN-ST. JEOR: SCORE: 1838.39

## 2021-03-08 ENCOUNTER — OFFICE VISIT - HEALTHEAST (OUTPATIENT)
Dept: FAMILY MEDICINE | Facility: CLINIC | Age: 41
End: 2021-03-08

## 2021-03-08 DIAGNOSIS — J44.9 CHRONIC OBSTRUCTIVE PULMONARY DISEASE, UNSPECIFIED COPD TYPE (H): ICD-10-CM

## 2021-03-08 DIAGNOSIS — F11.21 OPIOID USE DISORDER, MODERATE, IN SUSTAINED REMISSION, ON MAINTENANCE THERAPY (H): ICD-10-CM

## 2021-03-08 DIAGNOSIS — F17.200 TOBACCO DEPENDENCE SYNDROME: ICD-10-CM

## 2021-04-05 ENCOUNTER — COMMUNICATION - HEALTHEAST (OUTPATIENT)
Dept: FAMILY MEDICINE | Facility: CLINIC | Age: 41
End: 2021-04-05

## 2021-04-05 DIAGNOSIS — F11.21 OPIOID USE DISORDER, MODERATE, IN SUSTAINED REMISSION, ON MAINTENANCE THERAPY (H): ICD-10-CM

## 2021-05-03 ENCOUNTER — OFFICE VISIT - HEALTHEAST (OUTPATIENT)
Dept: FAMILY MEDICINE | Facility: CLINIC | Age: 41
End: 2021-05-03

## 2021-05-03 DIAGNOSIS — F41.1 GAD (GENERALIZED ANXIETY DISORDER): ICD-10-CM

## 2021-05-03 DIAGNOSIS — J44.9 CHRONIC OBSTRUCTIVE PULMONARY DISEASE, UNSPECIFIED COPD TYPE (H): ICD-10-CM

## 2021-05-03 DIAGNOSIS — F11.21 OPIOID USE DISORDER, MODERATE, IN SUSTAINED REMISSION, ON MAINTENANCE THERAPY (H): ICD-10-CM

## 2021-05-26 ASSESSMENT — PATIENT HEALTH QUESTIONNAIRE - PHQ9
SUM OF ALL RESPONSES TO PHQ QUESTIONS 1-9: 3
SUM OF ALL RESPONSES TO PHQ QUESTIONS 1-9: 4
SUM OF ALL RESPONSES TO PHQ QUESTIONS 1-9: 2
SUM OF ALL RESPONSES TO PHQ QUESTIONS 1-9: 2
SUM OF ALL RESPONSES TO PHQ QUESTIONS 1-9: 1
SUM OF ALL RESPONSES TO PHQ QUESTIONS 1-9: 2
SUM OF ALL RESPONSES TO PHQ QUESTIONS 1-9: 2
SUM OF ALL RESPONSES TO PHQ QUESTIONS 1-9: 3
SUM OF ALL RESPONSES TO PHQ QUESTIONS 1-9: 5

## 2021-05-27 ASSESSMENT — PATIENT HEALTH QUESTIONNAIRE - PHQ9
SUM OF ALL RESPONSES TO PHQ QUESTIONS 1-9: 3
SUM OF ALL RESPONSES TO PHQ QUESTIONS 1-9: 5
SUM OF ALL RESPONSES TO PHQ QUESTIONS 1-9: 6
SUM OF ALL RESPONSES TO PHQ QUESTIONS 1-9: 2
SUM OF ALL RESPONSES TO PHQ QUESTIONS 1-9: 1

## 2021-05-28 ASSESSMENT — ANXIETY QUESTIONNAIRES
GAD7 TOTAL SCORE: 3
GAD7 TOTAL SCORE: 2
GAD7 TOTAL SCORE: 6
GAD7 TOTAL SCORE: 5
GAD7 TOTAL SCORE: 4
GAD7 TOTAL SCORE: 4
GAD7 TOTAL SCORE: 1
GAD7 TOTAL SCORE: 3
GAD7 TOTAL SCORE: 1
GAD7 TOTAL SCORE: 5
GAD7 TOTAL SCORE: 12
GAD7 TOTAL SCORE: 6
GAD7 TOTAL SCORE: 2
GAD7 TOTAL SCORE: 7

## 2021-05-28 NOTE — PROGRESS NOTES
Correct pharmacy verified with patient and confirmed in snapshot? [x] yes []no    Charge captured ? [x] yes  [] no    Medications Phoned  to Pharmacy [] yes [x]no  Name of Pharmacist:  List Medications, including dose, quantity and instructions      Medication Prescriptions given to patient   [] yes  [x] no   List the name of the drug the prescription was written for.       Medications ordered this visit were e-scribed.  Verified by order class [x] yes  [] no buprenorphine-naloxone 4-1mg, zanaflex 4mg    Medication changes or discontinuations were communicated to patient's pharmacy: [] yes  [x] no    UA collected [x] yes  [] no    [place] Prescription Monitoring Program Reviewed? [x] yes  [] no    Referrals were made to:  no    Future appointment was made: [x] yes  [] no    Dictation completed at time of chart check: [x] yes  [] no    I have checked the documentation for today s encounters and the above information has been reviewed and completed.

## 2021-05-28 NOTE — PROGRESS NOTES
DAP 1st time; co-pay only    Correct pharmacy verified with patient and confirmed in snapshot? [x] yes []no    Charge captured ? [x] yes  [] no    Medications Phoned  to Pharmacy [] yes [x]no  Name of Pharmacist:  List Medications, including dose, quantity and instructions      Medication Prescriptions given to patient   [] yes  [x] no   List the name of the drug the prescription was written for.       Medications ordered this visit were e-scribed.  Verified by order class [x] yes  [] no   Suboxone  Medication changes or discontinuations were communicated to patient's pharmacy: [] yes  [x] no    UA collected [x] yes  [] no    [place] Prescription Monitoring Program Reviewed? [x] yes  [] no    Referrals were made to:      Future appointment was made: [x] yes  [] no  5/14/19  Dictation completed at time of chart check: [] yes  [x] no    I have checked the documentation for today s encounters and the above information has been reviewed and completed.

## 2021-05-28 NOTE — PROGRESS NOTES
Correct pharmacy verified with patient and confirmed in snapshot? [x] yes []no    Charge captured ? [x] yes  [] no    Medications Phoned  to Pharmacy [] yes [x]no  Name of Pharmacist:  List Medications, including dose, quantity and instructions      Medication Prescriptions given to patient   [] yes  [x] no   List the name of the drug the prescription was written for.       Medications ordered this visit were e-scribed.  Verified by order class [] yes  [x] no    Medication changes or discontinuations were communicated to patient's pharmacy: [] yes  [x] no    UA collected [x] yes  [] no    [place] Prescription Monitoring Program Reviewed? [x] yes  [] no    Referrals were made to:  none    Future appointment was made: [x] yes  [] no  5/21/19  Dictation completed at time of chart check: [x] yes  [] no    I have checked the documentation for today s encounters and the above information has been reviewed and completed.

## 2021-05-29 NOTE — PROGRESS NOTES
Correct pharmacy verified with patient and confirmed in snapshot? [] yes []no    Charge captured ? [x] yes  [] no    Medications Phoned  to Pharmacy [] yes [x]no  Name of Pharmacist:  List Medications, including dose, quantity and instructions      Medication Prescriptions given to patient   [] yes  [x] no   List the name of the drug the prescription was written for.       Medications ordered this visit were e-scribed.  Verified by order class [x] yes  [] no zanaflex 4mg, buprenorphine-naloxone 4-1mg     Medication changes or discontinuations were communicated to patient's pharmacy: [] yes  [x] no    UA collected [x] yes  [] no    [place] Prescription Monitoring Program Reviewed? [x] yes  [] no    Referrals were made to:  no    Future appointment was made: [x] yes  [] no    Dictation completed at time of chart check: [x] yes  [] no    I have checked the documentation for today s encounters and the above information has been reviewed and completed.

## 2021-05-29 NOTE — PROGRESS NOTES
Correct pharmacy verified with patient and confirmed in snapshot? [x] yes []no    Charge captured ? [x] yes  [] no    Medications Phoned  to Pharmacy [] yes [x]no  Name of Pharmacist:  List Medications, including dose, quantity and instructions      Medication Prescriptions given to patient   [] yes  [x] no   List the name of the drug the prescription was written for.       Medications ordered this visit were e-scribed.  Verified by order class [x] yes  [] no prilosec 40mg, suboxone 8-2mg    Medication changes or discontinuations were communicated to patient's pharmacy: [x] yes  [] no    UA collected [x] yes  [] no    [place] Prescription Monitoring [place] Reviewed? [x] yes  [] no    Referrals were made to:  none    Future appointment was made: [x] yes  [] no    Dictation completed at time of chart check: [] yes  [x] no    I have checked the documentation for today s encounters and the above information has been reviewed and completed.

## 2021-05-30 NOTE — PROGRESS NOTES
Correct pharmacy verified with patient and confirmed in snapshot? [x] yes []no    Charge captured ? [x] yes  [] no    Medications Phoned  to Pharmacy [] yes [x]no  Name of Pharmacist:  List Medications, including dose, quantity and instructions      Medication Prescriptions given to patient   [] yes  [x] no   List the name of the drug the prescription was written for.       Medications ordered this visit were e-scribed.  Verified by order class [x] yes  [] no  Gabapentin 300 mg  Suboxone 8-2 mg  Tizanidine 4 mg    Medication changes or discontinuations were communicated to patient's pharmacy: [] yes  [x] no    UA collected [x] yes  [] no    [place] Prescription Monitoring Program Reviewed? [x] yes  [] no    Referrals were made to:none      Future appointment was made: [x] yes  [] no    Dictation completed at time of chart check: [x] yes  [] no    I have checked the documentation for today s encounters and the above information has been reviewed and completed.
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
## 2021-05-31 NOTE — TELEPHONE ENCOUNTER
"Returned call to patient. Informed him of the added midday 0.5 film of suboxone. Pt became irritable, upset and confused on how he's supposed to cut a strip in half. He was very worried about how he's supposed to store the other half and not have it \"go bad\" by the next day. Reassured patient that it was ok. Pt requested to stop back in to talk about it with Dr. Rangel. Informed him that she has patients in clinic this evening. Pt asked that I let her know that he's \"not happy about this\" and stated \"I may as well not even take it then.\" Offered reassurance and told him his message would be passed along.   "
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
## 2021-05-31 NOTE — PROGRESS NOTES
Correct pharmacy verified with patient and confirmed in snapshot? [x] yes []no    Charge captured ? [x] yes  [] no    Medications Phoned  to Pharmacy [] yes [x]no  Name of Pharmacist:  List Medications, including dose, quantity and instructions      Medication Prescriptions given to patient   [] yes  [x] no   List the name of the drug the prescription was written for.       Medications ordered this visit were e-scribed.  Verified by order class [x] yes  [] no suboxone 8-2mg, gabapentin 300mg    Medication changes or discontinuations were communicated to patient's pharmacy: [] yes  [x] no    UA collected [x] yes  [] no    [place] Prescription Monitoring Program Reviewed? [x] yes  [] no    Referrals were made to:  none    Future appointment was made: [x] yes  [] no    Dictation completed at time of chart check: [x] yes  [] no    I have checked the documentation for today s encounters and the above information has been reviewed and completed.
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
## 2021-05-31 NOTE — TELEPHONE ENCOUNTER
Patient called to inquire on dosing instructions for his suboxone rx. Please call pt [phone] ( patient was not able to schedule 30 day appt due to provider being out of office week of 9.2-9.5 so his next appt is 9.10.19

## 2021-06-01 NOTE — TELEPHONE ENCOUNTER
Date of Last Office Visit: 09/10/2019  Date of Next Office Visit: 10/08/2019  No shows since last visit: none  Cancellations since last visit: none  ED visits since last visit:  none  Medication Omeprazole 40 mg date last ordered: 06/11/2019  Qty: 30  Refills: 2, and last fill was on 08/02/2019 for #30  Lapse in therapy greater than 7 days: should not be  Medication refill request verified as identical to current order: yes  Result of Last DAM, VPA, Li+ Level, CBC, or Carbamazepine Level (at or since last visit): Negative on 09/10/2019.     [] Medication refilled per AC M-1.   [x] Medication unable to be refilled by RN due to criteria not met as indicated below:     []Eligibility - not seen in last year    []Supervision - no future appointment    []Compliance     []Verification - order discrepancy    []Controlled Medication    [x]Medication not included in RN Protocol    []90 - day supply request    []Other     Current Medication list:    Carlos Hamilton    (MRN [id number])   Your Current Medications Are     acetaminophen (TYLENOL) 325 MG tablet Take 325-650 mg by mouth every 4 (four) hours as needed for pain. Max 4000mg/24 hours   albuterol (PROAIR HFA;PROVENTIL HFA;VENTOLIN HFA) 90 mcg/actuation inhaler Inhale 2 puffs every 4 (four) hours as needed for wheezing or shortness of breath.   budesonide-formoterol (SYMBICORT) 160-4.5 mcg/actuation inhaler Inhale 2 puffs 2 (two) times a day.   buprenorphine-naloxone (SUBOXONE SL FILM) 8-2 mg Film per sublingual film Place 1 Film under the tongue 3 (three) times a day.   gabapentin (NEURONTIN) 300 MG capsule Take 1 capsule (300 mg total) by mouth 3 (three) times a day.   nicotine (NICOTROL) 10 mg inhaler Inhale 10mg (1 cartridge) every 2 hours as needed for smoking cessation   omeprazole (PRILOSEC) 40 MG capsule Take 1 capsule (40 mg total) by mouth daily before breakfast.   tiZANidine (ZANAFLEX) 4 MG tablet Take 1 tablet (4 mg total) by mouth every 6 (six) hours as  needed (muscle spasms).   traZODone (DESYREL) 100 MG tablet Take 100 mg by mouth.   buprenorphine-naloxone (SUBOXONE SL FILM) 8-2 mg Film per sublingual film (Discontinued) 2.5 films daily. 1 film two times a day and 0.5 film SL at midday   buprenorphine-naloxone (SUBOXONE SL FILM) 8-2 mg Film per sublingual film (Discontinued) Place 1 Film under the tongue 3 (three) times a day. 2.5 films daily. 1 film two times a day and 0.5 film SL at midday   traZODone (DESYREL) 100 MG tablet (Discontinued) Take 1 tablet (100 mg total) by mouth at bedtime.       Medication Plan of Care at last office visit with MD/CNP:    Not completed from 09/10/2019.

## 2021-06-01 NOTE — TELEPHONE ENCOUNTER
Prior authorization done through CoverMyMeds for Buprenorphine-Naloxone 8-2 mg, taking 3 daily.  This was approved.  Robert said it went through without issue, and Carlos was notified.  Paperwork sent to Epic for scanning.

## 2021-06-01 NOTE — PROGRESS NOTES
Correct pharmacy verified with patient and confirmed in snapshot? [x] yes []no    Charge captured ? [x] yes  [] no    Medications Phoned  to Pharmacy [] yes [x]no  Name of Pharmacist:  List Medications, including dose, quantity and instructions      Medication Prescriptions given to patient   [] yes  [x] no   List the name of the drug the prescription was written for.       Medications ordered this visit were e-scribed.  Verified by order class [x] yes  [] no suboxone 8-2mg    Medication changes or discontinuations were communicated to patient's pharmacy: [x] yes  [] no    UA collected [x] yes  [] no    [place] Prescription Monitoring Program Reviewed? [x] yes  [] no    Referrals were made to:  no    Future appointment was made: [x] yes  [] no    Dictation completed at time of chart check: [x] yes  [] no    I have checked the documentation for today s encounters and the above information has been reviewed and completed.

## 2021-06-02 VITALS — WEIGHT: 171 LBS | HEIGHT: 71 IN | BODY MASS INDEX: 23.94 KG/M2

## 2021-06-02 VITALS — BODY MASS INDEX: 24.36 KG/M2 | WEIGHT: 174 LBS | HEIGHT: 71 IN

## 2021-06-02 VITALS — WEIGHT: 182 LBS | HEIGHT: 71 IN | BODY MASS INDEX: 25.48 KG/M2

## 2021-06-02 VITALS — WEIGHT: 173 LBS | HEIGHT: 71 IN | BODY MASS INDEX: 24.22 KG/M2

## 2021-06-02 VITALS — BODY MASS INDEX: 23.8 KG/M2 | WEIGHT: 170 LBS | HEIGHT: 71 IN

## 2021-06-02 NOTE — TELEPHONE ENCOUNTER
.Date of Last Office Visit: 10/8/19  Date of Next Office Visit: 11/5/19  No shows since last visit: no  Cancellations since last visit: no  ED visits since last visit: no    Medication Tizanidine 4 mg date last ordered: 9/20/19  Qty: 120  Refills: 0    tiZANidine (ZANAFLEX) 4 MG tablet 120 tablet 0 9/20/2019     Sig: TAKE ONE TABLET BY MOUTH EVERY SIX HOURS AS NEEDED FOR MUSCLE SPASM         Lapse in therapy greater than 7 days: no  Medication refill request verified as identical to current order: yes  Result of Last DAM, VPA, Li+ Level, CBC, or Carbamazepine Level (at or since last visit): Negative DAM on 10/8/19; pos BUP on 8/6/19        []Eligibility - not seen in last year    []Supervision - no future appointment    []Compliance     []Verification - order discrepancy    []Controlled Medication    []90 - day supply request    [x]Other LPN pending medications    Current Medication list:    acetaminophen (TYLENOL) 325 MG tablet Take 325-650 mg by mouth every 4 (four) hours as needed for pain. Max 4000mg/24 hours   albuterol (PROAIR HFA;PROVENTIL HFA;VENTOLIN HFA) 90 mcg/actuation inhaler Inhale 2 puffs every 4 (four) hours as needed for wheezing or shortness of breath.   budesonide-formoterol (SYMBICORT) 160-4.5 mcg/actuation inhaler Inhale 2 puffs 2 (two) times a day.   buprenorphine-naloxone (SUBOXONE SL FILM) 8-2 mg Film per sublingual film Place 1 Film under the tongue 3 (three) times a day.   gabapentin (NEURONTIN) 300 MG capsule Take 1 capsule (300 mg total) by mouth 3 (three) times a day.   nicotine (NICOTROL) 10 mg inhaler Inhale 10mg (1 cartridge) every 2 hours as needed for smoking cessation   omeprazole (PRILOSEC) 40 MG capsule Take 1 capsule (40 mg total) by mouth daily before breakfast.   tiZANidine (ZANAFLEX) 4 MG tablet TAKE ONE TABLET BY MOUTH EVERY SIX HOURS AS NEEDED FOR MUSCLE SPASM    traZODone (DESYREL) 100 MG tablet Take 100 mg by mouth.         Medication Plan of Care at last office visit  with MD/CNP:    PLAN:       1. Opioid use disorder, moderate, dependence (H)  -Continue Suboxone 8/2 mg sublingually 3 times daily.  Benefits of the medication still outweigh the risks.  Patient is sober for more than 7 months and is determined to continue with medications, AA support and maintain sobriety  - buprenorphine-naloxone (SUBOXONE SL FILM) 8-2 mg Film per sublingual film; Place 1 Film under the tongue 3 (three) times a day.  Dispense: 90 Film; Refill: 0  - DAM    (Drug Abuse 1+, Urine)     2. Severe alcohol use disorder (H)  -In sustained remission and in controlled environment.  - DAM    (Drug Abuse 1+, Urine)     3. Gastroesophageal reflux disease, esophagitis presence not specified  -Patient requested refill of the medication which was authorized  - omeprazole (PRILOSEC) 40 MG capsule; Take 1 capsule (40 mg total) by mouth daily before breakfast.  Dispense: 30 capsule; Refill: 0     4. Chronic midline low back pain without sciatica  -Follow-up with primary care provider and physical therapy  - gabapentin (NEURONTIN) 300 MG capsule; Take 1 capsule (300 mg total) by mouth 3 (three) times a day.  Dispense: 90 capsule; Refill: 1  -Continue to utilize tizanidine 4 mg p.o. 4 times daily as needed for breakthrough pain     5. Anxiety state  -Improved with gabapentin     5.  Follow-up in clinic in 1 month for medication management     [place], [place], [place], and [place] : NA

## 2021-06-02 NOTE — PROGRESS NOTES
Correct pharmacy verified with patient and confirmed in snapshot? [x] yes []no    Charge captured ? [x] yes  [] no    Medications Phoned  to Pharmacy [] yes [x]no  Name of Pharmacist:  List Medications, including dose, quantity and instructions      Medication Prescriptions given to patient   [] yes  [x] no   List the name of the drug the prescription was written for.       Medications ordered this visit were e-scribed.  Verified by order class [x] yes  [] no  buprenorphine-naloxone (SUBOXONE SL FILM) 8-2 mg Film per sublingual film 90 Film 0 10/8/2019     gabapentin (NEURONTIN) 300 MG capsule 90 capsule 1 10/8/2019     omeprazole (PRILOSEC) 40 MG capsule 30 capsule 0 10/8/2019             Medication changes or discontinuations were communicated to patient's pharmacy: [] yes  [x] no    UA collected [x] yes  [] no    [place] Prescription Monitoring Program Reviewed? [x] yes  [] no    Referrals were made to:  no    Future appointment was made: [x] yes  [] no    Dictation completed at time of chart check: [x] yes  [] no    I have checked the documentation for today s encounters and the above information has been reviewed and completed.

## 2021-06-03 VITALS
DIASTOLIC BLOOD PRESSURE: 78 MMHG | HEART RATE: 76 BPM | WEIGHT: 195 LBS | BODY MASS INDEX: 27.3 KG/M2 | HEIGHT: 71 IN | SYSTOLIC BLOOD PRESSURE: 140 MMHG

## 2021-06-03 VITALS
SYSTOLIC BLOOD PRESSURE: 117 MMHG | DIASTOLIC BLOOD PRESSURE: 71 MMHG | HEART RATE: 77 BPM | WEIGHT: 192 LBS | BODY MASS INDEX: 26.88 KG/M2 | HEIGHT: 71 IN

## 2021-06-03 VITALS
DIASTOLIC BLOOD PRESSURE: 80 MMHG | WEIGHT: 198 LBS | BODY MASS INDEX: 27.72 KG/M2 | SYSTOLIC BLOOD PRESSURE: 126 MMHG | HEIGHT: 71 IN | HEART RATE: 82 BPM

## 2021-06-03 VITALS
BODY MASS INDEX: 26.18 KG/M2 | DIASTOLIC BLOOD PRESSURE: 69 MMHG | HEIGHT: 71 IN | SYSTOLIC BLOOD PRESSURE: 122 MMHG | HEART RATE: 78 BPM | WEIGHT: 187 LBS

## 2021-06-03 VITALS
HEART RATE: 83 BPM | DIASTOLIC BLOOD PRESSURE: 71 MMHG | BODY MASS INDEX: 26.32 KG/M2 | SYSTOLIC BLOOD PRESSURE: 113 MMHG | WEIGHT: 188 LBS | HEIGHT: 71 IN

## 2021-06-03 VITALS — HEIGHT: 71 IN | BODY MASS INDEX: 26.6 KG/M2 | WEIGHT: 190 LBS

## 2021-06-03 VITALS — BODY MASS INDEX: 26.04 KG/M2 | WEIGHT: 186 LBS | HEIGHT: 71 IN

## 2021-06-03 NOTE — PROGRESS NOTES
Correct pharmacy verified with patient and confirmed in snapshot? [x] yes []no    Charge captured ? [x] yes  [] no    Medications Phoned  to Pharmacy [] yes [x]no  Name of Pharmacist:  List Medications, including dose, quantity and instructions      Medication Prescriptions given to patient   [] yes  [x] no   List the name of the drug the prescription was written for.       Medications ordered this visit were e-scribed.  Verified by order class [x] yes  [] no  buprenorphine-naloxone (SUBOXONE SL FILM) 8-2 mg Film per sublingual film 90 Film 0 12/3/2019     omeprazole (PRILOSEC) 40 MG capsule 30 capsule 0 12/3/2019     Medication changes or discontinuations were communicated to patient's pharmacy: [] yes  [x] no    UA collected [x] yes  [] no    [place] Prescription Monitoring Program Reviewed? [x] yes  [] no    Referrals were made to:  none    Future appointment was made: [x] yes  [] no    Dictation completed at time of chart check: [x] yes  [] no    I have checked the documentation for today s encounters and the above information has been reviewed and completed.

## 2021-06-04 VITALS
DIASTOLIC BLOOD PRESSURE: 86 MMHG | HEIGHT: 71 IN | WEIGHT: 200 LBS | SYSTOLIC BLOOD PRESSURE: 143 MMHG | HEART RATE: 83 BPM | BODY MASS INDEX: 28 KG/M2

## 2021-06-04 VITALS — HEIGHT: 71 IN | BODY MASS INDEX: 28 KG/M2 | WEIGHT: 200 LBS

## 2021-06-04 NOTE — TELEPHONE ENCOUNTER
Called patient and let him know that I checked with the pharmacy.  The Tizanidine still does not run through due to it is a 6 mg strength and he has straight MA this month.  Contacted covermymeds and the response is it is still under review.  This medication is not covered under [employer].  I told patient I we would call him tomorrow on the update either way.  The other option he said he would be open to is decreasing the strength to 4 mg dose temporarily until PA is determined, but wanted to wait until tomorrow to see if PA goes through.

## 2021-06-04 NOTE — TELEPHONE ENCOUNTER
Patient calling back asking  For call back, states that he was told by pharmacy his pa was denied Saturday. Please call [phone]

## 2021-06-04 NOTE — TELEPHONE ENCOUNTER
This is being addressed in another encounter.  Arlene KELLY RN contacted patient and pharmacy.  Still pending.

## 2021-06-04 NOTE — TELEPHONE ENCOUNTER
Writer talked to Dr. Brunner who is covering for Dr. Rangel today.    Pharmacy states insurance will not cover medication Tizanidine 4 mg capsules but will cover 4 mg tablets.    Dr. Brunner did give ok to substitute tablets for capsules due to insurance formulary coverage.    Writer contacted pharmacy and talked to pharmacist: Bernardo at [email or web address]  Gave Dr. Brunner's approval for substitution.

## 2021-06-04 NOTE — TELEPHONE ENCOUNTER
Checked with Covermymeds this morning and the PA is still under review.  It was submitted 12-6-19 and they have 3-5 business days to respond with a decision.

## 2021-06-04 NOTE — PROGRESS NOTES
Correct pharmacy verified with patient and confirmed in snapshot? [x] yes []no    Charge captured ? [x] yes  [] no    Medications Phoned  to Pharmacy [] yes [x]no  Name of Pharmacist:  List Medications, including dose, quantity and instructions      Medication Prescriptions given to patient   [] yes  [x] no   List the name of the drug the prescription was written for.       Medications ordered this visit were e-scribed.  Verified by order class [x] yes  [] no  buprenorphine-naloxone (SUBOXONE SL FILM) 8-2 mg Film per sublingual film 90 Film 0 12/30/2019     gabapentin (NEURONTIN) 300 MG capsule 120 capsule 1 12/30/2019     omeprazole (PRILOSEC) 40 MG capsule 30 capsule 0 12/30/2019     tiZANidine (ZANAFLEX) 4 MG tablet 180 tablet 1 12/30/2019       Medication changes or discontinuations were communicated to patient's pharmacy: [] yes  [x] no    UA collected [x] yes  [] no    [place] Prescription Monitoring Program Reviewed? [x] yes  [] no    Referrals were made to:  none    Future appointment was made: [x] yes  [] no    Dictation completed at time of chart check: [x] yes  [] no    I have checked the documentation for today s encounters and the above information has been reviewed and completed.

## 2021-06-04 NOTE — TELEPHONE ENCOUNTER
Arlette from [facility] pharmacy left VM updating that this pt has a straight MA and his ins ONLY cover Tizanidine tablets, not capsules. She already changed the RX to tabs so the pt can get his meds and is asking to correct his prescription to avoid future problems.   Order changed to tabs and pended for provider to review and sign if appropriate to update our record

## 2021-06-04 NOTE — TELEPHONE ENCOUNTER
Pt called back and stated order was entered as capsuls and it needs to be tablets. Pharmacy will not fill order unless entered as tablets. Please contact pt ASAP to discuss.

## 2021-06-04 NOTE — TELEPHONE ENCOUNTER
PA not needed.  Dose changed on 12-12-19.  Patient picked up new script for the 4 mg dose every 4 hours.  Patient said he said he will address the dose after the first of the year as his insurance will be changing again.

## 2021-06-04 NOTE — TELEPHONE ENCOUNTER
Patient called and states he talked with his pharmacy on Saturday and was told the medication should go through. This writer called pharmacy today to check status and they said they haven't received anything. When patient was called back, he said that his insurance had changed and he didn't have any of his numbers/cards yet. Explained that we hadn't received any information back yet on the PA status.

## 2021-06-04 NOTE — TELEPHONE ENCOUNTER
Patient called several times today checking on status of PA for Tizanidine. Pt updated that staff had called insurance daily. Pt requested if Dr Rangel would be willing to go back to prior dose 4 mg. Reviewed with Dr Rangel, she e-scripted medication, patient notified.

## 2021-06-05 VITALS
BODY MASS INDEX: 29.42 KG/M2 | TEMPERATURE: 97.8 F | WEIGHT: 205.5 LBS | HEART RATE: 70 BPM | RESPIRATION RATE: 20 BRPM | HEIGHT: 70 IN | SYSTOLIC BLOOD PRESSURE: 118 MMHG | DIASTOLIC BLOOD PRESSURE: 60 MMHG | OXYGEN SATURATION: 98 %

## 2021-06-06 NOTE — TELEPHONE ENCOUNTER
Date of Last Office Visit: 1-24-20, yesterday  Date of Next Office Visit: 3-23-20  No shows since last visit: 0  Cancellations since last visit: 0  ED visits since last visit:  0  Medication Prilosec # 30 with 0 refills 1-27-20 and Zanaflex # 180 with 1 refill 1-27-20  Lapse in therapy greater than 7 days: no  Medication refill request verified as identical to current order: yes  Result of Last DAM, VPA, Li+ Level, CBC, or Carbamazepine Level (at or since last visit): N/A     [] Medication refilled per [facility] M-1.   [x] Medication unable to be refilled by RN due to criteria not met as indicated below:     []Eligibility - not seen in last year    []Supervision - no future appointment    []Compliance     []Verification - order discrepancy    []Controlled Medication    [x]Medication not included in RN Protocol    []90 - day supply request    []Other   Current Medication list:    acetaminophen (TYLENOL) 325 MG tablet Take 325-650 mg by mouth every 4 (four) hours as needed for pain. Max 4000mg/24 hours   albuterol (PROAIR HFA;PROVENTIL HFA;VENTOLIN HFA) 90 mcg/actuation inhaler Inhale 2 puffs every 4 (four) hours as needed for wheezing or shortness of breath.   budesonide-formoterol (SYMBICORT) 160-4.5 mcg/actuation inhaler Inhale 2 puffs 2 (two) times a day.   buprenorphine-naloxone (SUBOXONE SL FILM) 8-2 mg Film per sublingual film Place 1 Film under the tongue 3 (three) times a day.   gabapentin (NEURONTIN) 300 MG capsule Take 300 mg p.o. 4 times daily and 300 mg p.o. twice daily as needed in addition for muscle spasms and pain   nicotine (NICOTROL) 10 mg inhaler Inhale 10mg (1 cartridge) every 2 hours as needed for smoking cessation   omeprazole (PRILOSEC) 40 MG capsule Take 1 capsule (40 mg total) by mouth daily before breakfast.   tiZANidine (ZANAFLEX) 4 MG tablet Take 1 tablet (4 mg total) by mouth every 4 (four) hours as needed (for muscle spasm).   traZODone (DESYREL) 100 MG tablet Take 100 mg by mouth.    buprenorphine-naloxone (SUBOXONE SL FILM) 8-2 mg Film per sublingual film (Discontinued) Place 1 Film under the tongue 3 (three) times a day.   gabapentin (NEURONTIN) 300 MG capsule (Discontinued) Take 300 mg p.o. 4 times daily and 300 mg p.o. twice daily as needed in addition for muscle spasms and pain   Allergies     Naproxen   Diphenhydramine   Toradol [ketorolac]       Medication Plan of Care at last office visit with MD/CNP:  Diagnoses/Plan:     1. Opioid use disorder, moderate, dependence (H)  -Continue Suboxone 8/2 mg sublingually 3 times daily.  Benefits of the medication outweigh the risks.  Patient is sober for more than 11 months and is determined to continue with medications, AA support and maintain sobriety.  Continue monthly follow-up in clinic  - DAM    (Drug Abuse 1+, Urine)  - Ethyl Glucuronide and Ethyl Sulfate, Urine, Quantitative (CDCO ETG/S)  - buprenorphine-naloxone (SUBOXONE SL FILM) 8-2 mg Film per sublingual film; Place 1 Film under the tongue 3 (three) times a day.  Dispense: 90 Film; Refill: 0     2. Severe alcohol use disorder (H)  -In sustained remission, now relapses and cravings.  Continue with meetings, sponsor contact and support  - DAM    (Drug Abuse 1+, Urine)  - Ethyl Glucuronide and Ethyl Sulfate, Urine, Quantitative (CDCO ETG/S)     3. Chronic midline low back pain without sciatica  - tiZANidine (ZANAFLEX) 4 MG tablet; Take 1 tablet (4 mg total) by mouth every 4 (four) hours as needed (for muscle spasm).  Dispense: 180 tablet; Refill: 1  - gabapentin (NEURONTIN) 300 MG capsule; Take 300 mg p.o. 4 times daily and 300 mg p.o. twice daily as needed in addition for muscle spasms and pain  Dispense: 180 capsule; Refill: 1  - gabapentin (NEURONTIN) 300 MG capsule; Take 300 mg p.o. 4 times daily and 300 mg p.o. twice daily as needed in addition for muscle spasms and pain  Dispense: 180 capsule; Refill: 1     At least 25 min, was spent in the care of this patient and >50% of this time  was spent in face-to-face counseling and coordination of care.Counseling involved general counseling, importance to continue AA/NA support and attendance, discussion about indications and benefits of Suboxone, gabapentin and tizanidine versus risks and side effects

## 2021-06-06 NOTE — PROGRESS NOTES
Correct pharmacy verified with patient and confirmed in snapshot? [x] yes []no    Charge captured ? [x] yes  [] no    Medications Phoned  to Pharmacy [] yes [x]no  Name of Pharmacist:  List Medications, including dose, quantity and instructions      Medication Prescriptions given to patient   [] yes  [x] no   List the name of the drug the prescription was written for.       Medications ordered this visit were e-scribed.  Verified by order class [x] yes  [] no  buprenorphine-naloxone (SUBOXONE SL FILM) 8-2 mg Film per sublingual film 90 Film 0 2/24/2020     gabapentin (NEURONTIN) 300 MG capsule 180 capsule 1 2/24/2020       Medication changes or discontinuations were communicated to patient's pharmacy: [] yes  [x] no    UA collected [x] yes  [] no    [place] Prescription Monitoring Program Reviewed? [x] yes  [] no    Referrals were made to:  no    Future appointment was made: [x] yes  [] no    Dictation completed at time of chart check: [x] yes  [] no    I have checked the documentation for today s encounters and the above information has been reviewed and completed.

## 2021-06-07 ENCOUNTER — COMMUNICATION - HEALTHEAST (OUTPATIENT)
Dept: FAMILY MEDICINE | Facility: CLINIC | Age: 41
End: 2021-06-07

## 2021-06-07 DIAGNOSIS — F11.11 OPIOID USE DISORDER, MILD, IN SUSTAINED REMISSION, ON MAINTENANCE THERAPY (H): ICD-10-CM

## 2021-06-07 NOTE — TELEPHONE ENCOUNTER
Patient had a telephone visit with Dr. Rangel yesterday.  He said he did get the prescription of Gabapentin 300 mg four times a day , but said he is also suppose to have a script for Gabapentin 300 mg take twice a day prn.   He is asking for a script to be sent to his pharmacy for the prn doses.

## 2021-06-07 NOTE — PROGRESS NOTES
________________________________________  Medications Phoned  to Pharmacy [] yes [x]no  Name of Pharmacist:  List Medications, including dose, quantity and instructions    Medications ordered this visit were e-scribed.  Verified by order class [x] yes  [] no    Medication changes or discontinuations were communicated to patient's pharmacy: [] yes  [x] no    Dictation completed at time of chart check: [x] yes  [] no    I have checked the documentation for today s encounters and the above information has been reviewed and completed.

## 2021-06-07 NOTE — TELEPHONE ENCOUNTER
Patient called and stated that pharmacy still does not have the right script for Tizanidine. Patient stated that this is frustrating as he keeps going back and forth with pharmacy and the clinic every month to get this fixed and that if it happens again he will get another provider instead of dealing with this.     Writer can see that Dr. Rangel stated the correct script. However, under patient current medications, this medication has two prescription. The prn script was last ordered 2/25/2020 and writer is not sure of which script is the right one. However, staff will cue up the prn dose for provider to look through.

## 2021-06-07 NOTE — TELEPHONE ENCOUNTER
Client t calls re: tiZANidine (ZANAFLEX) 4 MG tablet   States he has talked to nursing  03   Needs to talk to someone re: his med's ASAP   Ph# [phone]

## 2021-06-07 NOTE — TELEPHONE ENCOUNTER
Spoke with patient, he reports that Tizanidine wasn't ordered correctly at last visit. He said it was only ordered 3 times daily and was ordered as capsule and his insurance only pays for tablets.    He requests the Tizanidine be ordered as 4 mg tablet, take one tablet every four hours as need for muscle pain.    I spoke with pharmacist and verified that patient had not picked up Tizanidine ordered 3/23/20. Instructed him that I would send request to Dr Rangel for directives    Reviewed with Dr Rangel, called patient and notified that script had been resent.

## 2021-06-07 NOTE — TELEPHONE ENCOUNTER
Date of Last Office Visit: 3/24/2020  Date of Next Office Visit: 4/20/2020  No shows since last visit: none  Cancellations since last visit: none  ED visits since last visit:  none    Medication Tizanidine 4 mg date last ordered: 2/25/2020  Qty: 180  Refills: 0 Please advice if you would like the schedule dose which was order to be discontinued.     Lapse in therapy greater than 7 days: none  Medication refill request verified as identical to current order: yes  Result of Last DAM, VPA, Li+ Level, CBC, or Carbamazepine Level (at or since last visit): N/A     [] Medication refilled per [facility] M-1.   [x] Medication unable to be refilled by RN due to criteria not met as indicated below:     []Eligibility - not seen in last year    []Supervision - no future appointment    []Compliance     [x]Verification - order discrepancy    []Controlled Medication    [x]Medication not included in RN Protocol    []90 - day supply request    []Other     Current Medication list:  Carlosaguila Hamilton    (MRN [id number])   Your Current Medications Are     acetaminophen (TYLENOL) 325 MG tablet  Take 325-650 mg by mouth every 4 (four) hours as needed for pain. Max 4000mg/24 hours    albuterol (PROAIR HFA;PROVENTIL HFA;VENTOLIN HFA) 90 mcg/actuation inhaler  Inhale 2 puffs every 4 (four) hours as needed for wheezing or shortness of breath.    albuterol (PROAIR HFA;PROVENTIL HFA;VENTOLIN HFA) 90 mcg/actuation inhaler  Inhale 2 puffs every 6 (six) hours as needed for wheezing.    budesonide-formoterol (SYMBICORT) 160-4.5 mcg/actuation inhaler  Inhale 2 puffs 2 (two) times a day.    buprenorphine-naloxone (SUBOXONE SL FILM) 8-2 mg Film per sublingual film  Place 1 Film under the tongue 3 (three) times a day.    buprenorphine-naloxone (SUBOXONE SL FILM) 8-2 mg Film per sublingual film  Place 1 Film under the tongue 3 (three) times a day.    gabapentin (NEURONTIN) 300 MG capsule  Take 300 mg p.o. 4 times daily and 300 mg p.o. twice daily as needed in  addition for muscle spasms and pain    nicotine (NICOTROL) 10 mg inhaler  Inhale 10mg (1 cartridge) every 2 hours as needed for smoking cessation    omeprazole (PRILOSEC) 40 MG capsule  Take 1 capsule (40 mg total) by mouth daily before breakfast.    omeprazole (PRILOSEC) 40 MG capsule  Take 1 capsule (40 mg total) by mouth daily before breakfast.    TiZANidine (ZANAFLEX) 4 MG capsule  Take 1 capsule (4 mg total) by mouth 3 (three) times a day.    tiZANidine (ZANAFLEX) 4 MG tablet  TAKE ONE TABLET BY MOUTH EVERY FOUR HOURS AS NEEDED FOR MUSCLE SPASM    traZODone (DESYREL) 100 MG tablet  Take 100 mg by mouth.    gabapentin (NEURONTIN) 300 MG capsule (Discontinued)  Take 1 capsule (300 mg total) by mouth 4 (four) times a day.        Medication Plan of Care at last office visit with MD/CNP:  Diagnoses/Plan:     1. Opioid use disorder, moderate, dependence (H)  -Continue Suboxone 8/2 mg sublingually 3 times daily.  Benefits of the medication outweigh the risks.  Patient is sober for more than 12 months and is determined to continue with medications, AA support and maintain sobriety.      - buprenorphine-naloxone (SUBOXONE SL FILM) 8-2 mg Film per sublingual film; Place 1 Film under the tongue 3 (three) times a day.  Dispense: 90 Film; Refill: 1     2. Severe alcohol use disorder (H)  -In sustained remission, without relapses and cravings.  Continue with meetings, sponsor contact and support     3. Chronic midline low back pain without sciatica  - tiZANidine (ZANAFLEX) 4 MG tablet; Take 1 tablet (4 mg total) by mouth every 4 (four) hours as needed (for muscle spasm).  Dispense: 180 tablet; Refill: 1  - gabapentin (NEURONTIN) 300 MG capsule; Take 300 mg p.o. 4 times daily and 300 mg p.o. twice daily as needed in addition for muscle spasms and pain  Dispense: 180 capsule; Refill: 1  - gabapentin (NEURONTIN) 300 MG capsule; Take 300 mg p.o. 4 times daily and 300 mg p.o. twice daily as needed in addition for muscle spasms  and pain  Dispense: 180 capsule; Refill: 1

## 2021-06-07 NOTE — TELEPHONE ENCOUNTER
Patient called requesting that his gabapentin 300 mg be switch to one script with both prescriptions as his insurance does not allow him to get both scripts. Also states that he takes Tizanidine but the script was screwed up as he reports that he takes 4 pills a day for the scheduled dose and extra two as needed.

## 2021-06-07 NOTE — PROGRESS NOTES
Medications Phoned  to Pharmacy [] yes [x]no  Name of Pharmacist:  List Medications, including dose, quantity and instructions    Medications ordered this visit were e-scribed.  Verified by order class [x] yes  [] no    Medication changes or discontinuations were communicated to patient's pharmacy: [] yes  [x] no    Future appointment was made: [x] yes  [] No    Dictation completed at time of chart check: [x] yes  [] no    I have checked the documentation for today s encounters and the above information has been reviewed and completed.

## 2021-06-07 NOTE — PROGRESS NOTES
This video/telephone visit will be conducted via a call between you and your physician/provider. We have found that certain health care needs can be provided without the need for an in-person physical exam. This service lets us provide the care you need with a video /telephone conversation. If a prescription is necessary we can send it directly to your pharmacy. If lab work is needed we can place an order for that and you can then stop by our lab to have the test done at a later time.    Just as we bill insurance for in-person visits, we also bill insurance for video/telephone visits. If you have questions about your insurance coverage, we recommend that you speak with your insurance company.    Patient has given verbal consent for a Telephone visit? yes  Patient would like a phone visit to [phone]  Anna Marie Hester CMA    Patient verified allergies, medications and pharmacy via phone. PHQ : 2 not difficult and MARY: 4 not difficult done verbally with writer. Patient states he is ready for visit.

## 2021-06-08 ENCOUNTER — AMBULATORY - HEALTHEAST (OUTPATIENT)
Dept: LAB | Facility: CLINIC | Age: 41
End: 2021-06-08

## 2021-06-08 DIAGNOSIS — F11.11 OPIOID USE DISORDER, MILD, IN SUSTAINED REMISSION, ON MAINTENANCE THERAPY (H): ICD-10-CM

## 2021-06-08 LAB
AMPHETAMINE-CLINIC: ABNORMAL
BARBITURATES-CLINIC: ABNORMAL
BENZODIAZEPINES-CLINIC: ABNORMAL
BUPRENORPHINE-CLINIC: ABNORMAL
COCAINE-CLINIC: ABNORMAL
ECSTASY-CLINIC: ABNORMAL
MARIJUANA-CLINIC: ABNORMAL
METHADONE METABOLITE-CLINIC: ABNORMAL
METHAMPHETAMINE-CLINIC: ABNORMAL
OPIATES-CLINIC: ABNORMAL
OXIDANTS: NORMAL
OXYCODONE-CLINIC: ABNORMAL
PCP 25-CLINIC: ABNORMAL
PH-CLINIC: 4 (ref 5–8)
SP GR UR STRIP: 1 (ref 1–1.03)

## 2021-06-08 NOTE — PROGRESS NOTES
This video/telephone visit will be conducted via a call between you and your physician/provider. We have found that certain health care needs can be provided without the need for an in-person physical exam. This service lets us provide the care you need with a video /telephone conversation. If a prescription is necessary we can send it directly to your pharmacy. If lab work is needed we can place an order for that and you can then stop by our lab to have the test done at a later time.    Just as we bill insurance for in-person visits, we also bill insurance for video/telephone visits. If you have questions about your insurance coverage, we recommend that you speak with your insurance company.    Patient has given verbal consent for video/Telephone visit? Yes  Patient would like telephone visit today please call: [phone]  Nicolette Kimball [facility]   Pt denies any use of substances. Pended medication refills.  Patient verified allergies, medications and pharmacy via phone.   PHQ: 2 and MARY: 2  done verbally with writer. Patient states he is ready for visit.

## 2021-06-08 NOTE — PROGRESS NOTES
________________________________________  Medications Phoned  to Pharmacy [] yes [x]no  Name of Pharmacist:  List Medications, including dose, quantity and instructions    Medications ordered this visit were e-scribed.  Verified by order class [x] yes  [] no  Gabapentin 300 mg; tizanidine 4 mg; Suboxone 8-2 mg   Medication changes or discontinuations were communicated to patient's pharmacy: [] yes  [x] no    Dictation completed at time of chart check: [] yes  [x] no    I have checked the documentation for today s encounters and the above information has been reviewed and completed.

## 2021-06-09 ENCOUNTER — AMBULATORY - HEALTHEAST (OUTPATIENT)
Dept: FAMILY MEDICINE | Facility: CLINIC | Age: 41
End: 2021-06-09

## 2021-06-09 ENCOUNTER — OFFICE VISIT - HEALTHEAST (OUTPATIENT)
Dept: FAMILY MEDICINE | Facility: CLINIC | Age: 41
End: 2021-06-09

## 2021-06-09 DIAGNOSIS — K21.9 GASTROESOPHAGEAL REFLUX DISEASE: ICD-10-CM

## 2021-06-09 DIAGNOSIS — F11.11 OPIOID USE DISORDER, MILD, IN SUSTAINED REMISSION, ON MAINTENANCE THERAPY (H): ICD-10-CM

## 2021-06-09 DIAGNOSIS — F41.1 GAD (GENERALIZED ANXIETY DISORDER): ICD-10-CM

## 2021-06-09 DIAGNOSIS — M54.50 CHRONIC MIDLINE LOW BACK PAIN WITHOUT SCIATICA: ICD-10-CM

## 2021-06-09 DIAGNOSIS — G89.29 CHRONIC MIDLINE LOW BACK PAIN WITHOUT SCIATICA: ICD-10-CM

## 2021-06-09 DIAGNOSIS — F11.21 OPIOID USE DISORDER, MODERATE, IN SUSTAINED REMISSION, ON MAINTENANCE THERAPY (H): ICD-10-CM

## 2021-06-09 NOTE — TELEPHONE ENCOUNTER
"Called patient and he wanted Dr. Rangel to consider filling his prilosec.  He said he did not want to contact his PCP as \" that would be an expensive visit and a hassle due to the COVID stuff.\"  He is aware that she will no be back until tomorrow.  Dr. Brunner's response was communicated to patient.   "

## 2021-06-09 NOTE — TELEPHONE ENCOUNTER
"RN Triage:    Felt like he \"got something in my left eye\" on 7/2/20.  The next day the eye was painful and sensitive to light.  Symptoms then improved for a few days.  However, this morning he woke up with increased pain in the eye, light sensitivity and blurred vision.  Sclera is red.  Pain is at 5-6/10 on pain scale.  He plans to call his primary physician for evaluation today.    Vanessa Schilling RN  [employer]          Reason for Disposition    Eye pain/discomfort and more than mild    Additional Information    Negative: Followed an eye injury    Negative: Eye pain from chemical in the eye    Negative: Eye pain from foreign body in eye    Negative: Has sinus pain or pressure    Negative: Severe eye pain    Negative: Complete loss of vision in one or both eyes    Negative: Eyelids are very swollen (shut or almost) and fever    Negative: Eyelid (outer) is very red and fever    Negative: Foreign body sensation ('feels like something is in there') and irrigation didn't help    Negative: Vomiting    Negative: Ulcer or sore seen on the cornea (clear center part of the eye)    Negative: Recent eye surgery and increasing eye pain    Negative: Blurred vision and new or worsening    Negative: Patient sounds very sick or weak to the triager    Protocols used: EYE PAIN-A-OH      "

## 2021-06-09 NOTE — TELEPHONE ENCOUNTER
Date of Last Office Visit: 5-18-20  Date of Next Office Visit: 7-13-20  No shows since last visit: 0  Cancellations since last visit: 0  ED visits since last visit:  0  Medication prilosec  date last ordered: 3-23-20  Qty: 30  Refills: 2  Lapse in therapy greater than 7 days: no  Medication refill request verified as identical to current order: yes  Result of Last DAM, VPA, Li+ Level, CBC, or Carbamazepine Level (at or since last visit): N/A     [] Medication refilled per [facility] M-1.   [x] Medication unable to be refilled by RN due to criteria not met as indicated below:     []Eligibility - not seen in last year    []Supervision - no future appointment    []Compliance     []Verification - order discrepancy    []Controlled Medication    [x]Medication not included in RN Protocol    []90 - day supply request    []Other   Current Medication list:    acetaminophen (TYLENOL) 325 MG tablet  Take 325-650 mg by mouth every 4 (four) hours as needed for pain. Max 4000mg/24 hours    albuterol (PROAIR HFA;PROVENTIL HFA;VENTOLIN HFA) 90 mcg/actuation inhaler  Inhale 2 puffs every 6 (six) hours as needed for wheezing.    budesonide-formoterol (SYMBICORT) 160-4.5 mcg/actuation inhaler  Inhale 2 puffs 2 (two) times a day.    buprenorphine-naloxone (SUBOXONE SL FILM) 8-2 mg Film per sublingual film  Place 1 Film under the tongue 3 (three) times a day.    gabapentin (NEURONTIN) 300 MG capsule  Take 300 mg p.o. 4 times daily and 300 mg p.o. twice daily as needed in addition for muscle spasms and pain    nicotine (NICOTROL) 10 mg inhaler  Inhale 10mg (1 cartridge) every 2 hours as needed for smoking cessation    omeprazole (PRILOSEC) 40 MG capsule  Take 1 capsule (40 mg total) by mouth daily before breakfast.    tiZANidine (ZANAFLEX) 4 MG tablet  TAKE ONE TABLET BY MOUTH EVERY FOUR HOURS AS NEEDED FOR MUSCLE SPASM    traZODone (DESYREL) 100 MG tablet  Take 100 mg by mouth.        Medication Plan of Care at last office visit with MD/CNP:  1. Opioid use disorder, moderate, dependence (H)  -Continue Suboxone 8/2 mg sublingually 3 times daily.  Benefits of the medication outweigh the risks.  Patient is sober for more than 12 months and is determined to continue with medications, AA support and maintain sobriety.      2. Severe alcohol use disorder (H)  -In sustained remission, without relapses and cravings.  Continue with meetings, sponsor contact and support     3. Chronic midline low back pain without sciatica  - tiZANidine (ZANAFLEX) 4 MG tablet; Take 1 tablet (4 mg total) by mouth every 4 (four) hours as needed (for muscle spasm).  Dispense: 180 tablet; Refill: 1  - gabapentin (NEURONTIN) 300 MG capsule; Take 300 mg p.o. 4 times daily and 300 mg p.o. twice daily as needed in addition for muscle spasms and pain  Dispense: 180 capsule; Refill: 1  - gabapentin (NEURONTIN) 300 MG capsule; Take 300 mg p.o. 4 times daily and 300 mg p.o. twice daily as needed in addition for muscle spasms and pain  Dispense: 180 capsule; Refill: 1

## 2021-06-10 NOTE — PROGRESS NOTES
________________________________________  Medications Phoned  to Pharmacy [] yes [x]no  Name of Pharmacist:  List Medications, including dose, quantity and instructions    Medications ordered this visit were e-scribed.  Verified by order class [x] yes  [] no  Suboxone, Gabapentin, and Tizanidine  Medication changes or discontinuations were communicated to patient's pharmacy: [] yes  [x] no    Dictation completed at time of chart check: [x] yes  [] no    I have checked the documentation for today s encounters and the above information has been reviewed and completed.

## 2021-06-10 NOTE — PATIENT INSTRUCTIONS - HE
Follow up in 1 month for MAT- suboxone  Present to [facility] outpatient lab for DAM, ETG/ETS and bup screen.Patient has standing orders.  Please call pt to let him know how to come to the outpatient lab

## 2021-06-11 NOTE — PATIENT INSTRUCTIONS - HE
[place] [facility] [place] visit.

## 2021-06-11 NOTE — PROGRESS NOTES
________________________________________  Medications Phoned  to Pharmacy [] yes [x]no  Name of Pharmacist:  List Medications, including dose, quantity and instructions    Medications ordered this visit were e-scribed.  Verified by order class [x] yes  [] no  Suboxone 8-2 mg  Hydroxyzine 50 mg  Omeprazole 40 mg    Medication changes or discontinuations were communicated to patient's pharmacy: [] yes  [x] no    Dictation completed at time of chart check: [x] yes  [] no    I have checked the documentation for today s encounters and the above information has been reviewed and completed.

## 2021-06-11 NOTE — PROGRESS NOTES
This video/telephone visit will be conducted via a call between you and your physician/provider. We have found that certain health care needs can be provided without the need for an in-person physical exam. This service lets us provide the care you need with a video /telephone conversation. If a prescription is necessary we can send it directly to your pharmacy. If lab work is needed we can place an order for that and you can then stop by our lab to have the test done at a later time.    Just as we bill insurance for in-person visits, we also bill insurance for video/telephone visits. If you have questions about your insurance coverage, we recommend that you speak with your insurance company.    Patient has given verbal consent for video/Telephone visit?  yes  Patient would like the video visit invitation sent by: Text to cell phone:[phone]  PRISCILLA Calero    Patient verified allergies, medications and pharmacy via phone. PHQ :3 and MARY: 1 done verbally with writer. Patient states he  is ready for visit.    MN : reviewed, unable to embed in recored due to remote status, Last fills Suboxone 8/31/2020, Gabapentin 9/2/2020

## 2021-06-11 NOTE — PROGRESS NOTES
________________________________________  Medications Phoned  to Pharmacy [] yes [x]no  Name of Pharmacist:  List Medications, including dose, quantity and instructions    Medications ordered this visit were e-scribed.  Verified by order class [x] yes  [] no  Suboxone, Gabapentin, and Zanaflex    Medication changes or discontinuations were communicated to patient's pharmacy: [] yes  [x] no changes    Dictation completed at time of chart check: [x] yes  [] no    I have checked the documentation for today s encounters and the above information has been reviewed and completed.

## 2021-06-12 NOTE — PATIENT INSTRUCTIONS - HE
[place] [facility] [place] for establishing care and Suboxone MAT/phone visit

## 2021-06-12 NOTE — PROGRESS NOTES
________________________________________  Medications Phoned  to Pharmacy [] yes [x]no  Name of Pharmacist:  List Medications, including dose, quantity and instructions    Medications ordered this visit were e-scribed.  Verified by order class [x] yes  [] no  Suboxone 8-2 mg  Omeprazole 40 mg    Medication changes or discontinuations were communicated to patient's pharmacy: [] yes  [x] no    Dictation completed at time of chart check: [x] yes  [] no    I have checked the documentation for today s encounters and the above information has been reviewed and completed.

## 2021-06-13 NOTE — TELEPHONE ENCOUNTER
Talked to pt, he wants to see if his transportation resources can get him out to us, he will call us when he is ready to schedule.

## 2021-06-13 NOTE — TELEPHONE ENCOUNTER
Please call Carlos and help him arrange an appointment to establish care with me.      Please explain that the [facility] addiction clinic reached out to me since I am a Suboxone prescriber and since his physician at [facility] is no longer there, I am able to start seeing him if he wants to come here.      Thank you.      Dr. Cardenas

## 2021-06-14 NOTE — PROGRESS NOTES
"SUBJECTIVE:  Carlos Hamilton is a 40 y.o. male here for f/u on Suboxone therapy.    Carlos is currently taking 24 mg of Suboxone daily. This is taken in multiple doses daily. 8-8-8. This is working to keep cravings at a manageable level. 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 Also has a sponsor.     His chronic back pain is well-controlled with Suboxone along with gabapentin or tizanidine.  He is tolerating his Suboxone well without constipation.    Tobacco use: yes, 1 ppd. Uses nicotine inhaler occasionally. Not ready to quit yet. Plan has been to quit when he reaches his 2-year sobriety aj, which is in March.      OBJECTIVE:  Blood pressure 118/60, pulse 70, temperature 97.8  F (36.6  C), temperature source Oral, resp. rate 20, height 5' 10\" (1.778 m), weight 205 lb 8 oz (93.2 kg), SpO2 98 %.  Gen: AO, NAD. No sweating. Resting comfortably.  Eyes: pupils normal caliber, reactive to light  Heart: RRR, no murmers, rubs or gallops.    ASSESSMENT/PLAN:  1. Opioid use disorder, moderate, in sustained remission, on maintenance therapy (H)  Clinic Urine Drug Screen-RLN/STW Only    buprenorphine-naloxone (SUBOXONE SL FILM) 8-2 mg Film per sublingual film   2. Chronic midline low back pain without sciatica  gabapentin (NEURONTIN) 300 MG capsule    tiZANidine (ZANAFLEX) 4 MG tablet   3. Alcohol use disorder, severe, in sustained remission (H)     4. Opioid use disorder, mild, in sustained remission, on maintenance therapy (H)     5. Tobacco dependence syndrome     -Refilled Suboxone for 1 month with 1 refill.  Because he has been so stable and consistent with his treatment, okay to do another virtual visit in 2 months.  Will do face-to-face visits with urine drug screen every 6 months.  -Low back pain is stable.  Refilled gabapentin and tizanidine.  -Tobacco dependence needs " improvement.  Offered that when he is ready to quit, we could discuss using a medication to help with cravings such as bupropion or Chantix, with or without nicotine replacement therapy.  We will discuss this again at next visit.      F/U visit in 2 months.    Lucero Cardenas MD    Options for treatment and follow-up care were reviewed with the patient and/or guardian. Carlos Hamilton and/or guardian engaged in the decision making process and verbalized understanding of the options discussed and agreed with the final plan.

## 2021-06-15 NOTE — PROGRESS NOTES
"Carlos Hamilton is a 40 y.o. male who is being evaluated via a billable telephone visit.      What phone number would you like to be contacted at? [phone]  How would you like to obtain your AVS? AVS Preference: Teddy.    Assessment & Plan     Carlos was seen today for follow-up.    Diagnoses and all orders for this visit:    Opioid use disorder, moderate, in sustained remission, on maintenance therapy (H)  -     buprenorphine-naloxone (SUBOXONE SL FILM) 8-2 mg Film per sublingual film; Place 1 Film under the tongue 3 (three) times a day.    Tobacco dependence syndrome: advised that he consider using a quit aid (NRT or a medication) when he is ready to quit. He stated clearly today that he is not ready yet.     Chronic obstructive pulmonary disease, unspecified COPD type (H): unsure whether he has been formally diagnosed but he does have a several-decade smoking history. Continue Symbicort PRN- he does not feel he needs it daily         Tobacco Cessation:   reports that he has been smoking cigarettes. He has never used smokeless tobacco.    BMI:   Estimated body mass index is 29.49 kg/m  as calculated from the following:    Height as of 1/6/21: 5' 10\" (1.778 m).    Weight as of 1/6/21: 205 lb 8 oz (93.2 kg).       No follow-ups on file.    Lucero Cardenas MD  [facility]   Carlos Hamilton is 40 y.o. and presents today for the following health issues:    Doing well overall. He did have what turned out to be anxiety a few weeks ago- he was having chest tightness and shortness of breath and went to the hospital but they said his heart and lungs were ok.     2 year's sober on the 21st. Going to meetings, meeting with his sponsor.      Not ready to quit smoking yet. Wants to talk to sponsor.     Only takes Symbicort if he's sick with a respiratory infection. Does have a cough \"here and there,\" but nothing severe.               Phone call duration: 9 minutes  "

## 2021-06-16 NOTE — TELEPHONE ENCOUNTER
Telephone Encounter by Nicolette Kimball CMA at 9/20/2019  8:50 AM     Author: Nicolette Kimball CMA Service: -- Author Type: Certified Medical Assistant    Filed: 9/20/2019  8:57 AM Encounter Date: 9/20/2019 Status: Signed    : Nicolette Kimball CMA (Certified Medical Assistant)       /Date of Last Office Visit: 09/10/19  Date of Next Office Visit: 10/08/19  No shows since last visit: 0  Cancellations since last visit: 0  ED visits since last visit:  0  Medication tizanidine 4 mg tablet  date last ordered: 7/09/19  Qty: 120  Refills: 1  Lapse in therapy greater than 7 days: No  Medication refill request verified as identical to current order: Yes  Result of Last DAM, VPA, Li+ Level, CBC, or Carbamazepine Level (at or since last visit):  9/10/19 DAM = Neg     [] Medication refilled per [facility] M-1.   [] Medication unable to be refilled by RN due to criteria not met as indicated below:     []Eligibility - not seen in last year    []Supervision - no future appointment    []Compliance     []Verification - order discrepancy    []Controlled Medication    []Medication not included in RN Protocol    []90 - day supply request    [x]Other  CMA pending medication refills  MN :     Current Medication list:    acetaminophen (TYLENOL) 325 MG tablet Take 325-650 mg by mouth every 4 (four) hours as needed for pain. Max 4000mg/24 hours   albuterol (PROAIR HFA;PROVENTIL HFA;VENTOLIN HFA) 90 mcg/actuation inhaler Inhale 2 puffs every 4 (four) hours as needed for wheezing or shortness of breath.   budesonide-formoterol (SYMBICORT) 160-4.5 mcg/actuation inhaler Inhale 2 puffs 2 (two) times a day.   buprenorphine-naloxone (SUBOXONE SL FILM) 8-2 mg Film per sublingual film Place 1 Film under the tongue 3 (three) times a day.   gabapentin (NEURONTIN) 300 MG capsule Take 1 capsule (300 mg total) by mouth 3 (three) times a day.   nicotine (NICOTROL) 10 mg inhaler Inhale 10mg (1 cartridge) every 2 hours as needed for smoking cessation    omeprazole (PRILOSEC) 40 MG capsule Take 1 capsule (40 mg total) by mouth daily before breakfast.   tiZANidine (ZANAFLEX) 4 MG tablet Take 1 tablet (4 mg total) by mouth every 6 (six) hours as needed (muscle spasms).   traZODone (DESYREL) 100 MG tablet Take 100 mg by mouth.       Medication Plan of Care at last office visit with MD/CNP:  Dr. Rangel 9/10/2019  Diagnoses/Plan:     1. Opioid use disorder, moderate, dependence (H)  -Due to worsening back pain will increase Suboxone to 8/2 mg 1 film 3 times daily.  - DAM    (Drug Abuse 1+, Urine)  - buprenorphine-naloxone (SUBOXONE SL FILM) 8-2 mg Film per sublingual film; Place 1 Film under the tongue 3 (three) times a day. 2.5 films daily. 1 film two times a day and 0.5 film SL at midday  Dispense: 90 Film; Refill: 0     2. Severe alcohol use disorder (H)  -In remission and in controlled environments.  - DAM    (Drug Abuse 1+, Urine)     3. Chronic midline low back pain without sciatica  -Working with the primary care provider and physical therapist.  - gabapentin (NEURONTIN) 300 MG capsule; Take 1 capsule (300 mg total) by mouth 3 (three) times a day.  Dispense: 90 capsule; Refill: 1  -May continue to take tizanidine 4 mg p.o. 4 times daily as needed for breakthrough pain     4. Anxiety state  -Improved with Gabapentin     5.  Follow-up in clinic in 1 month for medication management

## 2021-06-16 NOTE — TELEPHONE ENCOUNTER
Telephone Encounter by Olivia Schwartz RN at 3/24/2020  1:05 PM     Author: Olivia Schwartz RN Service: -- Author Type: Registered Nurse    Filed: 3/24/2020  1:23 PM Encounter Date: 3/24/2020 Status: Signed    : Olivia Schwartz RN (Registered Nurse)       Spoke to Arlette the pharmacist and the new script was received for the Gabapentin.   The previous scripts were cancelled out.   The scheduled and PRN Geodon are being submitted on one script per Dr. Rangel's directive.      Please clarify the Tizanidine order:    A new script was sent yesterday 3-23-20 for 4 mg taking it three times a day.    On 2-25-20 Tizanidine was ordered 4 mg taking it four times a day # 180 with 0 refills.     Assessment/Plan:     Diagnoses/Plan:     1. Opioid use disorder, moderate, dependence (H)  -Continue Suboxone 8/2 mg sublingually 3 times daily.  Benefits of the medication outweigh the risks.  Patient is sober for more than 12 months and is determined to continue with medications, AA support and maintain sobriety.      - buprenorphine-naloxone (SUBOXONE SL FILM) 8-2 mg Film per sublingual film; Place 1 Film under the tongue 3 (three) times a day.  Dispense: 90 Film; Refill: 1     2. Severe alcohol use disorder (H)  -In sustained remission, without relapses and cravings.  Continue with meetings, sponsor contact and support     3. Chronic midline low back pain without sciatica  - tiZANidine (ZANAFLEX) 4 MG tablet; Take 1 tablet (4 mg total) by mouth every 4 (four) hours as needed (for muscle spasm).  Dispense: 180 tablet; Refill: 1  - gabapentin (NEURONTIN) 300 MG capsule; Take 300 mg p.o. 4 times daily and 300 mg p.o. twice daily as needed in addition for muscle spasms and pain  Dispense: 180 capsule; Refill: 1  - gabapentin (NEURONTIN) 300 MG capsule; Take 300 mg p.o. 4 times daily and 300 mg p.o. twice daily as needed in addition for muscle spasms and pain  Dispense: 180 capsule; Refill: 1            Rona Walker,  Anisha Lott, RN; P  Mental Health Support Pool    Caller: Unspecified (Today, 12:23 PM)          The gabapentin prescription was sent as patient requested with the additional 2 capsules as needed.  Please call his pharmacy and cancel his prior to gabapentin prescriptions if they were already dispensed then please cancel the new one because patient cannot be receiving double the amount of gabapentin.  As far as tizanidine the prescription is being sent as previously prescribed and will not be changed.  Thank you

## 2021-06-16 NOTE — TELEPHONE ENCOUNTER
Controlled Substance Refill Request  Medication Name:   Requested Prescriptions     Pending Prescriptions Disp Refills     SUBOXONE 8-2 mg Film per sublingual film [Pharmacy Med Name: Suboxone Sublingual Film 8-2 MG] 90 each 0     Sig: PLACE 1 FILM UNDER THE TONGUE 3 (THREE) TIMES A DAY.     Date Last Fill: 3/8/21  [facility]ed [facility]: [facility]  Submit electronically to pharmacy  Controlled Substance Agreement on file:   Encounter-Level CSA Scan Date:    There are no encounter-level csa scan date.        Last office visit:  3/8/21

## 2021-06-17 NOTE — PROGRESS NOTES
Carlos Hamilton is a 40 y.o. male who is being evaluated via a billable telephone visit.      What phone number would you like to be contacted at? [phone]   How would you like to obtain your AVS? AVS Preference: Teddy.    Assessment & Plan     Carlos was seen today for follow-up and medication reaction.    Diagnoses and all orders for this visit:    Opioid use disorder, moderate, in sustained remission, on maintenance therapy (H): refilled buprenorphine at same dose for 1 month. Will do UA at next visit.   -     buprenorphine-naloxone (SUBOXONE) 8-2 mg Film per sublingual film; Place 1 Film under the tongue 3 (three) times a day.    Chronic obstructive pulmonary disease, unspecified COPD type (H): refilled albuterol. He is not ready to quit smoking yet.   -     albuterol (PROAIR HFA;PROVENTIL HFA;VENTOLIN HFA) 90 mcg/actuation inhaler; Inhale 2 puffs every 6 (six) hours as needed for wheezing.    MARY (generalized anxiety disorder): Discussed a trial of medication and he would like to do this.     Discussed mechanism of action of lexapro. Will start at 10 mg/day. Discussed that it usually takes 4-6 weeks before medication reaches its efficacy. Discussed side effects, including diarrhea, sleep disturbance, decreased libido, and the rare increase in suicidal thoughts. Patient was instructed that if this happens, he should STOP the medication and call the clinic.     Also starting hydroxyzine to use PRN at bedtime since this is when he has a lot of anxiety.   -     escitalopram oxalate (LEXAPRO) 10 MG tablet; Take 1 tablet (10 mg total) by mouth daily.  -     hydrOXYzine HCL (ATARAX) 25 MG tablet; Take 1 tablet (25 mg total) by mouth at bedtime as needed.        Tobacco Cessation:   reports that he has been smoking cigarettes. He has never used smokeless tobacco.  Have discussed tobacco cessation; he is not ready to quit    BMI:   Estimated body mass index is 29.49 kg/m  as calculated from the following:     "Height as of 1/6/21: 5' 10\" (1.778 m).    Weight as of 1/6/21: 205 lb 8 oz (93.2 kg).       Return in about 5 weeks (around 6/7/2021) for suboxone.    Lucero Cardenas MD  [facility]   Carlos Hamilton is 40 y.o. and presents today for the following health issues:     Follow up on Suboxone. Feels like this is going fine, continues with managing a sober house, talks regularly with his sponsor.    His anxiety has been getting worse over the past couple of months.  He did have an ER visit in February for a panic attack.  Now sometimes he gets anxious about going out in public especially in large crowds, and worries a lot at night about everything from his job to the general state of the world.     Phone call duration: 12 minutes  "

## 2021-06-20 NOTE — LETTER
Letter by Rona Walker MD at      Author: Rona Walker MD Service: -- Author Type: --    Filed:  Encounter Date: 10/8/2020 Status: (Other)         Carlos Hamilton  [address] [place] [address]             October 9, 2020    Im writing to inform you that Rona Rangel MD, will be leaving [facility] on November 11, 2020.   We apologize for this disruption in your care and we are committed to supporting your treatment needs.    If you have follow-up appointments after November 11, 2020, we will connect you with another addiction medicine provider within our system.     For medication refills, please contact your pharmacy and they will connect with us to initiate the refill process.  To schedule an appointment with Dr. Rangel before November 11, 2020, please call [facility] at [phone].    If you do not plan to return for ongoing medication management at this time, please let us know that as well, so we can note that in your medical record.   Again, we apologize for the inconvenience and look forward to continuing to provide you with the best possible care.    Sincerely,  Rafaela Chase CNP - Clinic Manager  [facility]

## 2021-06-21 NOTE — LETTER
Letter by Lucero Cardenas MD at      Author: Lucero Cardenas MD Service: -- Author Type: --    Filed:  Encounter Date: 1/6/2021 Status: (Other)         [facility]  01/06/21    Patient: Carlos Hamilton  YOB: 1980  Medical Record Number: [id number]                                                                  Opioid / Opioid Plus Controlled Substance Agreement    I understand that my care provider has prescribed an opioid (narcotic) controlled substance to help manage my condition(s). I am taking this medicine to help me function or work. I know this is strong medicine, and that it can cause serious side effects. Opioid medicine can be sedating, addicting and may cause a dependency on the drug. They can affect my ability to drive or think, and cause depression. They need to be taken exactly as prescribed. Combining opioids with certain medicines or chemicals (such as cocaine, sedatives and tranquilizers, sleeping pills, meth) can be dangerous or even fatal. Also, if I stop opioids suddenly, I may have severe withdrawal symptoms. Last, I understand that opioids do not work for all types of pain nor for all patients. If not helpful, I may be asked to stop them.        The risks, benefits, and side effects of these medicine(s) were explained to me. I agree that:    1. I will take part in other treatments as advised by my care team. This may be psychiatry or counseling, physical therapy, behavioral therapy, group treatment or a referral to a pain clinic. I will reduce or stop my medicine when my care team tells me to do so.  2. I will take my medicines as prescribed. I will not change the dose or schedule unless my care team tells me to. There will be no refills if I run out early.  I may be contactedwithout warning and asked to complete a urine drug test or pill count at any time.   3. I will keep all my appointments, and understand this is part of the monitoring of opioids. My care team may  require an office visit for EVERY opioid/controlled substance refill. If I miss appointments or dont follow instructions, my care team may stop my medicine.  4. I will not ask other providers to prescribe controlled substances, and I will not accept controlled substances from other people. If I need another prescribed controlled substance for a new reason, I will tell my care team within 1 business day.  5. I will use one pharmacy to fill all of my controlled substance prescriptions, and it is up to me to make sure that I do not run out of my medicines on weekends or holidays. If my care team is willing to refill my opioid prescription without a visit, I must request refills only during office hours, refills may take up to 3 days to process, and it may take up to 5 to 7 days for my medicine to be mailed and ready at my pharmacy. Prescriptions will not be mailed anywhere except my pharmacy.        [id number]  Rev 12/18         Registration to scan to EHR                             Page 1 of 2               Controlled Substance Agreement [facility]  01/06/21  Patient: Carlos Hamilton  YOB: 1980  Medical Record Number: [id number]                                                                  6. I am responsible for my prescriptions. If the medicine/prescription is lost or stolen, it will not be replaced. I also agree not to share controlled substance medicines with anyone.  7. I agree to not use ANY illegal or recreational drugs. This includes marijuana, cocaine, bath salts or other drugs. I agree not to use alcohol unless my care team says I may.          I agree to give urine samples whenever asked. If I dont give a urine sample, the care team may stop my medicine.    8. If I enroll in the [place] Medical Marijuana program, I will tell my care team. I will also sign an agreement to share my medical records with my care team.   9. I will bring in my list of medicines  (or my medicine bottles) each time I come to the clinic.   10. I will tell my care team right away if I become pregnant or have a new medical problem treated outside of my regular clinic.  11. I understand that this medicine can affect my thinking and judgment. It may be unsafe for me to drive, use machinery and do dangerous tasks. I will not do any of these things until I know how the medicine affects me. If my dose changes, I will wait to see how it affects me. I will contact my care team if I have concerns about medicine side effects.    I understand that if I do not follow any of the conditions above, my prescriptions or treatment may be stopped.      I agree that my provider, clinic care team, and pharmacy may work with any city, state or federal law enforcement agency that investigates the misuse, sale, or other diversion of my controlled medicine. I will allow my provider to discuss my care with or share a copy of this agreement with any other treating provider, pharmacy or emergency room where I receive care. I agree to give up (waive) any right of privacy or confidentiality with respect to these consents.     I have read this agreement and have asked questions about anything I did not understand.      ________________________________________________________________________  Patient signature - Date/Time -  Carlos Hamilton                                      ________________________________________________________________________  Witness signature                                                            ________________________________________________________________________  Provider signature - Lucero Cardenas MD      [id number]  Rev 12/18         Registration to scan to EHR                         Page 2 of 2                   Controlled Substance Agreement Opioid           Page 1 of 2  Opioid Pain Medicines (also known as Narcotics)  What You Need to Know    What are opioids?   Opioids are pain medicines that  must be prescribed by a doctor.  They are also known as narcotics.    Examples are:     morphine (MS Contin, Junie)    oxycodone (Oxycontin)    oxycodone and acetaminophen (Percocet)    hydrocodone and acetaminophen (Vicodin, Norco)     fentanyl patch (Duragesic)     hydromorphone (Dilaudid)     methadone     What do opioids do well?   Opioids are best for short-term pain after a surgery or injury. They also work well for cancer pain. Unlike other pain medicines, they do not cause liver or kidney failure or ulcers. They may help some people with long-lasting (chronic) pain.     What do opioids NOT do well?   Opioids never get rid of pain entirely, and they do not work well for most patients with chronic pain. Opioids do not reduce swelling, one of the causes of pain. They also dont work well for nerve pain.                           For informational purposes only.  Not to replace the advice of your care provider.  Copyright [facility]. All right reserved. [employer] 447834-Sfq 02/18.      Page 2 of 2    Risks and side effects   Talk to your doctor before you start or decide to keep taking one of these medicines. Side effects include:    Lowering your breathing rate enough to cause death    Overdose, including death, especially if taking higher than prescribed doses    Long-term opioid use    Worse depression symptoms; less pleasure in things you usually enjoy    Feeling tired or sluggish    Slower thoughts or cloudy thinking    Being more sensitive to pain over time; pain is harder to control    Trouble sleeping or restless sleep    Changes in hormone levels (for example, less testosterone)    Changes in sex drive or ability to have sex    Constipation    Unsafe driving    Itching and sweating    Feeling dizzy    Nausea, vomiting and dry mouth    What else should I know about opioids?  When someone takes opioids for too long or too often, they become dependent. This means that if you stop or  reduce the medicine too quickly, you will have withdrawal symptoms.    Dependence is not the same as addiction. Addiction is when people keep using a substance that harms their body, their mind or their relations with others. If you have a history of drug or alcohol abuse, taking opioids can cause a relapse.    Over time, opioids dont work as well. Most people will need higher and higher doses. The higher the dose, the more serious the side effects. We dont know the long-term effects of opioids.      Prescribed opioids aren't the best way to manage chronic pain    Other ways to manage pain include:      Ibuprofen or acetaminophen.  You should always try this first.      Treat health problems that may be causing pain.      acupuncture or massage, deep breathing, meditation, visual imagery, aromatherapy.      Use heat or ice at the pain site      Physical therapy and exercise      Stop smoking      See a counselor or therapist                                                  People who have used opioids for a long time may have a lower quality of life, worse depression, higher levels of pain and more visits to doctors.    Never share your opioids with others. Be sure to store opioids in a secure place, locked if possible.Young children can easily swallow them and overdose.     You can overdose on opioids.  Signs of overdose include decrease or loss of consciousness, slowed breathing, trouble waking and blue lips.  If someone is worried about overdose, they should call 911.    If you are at risk for overdose, you may get naloxone (Narcan, a medicine that reverses the effects of opioids.  If you overdose, a friend or family member can give you Narcan while waiting for the ambulance.  They need to know the signs of overdose and how to give Narcan.    While you're taking opioids:    Don't use alcohol or street drugs. Taking them together can cause death.    Don't take any of these medicines unless your doctor says its  okay.  Taking these with opioids can cause death.    Benzodiazepines (such as lorazepam         or diazepam)    Muscle relaxers (such as cyclobenzaprine)    sleeping pills    other opioids    Safe disposal of opioids  Find your area drug take-back program, your pharmacy mail-back program, buy a special disposal bag (such as Deterra) from your pharmacy or flush them down the toilet.  Use the guidelines at:  www.fda.gov/drugs/resourcesforyou

## 2021-06-25 NOTE — TELEPHONE ENCOUNTER
Patient was supposed to come in for labs today due to his suboxone. Patient's ride goes back to work Wednesday, please inform patient when or what you would like for him to do at this time.

## 2021-06-25 NOTE — TELEPHONE ENCOUNTER
Please apologize to Carlos for the inconvenience.     If he can come in to leave a urine sample sometime today or Tuesday, that would be great. Please try to reschedule him to a phone visit, whenever he is able (ok to double book), either later this week or next.     Thank you.     Lucero Cardenas MD

## 2021-06-25 NOTE — TELEPHONE ENCOUNTER
Patient notified. Did not book lab yet as he is not certain that he can get here. Patient scheduled for phone visit 6/9/21 @ 10:20 AM. Patient states that he will see if he can get a ride to the clinic today or tomorrow. Patient advised that staff can help schedule him for appointment when he gets here.

## 2021-06-26 NOTE — PROGRESS NOTES
"Carlos Hamilton is a 40 y.o. male who is being evaluated via a billable telephone visit.      What phone number would you like to be contacted at? [phone]  How would you like to obtain your AVS? AVS Preference: Teddy.    Assessment & Plan     Opioid use disorder, moderate, in sustained remission, on maintenance therapy (H)  - buprenorphine-naloxone (SUBOXONE) 8-2 mg Film per sublingual film  Dispense: 90 each; Refill: 0    MARY (generalized anxiety disorder): We will try increasing Lexapro dose to 20 mg daily.  He does not feel like hydroxyzine has been helpful and just makes him groggy in the morning, so he will stop this.  I offered referral for therapy but he declines at this time.  He also reported anxiety when he wants a cigarette, and we discussed how nicotine withdrawal itself provokes anxiety, so when he quits smoking, this will help.  - escitalopram oxalate (LEXAPRO) 20 MG tablet  Dispense: 60 tablet; Refill: 2      Gastroesophageal reflux disease: refilled omeprazole  - omeprazole (PRILOSEC) 40 MG capsule  Dispense: 120 capsule; Refill: 3    Chronic midline low back pain without sciatica: refilled the following  - gabapentin (NEURONTIN) 300 MG capsule  Dispense: 450 capsule; Refill: 3  - tiZANidine (ZANAFLEX) 4 MG tablet  Dispense: 180 tablet; Refill: 3    Tobacco Cessation:   reports that he has been smoking cigarettes. He has never used smokeless tobacco.  Pt is not ready to quit but states it is in his \"long term plan.\"    BMI:   Estimated body mass index is 29.49 kg/m  as calculated from the following:    Height as of 1/6/21: 5' 10\" (1.778 m).    Weight as of 1/6/21: 205 lb 8 oz (93.2 kg).     Return in about 1 month (around 7/9/2021) for suboxone, anxiety.    Lucero Cardenas MD  [facility]    Subjective   Carlos Hamilton is 40 y.o. and presents today for the following health issues: suboxone and anxiety.     Happy with Suboxone dose, no issues with sobriety.     Started " Lexapro and hydroxyzine after our last visit but has not noticed a big difference in his anxiety yet.  He gets flustered throughout the day.  Gets short of breath but does not get chest pain.  He gets anxious before he has a cigarette.  Still smoking, not ready to quit yet. Hydroxyzine makes him groggy in the AM.             Phone call duration: 16 minutes

## 2021-06-28 NOTE — PROGRESS NOTES
Progress Notes by Ting Trotter CMA at 9/10/2019 11:15 AM     Author: Ting Trotter CMA Service: -- Author Type: Certified Medical Assistant    Filed: 9/10/2019 12:02 PM Encounter Date: 9/10/2019 Status: Signed    : Rona Walker MD (Physician)       Addiction Medicine  Outpatient Visit  With Dr. Rangel    9/10/2019    Carlos Hamilton, 1980.    Tali Gonzalez is a 38 year old male with severe alcohol use disorder , moderate opioid use disorder , chronic low back pain and anxiety NOS who presents for a 4-week scheduled follow-up for management of severe alcohol and moderate opioid use disorder in  sustained remission on Suboxone medication assisted therapy-20 mg daily..  Last appointment with this provider was on 08/06/19  Interval history: Patient is now sober for 5 months and 20 days.  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  He has been compliant with his medications and therapy.  On August 23 we received a call from the manager at [facility] reporting that Carlos's medications have been : 10 strips of Suboxone were missing despite being kept in a locked room and in a lock box.  It appears that a staff member did that.  I refilled Sheridans medication until his appointment today and he did not have a gap in therapy.  He has been doing really well from addiction standpoint.  Denies cravings and relapses.  From medical standpoint he continues to experience significant back pain and is working with his primary care provider and physical therapy.  The back pain is specifically worse at night and he frequently wakes up between 4 and 5 AM due to pain.  He is taking tizanidine 4 times daily as needed for muscle pain in addition to gabapentin and Suboxone.  Continues to be very motivated to remain sober.  No behavioral issues.  Anxiety is minimal, no signs of depression.       At this time, patient would like to : stay same/  increase or decrease dose because suboxone 16mg  PHQ-9 score past 2 today 2  MARY-7 score past 6 today 6   VIDAL: none       Last UDS performed on 8/6/19 and was: neg  Last BUP performed on 8/6/19 and was: pos  Last ETG performed on 5/21/19 and was: neg     UA collected today      Have you maintained sobriety from all substances?  If not what is your problem substance and frequency?  yes      What does patient currently fills his/her day with? 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  From 0 to 5 (zero being none) grade your craving. What is the craving for?  0       Are you going to groups, if so where and how often? What group?  Yes, 12 step program, IOP wkly     Side effects:  Dry mouth yes Constipation denies     Do you follow with a primary care doctor? When was your last visit?   Yes, lov 8/12/19      Patient reports main support person is:  Sponsor and mother     Is patient currently experiencing depression or thoughts of self-harm?   denies     Is patient having trouble with functioning because of worrying about things?  denies     Are any of the people in your life expressing concern for you?    no     Is there anything you would like to ask your doctor about?   refills         Social History:  Patient lives with roomates, in an sober house and is attending 12 step program and working part time at [employer].    Patient has no obstacles to maintain sobriety.    Allergies:  Naproxen; Diphenhydramine; and Toradol [ketorolac]      Medications:  Current Outpatient Medications   Medication Sig Dispense Refill   ? acetaminophen (TYLENOL) 325 MG tablet Take 325-650 mg by mouth every 4 (four) hours as needed for pain. Max 4000mg/24 hours     ? albuterol (PROAIR HFA;PROVENTIL HFA;VENTOLIN HFA) 90 mcg/actuation inhaler Inhale 2 puffs every 4 (four) hours as needed for wheezing or shortness of breath. 8.6 g 0   ? budesonide-formoterol (SYMBICORT) 160-4.5  "mcg/actuation inhaler Inhale 2 puffs 2 (two) times a day. 1 Inhaler 12   ? buprenorphine-naloxone (SUBOXONE SL FILM) 8-2 mg Film per sublingual film 2.5 films daily. 1 film two times a day and 0.5 film SL at midday 23 Film 0   ? gabapentin (NEURONTIN) 300 MG capsule Take 1 capsule (300 mg total) by mouth 3 (three) times a day. 90 capsule 1   ? nicotine (NICOTROL) 10 mg inhaler Inhale 10mg (1 cartridge) every 2 hours as needed for smoking cessation     ? omeprazole (PRILOSEC) 40 MG capsule Take 1 capsule (40 mg total) by mouth daily before breakfast. 30 capsule 2   ? tiZANidine (ZANAFLEX) 4 MG tablet Take 1 tablet (4 mg total) by mouth every 6 (six) hours as needed (muscle spasms). 120 tablet 1   ? traZODone (DESYREL) 100 MG tablet Take 100 mg by mouth.       No current facility-administered medications for this visit.          Review of Systems:  Patient  denies chest pain or shortness of breath. Patient is  not having fever or fatigue.       Physical Examination:  /71   Pulse 83   Ht 5' 11\" (1.803 m)   Wt 188 lb (85.3 kg)   BMI 26.22 kg/m    Physical Exam  There were no signs suggesting medication toxicity or impairment due to drug or alcohol use. Patient (is/is not) overtly manipulative. Gait intact.      Mental Status Examination    Grooming, Dress & Hygeine  Yes No  Comments    Normal x    no acute distress, appropriately groomed, awake alert oriented for time place and person and situation    Attitude      pleasant and calm    Cooperative x        Mood      cooperative    Euthymic x        Affect      full    Full range x        Normal intensity x        Reactive x        Eye Contact      appropriate    Normal amount x        Speech      good historian, no  memory impairment, answers questions appropriately    Normal volume, rate and amount x        Verbal Fluency          Normal phonemic x        Normal semantic X        Fund of Knowledge          Average X        Thought Content      denies thoughts " of hurting self or others    Normal X        Thought Processes      linear and logical    Normal X        Perceptions      no signs of psychosis    Normal X        Insight & Judgment      progressing    Recognition of illness X    gaining insight into his mental illness and chemical dependency disease    Readiness to change X    in action stage of recovering    Taking steps to manage illness X    following recommendations, attending outpatient program, AA meetings in the community and taking medications as prescribed           [place] Prescription Monitoring Program:  Reviewed, no worrisome activity was noted.    Summary of Diagnostic Studies:  Urine toxicology pending at the time this note was completed.      Assessment    1. Opioid use disorder, moderate, dependence (H)    2. Severe alcohol use disorder (H)    3. Chronic midline low back pain without sciatica    4. Anxiety state        Diagnoses/Plan:    1. Opioid use disorder, moderate, dependence (H)  -Due to worsening back pain will increase Suboxone to 8/2 mg 1 film 3 times daily.  - DAM    (Drug Abuse 1+, Urine)  - buprenorphine-naloxone (SUBOXONE SL FILM) 8-2 mg Film per sublingual film; Place 1 Film under the tongue 3 (three) times a day. 2.5 films daily. 1 film two times a day and 0.5 film SL at midday  Dispense: 90 Film; Refill: 0    2. Severe alcohol use disorder (H)  -In remission and in controlled environments.  - DAM    (Drug Abuse 1+, Urine)    3. Chronic midline low back pain without sciatica  -Working with the primary care provider and physical therapist.  - gabapentin (NEURONTIN) 300 MG capsule; Take 1 capsule (300 mg total) by mouth 3 (three) times a day.  Dispense: 90 capsule; Refill: 1  -May continue to take tizanidine 4 mg p.o. 4 times daily as needed for breakthrough pain    4. Anxiety state  -Improved with Gabapentin    5.  Follow-up in clinic in 1 month for medication management     At least 25 min, was spent in the care of this patient  and >50% of this time was spent in face-to-face counseling and coordination of care
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
## 2021-06-28 NOTE — PROGRESS NOTES
"Progress Notes by Anna Marie Hester CMA at 3/23/2020 11:15 AM     Author: Anna Marie Hester CMA Service: -- Author Type: Certified Medical Assistant    Filed: 3/23/2020  1:35 PM Encounter Date: 3/23/2020 Status: Signed    : Rona Walker MD (Physician)             Carlos Hamilton is a 39 y.o. male who is being evaluated via a billable telephone visit.      The patient has been notified of following:     \"This telephone visit will be conducted via a call between you and your physician/provider. We have found that certain health care needs can be provided without the need for a physical exam.  This service lets us provide the care you need with a short phone conversation.  If a prescription is necessary we can send it directly to your pharmacy.  If lab work is needed we can place an order for that and you can then stop by our lab to have the test done at a later time.    If during the course of the call the physician/provider feels a telephone visit is not appropriate, you will not be charged for this service.\"     Carlos Hamilton a 39 year old male with for opioid/alcohol use disorder.  He is on medication assisted therapy with Suboxone and is followed up monthly in addiction medicine clinic.  Patient medication dose and name: suboxone 8/2mg, gabapentin 300mg 4 times daily  Patient has been sober now for 12 months.  He is doing well.  He is taking medications as prescribed, no behavioral health issues, no calls for early refills or lost medications.  All his urine toxicology screens have been negative for all tested illicit drugs, positive for buprenorphine, negative for alcohol.   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  He has good support from his uncle in [place] who has been sober for more than 40 years, his mother in [place], peers and sponsor.  He continues with " virtual AA/NA meetings and sponsor contacts weekly.  Denies any new mental health or medical concerns.  Pain is reasonably controlled with gabapentin and tizanidine with mild flares in the mornings and evenings.  Asks if he can takes additional gabapentin during muscle pain flares.      Denies any side effects from his medications especially no side effect of sedation, mental cloudiness, intoxication or drowsiness from the combination after tizanidine, gabapentin and Suboxone.  Continues to be genuinely motivated to remain sober      Patient medication dose and name:  Suboxone 8-2mg film tid     At this time, patient would like to : stay same  PHQ-9 score past 6 today NA  MARY-7 score past 5 today NA      Last UDS performed on 2/24/20 and was: negative  Last BUP performed on 12/3/19 and was: positive  Last ETG performed on 2/24/20 and was:low positive at 170, may be due to alcohol based hand       Have you maintained sobriety from all substances?  If not what is your problem substance and frequency?  yes       What does patient currently fills his/her day with? (working, volunteering, TV)  Working 2 part time jobs      Are you having any cravings?  From 0 to 5 (zero being none) grade your craving. What is the craving for?  0         Are you going to groups, if so where and how often? What group?  AA/NA 3-4x wkly      Side effects:  Dry mouth denies Constipation denies      Do you follow with a primary care doctor? When was your last visit?   Yes, has not been in       Patient reports main support person is:  Mother and sponsor      Is patient currently experiencing depression or thoughts of self-harm?   None, denies SIHI      Is patient having trouble with functioning because of worrying about things?  no      Are any of the people in your life expressing concern for you?    no      Is there anything you would like to ask your doctor about?   refills       I have reviewed and updated the patient's Past  Medical History, Social History, Family History and Medication List.      [place] :          ALLERGIES  Naproxen; Diphenhydramine; and Toradol [ketorolac]          Assessment/Plan:    Diagnoses/Plan:     1. Opioid use disorder, moderate, dependence (H)  -Continue Suboxone 8/2 mg sublingually 3 times daily.  Benefits of the medication outweigh the risks.  Patient is sober for more than 12 months and is determined to continue with medications, AA support and maintain sobriety.     - buprenorphine-naloxone (SUBOXONE SL FILM) 8-2 mg Film per sublingual film; Place 1 Film under the tongue 3 (three) times a day.  Dispense: 90 Film; Refill: 1     2. Severe alcohol use disorder (H)  -In sustained remission, without relapses and cravings.  Continue with meetings, sponsor contact and support    3. Chronic midline low back pain without sciatica  - tiZANidine (ZANAFLEX) 4 MG tablet; Take 1 tablet (4 mg total) by mouth every 4 (four) hours as needed (for muscle spasm).  Dispense: 180 tablet; Refill: 1  - gabapentin (NEURONTIN) 300 MG capsule; Take 300 mg p.o. 4 times daily and 300 mg p.o. twice daily as needed in addition for muscle spasms and pain  Dispense: 180 capsule; Refill: 1  - gabapentin (NEURONTIN) 300 MG capsule; Take 300 mg p.o. 4 times daily and 300 mg p.o. twice daily as needed in addition for muscle spasms and pain  Dispense: 180 capsule; Refill: 1           I have reviewed the note as documented above.  This accurately captures the substance of my conversation with the patient.      Phone call contact time    Call Started at: 11:15  Call Ended at: 11:40

## 2021-06-28 NOTE — PROGRESS NOTES
Progress Notes by Ting Trotter CMA at 1/27/2020 11:15 AM     Author: Ting Trotter CMA Service: -- Author Type: Certified Medical Assistant    Filed: 1/27/2020 11:58 AM Encounter Date: 1/27/2020 Status: Signed    : Rona Walker MD (Physician)       Addiction Medicine  Outpatient Visit  With Dr. Rangel    1/27/2020    Carlos Hamilton, 1980.    Subjective   The patient is a 39 year old male who presents for opioid/alcohol use disorder in remission for 10 months  on medication assisted therapy with Suboxone 24 mg daily and last saw Dr. Rangel on 12/30/19.  Patient's medication dose and name: suboxone 8-2mg sublingually 3 times daily and gabapentin 300 mg 4 times daily  Patient is celebrating 10 months of sobriety.  Congratulated him about his sustained sobriety and compliance with medications and therapy.  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  He has good support from his uncle in [place] who has been sober for more than 40 years, his mother in [place], peers and sponsor.  He continues AA/NA meetings 3-4 times a week and sponsor contacts weekly.  Denies any new mental health or medical concerns.  Pain is reasonably controlled with gabapentin and tizanidine with mild flares in the mornings and evenings.  Asks if he can takes additional gabapentin during muscle pain flares.      Denies any side effects from his medications especially no side effect of sedation, mental cloudiness, intoxication or drowsiness from the combination after tizanidine, gabapentin and Suboxone.  Continues to be genuinely motivated to remain sober.  Denies any new life events with exception of getting his 's license soon.       At this time, patient would like to : stay same  PHQ-9 score past 2 today 5  MARY-7 score past 6 today 7        Last UDS performed on 12/30/19 and was: neg  Last  BUP performed on 12/30/19 and was: pos  Last ETG performed on 12/30/19 and was: <100  First day of last menstrual period  n/a    UA collected today      Have you maintained sobriety from all substances?  If not what is your problem substance and frequency?  yes      What does patient currently fills his/her day with? (working, volunteering, TV)  Working full time     Are you having any cravings?  From 0 to 5 (zero being none) grade your craving. What is the craving for?  0       Are you going to groups, if so where and how often? What group?  AA, NA 3-4wkly     Side effects:  Dry mouth deneis Constipation denies     Do you follow with a primary care doctor? When was your last visit?   Yes, has not been in recently       Patient reports main support person is:  Sponsor and mother     Is patient currently experiencing depression or thoughts of self-harm?   No, denies SIHI     Is patient having trouble with functioning because of worrying about things?  Stable/ intermittent     Are any of the people in your life expressing concern for you?    no     Is there anything you would like to ask your doctor about?   refills         Social History:  Patient lives with roomates, in an sober house. Working part time at 2 jobs.     Patient has no obstacles to maintain sobriety.    Allergies:  Naproxen; Diphenhydramine; and Toradol [ketorolac]      Medications:  Current Outpatient Medications   Medication Sig Dispense Refill   ? acetaminophen (TYLENOL) 325 MG tablet Take 325-650 mg by mouth every 4 (four) hours as needed for pain. Max 4000mg/24 hours     ? albuterol (PROAIR HFA;PROVENTIL HFA;VENTOLIN HFA) 90 mcg/actuation inhaler Inhale 2 puffs every 4 (four) hours as needed for wheezing or shortness of breath. 8.6 g 0   ? budesonide-formoterol (SYMBICORT) 160-4.5 mcg/actuation inhaler Inhale 2 puffs 2 (two) times a day. 1 Inhaler 12   ? buprenorphine-naloxone (SUBOXONE SL FILM) 8-2 mg Film per sublingual film Place 1 Film under  "the tongue 3 (three) times a day. 90 Film 0   ? gabapentin (NEURONTIN) 300 MG capsule Take 1 capsule (300 mg total) by mouth 4 (four) times a day. 120 capsule 1   ? nicotine (NICOTROL) 10 mg inhaler Inhale 10mg (1 cartridge) every 2 hours as needed for smoking cessation     ? omeprazole (PRILOSEC) 40 MG capsule Take 1 capsule (40 mg total) by mouth daily before breakfast. 30 capsule 0   ? tiZANidine (ZANAFLEX) 4 MG tablet Take 1 tablet (4 mg total) by mouth every 4 (four) hours as needed (for muscle spasm). 180 tablet 1   ? traZODone (DESYREL) 100 MG tablet Take 100 mg by mouth.       No current facility-administered medications for this visit.          Review of Systems:  Patient denies chest pain or shortness of breath. Patient is  not having fever or fatigue.       Physical Examination:  /86   Pulse 83   Ht 5' 11\" (1.803 m)   Wt 200 lb (90.7 kg)   BMI 27.89 kg/m    Physical Exam  There were no signs suggesting medication toxicity or impairment due to drug or alcohol use. Patient is not overtly manipulative. Gait intact.      Mental Status Examination    Grooming, Dress & Hygeine  Yes No  Comments    Normal x    No acute distress, appropriately groomed, awake alert, oriented for time place , person and situation    Attitude      pleasant and calm    Cooperative x        Mood      cooperative    Euthymic x        Affect      full    Full range x        Normal intensity x        Reactive x        Eye Contact      appropriate    Normal amount x        Speech      good historian, no  memory impairment, answers questions appropriately    Normal volume, rate and amount x        Verbal Fluency          Normal phonemic x        Normal semantic X        Fund of Knowledge          Average X        Thought Content      denies thoughts of hurting self or others    Normal X        Thought Processes      linear and logical    Normal X        Perceptions      no signs of psychosis    Normal X        Insight & Judgment "      progressing    Recognition of illness X    gaining insight into his mental illness and chemical dependency disease    Readiness to change X    in action stage of recovering    Taking steps to manage illness X    following recommendations, attending outpatient program, AA meetings in the community and taking medications as prescribed              [place] Prescription Monitoring Program:  Reviewed, no worrisome activity was noted.    Summary of Diagnostic Studies:  Urine toxicology pending at the time this note was completed.      Assessment    1. Opioid use disorder, moderate, dependence (H)    2. Severe alcohol use disorder (H)    3. Anxiety state    4. Gastroesophageal reflux disease, esophagitis presence not specified    5. Chronic midline low back pain without sciatica        Diagnoses/Plan:    1. Opioid use disorder, moderate, dependence (H)  -Continue Suboxone 8/2 mg sublingually 3 times daily.  Benefits of the medication outweigh the risks.  Patient is sober for more than 11 months and is determined to continue with medications, AA support and maintain sobriety.  Continue monthly follow-up in clinic  - [facility]    (Drug Abuse 1+, Urine)  - buprenorphine-naloxone (SUBOXONE SL FILM) 8-2 mg Film per sublingual film; Place 1 Film under the tongue 3 (three) times a day.  Dispense: 90 Film; Refill: 0    2. Severe alcohol use disorder (H)  -In sustained remission, now relapses and cravings.  Continue with meetings, sponsor contact and support  - [facility]    (Drug Abuse 1+, Urine)    3. Anxiety state  -Continue gabapentin as prescribed    4. Gastroesophageal reflux disease, esophagitis presence not specified    - omeprazole (PRILOSEC) 40 MG capsule; Take 1 capsule (40 mg total) by mouth daily before breakfast.  Dispense: 30 capsule; Refill: 0    5. Chronic midline low back pain without sciatica    - tiZANidine (ZANAFLEX) 4 MG tablet; Take 1 tablet (4 mg total) by mouth every 4 (four) hours as needed (for muscle spasm).   Dispense: 180 tablet; Refill: 1  - gabapentin (NEURONTIN) 300 MG capsule; Take 300 mg p.o. 4 times daily and 300 mg p.o. twice daily as needed in addition for muscle spasms and pain  Dispense: 180 capsule; Refill: 1     At least 25 min, was spent in the care of this patient and >50% of this time was spent in face-to-face counseling.Counseling involved general counseling, importance to continue AA/NA support and attendance, discussion about indications and benefits of Suboxone, gabapentin and tizanidine versus risks and side effects

## 2021-06-29 NOTE — PROGRESS NOTES
Progress Notes by Amelie Dunbar LPN at 8/3/2020 11:15 AM     Author: Amelie Dunbar LPN Service: -- Author Type: Licensed Nurse    Filed: 8/4/2020 10:35 AM Encounter Date: 8/3/2020 Status: Signed    : Amelie Dunbar LPN (Licensed Nurse)       This video/telephone visit will be conducted via a call between you and your physician/provider. We have found that certain health care needs can be provided without the need for an in-person physical exam. This service lets us provide the care you need with a video /telephone conversation. If a prescription is necessary we can send it directly to your pharmacy. If lab work is needed we can place an order for that and you can then stop by our lab to have the test done at a later time.   Just as we bill insurance for in-person visits, we also bill insurance for video/telephone visits. If you have questions about your insurance coverage, we recommend that you speak with your insurance company.   Patient has given verbal consent for video/Telephone visit? yes  Patient would like the video visit invitation sent by: Text to cell phone: NA or Send to email: NA  ANMOL/LPN : AD,LPN    Patient verified allergies, medications and pharmacy via phone. PHQ : 1 and MARY: 1 done verbally with writer. Patient states he  is ready for visit.

## 2021-06-29 NOTE — PROGRESS NOTES
Progress Notes by Anna Marie Hester CMA at 7/13/2020 11:15 AM     Author: Anna Marie Hester CMA Service: -- Author Type: Certified Medical Assistant    Filed: 7/13/2020  3:09 PM Encounter Date: 7/13/2020 Status: Signed    : Anna Marie Hester CMA (Certified Medical Assistant)       This video/telephone visit will be conducted via a call between you and your physician/provider. We have found that certain health care needs can be provided without the need for an in-person physical exam. This service lets us provide the care you need with a video /telephone conversation. If a prescription is necessary we can send it directly to your pharmacy. If lab work is needed we can place an order for that and you can then stop by our lab to have the test done at a later time.   Just as we bill insurance for in-person visits, we also bill insurance for video/telephone visits. If you have questions about your insurance coverage, we recommend that you speak with your insurance company.   Patient has given verbal consent for a Telephone visit? yes  Patient would like the phone call to [phone]  Patient verified allergies, medications and pharmacy via phone. PHQ : 5 and MARY: 12 done verbally with writer. Patient states he is ready for visit.  Anna Marie Hester CMA  [place]  was reviewed prior to seeing patient today. See below for embedded report.

## 2021-06-29 NOTE — PROGRESS NOTES
Progress Notes by Mimi Bueno CMA at 10/26/2020 11:15 AM     Author: Mimi Bueno CMA Service: -- Author Type: Certified Medical Assistant    Filed: 10/26/2020 12:08 PM Encounter Date: 10/26/2020 Status: Signed    : Mimi Bueno CMA (Certified Medical Assistant)       This video/telephone visit will be conducted via a call between you and your physician/provider. We have found that certain health care needs can be provided without the need for an in-person physical exam. This service lets us provide the care you need with a video /telephone conversation. If a prescription is necessary we can send it directly to your pharmacy. If lab work is needed we can place an order for that and you can then stop by our lab to have the test done at a later time.   Just as we bill insurance for in-person visits, we also bill insurance for video/telephone visits. If you have questions about your insurance coverage, we recommend that you speak with your insurance company.   Patient has given verbal consent for video/Telephone visit? Yes  Patient would like telephone visit, please call:  [phone]   ANMOL/MARICARMEN CALHOUN CMA    Patient verified allergies, medications and pharmacy via phone. PHQ: 3 and MARY: 3 done verbally with writer. Patient states he is ready for visit.    [place]  reviewed prior to appt, please see embedded report below:

## 2021-06-29 NOTE — PROGRESS NOTES
Progress Notes by Mimi Bueno CMA at 8/31/2020 11:15 AM     Author: Mimi Bueno CMA Service: -- Author Type: Certified Medical Assistant    Filed: 8/31/2020  6:43 PM Encounter Date: 8/31/2020 Status: Signed    : Mimi Bueno CMA (Certified Medical Assistant)       This video/telephone visit will be conducted via a call between you and your physician/provider. We have found that certain health care needs can be provided without the need for an in-person physical exam. This service lets us provide the care you need with a video /telephone conversation. If a prescription is necessary we can send it directly to your pharmacy. If lab work is needed we can place an order for that and you can then stop by our lab to have the test done at a later time.   Just as we bill insurance for in-person visits, we also bill insurance for video/telephone visits. If you have questions about your insurance coverage, we recommend that you speak with your insurance company.   Patient has given verbal consent for video/Telephone visit? Yes  Patient would like telephone visit, please call:  [phone]  ANMOL/MARICARMEN CLAHOUN CMA    Patient verified allergies, medications and pharmacy via phone. PHQ: 3   and MARY: 2 done verbally with writer. Patient states he is ready for visit.    [place]  reviewed prior to appt, please see embedded report below:

## 2021-07-03 NOTE — ADDENDUM NOTE
Addendum Note by Amelie Dunbar LPN at 12/30/2019 12:59 PM     Author: Amelie Dunbar LPN Service: -- Author Type: Licensed Nurse    Filed: 12/30/2019 12:59 PM Encounter Date: 12/30/2019 Status: Signed    : Amelie Dunbar LPN (Licensed Nurse)    Addended by: AMELIE DUNBAR on: 12/30/2019 12:59 PM        Modules accepted: Orders

## 2021-07-03 NOTE — ADDENDUM NOTE
Addendum Note by Anisha Major RN at 3/26/2020 10:38 AM     Author: Anisha Major RN Service: -- Author Type: Registered Nurse    Filed: 3/26/2020 10:38 AM Encounter Date: 3/24/2020 Status: Signed    : Anisha Major RN (Registered Nurse)    Addended by: ANISHA MAJOR on: 3/26/2020 10:38 AM        Modules accepted: Orders

## 2021-07-03 NOTE — ADDENDUM NOTE
Addendum Note by Georgina Archuleta MD at 9/10/2019 11:15 AM     Author: Georgina Archuleta MD Service: -- Author Type: Physician    Filed: 9/10/2019  4:00 PM Encounter Date: 9/10/2019 Status: Signed    : Georgina Archuleta MD (Physician)    Addended by: GEORGINA ARCHULETA on: 9/10/2019 04:00 PM        Modules accepted: Orders

## 2021-07-03 NOTE — ADDENDUM NOTE
Addendum Note by Georgina Archuleta MD at 3/24/2020 12:35 PM     Author: Georgina Archuleta MD Service: Family Medicine Author Type: Physician    Filed: 3/24/2020 12:35 PM Encounter Date: 3/24/2020 Status: Signed    : Georgina Archuleta MD (Physician)    Addended by: GEORGINA ARCHULETA on: 3/24/2020 12:35 PM        Modules accepted: Orders

## 2021-07-04 NOTE — ADDENDUM NOTE
Addendum Note by Trista Diallo MD at 6/8/2021 10:02 AM     Author: Trista Diallo MD Service: -- Author Type: Physician    Filed: 6/8/2021 10:02 AM Encounter Date: 6/7/2021 Status: Signed    : Trista Diallo MD (Physician)    Addended by: TRISTA DIALLO on: 6/8/2021 10:02 AM        Modules accepted: Orders

## 2021-08-02 DIAGNOSIS — F11.20 OPIOID USE DISORDER, MODERATE, ON MAINTENANCE THERAPY (H): Primary | ICD-10-CM

## 2021-08-02 RX ORDER — BUPRENORPHINE AND NALOXONE 8; 2 MG/1; MG/1
1 FILM, SOLUBLE BUCCAL; SUBLINGUAL 3 TIMES DAILY
Qty: 90 FILM | Refills: 0 | Status: SHIPPED | OUTPATIENT
Start: 2021-08-04 | End: 2021-08-16

## 2021-08-02 NOTE — TELEPHONE ENCOUNTER
Pending Prescriptions:                       Disp   Refills    buprenorphine HCl-naloxone HCl (SUBOXONE)*                    Sig: Place 1 Film under the tongue

## 2021-08-02 NOTE — TELEPHONE ENCOUNTER
Reviewed  database. He has upcoming visit in 2 weeks. Last fill suboxone #90 filled 7/7/21 (written 7/5/21)    Refilled for 1 month but patient needs to keep appointment in 2 weeks.    Ashlyn Lopez MD   Stable

## 2021-08-03 PROBLEM — F19.11 SUBSTANCE ABUSE IN REMISSION (H): Status: RESOLVED | Noted: 2019-04-29 | Resolved: 2021-01-06

## 2021-08-03 PROBLEM — F10.939 ALCOHOL WITHDRAWAL (H): Status: RESOLVED | Noted: 2019-03-22 | Resolved: 2021-01-06

## 2021-08-03 PROBLEM — F19.20 DRUG DEPENDENCE (H): Status: RESOLVED | Noted: 2020-10-23 | Resolved: 2021-01-06

## 2021-08-05 DIAGNOSIS — F41.1 GAD (GENERALIZED ANXIETY DISORDER): Primary | ICD-10-CM

## 2021-08-06 RX ORDER — BUSPIRONE HYDROCHLORIDE 10 MG/1
TABLET ORAL
Qty: 90 TABLET | Refills: 0 | Status: SHIPPED | OUTPATIENT
Start: 2021-08-06 | End: 2021-08-16

## 2021-08-16 ENCOUNTER — OFFICE VISIT (OUTPATIENT)
Dept: FAMILY MEDICINE | Facility: CLINIC | Age: 41
End: 2021-08-16
Payer: COMMERCIAL

## 2021-08-16 VITALS
TEMPERATURE: 98.2 F | DIASTOLIC BLOOD PRESSURE: 70 MMHG | BODY MASS INDEX: 30.99 KG/M2 | WEIGHT: 216 LBS | HEART RATE: 71 BPM | OXYGEN SATURATION: 98 % | SYSTOLIC BLOOD PRESSURE: 122 MMHG

## 2021-08-16 DIAGNOSIS — F11.21 MODERATE OPIOID USE DISORDER IN SUSTAINED REMISSION ON MAINTENANCE THERAPY (H): Primary | ICD-10-CM

## 2021-08-16 DIAGNOSIS — F41.1 GAD (GENERALIZED ANXIETY DISORDER): ICD-10-CM

## 2021-08-16 DIAGNOSIS — F11.20 OPIOID USE DISORDER, MODERATE, ON MAINTENANCE THERAPY (H): ICD-10-CM

## 2021-08-16 PROCEDURE — 80305 DRUG TEST PRSMV DIR OPT OBS: CPT | Performed by: FAMILY MEDICINE

## 2021-08-16 PROCEDURE — 99213 OFFICE O/P EST LOW 20 MIN: CPT | Performed by: FAMILY MEDICINE

## 2021-08-16 RX ORDER — BUPRENORPHINE AND NALOXONE 8; 2 MG/1; MG/1
1 FILM, SOLUBLE BUCCAL; SUBLINGUAL 3 TIMES DAILY
Qty: 90 FILM | Refills: 0 | Status: SHIPPED | OUTPATIENT
Start: 2021-08-31 | End: 2021-09-15

## 2021-08-16 RX ORDER — BUSPIRONE HYDROCHLORIDE 10 MG/1
10 TABLET ORAL 3 TIMES DAILY
Qty: 90 TABLET | Refills: 3 | Status: SHIPPED | OUTPATIENT
Start: 2021-08-16 | End: 2021-09-15

## 2021-08-16 NOTE — PROGRESS NOTES
SUBJECTIVE:  Carlos Hamilton is a 40 year old male here for f/u on Suboxone therapy.    Carlos is currently taking 24 mg of Suboxone daily. This is taken in multiple dose daily. This is working to keep cravings at a manageable level. Aside from this, he is currently undergoing chemical dependency treatment in the form of meetings, residing at sober house, has sponsor. Denies visits to the ED since our last visit.    He has history of anxiety. Recently added buspar to lexapro at his last visit with PCP.  He hs remained abstinent from opioids.  He has remained abstinent from alcohol.  He has remained abstinent from other recreational drugs.    Tobacco use: yes, wants to quit, it was his goal to do this at 2 years sobriety but he does not feel ready. Has a lot of work stress.    Other medical concerns: Denies    PMHx: Chart reviewed, no significant changes to health history. Labs and medications reviewed and meds updated. [place]  reviewed: yes, last fill 8/3/21    ROS:  Gen - denies fevers or chills  GI - denies abdominal pain, nausea, vomiting, diarrhea.    OBJECTIVE:  Blood pressure 122/70, pulse 71, temperature 98.2  F (36.8  C), temperature source Oral, weight 98 kg (216 lb), SpO2 98 %.  Gen: AO, NAD. No sweating. Resting comfortably.  Eyes: pupils normal caliber  Heart: RRR, no murmers, rubs or gallops.  CV: RRR, no, m/r/g     ASSESSMENT/PLAN:    Carlos was seen today for recheck.    Diagnoses and all orders for this visit:    Moderate opioid use disorder in sustained remission on maintenance therapy (H)  -     Drugs of abuse, urine (CLINIC ONLY); Future  -     Drugs of abuse, urine (CLINIC ONLY)  -     buprenorphine HCl-naloxone HCl (SUBOXONE) 8-2 MG per film; Place 1 Film under the tongue 3 times daily    MARY (generalized anxiety disorder): Continue buspar and lexapro- continue for another few months. If no improvement, consider stopping buspar or switching medications. Will continue to encourage adding therapy  as well.      Dosage will not need adjustment today.      F/U visit in 1 month (telephone visit), then 1 month for in-person visit.    Ashlyn Lopez MD     Options for treatment and follow-up care were reviewed with the patient and/or guardian. Carlos Hamilton and/or guardian engaged in the decision making process and verbalized understanding of the options discussed and agreed with the final plan.

## 2021-09-15 ENCOUNTER — VIRTUAL VISIT (OUTPATIENT)
Dept: FAMILY MEDICINE | Facility: CLINIC | Age: 41
End: 2021-09-15
Payer: COMMERCIAL

## 2021-09-15 DIAGNOSIS — F11.20 OPIOID USE DISORDER, MODERATE, ON MAINTENANCE THERAPY (H): ICD-10-CM

## 2021-09-15 PROCEDURE — 99213 OFFICE O/P EST LOW 20 MIN: CPT | Mod: 95 | Performed by: FAMILY MEDICINE

## 2021-09-15 RX ORDER — BUPRENORPHINE AND NALOXONE 8; 2 MG/1; MG/1
1 FILM, SOLUBLE BUCCAL; SUBLINGUAL 3 TIMES DAILY
Qty: 90 FILM | Refills: 0 | Status: SHIPPED | OUTPATIENT
Start: 2021-09-29 | End: 2021-10-11

## 2021-09-15 NOTE — PROGRESS NOTES
"Carlos is a 40 year old who is being evaluated via a billable telephone visit.        Carlos was seen today for recheck.    Diagnoses and all orders for this visit:    Opioid use disorder, moderate, on maintenance therapy (H): Continues to do well with his sobriety- 2.5 years sober. 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 Refilled suboxone for 1 month  -     buprenorphine HCl-naloxone HCl (SUBOXONE) 8-2 MG per film; Place 1 Film under the tongue 3 times daily Post-dated 9/29/21 for 30 day supply    Next appt in 1 month with me, then resume appointments with Dr. Cardenas      Subjective   Carlos is a 40 year old who presents for the following health issues:    HPI     Stopped buspirone- making him feel groggy and did not help anxiety  2.5 years sobriety  Going to meetings and managing sober house  Denies cravings   \"even when I get down, it doesn't even really enter my mind to use\"   Sometimes he misses the middle of the day dose and takes in the evenings- wondering if he could be dosing twice daily      Review of Systems   See HPI      Objective           Vitals:  No vitals were obtained today due to virtual visit.    Physical Exam   healthy, alert and no distress  RESP: No cough, no audible wheezing, able to talk in full sentences  Remainder of exam unable to be completed due to telephone visits          Phone call duration: 5 minutes    "

## 2021-09-19 ENCOUNTER — HEALTH MAINTENANCE LETTER (OUTPATIENT)
Age: 41
End: 2021-09-19

## 2021-09-23 DIAGNOSIS — Z76.0 ENCOUNTER FOR MEDICATION REFILL: Primary | ICD-10-CM

## 2021-10-11 ENCOUNTER — OFFICE VISIT (OUTPATIENT)
Dept: FAMILY MEDICINE | Facility: CLINIC | Age: 41
End: 2021-10-11
Payer: COMMERCIAL

## 2021-10-11 VITALS
TEMPERATURE: 98 F | RESPIRATION RATE: 16 BRPM | HEIGHT: 70 IN | DIASTOLIC BLOOD PRESSURE: 76 MMHG | SYSTOLIC BLOOD PRESSURE: 130 MMHG | OXYGEN SATURATION: 99 % | WEIGHT: 216 LBS | HEART RATE: 73 BPM | BODY MASS INDEX: 30.92 KG/M2

## 2021-10-11 DIAGNOSIS — F19.11 SUBSTANCE ABUSE IN REMISSION (H): ICD-10-CM

## 2021-10-11 DIAGNOSIS — F11.21 MODERATE OPIOID USE DISORDER IN SUSTAINED REMISSION ON MAINTENANCE THERAPY (H): Primary | ICD-10-CM

## 2021-10-11 DIAGNOSIS — F11.20 OPIOID USE DISORDER, MODERATE, ON MAINTENANCE THERAPY (H): ICD-10-CM

## 2021-10-11 PROCEDURE — 99213 OFFICE O/P EST LOW 20 MIN: CPT | Performed by: FAMILY MEDICINE

## 2021-10-11 PROCEDURE — 80305 DRUG TEST PRSMV DIR OPT OBS: CPT | Performed by: FAMILY MEDICINE

## 2021-10-11 RX ORDER — BUPRENORPHINE AND NALOXONE 8; 2 MG/1; MG/1
1 FILM, SOLUBLE BUCCAL; SUBLINGUAL 3 TIMES DAILY
Qty: 90 FILM | Refills: 0 | Status: SHIPPED | OUTPATIENT
Start: 2021-10-29 | End: 2021-11-10

## 2021-10-11 ASSESSMENT — MIFFLIN-ST. JEOR: SCORE: 1886.02

## 2021-10-11 NOTE — PROGRESS NOTES
"SUBJECTIVE:  Carlos Hamilton is a 40 year old male here for f/u on Suboxone therapy.    Carlos is currently taking 24 mg of Suboxone daily. This is taken in multiple doses daily. This is working to keep cravings at a manageable level. Aside from this, he is currently undergoing chemical dependency treatment in the form of attending meetings, managing sober house. Denies visits to the ED since our last visit.    He reports feeling more anxiety the last 1 week  Resident at the sober house relapsed and he had to use narcan- a few days later, resident  from overdose-  is tomorrow.   He denies any increased cravings. More having increased anxiety \"can't settle down\"    He has remained abstinent from opioids.  He has remained abstinent from alcohol.  He has remained abstinent from other recreational drugs.      PMHx: Chart reviewed, no significant changes to health history. Labs and medications reviewed and meds updated. [place]  reviewed: last prescription filled 21 for 30 days    ROS:  Gen - denies fevers or chills  GI - denies abdominal pain, nausea, vomiting, diarrhea.    OBJECTIVE:  Blood pressure 130/76, pulse 73, temperature 98  F (36.7  C), temperature source Oral, resp. rate 16, height 1.778 m (5' 10\"), weight 98 kg (216 lb), SpO2 99 %.  Gen: AO, NAD. No sweating.   Eyes: pupils normal caliber  Heart: RRR, no murmers, rubs or gallops.    ASSESSMENT/PLAN:    Carlos was seen today for follow up.    Diagnoses and all orders for this visit:    Moderate opioid use disorder in sustained remission on maintenance therapy (H): Urine drug screen appropriate. Continue recovery activities. Refilled suboxone for 1 month  -     Drugs of abuse, urine (CLINIC ONLY); Future  -     Drugs of abuse, urine (CLINIC ONLY)  -     buprenorphine HCl-naloxone HCl (SUBOXONE) 8-2 MG per film; Place 1 Film under the tongue 3 times daily Post-dated 10/29/21      Dosage will not need adjustment today.    F/U visit in 1 month with " PCP    Ashlyn Lopez MD     Options for treatment and follow-up care were reviewed with the patient and/or guardian. Carlos Hamilton and/or guardian engaged in the decision making process and verbalized understanding of the options discussed and agreed with the final plan.

## 2021-11-10 ENCOUNTER — VIRTUAL VISIT (OUTPATIENT)
Dept: FAMILY MEDICINE | Facility: CLINIC | Age: 41
End: 2021-11-10
Payer: COMMERCIAL

## 2021-11-10 DIAGNOSIS — M54.50 CHRONIC LOW BACK PAIN WITHOUT SCIATICA, UNSPECIFIED BACK PAIN LATERALITY: ICD-10-CM

## 2021-11-10 DIAGNOSIS — F41.1 GAD (GENERALIZED ANXIETY DISORDER): ICD-10-CM

## 2021-11-10 DIAGNOSIS — F11.20 OPIOID USE DISORDER, MODERATE, ON MAINTENANCE THERAPY (H): Primary | ICD-10-CM

## 2021-11-10 DIAGNOSIS — Z76.0 ENCOUNTER FOR MEDICATION REFILL: ICD-10-CM

## 2021-11-10 DIAGNOSIS — G89.29 CHRONIC LOW BACK PAIN WITHOUT SCIATICA, UNSPECIFIED BACK PAIN LATERALITY: ICD-10-CM

## 2021-11-10 DIAGNOSIS — F17.200 TOBACCO DEPENDENCE SYNDROME: ICD-10-CM

## 2021-11-10 PROCEDURE — 99213 OFFICE O/P EST LOW 20 MIN: CPT | Mod: 95 | Performed by: FAMILY MEDICINE

## 2021-11-10 RX ORDER — ESCITALOPRAM OXALATE 20 MG/1
20 TABLET ORAL DAILY
Qty: 90 TABLET | Refills: 3 | Status: SHIPPED | OUTPATIENT
Start: 2021-11-10 | End: 2022-03-18

## 2021-11-10 RX ORDER — BUPRENORPHINE AND NALOXONE 8; 2 MG/1; MG/1
1 FILM, SOLUBLE BUCCAL; SUBLINGUAL 3 TIMES DAILY
Qty: 90 FILM | Refills: 0 | Status: SHIPPED | OUTPATIENT
Start: 2021-11-10 | End: 2021-12-23

## 2021-11-10 ASSESSMENT — ASTHMA QUESTIONNAIRES
QUESTION_2 LAST FOUR WEEKS HOW OFTEN HAVE YOU HAD SHORTNESS OF BREATH: THREE TO SIX TIMES A WEEK
QUESTION_3 LAST FOUR WEEKS HOW OFTEN DID YOUR ASTHMA SYMPTOMS (WHEEZING, COUGHING, SHORTNESS OF BREATH, CHEST TIGHTNESS OR PAIN) WAKE YOU UP AT NIGHT OR EARLIER THAN USUAL IN THE MORNING: NOT AT ALL
QUESTION_1 LAST FOUR WEEKS HOW MUCH OF THE TIME DID YOUR ASTHMA KEEP YOU FROM GETTING AS MUCH DONE AT WORK, SCHOOL OR AT HOME: A LITTLE OF THE TIME
QUESTION_4 LAST FOUR WEEKS HOW OFTEN HAVE YOU USED YOUR RESCUE INHALER OR NEBULIZER MEDICATION (SUCH AS ALBUTEROL): ONCE A WEEK OR LESS
ACT_TOTALSCORE: 20
QUESTION_5 LAST FOUR WEEKS HOW WOULD YOU RATE YOUR ASTHMA CONTROL: WELL CONTROLLED

## 2021-11-10 NOTE — PROGRESS NOTES
"Carlos is a 41 year old who is being evaluated via a billable telephone visit.      What phone number would you like to be contacted at?  [phone]    Assessment & Plan      Opioid use disorder, moderate, on maintenance therapy (H): continue at normal dosing.  - buprenorphine HCl-naloxone HCl (SUBOXONE) 8-2 MG per film  Dispense: 90 Film; Refill: 0    MARY (generalized anxiety disorder): continue lexapro. Today, discussed \"5-5-10\" breathing (breathe in for 5 seconds, hold for 5, exhale for 10), doing this 3x/day for at least 5 breaths. Explained the role of this with decreasing GI symptoms of anxiety   - escitalopram (LEXAPRO) 20 MG tablet  Dispense: 90 tablet; Refill: 3    Tobacco dependence syndrome: not ready to quit    Chronic low back pain: refilled  - tiZANidine (ZANAFLEX) 4 MG tablet  Dispense: 180 tablet; Refill: 3       Tobacco Cessation:   reports that he has been smoking cigarettes. He has a 27.00 pack-year smoking history. He has quit using smokeless tobacco.    BMI:   Estimated body mass index is 30.99 kg/m  as calculated from the following:    Height as of 10/11/21: 1.778 m (5' 10\").    Weight as of 10/11/21: 98 kg (216 lb).   Weight management plan: Discussed healthy diet and exercise guidelines    Regular exercise    No follow-ups on file.    Lucero Cardenas MD  [facility]   Carlos is a 41 year old who presents for the following health issues: suboxone follow up and anxiety.  HPI     Carlos is doing \"all right.\" Resident at [facility]  last month of an overdose, and he is still recovering from this. His anxiety is still heightened. Doesn't feel like he has time for therapy but is still taking lexapro 20 mg/day.     He gets diarrhea when he gets anxious.     Breathing is ok- doing well on inhaler. Doing a 10 day trial membership at the gym. Not ready to quit smoking.      Phone call duration: 14 minutes  "

## 2021-11-11 ASSESSMENT — ASTHMA QUESTIONNAIRES: ACT_TOTALSCORE: 20

## 2021-12-23 ENCOUNTER — VIRTUAL VISIT (OUTPATIENT)
Dept: FAMILY MEDICINE | Facility: CLINIC | Age: 41
End: 2021-12-23
Payer: COMMERCIAL

## 2021-12-23 DIAGNOSIS — Z15.89 HLA B27 (HLA B27 POSITIVE): ICD-10-CM

## 2021-12-23 DIAGNOSIS — F10.21 ALCOHOL DEPENDENCE IN REMISSION (H): ICD-10-CM

## 2021-12-23 DIAGNOSIS — F11.20 OPIOID USE DISORDER, MODERATE, ON MAINTENANCE THERAPY (H): Primary | ICD-10-CM

## 2021-12-23 DIAGNOSIS — M51.379 DEGENERATION OF LUMBAR/LUMBOSACRAL DISC WITHOUT MYELOPATHY: ICD-10-CM

## 2021-12-23 DIAGNOSIS — Z87.39 HX OF AVASCULAR NECROSIS OF CAPITAL FEMORAL EPIPHYSIS: ICD-10-CM

## 2021-12-23 PROBLEM — M25.552 ACUTE PAIN OF BOTH HIPS: Status: RESOLVED | Noted: 2019-07-10 | Resolved: 2021-12-23

## 2021-12-23 PROBLEM — M25.551 ACUTE PAIN OF BOTH HIPS: Status: RESOLVED | Noted: 2019-07-10 | Resolved: 2021-12-23

## 2021-12-23 PROCEDURE — 99213 OFFICE O/P EST LOW 20 MIN: CPT | Mod: 95 | Performed by: FAMILY MEDICINE

## 2021-12-23 RX ORDER — BUPRENORPHINE AND NALOXONE 8; 2 MG/1; MG/1
1 FILM, SOLUBLE BUCCAL; SUBLINGUAL 3 TIMES DAILY
Qty: 90 FILM | Refills: 1 | Status: SHIPPED | OUTPATIENT
Start: 2021-12-23 | End: 2022-02-23

## 2021-12-23 NOTE — PROGRESS NOTES
Carlos is a 41 year old who is being evaluated via a billable telephone visit.        Assessment & Plan     Opioid use disorder, moderate, on maintenance therapy (H): Refilled Suboxone.  He has been very stable with his adherence and urine drug screens.  Follow-up in 2 months  - buprenorphine HCl-naloxone HCl (SUBOXONE) 8-2 MG per film  Dispense: 90 Film; Refill: 1    Degeneration of lumbar/lumbosacral disc without myelopathy: He has not seen a specialist for his hip or back pain in quite a long time.  I described the role of a physical medicine and rehabilitation doctor and recommended referral to them.  He states he would prefer to wait to do this until the spring when the weather is better.  He has challenges with transportation and gets a bus pass through his insurance but not a medical cab.  The cold makes his pain worse and he prefers not to wait outside for the bus.  We will revisit this idea after winter.    Hx of avascular necrosis of capital femoral epiphysis      HLA B27 (HLA B27 positive)      Alcohol dependence in remission (H): Encourage hard work at sobriety including meetings and sponsorship.       Tobacco Cessation:   reports that he has been smoking cigarettes. He has a 27.00 pack-year smoking history. He has quit using smokeless tobacco.  Tobacco Cessation Action Plan: Information offered: Patient not interested at this time    No follow-ups on file.    Lucero Cardenas MD  [facility]   Carlos is a 41 year old who presents for the following health issues     HPI     Having more back pain days with his low back and right hip. Meds (ibuprofen, tylenol, gabapentin, tizanidine) help at night, less during the day. Currently he has 1-2 days that are really bad with pain that occur a couple times a month at least.     Had a hip replacement on the right, about 7 years ago. Had avascular necrosis and had also had steroid injections that his doctors thought contributed to  decay of the bone.      Started a weekly meeting at his sober house. They have a good group of guys living there now. Still meets frequently with sponsor.     Got a COVID booster at [facility] 2 weeks ago.           Objective           Vitals:  No vitals were obtained today due to virtual visit.    Physical Exam   healthy, alert and no distress  PSYCH: Alert and oriented times 3; coherent speech, normal   rate and volume, able to articulate logical thoughts, able   to abstract reason, no tangential thoughts, no hallucinations   or delusions  His affect is normal  RESP: occasional cough, no audible wheezing, able to talk in full sentences  Remainder of exam unable to be completed due to telephone visits          Phone call duration: 22 minutes

## 2021-12-23 NOTE — PROGRESS NOTES
"The patient has been notified of following:     \"This telephone visit will be conducted via a call between you and your physician/provider. We have found that certain health care needs can be provided without the need for a physical exam.  This service lets us provide the care you need with a short phone conversation.  If a prescription is necessary we can send it directly to your pharmacy.  If lab work is needed we can place an order for that and you can then stop by our lab to have the test done at a later time.    Telephone visits are billed at different rates depending on your insurance coverage. During this emergency period, for some insurers they may be billed the same as an in-person visit.  Please reach out to your insurance provider with any questions.    If during the course of the call the physician/provider feels a telephone visit is not appropriate, you will not be charged for this service.\"    Patient has given verbal consent for Telephone visit?  Yes    What phone number would you like to be contacted at? [phone]    How would you like to obtain your AVS? MyChart  "

## 2021-12-31 ENCOUNTER — E-VISIT (OUTPATIENT)
Dept: FAMILY MEDICINE | Facility: CLINIC | Age: 41
End: 2021-12-31
Payer: COMMERCIAL

## 2021-12-31 ENCOUNTER — TELEPHONE (OUTPATIENT)
Dept: FAMILY MEDICINE | Facility: CLINIC | Age: 41
End: 2021-12-31
Payer: COMMERCIAL

## 2021-12-31 ENCOUNTER — MYC MEDICAL ADVICE (OUTPATIENT)
Dept: FAMILY MEDICINE | Facility: CLINIC | Age: 41
End: 2021-12-31

## 2021-12-31 ENCOUNTER — NURSE TRIAGE (OUTPATIENT)
Dept: FAMILY MEDICINE | Facility: CLINIC | Age: 41
End: 2021-12-31
Payer: COMMERCIAL

## 2021-12-31 DIAGNOSIS — M54.9 BACK PAIN, UNSPECIFIED BACK LOCATION, UNSPECIFIED BACK PAIN LATERALITY, UNSPECIFIED CHRONICITY: Primary | ICD-10-CM

## 2021-12-31 PROCEDURE — 99207 PR NO CHARGE LOS: CPT | Performed by: FAMILY MEDICINE

## 2021-12-31 NOTE — TELEPHONE ENCOUNTER
*This issue can wait for PCP to address upon return    Patient call to report dramatic increase in back pain   -would like to know how to proceed to find relief    Rn call to patient:   reports dramatic increase in back pain in the last 24/48 hours  States it is the worst he has had in a while,   Bad enough that he considered ED but is wary due to potential infection exposure and volume/wait  States he is 'maxed out on suboxone and ibuprofen (which is hard on his stomach)   Wonders if x-ray would be helpful  Would take referral to ortho if appropriate    RN recommenced walk-in ortho if he feels he needs to be seen immediately  -he states he is going to work and works all weekend  -Inquired about a virtual visit of some kind, advised that an e-visit may be a place to start due to his work schedule  -RN advised walk-in ortho again, will provide links to local locations for reference if he chooses to be seen.     Will route to PCP for review on return to clinic    Patient is in agreement with this plan and has no questions at the end of this call    Cinda Stinson RN on 12/31/2021 at 12:52 PM              Reason for call:  Patient reporting a symptom    Symptom or request: Pain    Duration (how long have symptoms been present): few dayd    Have you been treated for this before? Yes    Additional comments: Pt stated that PCP knows about his pain, and he is wondering if PCP can see him or schedule him for X-Rays. Pt needs advice on what to do, it has become unbearable and does not want to go to ER.    Phone Number patient can be reached at:  Home number on file [phone] (home)    Best Time:  ANY    Can we leave a detailed message on this number:  YES    Call taken on 12/31/2021 at 10:14 AM by Trung Mason

## 2021-12-31 NOTE — PATIENT INSTRUCTIONS
Thank you for choosing us for your care. I think an in-clinic visit would be best next steps based on your symptoms. Please schedule a clinic appointment; you won t be charged for this eVisit.      You can schedule an appointment right here in [facility], or call [phone]    Thank you for choosing us for your care. Based on the information provided, I believe you need to be seen immediately.  Please go urgent care as soon as possible.     You will not be charged for this eVisit.      Needs to be seen to better evaluate . If any worsening weakness, tingling, loss of sensation, need to be seen in the ER.

## 2021-12-31 NOTE — TELEPHONE ENCOUNTER
RN closing encounter. Please see Triage encounter 12/31/21      Cinda Stinson RN on 12/31/2021 at 12:33 PM

## 2021-12-31 NOTE — TELEPHONE ENCOUNTER
Reason for call:  Patient reporting a symptom    Symptom or request: Pain    Duration (how long have symptoms been present): few dayd    Have you been treated for this before? Yes    Additional comments: Pt stated that PCP knows about his pain, and he is wondering if PCP can see him or schedule him for X-Rays. Pt needs advice on what to do, it has become unbearable and does not want to go to ER.    Phone Number patient can be reached at:  Home number on file [phone] (home)    Best Time:  ANY    Can we leave a detailed message on this number:  YES    Call taken on 12/31/2021 at 10:14 AM by Trung Mason

## 2022-01-09 ENCOUNTER — HEALTH MAINTENANCE LETTER (OUTPATIENT)
Age: 42
End: 2022-01-09

## 2022-02-17 PROBLEM — Z79.899 HIGH RISK MEDICATION USE: Status: ACTIVE | Noted: 2020-07-20

## 2022-02-17 PROBLEM — Z87.39: Status: ACTIVE | Noted: 2019-07-10

## 2022-02-17 PROBLEM — H20.00 ACUTE ANTERIOR UVEITIS OF LEFT EYE: Status: RESOLVED | Noted: 2020-07-19 | Resolved: 2021-12-23

## 2022-02-17 PROBLEM — R70.0 ELEVATED ERYTHROCYTE SEDIMENTATION RATE: Status: RESOLVED | Noted: 2020-07-19 | Resolved: 2021-12-23

## 2022-02-23 ENCOUNTER — VIRTUAL VISIT (OUTPATIENT)
Dept: FAMILY MEDICINE | Facility: CLINIC | Age: 42
End: 2022-02-23
Payer: COMMERCIAL

## 2022-02-23 DIAGNOSIS — F11.20 OPIOID USE DISORDER, MODERATE, ON MAINTENANCE THERAPY (H): ICD-10-CM

## 2022-02-23 DIAGNOSIS — F40.243 ANXIETY WITH FLYING: ICD-10-CM

## 2022-02-23 DIAGNOSIS — F17.200 TOBACCO DEPENDENCE SYNDROME: ICD-10-CM

## 2022-02-23 DIAGNOSIS — Z76.0 ENCOUNTER FOR MEDICATION REFILL: ICD-10-CM

## 2022-02-23 DIAGNOSIS — J44.9 CHRONIC OBSTRUCTIVE PULMONARY DISEASE, UNSPECIFIED COPD TYPE (H): ICD-10-CM

## 2022-02-23 DIAGNOSIS — G89.29 CHRONIC BILATERAL LOW BACK PAIN WITH LEFT-SIDED SCIATICA: Primary | ICD-10-CM

## 2022-02-23 DIAGNOSIS — F40.240 CLAUSTROPHOBIA: ICD-10-CM

## 2022-02-23 DIAGNOSIS — M54.42 CHRONIC BILATERAL LOW BACK PAIN WITH LEFT-SIDED SCIATICA: Primary | ICD-10-CM

## 2022-02-23 PROCEDURE — 99214 OFFICE O/P EST MOD 30 MIN: CPT | Mod: 95 | Performed by: FAMILY MEDICINE

## 2022-02-23 RX ORDER — LORAZEPAM 0.5 MG/1
0.5 TABLET ORAL EVERY 4 HOURS PRN
Qty: 6 TABLET | Refills: 0 | Status: SHIPPED | OUTPATIENT
Start: 2022-02-23 | End: 2022-04-25

## 2022-02-23 RX ORDER — BUPRENORPHINE AND NALOXONE 8; 2 MG/1; MG/1
1 FILM, SOLUBLE BUCCAL; SUBLINGUAL 3 TIMES DAILY
Qty: 90 FILM | Refills: 1 | Status: SHIPPED | OUTPATIENT
Start: 2022-02-23 | End: 2022-04-25

## 2022-02-23 RX ORDER — GABAPENTIN 300 MG/1
600 CAPSULE ORAL 3 TIMES DAILY
Qty: 540 CAPSULE | Refills: 3 | Status: SHIPPED | OUTPATIENT
Start: 2022-02-23 | End: 2022-05-25

## 2022-02-23 NOTE — PROGRESS NOTES
Carlos is a 41 year old who is being evaluated via a billable telephone visit.        Assessment & Plan     Chronic bilateral low back pain with left-sided sciatica: since low back pain is worsening and he seems to have developed new symptoms of sciatica, we will get a spine MRI and refer to the spine clinic.  Explained that he can take 600 mg gabapentin 3 times a day  - Spine Referral  - gabapentin (NEURONTIN) 300 MG capsule  Dispense: 540 capsule; Refill: 3  - MR Lumbar Spine w/o Contrast    Tobacco dependence syndrome: He has been smoking less in the winter since he does not want to go outside in the cold.  I congratulated him on this and encouraged him to keep this up during the spring as a way to move towards quitting.    Moderate opioid use disorder in sustained remission on maintenance therapy (H)    Chronic obstructive pulmonary disease, unspecified COPD type (H): encouraged him to use his Symbicort PRN since it might work better than albuterol PRN.     Opioid use disorder, moderate, on maintenance therapy (H)  - buprenorphine HCl-naloxone HCl (SUBOXONE) 8-2 MG per film  Dispense: 90 Film; Refill: 1    Encounter for medication refill: refilled  - tiZANidine (ZANAFLEX) 4 MG tablet  Dispense: 540 tablet; Refill: 3    Anxiety with flying: although benzos plus buprenorphine are not recommended, I feel that a low dose short-term prescription is appropriate. Will give #6 tabs of ativan 0.5 mg (one for each of his 4 flights on his trip, plus 2 for his MRI).   - LORazepam (ATIVAN) 0.5 MG tablet  Dispense: 6 tablet; Refill: 0    Claustrophobia  - LORazepam (ATIVAN) 0.5 MG tablet  Dispense: 6 tablet; Refill: 0    No follow-ups on file.    Lucero Cardenas MD  [facility]      Tali Gonzalez is a 41 year old who presents for the following health issues     HPI     Back pain has been worse than usual, during the past 2 months or so. A new symptom is that when he sits down ,gets a throbbing pain  "going down his leg into into his left foot. Sometimes it feels like \"my foot is going to explode.\" If he gets an MRI, he would like some kind of sedation- has had this in the past.      Anxiety gets bad when his back pain gets bad. Then, his breathing gets worse and he starts gasping. He's been using his albuterol PRN. He doesn't usually use his Symbicort because he only uses it when he's sick. Smoking a little less during the winter.     He is going to be flying to [place] to see his parents. His dad has dementia and is not doing well. Carlos gets very anxious before flights, especially because he knows his back pain is going to flare. Wondering about some kind of medication for anxiety.         Objective           Vitals:  No vitals were obtained today due to virtual visit.    Physical Exam   healthy, alert and no distress  PSYCH: Alert and oriented times 3; coherent speech, normal   rate and volume, able to articulate logical thoughts, able   to abstract reason, no tangential thoughts, no hallucinations   or delusions  His affect is normal  RESP: No cough, no audible wheezing, able to talk in full sentences  Remainder of exam unable to be completed due to telephone visits      Phone call duration: 11 minutes    "

## 2022-03-08 ENCOUNTER — ANCILLARY PROCEDURE (OUTPATIENT)
Dept: MRI IMAGING | Facility: CLINIC | Age: 42
End: 2022-03-08
Attending: FAMILY MEDICINE
Payer: COMMERCIAL

## 2022-03-08 DIAGNOSIS — G89.29 CHRONIC BILATERAL LOW BACK PAIN WITH LEFT-SIDED SCIATICA: ICD-10-CM

## 2022-03-08 DIAGNOSIS — M54.42 CHRONIC BILATERAL LOW BACK PAIN WITH LEFT-SIDED SCIATICA: ICD-10-CM

## 2022-03-08 PROCEDURE — 72148 MRI LUMBAR SPINE W/O DYE: CPT | Performed by: RADIOLOGY

## 2022-03-09 ENCOUNTER — TELEPHONE (OUTPATIENT)
Dept: FAMILY MEDICINE | Facility: CLINIC | Age: 42
End: 2022-03-09
Payer: COMMERCIAL

## 2022-03-09 NOTE — TELEPHONE ENCOUNTER
Please call Carlos and explain his MRI showed he has discs bulging on many levels in his lumbar spine. I recommend he follow up at the spine clinic to discuss treatment plans with them. Please give him the number to schedule. Thank you.     Lucero Cardenas MD

## 2022-03-09 NOTE — LETTER
March 10, 2022      Carlos Hamilton  [address] [place] [address]        Dear Joy,    We are writing to inform you of your test results.    Your MRI results are enclosed.     Dr. Cardenas noted that your MRI shows disc bulging on many levels of your lumbar spine (the lower spine).     Dr. Cardenas recommends that you consult with the spine specialist and discuss further assessment and treatment planning.     If you have any questions or concerns, please call the clinic at the number listed above.       Sincerely,        Hollis BLAND CMA([facility])

## 2022-03-10 NOTE — TELEPHONE ENCOUNTER
The patient verbalizes understanding of provider/CSS instructions for follow-up and continued care per provider message.     Referred To     Lucero Cardenas MD   [address] [place] [address] [address]   [place] [address]   Phone: [phone]   Fax: [phone]    Diagnoses: Chronic bilateral low back pain with left-sided sciatica   Order: Spine Referral    [facility]   [address] [place] [id number]   Phone: [phone]   Fax: [phone]        Patient will call spine specialist. Copy of MRI results sent in mail with MD instructions for follow up and continued care.

## 2022-03-15 ENCOUNTER — TELEPHONE (OUTPATIENT)
Dept: FAMILY MEDICINE | Facility: CLINIC | Age: 42
End: 2022-03-15
Payer: COMMERCIAL

## 2022-03-15 NOTE — TELEPHONE ENCOUNTER
"Dr. Cardenas-Writer called an talked with pt in regards to provider message below.      Pt states that he is only taking Ibuprofen 400mg to 600 mg two to three times a day.  He also alternates with Tylenol.  Relayed message that he can take Ibuprofen 800mg TID as recommended by Dr Byers and he can continue taking the Tylenol as he has been to see if that helps for better pain control.    Pt states that his appt on March 29, 2022 is with the  system spine clinic in [place].    Pt is experiencing increased stress due to the pain not getting better.  States he is used to pain, but this increased pain is starting to\" wear him down\" and increase is stress level. Just wanted Dr. Cardenas to know to see what else may be done.    Recommended to try increasing his Ibuprofen as discussed and that this writer will forward this information on to his PCP for further plan of care.    Chanell JIMENEZ RN  [facility]  "

## 2022-03-15 NOTE — TELEPHONE ENCOUNTER
Reason for call:  Patient reporting a symptom - declined triage    Symptom or request: back pain level 4-5    Duration (how long have symptoms been present): several months    Have you been treated for this before? Yes    Additional comments: Patient states he has spine appt on 3/24 but his back pain level increased to 4-5. It's hard for him to get out of bed. He's currently on Ibuprofen and Tylenol. He's wondering if he can get something stronger for the pain until spine appt. He is aware Dr. Cardenas is off today. Can covering provider address? Please advise if appropriate.    Phone Number patient can be reached at:  Cell number on file:    Telephone Information:   Mobile [phone]       Best Time:  anytime    Can we leave a detailed message on this number:  YES    Call taken on 3/15/2022 at 2:01 PM by Taniya Armstrong

## 2022-03-15 NOTE — TELEPHONE ENCOUNTER
I'm not sure how much of medication he is taking, but he can take ibuprofen up to 800mg tid.    He's already on the usual medications that help with back pain, so I don't think there's anything else that I can prescribe.    It's a bit unusual that its taking so long for him to get into the Spine Clinic.  Is he being seen outside of the system?  If so, perhaps we can refer him to our own Spine Clinic and he will get in faster.    (including PCP as [facility] for tomorrow)

## 2022-03-16 NOTE — TELEPHONE ENCOUNTER
Appointment with spine clinic is 03/24/2021 at 1:00 PM @ [facility].     Patient scheduled this Friday.

## 2022-03-16 NOTE — TELEPHONE ENCOUNTER
Please confirm that he has an appointment scheduled with the spine clinic. I do not see this in the chart.     Please offer him a virtual visit on Friday if he wants to discuss his pain and mood.     Lucero Cardenas MD

## 2022-03-18 ENCOUNTER — VIRTUAL VISIT (OUTPATIENT)
Dept: FAMILY MEDICINE | Facility: CLINIC | Age: 42
End: 2022-03-18
Payer: COMMERCIAL

## 2022-03-18 DIAGNOSIS — G89.29 CHRONIC RIGHT-SIDED LOW BACK PAIN WITH LEFT-SIDED SCIATICA: ICD-10-CM

## 2022-03-18 DIAGNOSIS — M51.379 DEGENERATION OF LUMBAR/LUMBOSACRAL DISC WITHOUT MYELOPATHY: ICD-10-CM

## 2022-03-18 DIAGNOSIS — F43.21 ADJUSTMENT DISORDER WITH DEPRESSED MOOD: ICD-10-CM

## 2022-03-18 DIAGNOSIS — F41.1 GAD (GENERALIZED ANXIETY DISORDER): Primary | ICD-10-CM

## 2022-03-18 DIAGNOSIS — M54.42 CHRONIC RIGHT-SIDED LOW BACK PAIN WITH LEFT-SIDED SCIATICA: ICD-10-CM

## 2022-03-18 PROCEDURE — 99214 OFFICE O/P EST MOD 30 MIN: CPT | Mod: 95 | Performed by: FAMILY MEDICINE

## 2022-03-18 PROCEDURE — 96127 BRIEF EMOTIONAL/BEHAV ASSMT: CPT | Mod: 59 | Performed by: FAMILY MEDICINE

## 2022-03-18 RX ORDER — DULOXETIN HYDROCHLORIDE 30 MG/1
60 CAPSULE, DELAYED RELEASE ORAL DAILY
Qty: 60 CAPSULE | Refills: 1 | Status: SHIPPED | OUTPATIENT
Start: 2022-03-18 | End: 2022-04-25

## 2022-03-18 ASSESSMENT — ANXIETY QUESTIONNAIRES
2. NOT BEING ABLE TO STOP OR CONTROL WORRYING: NEARLY EVERY DAY
3. WORRYING TOO MUCH ABOUT DIFFERENT THINGS: SEVERAL DAYS
4. TROUBLE RELAXING: MORE THAN HALF THE DAYS
5. BEING SO RESTLESS THAT IT IS HARD TO SIT STILL: MORE THAN HALF THE DAYS
GAD7 TOTAL SCORE: 16
GAD7 TOTAL SCORE: 16
6. BECOMING EASILY ANNOYED OR IRRITABLE: NEARLY EVERY DAY
1. FEELING NERVOUS, ANXIOUS, OR ON EDGE: NEARLY EVERY DAY
7. FEELING AFRAID AS IF SOMETHING AWFUL MIGHT HAPPEN: MORE THAN HALF THE DAYS
7. FEELING AFRAID AS IF SOMETHING AWFUL MIGHT HAPPEN: MORE THAN HALF THE DAYS
GAD7 TOTAL SCORE: 16

## 2022-03-18 ASSESSMENT — PATIENT HEALTH QUESTIONNAIRE - PHQ9
10. IF YOU CHECKED OFF ANY PROBLEMS, HOW DIFFICULT HAVE THESE PROBLEMS MADE IT FOR YOU TO DO YOUR WORK, TAKE CARE OF THINGS AT HOME, OR GET ALONG WITH OTHER PEOPLE: VERY DIFFICULT
SUM OF ALL RESPONSES TO PHQ QUESTIONS 1-9: 13
SUM OF ALL RESPONSES TO PHQ QUESTIONS 1-9: 13

## 2022-03-18 NOTE — PROGRESS NOTES
Carlos is a 41 year old who is being evaluated via a billable telephone visit.      What phone number would you like to be contacted at? [phone]  How would you like to obtain your AVS? Giannahart    Assessment & Plan     MARY (generalized anxiety disorder): He agreed to a referral for therapy today.  Also agreed to try Cymbalta.  He did not feel he had any benefit from an SSRI, but Cymbalta may be a good fit given his chronic pain.  - Adult Mental Health  Referral  - DULoxetine (CYMBALTA) 30 MG capsule  Dispense: 60 capsule; Refill: 1    Adjustment disorder with depressed mood  - DULoxetine (CYMBALTA) 30 MG capsule  Dispense: 60 capsule; Refill: 1    Chronic right-sided low back pain with left-sided sciatica  - DULoxetine (CYMBALTA) 30 MG capsule  Dispense: 60 capsule; Refill: 1    Degeneration of lumbar/lumbosacral disc without myelopathy: he has an appointment with the spine clinic on 3/24. Adding cymbalta today, continue other meds. I encouraged him to try PT but he feels he already knows waht they are going to show/tell him and he is already doing exercises to try to help.   - Adult Mental [facility] Referral  - DULoxetine (CYMBALTA) 30 MG capsule  Dispense: 60 capsule; Refill: 1       Depression Screening Follow Up    PHQ 3/18/2022   PHQ-9 Total Score 13   Q9: Thoughts of better off dead/self-harm past 2 weeks Not at all     Last PHQ-9 3/18/2022   1.  Little interest or pleasure in doing things 3   2.  Feeling down, depressed, or hopeless 2   3.  Trouble falling or staying asleep, or sleeping too much 3   4.  Feeling tired or having little energy 3   5.  Poor appetite or overeating 1   6.  Feeling bad about yourself 0   7.  Trouble concentrating 1   8.  Moving slowly or restless 0   Q9: Thoughts of better off dead/self-harm past 2 weeks 0   PHQ-9 Total Score 13   Difficulty at work, home, or with people -       Follow Up Actions Taken  Mental Health Referral placed       No follow-ups on  "file.    Lucero Cardenas MD  [facility] ISABELLE Gonzalez is a 41 year old who presents for the following health issues:        Getting more and more worn down by his chronic back pain. His sponsor was getting worried because he wasn't acting like himself and seemed so stressed. He is still keeping up with his AA/NA meetings. He does have an appt scheduled at the spine center for next week    Doesn't feel like he needs a PT referral right now because he has done a lot of that in the past and knows lots of exercises and stretches already.     PHQ-9 score is 13, no thougths of self-harm.    MARY-7 score is 16.     Showed his MRI to a nurse who got him stressed out by saying he might need surgery. Worried about war in [place], prices going up. Feels like he's \"not me.\"      Taking all 6 doses of tizanidine and gabapentin. Taking lots of ibuprofen and acetaminophen, too. Stomach is feeling ok, which he attributes to taking omeprazole.      History of Present Illness       Back Pain:  He presents for follow up of back pain. Patient's back pain is a chronic problem.  Location of back pain:  Right lower back, right middle of back, right hip and left side of waist  Description of back pain: dull ache, sharp and shooting  Back pain spreads: left buttocks, left thigh, left knee and left foot    Since patient first noticed back pain, pain is: gradually worsening  Does back pain interfere with his job:  Yes      Mental Health Follow-up:  Patient presents to follow-up on Depression & Anxiety.Patient's depression since last visit has been:  Worse  The patient is not having other symptoms associated with depression.  Patient's anxiety since last visit has been:  Worse  The patient is not having other symptoms associated with anxiety.  Any significant life events: health concerns  Patient is feeling anxious or having panic attacks.  Patient has no concerns about alcohol or drug use.       Today's PHQ-9      "    PHQ-9 Total Score: 13  PHQ-9 Q9 Thoughts of better off dead/self-harm past 2 weeks :   (P) Not at all    How difficult have these problems made it for you to do your work, take care of things at home, or get along with other people: Very difficult    Today's MARY-7 Score: 16    He eats 2-3 servings of fruits and vegetables daily.He consumes 5 sweetened beverage(s) daily.He exercises with enough effort to increase his heart rate 20 to 29 minutes per day.  He exercises with enough effort to increase his heart rate 4 days per week.   He is taking medications regularly.           Objective           Vitals:  No vitals were obtained today due to virtual visit.    Physical Exam   healthy, alert and no distress  PSYCH: Alert and oriented times 3; coherent speech, normal   rate and volume, able to articulate logical thoughts, able   to abstract reason, no tangential thoughts, no hallucinations   or delusions  His affect is normal  RESP: No cough, no audible wheezing, able to talk in full sentences  Remainder of exam unable to be completed due to telephone visits        Phone call duration: 15 minutes

## 2022-03-19 ASSESSMENT — ANXIETY QUESTIONNAIRES: GAD7 TOTAL SCORE: 16

## 2022-03-19 ASSESSMENT — PATIENT HEALTH QUESTIONNAIRE - PHQ9: SUM OF ALL RESPONSES TO PHQ QUESTIONS 1-9: 13

## 2022-04-25 ENCOUNTER — OFFICE VISIT (OUTPATIENT)
Dept: FAMILY MEDICINE | Facility: CLINIC | Age: 42
End: 2022-04-25
Payer: COMMERCIAL

## 2022-04-25 VITALS
HEART RATE: 76 BPM | DIASTOLIC BLOOD PRESSURE: 108 MMHG | HEIGHT: 70 IN | RESPIRATION RATE: 12 BRPM | BODY MASS INDEX: 31.21 KG/M2 | TEMPERATURE: 98 F | WEIGHT: 218 LBS | OXYGEN SATURATION: 94 % | SYSTOLIC BLOOD PRESSURE: 152 MMHG

## 2022-04-25 DIAGNOSIS — F17.200 TOBACCO DEPENDENCE SYNDROME: ICD-10-CM

## 2022-04-25 DIAGNOSIS — M51.379 DEGENERATION OF LUMBAR/LUMBOSACRAL DISC WITHOUT MYELOPATHY: ICD-10-CM

## 2022-04-25 DIAGNOSIS — J44.9 CHRONIC OBSTRUCTIVE PULMONARY DISEASE, UNSPECIFIED COPD TYPE (H): ICD-10-CM

## 2022-04-25 DIAGNOSIS — M54.42 CHRONIC RIGHT-SIDED LOW BACK PAIN WITH LEFT-SIDED SCIATICA: ICD-10-CM

## 2022-04-25 DIAGNOSIS — F11.20 OPIOID USE DISORDER, MODERATE, ON MAINTENANCE THERAPY (H): ICD-10-CM

## 2022-04-25 DIAGNOSIS — Z11.59 NEED FOR HEPATITIS C SCREENING TEST: ICD-10-CM

## 2022-04-25 DIAGNOSIS — E78.2 MIXED HYPERLIPIDEMIA: ICD-10-CM

## 2022-04-25 DIAGNOSIS — Z76.0 ENCOUNTER FOR MEDICATION REFILL: ICD-10-CM

## 2022-04-25 DIAGNOSIS — I10 ESSENTIAL HYPERTENSION: Primary | ICD-10-CM

## 2022-04-25 DIAGNOSIS — G89.29 CHRONIC RIGHT-SIDED LOW BACK PAIN WITH LEFT-SIDED SCIATICA: ICD-10-CM

## 2022-04-25 DIAGNOSIS — F41.1 GAD (GENERALIZED ANXIETY DISORDER): ICD-10-CM

## 2022-04-25 DIAGNOSIS — R73.09 ELEVATED GLUCOSE LEVEL: ICD-10-CM

## 2022-04-25 DIAGNOSIS — F43.21 ADJUSTMENT DISORDER WITH DEPRESSED MOOD: ICD-10-CM

## 2022-04-25 LAB
ALBUMIN SERPL-MCNC: 3.9 G/DL (ref 3.5–5)
ALP SERPL-CCNC: 131 U/L (ref 45–120)
ALT SERPL W P-5'-P-CCNC: 35 U/L (ref 0–45)
ANION GAP SERPL CALCULATED.3IONS-SCNC: 14 MMOL/L (ref 5–18)
AST SERPL W P-5'-P-CCNC: 28 U/L (ref 0–40)
BILIRUB SERPL-MCNC: 0.4 MG/DL (ref 0–1)
BUN SERPL-MCNC: 12 MG/DL (ref 8–22)
CALCIUM SERPL-MCNC: 9.7 MG/DL (ref 8.5–10.5)
CHLORIDE BLD-SCNC: 104 MMOL/L (ref 98–107)
CHOLEST SERPL-MCNC: 227 MG/DL
CO2 SERPL-SCNC: 20 MMOL/L (ref 22–31)
CREAT SERPL-MCNC: 0.67 MG/DL (ref 0.7–1.3)
FASTING STATUS PATIENT QL REPORTED: YES
GFR SERPL CREATININE-BSD FRML MDRD: >90 ML/MIN/1.73M2
GLUCOSE BLD-MCNC: 115 MG/DL (ref 70–125)
HBA1C MFR BLD: 6.1 % (ref 0–5.6)
HDLC SERPL-MCNC: 34 MG/DL
LDLC SERPL CALC-MCNC: 134 MG/DL
POTASSIUM BLD-SCNC: 4.7 MMOL/L (ref 3.5–5)
PROT SERPL-MCNC: 7.7 G/DL (ref 6–8)
SODIUM SERPL-SCNC: 138 MMOL/L (ref 136–145)
TRIGL SERPL-MCNC: 297 MG/DL

## 2022-04-25 PROCEDURE — 99214 OFFICE O/P EST MOD 30 MIN: CPT | Performed by: FAMILY MEDICINE

## 2022-04-25 PROCEDURE — 83036 HEMOGLOBIN GLYCOSYLATED A1C: CPT | Performed by: FAMILY MEDICINE

## 2022-04-25 PROCEDURE — 80061 LIPID PANEL: CPT | Performed by: FAMILY MEDICINE

## 2022-04-25 PROCEDURE — 80053 COMPREHEN METABOLIC PANEL: CPT | Performed by: FAMILY MEDICINE

## 2022-04-25 PROCEDURE — 36415 COLL VENOUS BLD VENIPUNCTURE: CPT | Performed by: FAMILY MEDICINE

## 2022-04-25 PROCEDURE — 86803 HEPATITIS C AB TEST: CPT | Performed by: FAMILY MEDICINE

## 2022-04-25 RX ORDER — BUPRENORPHINE AND NALOXONE 8; 2 MG/1; MG/1
1 FILM, SOLUBLE BUCCAL; SUBLINGUAL 3 TIMES DAILY
Qty: 90 FILM | Refills: 1 | Status: SHIPPED | OUTPATIENT
Start: 2022-04-25 | End: 2022-06-27

## 2022-04-25 RX ORDER — LISINOPRIL 10 MG/1
10 TABLET ORAL DAILY
Qty: 60 TABLET | Refills: 1 | Status: SHIPPED | OUTPATIENT
Start: 2022-04-25 | End: 2022-05-25 | Stop reason: DRUGHIGH

## 2022-04-25 RX ORDER — DULOXETIN HYDROCHLORIDE 30 MG/1
30 CAPSULE, DELAYED RELEASE ORAL 3 TIMES DAILY
Qty: 270 CAPSULE | Refills: 3 | Status: ON HOLD | OUTPATIENT
Start: 2022-04-25 | End: 2022-11-19

## 2022-04-25 ASSESSMENT — ANXIETY QUESTIONNAIRES
6. BECOMING EASILY ANNOYED OR IRRITABLE: NEARLY EVERY DAY
7. FEELING AFRAID AS IF SOMETHING AWFUL MIGHT HAPPEN: SEVERAL DAYS
4. TROUBLE RELAXING: NEARLY EVERY DAY
1. FEELING NERVOUS, ANXIOUS, OR ON EDGE: NEARLY EVERY DAY
2. NOT BEING ABLE TO STOP OR CONTROL WORRYING: MORE THAN HALF THE DAYS
GAD7 TOTAL SCORE: 16
7. FEELING AFRAID AS IF SOMETHING AWFUL MIGHT HAPPEN: SEVERAL DAYS
5. BEING SO RESTLESS THAT IT IS HARD TO SIT STILL: MORE THAN HALF THE DAYS
GAD7 TOTAL SCORE: 16
3. WORRYING TOO MUCH ABOUT DIFFERENT THINGS: MORE THAN HALF THE DAYS
GAD7 TOTAL SCORE: 16

## 2022-04-25 ASSESSMENT — PATIENT HEALTH QUESTIONNAIRE - PHQ9
SUM OF ALL RESPONSES TO PHQ QUESTIONS 1-9: 9
10. IF YOU CHECKED OFF ANY PROBLEMS, HOW DIFFICULT HAVE THESE PROBLEMS MADE IT FOR YOU TO DO YOUR WORK, TAKE CARE OF THINGS AT HOME, OR GET ALONG WITH OTHER PEOPLE: VERY DIFFICULT
SUM OF ALL RESPONSES TO PHQ QUESTIONS 1-9: 9

## 2022-04-25 NOTE — LETTER
April 27, 2022      Carlos Hamilton  [address]  [place] [address]        Dear ,    We are writing to inform you of your test results.    {results letter list:[id number]}    Resulted Orders   Hepatitis C Screen Reflex to HCV RNA Quant and Genotype   Result Value Ref Range    Hepatitis C Antibody Nonreactive Nonreactive    Narrative    Assay performance characteristics have not been established for newborns, infants, and children.   Lipid Profile   Result Value Ref Range    Cholesterol 227 (H) <=199 mg/dL    Triglycerides 297 (H) <=149 mg/dL    Direct Measure HDL 34 (L) >=40 mg/dL      Comment:      HDL Cholesterol Reference Range:     0-2 years:   No reference ranges established for patients under 2 years old  at [facility] [employer] for lipid analytes.    2-8 years:  Greater than 45 mg/dL     18 years and older:   Female: Greater than or equal to 50 mg/dL   Male:   Greater than or equal to 40 mg/dL    LDL Cholesterol Calculated 134 (H) <=129 mg/dL    Patient Fasting > 8hrs? Yes    Hemoglobin A1c   Result Value Ref Range    Hemoglobin A1C 6.1 (H) 0.0 - 5.6 %      Comment:      Normal <5.7%   Prediabetes 5.7-6.4%    Diabetes 6.5% or higher     Note: Adopted from ADA consensus guidelines.   Comprehensive metabolic panel (BMP + Alb, Alk Phos, ALT, AST, Total. Bili, TP)   Result Value Ref Range    Sodium 138 136 - 145 mmol/L    Potassium 4.7 3.5 - 5.0 mmol/L    Chloride 104 98 - 107 mmol/L    Carbon Dioxide (CO2) 20 (L) 22 - 31 mmol/L    Anion Gap 14 5 - 18 mmol/L    Urea Nitrogen 12 8 - 22 mg/dL    Creatinine 0.67 (L) 0.70 - 1.30 mg/dL    Calcium 9.7 8.5 - 10.5 mg/dL    Glucose 115 70 - 125 mg/dL    Alkaline Phosphatase 131 (H) 45 - 120 U/L    AST 28 0 - 40 U/L    ALT 35 0 - 45 U/L    Protein Total 7.7 6.0 - 8.0 g/dL    Albumin 3.9 3.5 - 5.0 g/dL    Bilirubin Total 0.4 0.0 - 1.0 mg/dL    GFR Estimate >90 >60 mL/min/1.73m2      Comment:      Effective December 21, 2021 eGFRcr in adults is calculated  using the 2021 CKD-EPI creatinine equation which includes age and gender (Xiomy et al., NEJM, DOI: 10.1056/ZEZUwm9474810)   Hepatitis C antibody   Result Value Ref Range    Hepatitis C Antibody Negative Negative    Narrative    Assay performance characteristics have not been established for newborns, infants, children (<18 years) or populations of immunocompromised or immunosuppressed patients.        If you have any questions or concerns, please call the clinic at the number listed above.       Sincerely,      Lucero Cardenas MD
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
## 2022-04-26 LAB
HCV AB SERPL QL IA: NEGATIVE
HCV AB SERPL QL IA: NONREACTIVE

## 2022-04-26 ASSESSMENT — ANXIETY QUESTIONNAIRES: GAD7 TOTAL SCORE: 16

## 2022-04-26 ASSESSMENT — PATIENT HEALTH QUESTIONNAIRE - PHQ9: SUM OF ALL RESPONSES TO PHQ QUESTIONS 1-9: 9

## 2022-04-27 ENCOUNTER — MYC MEDICAL ADVICE (OUTPATIENT)
Dept: FAMILY MEDICINE | Facility: CLINIC | Age: 42
End: 2022-04-27
Payer: COMMERCIAL

## 2022-04-29 DIAGNOSIS — R73.03 PREDIABETES: Primary | ICD-10-CM

## 2022-04-29 RX ORDER — METFORMIN HCL 500 MG
500 TABLET, EXTENDED RELEASE 24 HR ORAL 2 TIMES DAILY WITH MEALS
Qty: 180 TABLET | Refills: 3 | Status: SHIPPED | OUTPATIENT
Start: 2022-04-29 | End: 2023-06-07

## 2022-05-11 ENCOUNTER — VIRTUAL VISIT (OUTPATIENT)
Dept: FAMILY MEDICINE | Facility: CLINIC | Age: 42
End: 2022-05-11
Payer: COMMERCIAL

## 2022-05-11 DIAGNOSIS — M51.379 DEGENERATION OF LUMBAR/LUMBOSACRAL DISC WITHOUT MYELOPATHY: ICD-10-CM

## 2022-05-11 DIAGNOSIS — F41.1 GAD (GENERALIZED ANXIETY DISORDER): ICD-10-CM

## 2022-05-11 DIAGNOSIS — I10 ESSENTIAL HYPERTENSION: Primary | ICD-10-CM

## 2022-05-11 PROCEDURE — 99214 OFFICE O/P EST MOD 30 MIN: CPT | Mod: 95 | Performed by: FAMILY MEDICINE

## 2022-05-11 NOTE — PROGRESS NOTES
"Carlos is a 41 year old who is being evaluated via a billable telephone visit.      What phone number would you like to be contacted at? [phone]  [facility]    Assessment & Plan     Carlos was seen today for blood pressure check.    Diagnoses and all orders for this visit:    Essential hypertension: BP is still over goal of 140/90. Will increase lisinopril to 20 mg/day.  Recheck in 2 weeks.    MARY (generalized anxiety disorder): He recently increased dose of Cymbalta to 90 mg a day.  He is feeling a little less anxious at the moment.    Degeneration of lumbar/lumbosacral disc without myelopathy: I encouraged him to reschedule with the spine clinic.           BMI:   Estimated body mass index is 31.28 kg/m  as calculated from the following:    Height as of 4/25/22: 1.778 m (5' 10\").    Weight as of 4/25/22: 98.9 kg (218 lb).   Weight management plan: Discussed healthy diet and exercise guidelines      No follow-ups on file.    Lucero Cardenas MD  [facility]   Carlos is a 41 year old who presents for the following health issues      BP readings over the last 2 weeks:    4//98, 147/96   5/1- 142/87, 153/96  5/2- 146/94  5/9- 137/92   5//96    He has been checking at the nurse's office at work with a manual cuff. Feels fine on 10 mg lisinopril.     Hasn't had time to reschedule with pain clinic yet.     Mood is ok. Started higher cymbalta dose to 90 mg/day.          Objective           Vitals:  No vitals were obtained today due to virtual visit.    Physical Exam   healthy, alert and no distress  PSYCH: Alert and oriented times 3; coherent speech, normal   rate and volume, able to articulate logical thoughts, able   to abstract reason, no tangential thoughts, no hallucinations   or delusions  His affect is normal  RESP: No cough, no audible wheezing, able to talk in full sentences  Remainder of exam unable to be completed due to " telephone visits        Phone call duration: 12 minutes

## 2022-05-12 ENCOUNTER — MYC MEDICAL ADVICE (OUTPATIENT)
Dept: FAMILY MEDICINE | Facility: CLINIC | Age: 42
End: 2022-05-12
Payer: COMMERCIAL

## 2022-05-25 ENCOUNTER — VIRTUAL VISIT (OUTPATIENT)
Dept: FAMILY MEDICINE | Facility: CLINIC | Age: 42
End: 2022-05-25
Payer: COMMERCIAL

## 2022-05-25 DIAGNOSIS — I10 ESSENTIAL HYPERTENSION: ICD-10-CM

## 2022-05-25 DIAGNOSIS — Z79.899 ENCOUNTER FOR LONG-TERM (CURRENT) USE OF MEDICATIONS: Primary | ICD-10-CM

## 2022-05-25 DIAGNOSIS — M54.42 CHRONIC BILATERAL LOW BACK PAIN WITH LEFT-SIDED SCIATICA: ICD-10-CM

## 2022-05-25 DIAGNOSIS — G89.29 CHRONIC BILATERAL LOW BACK PAIN WITH LEFT-SIDED SCIATICA: ICD-10-CM

## 2022-05-25 DIAGNOSIS — F41.1 GAD (GENERALIZED ANXIETY DISORDER): ICD-10-CM

## 2022-05-25 PROCEDURE — 99214 OFFICE O/P EST MOD 30 MIN: CPT | Performed by: FAMILY MEDICINE

## 2022-05-25 RX ORDER — LISINOPRIL 20 MG/1
20 TABLET ORAL DAILY
Qty: 90 TABLET | Refills: 3 | Status: SHIPPED | OUTPATIENT
Start: 2022-05-25 | End: 2023-05-29

## 2022-05-25 RX ORDER — LORAZEPAM 0.5 MG/1
0.5 TABLET ORAL EVERY 6 HOURS PRN
Qty: 6 TABLET | Refills: 0 | Status: SHIPPED | OUTPATIENT
Start: 2022-05-25 | End: 2022-07-27

## 2022-05-25 RX ORDER — GABAPENTIN 300 MG/1
600 CAPSULE ORAL 3 TIMES DAILY
Qty: 540 CAPSULE | Refills: 3 | Status: SHIPPED | OUTPATIENT
Start: 2022-05-25 | End: 2023-05-29
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
## 2022-06-19 DIAGNOSIS — J45.30 MILD PERSISTENT ASTHMA, UNSPECIFIED WHETHER COMPLICATED: ICD-10-CM

## 2022-06-20 RX ORDER — ALBUTEROL SULFATE 90 UG/1
AEROSOL, METERED RESPIRATORY (INHALATION)
Qty: 18 G | Refills: 0 | Status: SHIPPED | OUTPATIENT
Start: 2022-06-20 | End: 2022-09-19

## 2022-06-20 NOTE — TELEPHONE ENCOUNTER
"Routing refill request to provider for review/approval because:  A break in medication  ACT score out of date/not on file.    Last Written Prescription Date:  8/1/2019  Last Fill Quantity: 2,  # refills: 1   Last office visit provider:  5/25/22     Requested Prescriptions   Pending Prescriptions Disp Refills     albuterol (PROAIR HFA/PROVENTIL HFA/VENTOLIN HFA) 108 (90 Base) MCG/ACT inhaler [Pharmacy Med Name: Albuterol Sulfate HFA Inhalation Aerosol Solution 108 (90 Base) MCG/ACT] 18 g 0     Sig: INHALE 2 PUFFS BY MOUTH EVERY 6 HOURS AS NEEDED FOR WHEEZING       Asthma Maintenance Inhalers - Anticholinergics Failed - 6/20/2022  8:08 AM        Failed - Asthma control assessment score within normal limits in last 6 months     Please review ACT score.           Passed - Patient is age 12 years or older        Passed - Medication is active on med list        Passed - Recent (6 mo) or future (30 days) visit within the authorizing provider's specialty     Patient had office visit in the last 6 months or has a visit in the next 30 days with authorizing provider or within the authorizing provider's specialty.  See \"Patient Info\" tab in inbasket, or \"Choose Columns\" in Meds & Orders section of the refill encounter.           Short-Acting Beta Agonist Inhalers Protocol  Failed - 6/20/2022  8:08 AM        Failed - Asthma control assessment score within normal limits in last 6 months     Please review ACT score.           Passed - Patient is age 12 or older        Passed - Medication is active on med list        Passed - Recent (6 mo) or future (30 days) visit within the authorizing provider's specialty     Patient had office visit in the last 6 months or has a visit in the next 30 days with authorizing provider or within the authorizing provider's specialty.  See \"Patient Info\" tab in inbasket, or \"Choose Columns\" in Meds & Orders section of the refill encounter.                 Mitchel Tavera RN 06/20/22 8:08 AM  "

## 2022-06-27 ENCOUNTER — VIRTUAL VISIT (OUTPATIENT)
Dept: FAMILY MEDICINE | Facility: CLINIC | Age: 42
End: 2022-06-27
Payer: COMMERCIAL

## 2022-06-27 DIAGNOSIS — F11.20 OPIOID USE DISORDER, MODERATE, ON MAINTENANCE THERAPY (H): ICD-10-CM

## 2022-06-27 PROCEDURE — 99213 OFFICE O/P EST LOW 20 MIN: CPT | Mod: 95 | Performed by: FAMILY MEDICINE

## 2022-06-27 RX ORDER — BUPRENORPHINE AND NALOXONE 8; 2 MG/1; MG/1
1 FILM, SOLUBLE BUCCAL; SUBLINGUAL 3 TIMES DAILY
Qty: 90 FILM | Refills: 1 | Status: SHIPPED | OUTPATIENT
Start: 2022-06-27 | End: 2022-07-27
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
## 2022-07-24 ASSESSMENT — PATIENT HEALTH QUESTIONNAIRE - PHQ9
10. IF YOU CHECKED OFF ANY PROBLEMS, HOW DIFFICULT HAVE THESE PROBLEMS MADE IT FOR YOU TO DO YOUR WORK, TAKE CARE OF THINGS AT HOME, OR GET ALONG WITH OTHER PEOPLE: VERY DIFFICULT
SUM OF ALL RESPONSES TO PHQ QUESTIONS 1-9: 8
SUM OF ALL RESPONSES TO PHQ QUESTIONS 1-9: 8

## 2022-07-25 ASSESSMENT — ANXIETY QUESTIONNAIRES
2. NOT BEING ABLE TO STOP OR CONTROL WORRYING: MORE THAN HALF THE DAYS
GAD7 TOTAL SCORE: 17
7. FEELING AFRAID AS IF SOMETHING AWFUL MIGHT HAPPEN: MORE THAN HALF THE DAYS
6. BECOMING EASILY ANNOYED OR IRRITABLE: MORE THAN HALF THE DAYS
8. IF YOU CHECKED OFF ANY PROBLEMS, HOW DIFFICULT HAVE THESE MADE IT FOR YOU TO DO YOUR WORK, TAKE CARE OF THINGS AT HOME, OR GET ALONG WITH OTHER PEOPLE?: VERY DIFFICULT
4. TROUBLE RELAXING: NEARLY EVERY DAY
7. FEELING AFRAID AS IF SOMETHING AWFUL MIGHT HAPPEN: MORE THAN HALF THE DAYS
3. WORRYING TOO MUCH ABOUT DIFFERENT THINGS: NEARLY EVERY DAY
IF YOU CHECKED OFF ANY PROBLEMS ON THIS QUESTIONNAIRE, HOW DIFFICULT HAVE THESE PROBLEMS MADE IT FOR YOU TO DO YOUR WORK, TAKE CARE OF THINGS AT HOME, OR GET ALONG WITH OTHER PEOPLE: VERY DIFFICULT
GAD7 TOTAL SCORE: 17
1. FEELING NERVOUS, ANXIOUS, OR ON EDGE: NEARLY EVERY DAY
5. BEING SO RESTLESS THAT IT IS HARD TO SIT STILL: MORE THAN HALF THE DAYS

## 2022-07-27 ENCOUNTER — OFFICE VISIT (OUTPATIENT)
Dept: FAMILY MEDICINE | Facility: CLINIC | Age: 42
End: 2022-07-27
Payer: COMMERCIAL

## 2022-07-27 VITALS
DIASTOLIC BLOOD PRESSURE: 74 MMHG | OXYGEN SATURATION: 95 % | HEART RATE: 89 BPM | HEIGHT: 70 IN | BODY MASS INDEX: 30.53 KG/M2 | TEMPERATURE: 98.1 F | SYSTOLIC BLOOD PRESSURE: 110 MMHG | WEIGHT: 213.25 LBS | RESPIRATION RATE: 16 BRPM

## 2022-07-27 DIAGNOSIS — F41.1 GAD (GENERALIZED ANXIETY DISORDER): ICD-10-CM

## 2022-07-27 DIAGNOSIS — M54.42 CHRONIC LEFT-SIDED LOW BACK PAIN WITH LEFT-SIDED SCIATICA: ICD-10-CM

## 2022-07-27 DIAGNOSIS — R73.03 PREDIABETES: ICD-10-CM

## 2022-07-27 DIAGNOSIS — G89.29 CHRONIC LEFT-SIDED LOW BACK PAIN WITH LEFT-SIDED SCIATICA: ICD-10-CM

## 2022-07-27 DIAGNOSIS — F17.200 TOBACCO DEPENDENCE SYNDROME: ICD-10-CM

## 2022-07-27 DIAGNOSIS — F11.20 OPIOID USE DISORDER, MODERATE, ON MAINTENANCE THERAPY (H): ICD-10-CM

## 2022-07-27 DIAGNOSIS — J44.9 CHRONIC OBSTRUCTIVE PULMONARY DISEASE, UNSPECIFIED COPD TYPE (H): Primary | ICD-10-CM

## 2022-07-27 DIAGNOSIS — K21.9 GASTROESOPHAGEAL REFLUX DISEASE WITHOUT ESOPHAGITIS: ICD-10-CM

## 2022-07-27 DIAGNOSIS — R10.9 LEFT FLANK PAIN: ICD-10-CM

## 2022-07-27 LAB
ALBUMIN UR-MCNC: NEGATIVE MG/DL
AMPHETAMINES UR QL: NOT DETECTED
APPEARANCE UR: CLEAR
BARBITURATES UR QL SCN: NOT DETECTED
BENZODIAZ UR QL SCN: NOT DETECTED
BILIRUB UR QL STRIP: NEGATIVE
BUPRENORPHINE UR QL: DETECTED
CANNABINOIDS UR QL: NOT DETECTED
COCAINE UR QL SCN: NOT DETECTED
COLOR UR AUTO: YELLOW
D-METHAMPHET UR QL: NOT DETECTED
GLUCOSE UR STRIP-MCNC: NEGATIVE MG/DL
HGB UR QL STRIP: NEGATIVE
KETONES UR STRIP-MCNC: NEGATIVE MG/DL
LEUKOCYTE ESTERASE UR QL STRIP: NEGATIVE
METHADONE UR QL SCN: NOT DETECTED
NITRATE UR QL: NEGATIVE
OPIATES UR QL SCN: NOT DETECTED
OXYCODONE UR QL SCN: NOT DETECTED
PCP UR QL SCN: NOT DETECTED
PH UR STRIP: 6.5 [PH] (ref 5–7)
PROPOXYPH UR QL: NOT DETECTED
SP GR UR STRIP: 1.01 (ref 1–1.03)
TRICYCLICS UR QL SCN: NOT DETECTED
UROBILINOGEN UR STRIP-ACNC: 0.2 E.U./DL

## 2022-07-27 PROCEDURE — 81003 URINALYSIS AUTO W/O SCOPE: CPT | Mod: 59 | Performed by: FAMILY MEDICINE

## 2022-07-27 PROCEDURE — 90715 TDAP VACCINE 7 YRS/> IM: CPT | Performed by: FAMILY MEDICINE

## 2022-07-27 PROCEDURE — 80306 DRUG TEST PRSMV INSTRMNT: CPT | Performed by: FAMILY MEDICINE

## 2022-07-27 PROCEDURE — 96127 BRIEF EMOTIONAL/BEHAV ASSMT: CPT | Performed by: FAMILY MEDICINE

## 2022-07-27 PROCEDURE — 99214 OFFICE O/P EST MOD 30 MIN: CPT | Mod: 25 | Performed by: FAMILY MEDICINE

## 2022-07-27 PROCEDURE — 90471 IMMUNIZATION ADMIN: CPT | Performed by: FAMILY MEDICINE

## 2022-07-27 RX ORDER — BUPRENORPHINE AND NALOXONE 8; 2 MG/1; MG/1
1 FILM, SOLUBLE BUCCAL; SUBLINGUAL 3 TIMES DAILY
Qty: 90 FILM | Refills: 1 | Status: SHIPPED | OUTPATIENT
Start: 2022-07-27 | End: 2022-09-30

## 2022-07-27 RX ORDER — OMEPRAZOLE 40 MG/1
40 CAPSULE, DELAYED RELEASE ORAL DAILY
Qty: 90 CAPSULE | Refills: 3 | Status: SHIPPED | OUTPATIENT
Start: 2022-07-27 | End: 2023-07-09

## 2022-07-27 ASSESSMENT — ANXIETY QUESTIONNAIRES: GAD7 TOTAL SCORE: 17

## 2022-07-27 ASSESSMENT — PATIENT HEALTH QUESTIONNAIRE - PHQ9
10. IF YOU CHECKED OFF ANY PROBLEMS, HOW DIFFICULT HAVE THESE PROBLEMS MADE IT FOR YOU TO DO YOUR WORK, TAKE CARE OF THINGS AT HOME, OR GET ALONG WITH OTHER PEOPLE: VERY DIFFICULT
SUM OF ALL RESPONSES TO PHQ QUESTIONS 1-9: 8
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
## 2022-09-19 DIAGNOSIS — Z76.0 ENCOUNTER FOR MEDICATION REFILL: Primary | ICD-10-CM

## 2022-09-19 DIAGNOSIS — J45.30 MILD PERSISTENT ASTHMA, UNSPECIFIED WHETHER COMPLICATED: ICD-10-CM

## 2022-09-19 RX ORDER — ALBUTEROL SULFATE 90 UG/1
AEROSOL, METERED RESPIRATORY (INHALATION)
Qty: 18 G | Refills: 11 | Status: SHIPPED | OUTPATIENT
Start: 2022-09-19 | End: 2023-12-04

## 2022-09-30 ENCOUNTER — VIRTUAL VISIT (OUTPATIENT)
Dept: FAMILY MEDICINE | Facility: CLINIC | Age: 42
End: 2022-09-30
Payer: COMMERCIAL

## 2022-09-30 DIAGNOSIS — F43.21 ADJUSTMENT DISORDER WITH DEPRESSED MOOD: Primary | ICD-10-CM

## 2022-09-30 DIAGNOSIS — F11.20 OPIOID USE DISORDER, MODERATE, ON MAINTENANCE THERAPY (H): ICD-10-CM

## 2022-09-30 PROCEDURE — 99213 OFFICE O/P EST LOW 20 MIN: CPT | Mod: 93 | Performed by: FAMILY MEDICINE

## 2022-09-30 RX ORDER — BUPRENORPHINE AND NALOXONE 8; 2 MG/1; MG/1
1 FILM, SOLUBLE BUCCAL; SUBLINGUAL 3 TIMES DAILY
Qty: 90 FILM | Refills: 1 | Status: SHIPPED | OUTPATIENT
Start: 2022-09-30 | End: 2022-12-05
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
## 2022-11-17 ENCOUNTER — HOSPITAL ENCOUNTER (INPATIENT)
Facility: CLINIC | Age: 42
LOS: 2 days | Discharge: HOME | End: 2022-11-19
Attending: EMERGENCY MEDICINE | Admitting: STUDENT IN AN ORGANIZED HEALTH CARE EDUCATION/TRAINING PROGRAM
Payer: COMMERCIAL

## 2022-11-17 ENCOUNTER — APPOINTMENT (OUTPATIENT)
Dept: CT IMAGING | Facility: CLINIC | Age: 42
End: 2022-11-17
Attending: EMERGENCY MEDICINE
Payer: COMMERCIAL

## 2022-11-17 DIAGNOSIS — G89.29 CHRONIC LEFT-SIDED LOW BACK PAIN WITH LEFT-SIDED SCIATICA: ICD-10-CM

## 2022-11-17 DIAGNOSIS — M51.379 DEGENERATION OF LUMBAR/LUMBOSACRAL DISC WITHOUT MYELOPATHY: ICD-10-CM

## 2022-11-17 DIAGNOSIS — I67.1 BRAIN ANEURYSM: Primary | ICD-10-CM

## 2022-11-17 DIAGNOSIS — M54.42 CHRONIC LEFT-SIDED LOW BACK PAIN WITH LEFT-SIDED SCIATICA: ICD-10-CM

## 2022-11-17 DIAGNOSIS — Z86.79 PERSONAL HISTORY OF CARDIOVASCULAR DISORDER: ICD-10-CM

## 2022-11-17 DIAGNOSIS — R51.9 NONINTRACTABLE HEADACHE, UNSPECIFIED CHRONICITY PATTERN, UNSPECIFIED HEADACHE TYPE: ICD-10-CM

## 2022-11-17 DIAGNOSIS — F41.1 GAD (GENERALIZED ANXIETY DISORDER): ICD-10-CM

## 2022-11-17 DIAGNOSIS — Z86.79 HISTORY OF CEREBRAL ANEURYSM: ICD-10-CM

## 2022-11-17 DIAGNOSIS — Z11.52 ENCOUNTER FOR SCREENING LABORATORY TESTING FOR SEVERE ACUTE RESPIRATORY SYNDROME CORONAVIRUS 2 (SARS-COV-2): ICD-10-CM

## 2022-11-17 DIAGNOSIS — F41.1 ANXIETY STATE: ICD-10-CM

## 2022-11-17 LAB
ALBUMIN SERPL-MCNC: 3.5 G/DL (ref 3.4–5)
ALP SERPL-CCNC: 120 U/L (ref 40–150)
ALT SERPL W P-5'-P-CCNC: 40 U/L (ref 0–70)
ANION GAP SERPL CALCULATED.3IONS-SCNC: 7 MMOL/L (ref 3–14)
AST SERPL W P-5'-P-CCNC: 18 U/L (ref 0–45)
BASOPHILS # BLD AUTO: 0.1 10E3/UL (ref 0–0.2)
BASOPHILS NFR BLD AUTO: 1 %
BILIRUB SERPL-MCNC: 0.4 MG/DL (ref 0.2–1.3)
BUN SERPL-MCNC: 7 MG/DL (ref 7–30)
CALCIUM SERPL-MCNC: 8.8 MG/DL (ref 8.5–10.1)
CHLORIDE BLD-SCNC: 108 MMOL/L (ref 94–109)
CO2 SERPL-SCNC: 25 MMOL/L (ref 20–32)
CREAT BLD-MCNC: 0.5 MG/DL (ref 0.7–1.3)
CREAT SERPL-MCNC: 0.5 MG/DL (ref 0.66–1.25)
EOSINOPHIL # BLD AUTO: 0.2 10E3/UL (ref 0–0.7)
EOSINOPHIL NFR BLD AUTO: 2 %
ERYTHROCYTE [DISTWIDTH] IN BLOOD BY AUTOMATED COUNT: 14.1 % (ref 10–15)
GFR SERPL CREATININE-BSD FRML MDRD: >60 ML/MIN/1.73M2
GFR SERPL CREATININE-BSD FRML MDRD: >90 ML/MIN/1.73M2
GLUCOSE BLD-MCNC: 130 MG/DL (ref 70–99)
HCT VFR BLD AUTO: 37.2 % (ref 40–53)
HGB BLD-MCNC: 12.3 G/DL (ref 13.3–17.7)
IMM GRANULOCYTES # BLD: 0 10E3/UL
IMM GRANULOCYTES NFR BLD: 0 %
LYMPHOCYTES # BLD AUTO: 3.5 10E3/UL (ref 0.8–5.3)
LYMPHOCYTES NFR BLD AUTO: 37 %
MCH RBC QN AUTO: 30.6 PG (ref 26.5–33)
MCHC RBC AUTO-ENTMCNC: 33.1 G/DL (ref 31.5–36.5)
MCV RBC AUTO: 93 FL (ref 78–100)
MONOCYTES # BLD AUTO: 0.7 10E3/UL (ref 0–1.3)
MONOCYTES NFR BLD AUTO: 8 %
NEUTROPHILS # BLD AUTO: 5 10E3/UL (ref 1.6–8.3)
NEUTROPHILS NFR BLD AUTO: 52 %
NRBC # BLD AUTO: 0 10E3/UL
NRBC BLD AUTO-RTO: 0 /100
PLATELET # BLD AUTO: 340 10E3/UL (ref 150–450)
POTASSIUM BLD-SCNC: 3.6 MMOL/L (ref 3.4–5.3)
PROT SERPL-MCNC: 7.4 G/DL (ref 6.8–8.8)
RBC # BLD AUTO: 4.02 10E6/UL (ref 4.4–5.9)
SARS-COV-2 RNA RESP QL NAA+PROBE: NEGATIVE
SODIUM SERPL-SCNC: 140 MMOL/L (ref 133–144)
WBC # BLD AUTO: 9.5 10E3/UL (ref 4–11)

## 2022-11-17 PROCEDURE — 258N000003 HC RX IP 258 OP 636: Performed by: EMERGENCY MEDICINE

## 2022-11-17 PROCEDURE — 70450 CT HEAD/BRAIN W/O DYE: CPT

## 2022-11-17 PROCEDURE — 80053 COMPREHEN METABOLIC PANEL: CPT | Performed by: EMERGENCY MEDICINE

## 2022-11-17 PROCEDURE — 120N000002 HC R&B MED SURG/OB UMMC

## 2022-11-17 PROCEDURE — 999N000285 HC STATISTIC VASC ACCESS LAB DRAW WITH PIV START

## 2022-11-17 PROCEDURE — 36415 COLL VENOUS BLD VENIPUNCTURE: CPT | Performed by: EMERGENCY MEDICINE

## 2022-11-17 PROCEDURE — U0003 INFECTIOUS AGENT DETECTION BY NUCLEIC ACID (DNA OR RNA); SEVERE ACUTE RESPIRATORY SYNDROME CORONAVIRUS 2 (SARS-COV-2) (CORONAVIRUS DISEASE [COVID-19]), AMPLIFIED PROBE TECHNIQUE, MAKING USE OF HIGH THROUGHPUT TECHNOLOGIES AS DESCRIBED BY CMS-2020-01-R: HCPCS | Performed by: EMERGENCY MEDICINE

## 2022-11-17 PROCEDURE — 250N000011 HC RX IP 250 OP 636: Performed by: EMERGENCY MEDICINE

## 2022-11-17 PROCEDURE — 250N000013 HC RX MED GY IP 250 OP 250 PS 637: Performed by: EMERGENCY MEDICINE

## 2022-11-17 PROCEDURE — 70496 CT ANGIOGRAPHY HEAD: CPT

## 2022-11-17 PROCEDURE — 82565 ASSAY OF CREATININE: CPT

## 2022-11-17 PROCEDURE — 250N000009 HC RX 250: Performed by: EMERGENCY MEDICINE

## 2022-11-17 PROCEDURE — 999N000040 HC STATISTIC CONSULT NO CHARGE VASC ACCESS

## 2022-11-17 PROCEDURE — 85004 AUTOMATED DIFF WBC COUNT: CPT | Performed by: EMERGENCY MEDICINE

## 2022-11-17 PROCEDURE — 70498 CT ANGIOGRAPHY NECK: CPT

## 2022-11-17 PROCEDURE — 999N000007 HC SITE CHECK

## 2022-11-17 PROCEDURE — 999N000127 HC STATISTIC PERIPHERAL IV START W US GUIDANCE

## 2022-11-17 RX ORDER — ACETAMINOPHEN 500 MG
1000 TABLET ORAL ONCE
Status: COMPLETED | OUTPATIENT
Start: 2022-11-17 | End: 2022-11-17

## 2022-11-17 RX ORDER — IOPAMIDOL 755 MG/ML
100 INJECTION, SOLUTION INTRAVASCULAR ONCE
Status: COMPLETED | OUTPATIENT
Start: 2022-11-17 | End: 2022-11-17

## 2022-11-17 RX ORDER — OXYCODONE HYDROCHLORIDE 5 MG/1
5 TABLET ORAL ONCE
Status: COMPLETED | OUTPATIENT
Start: 2022-11-18 | End: 2022-11-18

## 2022-11-17 RX ORDER — HYDROMORPHONE HYDROCHLORIDE 1 MG/ML
0.5 INJECTION, SOLUTION INTRAMUSCULAR; INTRAVENOUS; SUBCUTANEOUS
Status: COMPLETED | OUTPATIENT
Start: 2022-11-17 | End: 2022-11-17

## 2022-11-17 RX ORDER — METOCLOPRAMIDE HYDROCHLORIDE 5 MG/ML
10 INJECTION INTRAMUSCULAR; INTRAVENOUS ONCE
Status: COMPLETED | OUTPATIENT
Start: 2022-11-17 | End: 2022-11-17

## 2022-11-17 RX ORDER — HYDROMORPHONE HYDROCHLORIDE 1 MG/ML
0.5 INJECTION, SOLUTION INTRAMUSCULAR; INTRAVENOUS; SUBCUTANEOUS ONCE
Status: COMPLETED | OUTPATIENT
Start: 2022-11-17 | End: 2022-11-17

## 2022-11-17 RX ORDER — METHYLPREDNISOLONE SODIUM SUCCINATE 1 G/16ML
1000 INJECTION, POWDER, LYOPHILIZED, FOR SOLUTION INTRAMUSCULAR; INTRAVENOUS ONCE
Status: DISCONTINUED | OUTPATIENT
Start: 2022-11-17 | End: 2022-11-17

## 2022-11-17 RX ORDER — HYDROMORPHONE HYDROCHLORIDE 1 MG/ML
0.5 INJECTION, SOLUTION INTRAMUSCULAR; INTRAVENOUS; SUBCUTANEOUS ONCE
Status: DISCONTINUED | OUTPATIENT
Start: 2022-11-17 | End: 2022-11-17

## 2022-11-17 RX ORDER — MAGNESIUM SULFATE HEPTAHYDRATE 40 MG/ML
2 INJECTION, SOLUTION INTRAVENOUS ONCE
Status: COMPLETED | OUTPATIENT
Start: 2022-11-17 | End: 2022-11-17

## 2022-11-17 RX ORDER — MULTIPLE VITAMINS W/ MINERALS TAB 9MG-400MCG
1 TAB ORAL DAILY
Status: ON HOLD | COMMUNITY
End: 2024-04-16

## 2022-11-17 RX ADMIN — ACETAMINOPHEN 1000 MG: 500 TABLET ORAL at 10:44

## 2022-11-17 RX ADMIN — METHYLPREDNISOLONE SODIUM SUCCINATE 1000 MG: 1 INJECTION INTRAMUSCULAR; INTRAVENOUS at 13:02

## 2022-11-17 RX ADMIN — IOPAMIDOL 75 ML: 755 INJECTION, SOLUTION INTRAVENOUS at 09:06

## 2022-11-17 RX ADMIN — HYDROMORPHONE HYDROCHLORIDE 0.5 MG: 1 INJECTION, SOLUTION INTRAMUSCULAR; INTRAVENOUS; SUBCUTANEOUS at 23:11

## 2022-11-17 RX ADMIN — HYDROMORPHONE HYDROCHLORIDE 0.5 MG: 1 INJECTION, SOLUTION INTRAMUSCULAR; INTRAVENOUS; SUBCUTANEOUS at 08:28

## 2022-11-17 RX ADMIN — SODIUM CHLORIDE 1000 ML: 9 INJECTION, SOLUTION INTRAVENOUS at 10:45

## 2022-11-17 RX ADMIN — MAGNESIUM SULFATE 2 G: 2 INJECTION INTRAVENOUS at 13:03

## 2022-11-17 RX ADMIN — SODIUM CHLORIDE 1000 ML: 9 INJECTION, SOLUTION INTRAVENOUS at 06:55

## 2022-11-17 RX ADMIN — SODIUM CHLORIDE 80 ML: 9 INJECTION, SOLUTION INTRAVENOUS at 09:09

## 2022-11-17 RX ADMIN — HYDROMORPHONE HYDROCHLORIDE 0.5 MG: 1 INJECTION, SOLUTION INTRAMUSCULAR; INTRAVENOUS; SUBCUTANEOUS at 18:25

## 2022-11-17 RX ADMIN — METOCLOPRAMIDE HYDROCHLORIDE 10 MG: 5 INJECTION INTRAMUSCULAR; INTRAVENOUS at 07:12

## 2022-11-17 ASSESSMENT — ACTIVITIES OF DAILY LIVING (ADL)
ADLS_ACUITY_SCORE: 35

## 2022-11-17 NOTE — ED TRIAGE NOTES
Started 2 nights ago with right sided headache behind eye.  No visual changes right eye.  Pt states ibuprofen and tylenol only help for awhile.  Has had previous aneurysm surgery of brain.     Triage Assessment     Row Name 11/17/22 0534       Triage Assessment (Adult)    Airway WDL WDL       Respiratory WDL    Respiratory WDL WDL       Skin Circulation/Temperature WDL    Skin Circulation/Temperature WDL WDL       Cardiac WDL    Cardiac WDL WDL       Peripheral/Neurovascular WDL    Peripheral Neurovascular WDL WDL       Cognitive/Neuro/Behavioral WDL    Cognitive/Neuro/Behavioral WDL WDL
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
## 2022-11-17 NOTE — ED NOTES
Sign out note: Carlos Hamilton is a 42 year old male was signed out to me by Dr. Torres  at 0715 am.  Please see initial dictation for full details and history.  Carlos Hamilton is a 42 year old male who presented with a 3-day history of intermittent headache.  He is on Suboxone and is also taking ibuprofen.  Head CT was done and this is unremarkable.  He does have a remote history of cerebellar aneurysm.  Plan at time of sign out is check the results of the CT angiogram of the head and neck..  If this is unremarkable.  The patient may be discharged home..       Course in the ED:  The patient had received Reglan during the night shift.  I treated him with Dilaudid IV  Results for orders placed or performed during the hospital encounter of 11/17/22   Head CT w/o contrast     Status: None    Narrative    EXAM: CT HEAD W/O CONTRAST  LOCATION: [facility]  DATE/TIME: 11/17/2022 6:39 AM    INDICATION: Headache h o aneurysm  COMPARISON: None.  TECHNIQUE: Routine CT Head without IV contrast. Multiplanar reformats. Dose reduction techniques were used.    FINDINGS:  Streak artifact limits aspects of exam.    INTRACRANIAL CONTENTS: Right pterional craniotomy with right MCA bifurcation aneurysm clipping. Right frontoparietal region MCA territory chronic infarct.    No acute intracranial hemorrhage, extraaxial collection, or mass effect.  No CT evidence of acute infarct. Otherwise, unremarkable brain parenchymal attenuation parenchymal attenuation. Normal ventricles and sulci.     VISUALIZED ORBITS/SINUSES/MASTOIDS: No intraorbital abnormality. Left maxillary sinus small retention cyst/mucosal polyp. Remaining visualized paranasal sinuses essentially clear. No middle ear or mastoid effusion.    BONES/SOFT TISSUES: No acute abnormality.      Impression    IMPRESSION:  1.  Right pterional craniotomy with right MCA bifurcation aneurysm clipping.   2.  Right frontoparietal region MCA  territory chronic infarct.  3.  No acute intracranial hemorrhage.  4.  No acute intracranial process.   CTA Head Neck with Contrast     Status: None (Preliminary result)    Narrative    CT ANGIOGRAM OF THE HEAD AND NECK WITH CONTRAST  11/17/2022 9:11 AM     HISTORY: Headache, history of cerebral aneurysm, status post clipping.    TECHNIQUE:  CT angiography with an injection of 75 mL Isovue 370 IV  with scans through the head and neck. Images were transferred to a  separate 3-D workstation where multiplanar reformations and 3-D images  were created. Estimates of carotid stenoses are made relative to the  distal internal carotid artery diameters except as noted. Radiation  dose for this scan was reduced using automated exposure control,  adjustment of the mA and/or kV according to patient size, or iterative  reconstruction technique.    COMPARISON: CT head 11/17/2022.     CT ANGIOGRAM HEAD FINDINGS: The intracranial internal carotid arteries  are widely patent. There is significant streak artifact related to  multiple metallic surgical clips at the lateral aspect of the right  middle cerebral artery cistern, consistent with the patient's history  of surgical clipping of a right middle cerebral artery aneurysm. The  artifact results in limited visualization of the M1 and proximal M2  segments of the right middle cerebral artery. No obvious large  residual or recurrent right middle cerebral artery aneurysm, although  evaluation is limited. The proximal branches of the anterior cerebral  arteries appear patent bilaterally. There appears to be at least  moderate tapered segmental stenosis of the mid to distal M1 segment of  the right middle cerebral artery (series 12 image 42), although this  is incompletely evaluated and difficult to characterize due to  artifact. The major proximal branches of the left middle cerebral  artery are patent.    The bilateral vertebral arteries, the basilar artery, and the major  proximal  branches of the posterior cerebral arteries appear patent.  Expected enhancement of the major dural venous sinuses.    Unchanged chronic areas of cortical/subcortical gliosis and  encephalomalacia involving the right parietal lobe and right  posterolateral temporoparietal junction. Right pterional craniotomy  change again noted. As before, there is mild curvilinear soft tissue  thickening overlying the anterolateral aspect of the right frontal  pole, which may represent small amount of postsurgical dural  thickening. No mass effect/herniation.    CT ANGIOGRAM NECK FINDINGS: Common origin of the brachiocephalic and  left common carotid arteries, a normal variant. No stenosis at the  origins of the great vessels.     Right carotid artery: The right common and internal carotid arteries  are patent. No significant stenosis or atherosclerotic disease in the  carotid artery.     Left carotid artery: The left common and internal carotid arteries are  patent. No significant stenosis or atherosclerotic disease in the  carotid artery.     Vertebral arteries: Vertebral arteries are patent without evidence of  dissection. No significant stenosis.     Other findings: There are scattered rounded mildly  prominent/borderline enlarged cervical lymph nodes bilaterally. For  example, there appears to be an enlarged left supraclavicular lymph  node that measures 14 mm x 18 mm in axial plane (series 5 image 155).  There are bilateral somewhat rounded prominent bilateral level IV and  level V lymph nodes measuring up to approximately 9-10 mm (for example  see series 5 images 142 and 147-148). Mildly prominent bilateral level  IIA lymph nodes measuring up to 19 mm on the left and 15-16 mm on the  right. These are nonspecific and could be reactive. Low-grade  pathologic lymphoproliferative process is thought to be less likely,  but difficult to entirely exclude.      Impression    IMPRESSION:  1. Redemonstrated changes consistent with the  patient's history of  right pterional craniotomy for surgical clipping of a right middle  cerebral artery bifurcation aneurysm. Significant artifact related to  surgical clips limits evaluation for residual/recurrent aneurysm. No  obvious large residual/recurrent right MCA aneurysm identified. This  would be better characterized with conventional diagnostic digital  subtraction cerebral angiography, as warranted.  2. Otherwise, no definite intracranial aneurysm or high-flow vascular  malformation.  3. There appears to be at least moderate tapered segmental stenosis of  the mid to distal right middle cerebral artery M1 segment, although  this is poorly characterized due to streak artifacts.  4. Unchanged small chronic infarcts in the right middle cerebral  artery territory.  5. No stenosis or occlusion of the cervical carotid or vertebral  arteries.  6. Scattered mildly rounded/enlarged cervical lymph nodes, as  described. These are probably reactive, but are nonspecific. As  clinically warranted, could consider short interval follow-up soft  tissue neck CT to ensure stability or resolution.   Creatinine POCT     Status: Abnormal   Result Value Ref Range    Creatinine POCT 0.5 (L) 0.7 - 1.3 mg/dL    GFR, ESTIMATED POCT >60 >60 mL/min/1.73m2      I reevaluated the patient at approximately 10:15 AM.  He states his headache is pretty severe.  He appears quite uncomfortable.  I note that he is febrile at 6:33  AM to 100.1.  He was also tachycardic, with a pulse of 116.    Repeat vital signs show at 1052 am shows BP of 140/85 pulse 81 respiratory rate of 16, temperature of 97.6 with 93% oxygen saturation.    I treated him with a normal saline bolus IV, and Dilaudid IV  On physical examination, I do notice the patient does have a deformity of the right forehead likely from past surgeries and craniotomy.  He appears quite uncomfortable, he states the pain is coming in waves.    I spoke with Dr. Kaye Carr of neurology  regarding the patient.  She recommends treating him with magnesium 2 g IV as well as Solu-Medrol 1 g IV for headache cocktail.  This was ordered.  I have also ordered an MRI of the brain with and without contrast after speaking with Dr. Michele of stroke neurology.    Carlos Hamilton is a 42 year old male who presented with intermittent right-sided headache for the past 3 days.  He does have a history of cerebral aneurysm.  He can use to have headache pain that comes in waves.  CT angio of the head and neck shows findings as noted above.  He continues to be uncomfortable.  He will be admitted to the neurology service here for further evaluation and treatment.  He is admitted under the care of Dr. Carr.  MRI of the brain is pending at the time of this dictation. (MRI studies were delayed because the patient has aneurysm clips in his brain.  We are trying to find out the model of aneurysm clips prior to obtaining an MRI)  The medical record was reviewed and interpreted.  Current labs reviewed and interpreted.  Current images reviewed and interpreted: see above.      This note was created in part by the use of Dragon voice recognition dictation system. Inadvertent grammatical errors and typographical errors may still exist.  MD Rosalind Gomez Alda L, MD  11/17/22 [id number]

## 2022-11-17 NOTE — PHARMACY-ADMISSION MEDICATION HISTORY
Admission Medication History Completed by Pharmacy    See [facility] Admission Navigator for allergy information, preferred outpatient pharmacy, prior to admission medications and immunization status.     Medication History Sources: patient, [employer]s    Changes made to PTA medication list (reason):    Added: multivitamin (per patient)    Deleted: Symbicort (no longer taking)    Changed: acetaminophen and ibuprofen updated instructions; updated duloxetine, gabapentin, and tizanidine instructions to how the patient is currently use (see below)    Additional Information:    Duloxetine prescribed as 30 mg TID. Patient currently taking 30 mg qAM and 60 mg qPM.     Gabapentin prescribed as 600 mg TID. Patient currently taking 300 mg qAM, 600 mg qPM, and will take an extra dose during the day if needed.    Tizanidine prescribed as 4 mg q4h. Patient currently taking 4 mg qAM, 8 mg qPM, and will take additional doses during the day if needed.     Prior to Admission medications    Medication Sig Last Dose Taking? Auth Provider Long Term End Date   acetaminophen (TYLENOL) 325 MG tablet Take 650 mg by mouth every 6 hours as needed for mild pain or headaches 11/16/2022 Yes Reported, Patient     buprenorphine HCl-naloxone HCl (SUBOXONE) 8-2 MG per film Place 1 Film under the tongue 3 times daily 11/16/2022 Yes Lucero Cardenas MD     DULoxetine (CYMBALTA) 30 MG capsule Take 1 capsule (30 mg) by mouth 3 times daily  Patient taking differently: Take 30 mg by mouth 3 times daily Take 1 capsule every morning and 2 capsules every evening. 11/16/2022 Yes Lucero Cardenas MD Yes    gabapentin (NEURONTIN) 300 MG capsule Take 2 capsules (600 mg) by mouth 3 times daily  Patient taking differently: Take 600 mg by mouth 3 times daily Take 1 capsule every morning, 2 capsules every evening, and additional throughout the day as needed. 11/16/2022 Yes Lucero Cardenas MD Yes    ibuprofen (ADVIL/MOTRIN) 200 MG tablet Take 600 mg by mouth every 6 hours as  needed for moderate pain (4-6) 11/16/2022 Yes Reported, Patient     lisinopril (ZESTRIL) 20 MG tablet Take 1 tablet (20 mg) by mouth daily 11/16/2022 Yes Lucero Cardenas MD Yes    metFORMIN (GLUCOPHAGE-XR) 500 MG 24 hr tablet Take 1 tablet (500 mg) by mouth 2 times daily (with meals) 11/16/2022 Yes Lucero Cardenas MD Yes    multivitamin w/minerals (THERA-VIT-M) tablet Take 1 tablet by mouth daily 11/16/2022 Yes Unknown, Entered By History     omeprazole (PRILOSEC) 40 MG DR capsule Take 1 capsule (40 mg) by mouth daily 11/16/2022 Yes Lucero Cardenas MD     tiZANidine (ZANAFLEX) 4 MG tablet Take 1 tablet (4 mg) by mouth every 4 hours  Patient taking differently: Take 4 mg by mouth every 4 hours Take 1 tablet every morning, 2 tablets every evening, and as needed throughout daytime. 11/16/2022 Yes Lucero Cardenas MD     VENTOLIN  (90 Base) MCG/ACT inhaler INHALE TWO PUFFS BY MOUTH EVERY SIX HOURS AS NEEDED FOR WHEEZING 11/16/2022 Yes Lucero Cardenas MD Yes    order for DME Equipment being ordered: adult mask for neb machine with tubing   Carol Serrano APRN CNP         Date completed: 11/17/22    Medication history completed by:   Татьяна Vizcarra, CrissyD, BCPPS

## 2022-11-17 NOTE — ED PROVIDER NOTES
ED Provider Note  [facility]      History     Chief Complaint   Patient presents with     Headache     HPI  Carlos Hamilton is a 42 year old male who presents to the ED with complaint of headache which is been intermittent for 3 days.  He states that he has been using his normal Suboxone as well as ibuprofen, which often takes the headache away for 10 or 12 hours, then it comes back.  States the headache is primarily in the right frontotemporal region.  He states when the headache comes back, seems to come back gradually, and then seems to be in waves while it is there.  He last took ibuprofen about 6 hours ago.  No recent head trauma, blurred vision, slurred speech, numbness, tingling, weakness.  He states the pain reminds him of when he had dry socket pain from a remote tooth extraction, though he denies any dental pain.  He states that he tried to put some Orajel in his mouth, just in case it was related.  No fever.  No nausea or vomiting.  No cough or shortness of breath.  He does have a remote history of a cerebral aneurysm which was.  He states that he did have bleeding of that at the time, and is just worried that this could be related.    Past Medical History  Past Medical History:   Diagnosis Date     Alcohol abuse      Asthma      Brain aneurysm     s/p clip in 2013     Brain aneurysm      Chronic back pain      HTN (hypertension)      Seizures (H)      Stroke (H)      Past Surgical History:   Procedure Laterality Date     aneursym clipping      Brain aneursym in 2013     ARTHROPLASTY HIP Right      ARTHROPLASTY REVISION HIP Right      BRAIN SURGERY       KNEE SURGERY      right knee     acetaminophen (TYLENOL) 325 MG tablet  budesonide-formoterol (SYMBICORT) 160-4.5 MCG/ACT Inhaler  buprenorphine HCl-naloxone HCl (SUBOXONE) 8-2 MG per film  DULoxetine (CYMBALTA) 30 MG capsule  gabapentin (NEURONTIN) 300 MG capsule  IBUPROFEN PO  lisinopril (ZESTRIL) 20 MG tablet  metFORMIN  "(GLUCOPHAGE-XR) 500 MG 24 hr tablet  omeprazole (PRILOSEC) 40 MG DR capsule  tiZANidine (ZANAFLEX) 4 MG tablet  VENTOLIN  (90 Base) MCG/ACT inhaler  order for DME      Allergies   Allergen Reactions     Diphenhydramine Swelling     Per patient, tongue/lips swelling, itchy eyes with both generic diphenhydramine and brand Benadryl      Naproxen Swelling     Pistachio Nut Extract Skin Test      Toradol [Ketorolac] Itching     Family History  Family History   Problem Relation Age of Onset     Glaucoma Mother      Uveitis Brother      Alcoholism No family hx of      Macular Degeneration No family hx of      Social History   Social History     Tobacco Use     Smoking status: Every Day     Packs/day: 0.50     Years: 27.00     Pack years: 13.50     Types: Cigarettes, Vaping Device     Smokeless tobacco: Former     Tobacco comments:     half a pack a day   Substance Use Topics     Alcohol use: Not Currently     Drug use: Not Currently      Past medical history, past surgical history, medications, allergies, family history, and social history were reviewed with the patient. No additional pertinent items.       Review of Systems  A complete review of systems was performed with pertinent positives and negatives noted in the HPI, and all other systems negative.    Physical Exam   BP: (!) 164/96  Pulse: 104  Temp: 98  F (36.7  C)  Resp: 16  Height: 180.3 cm (5' 11\")  Weight: 97.5 kg (215 lb)  SpO2: 97 %  Physical Exam  Constitutional:       General: He is not in acute distress.     Appearance: He is not diaphoretic.   HENT:      Head: Atraumatic.      Mouth/Throat:      Mouth: Oropharynx is clear and moist. Mucous membranes are moist.      Pharynx: Oropharynx is clear. No oropharyngeal exudate or posterior oropharyngeal erythema.      Comments: No dental tenderness with percussion  Eyes:      General: No scleral icterus.  Cardiovascular:      Pulses: Intact distal pulses.      Heart sounds: Normal heart sounds. "   Pulmonary:      Effort: No respiratory distress.      Breath sounds: Normal breath sounds.   Abdominal:      Palpations: Abdomen is soft.      Tenderness: There is no abdominal tenderness.   Musculoskeletal:         General: No tenderness or edema.      Cervical back: Neck supple.   Skin:     General: Skin is warm.      Findings: No rash.   Neurological:      General: No focal deficit present.      Mental Status: He is oriented to person, place, and time.      Cranial Nerves: No cranial nerve deficit.      Sensory: No sensory deficit.      Motor: No weakness.         ED Course      Procedures                     Results for orders placed or performed during the hospital encounter of 11/17/22   Head CT w/o contrast     Status: None    Narrative    EXAM: CT HEAD W/O CONTRAST  LOCATION: [facility]  DATE/TIME: 11/17/2022 6:39 AM    INDICATION: Headache h o aneurysm  COMPARISON: None.  TECHNIQUE: Routine CT Head without IV contrast. Multiplanar reformats. Dose reduction techniques were used.    FINDINGS:  Streak artifact limits aspects of exam.    INTRACRANIAL CONTENTS: Right pterional craniotomy with right MCA bifurcation aneurysm clipping. Right frontoparietal region MCA territory chronic infarct.    No acute intracranial hemorrhage, extraaxial collection, or mass effect.  No CT evidence of acute infarct. Otherwise, unremarkable brain parenchymal attenuation parenchymal attenuation. Normal ventricles and sulci.     VISUALIZED ORBITS/SINUSES/MASTOIDS: No intraorbital abnormality. Left maxillary sinus small retention cyst/mucosal polyp. Remaining visualized paranasal sinuses essentially clear. No middle ear or mastoid effusion.    BONES/SOFT TISSUES: No acute abnormality.      Impression    IMPRESSION:  1.  Right pterional craniotomy with right MCA bifurcation aneurysm clipping.   2.  Right frontoparietal region MCA territory chronic infarct.  3.  No acute intracranial  hemorrhage.  4.  No acute intracranial process.     Medications   0.9% sodium chloride BOLUS (1,000 mLs Intravenous New Bag 11/17/22 0655)   metoclopramide (REGLAN) injection 10 mg (10 mg Intravenous Given 11/17/22 0712)        Assessments & Plan (with Medical Decision Making)   No infectious signs or symptoms.  Neurologic exam was unremarkable.  Headaches are intermittent, does overall seem gradual in onset-however, the patient does have a history of known cerebral aneurysm status post clipping.  Given this, CT of head was completed, which does not show any acute evidence for bleed or any other acute abnormality.  However, again, given that the headaches of been going on several days, noncontrast head CT is going to be less sensitive for acute subarachnoid hemorrhage.  CTA head and neck was thus ordered to obtain a more complete picture.  He was also given a dose of Reglan here.  He will be signed out to the oncoming provider at shift change.  If negative, headache is improved, he can be discharged home with a plan to follow-up with his outpatient provider.    Dictation Disclaimer: Some of this Note has been completed with voice-recognition dictation software. Although errors are generally corrected real-time, there is the potential for a rare error to be present in the completed chart.      I have reviewed the nursing notes. I have reviewed the findings, diagnosis, plan and need for follow up with the patient.    New Prescriptions    No medications on file       Final diagnoses:   Nonintractable headache, unspecified chronicity pattern, unspecified headache type   History of cerebral aneurysm       --  Kiki Torres  [facility] EMERGENCY DEPARTMENT  11/17/2022     Kiki Torres MD  11/17/22 1906

## 2022-11-18 PROCEDURE — 99285 EMERGENCY DEPT VISIT HI MDM: CPT | Performed by: EMERGENCY MEDICINE

## 2022-11-18 PROCEDURE — 96374 THER/PROPH/DIAG INJ IV PUSH: CPT | Mod: 59 | Performed by: EMERGENCY MEDICINE

## 2022-11-18 PROCEDURE — 250N000013 HC RX MED GY IP 250 OP 250 PS 637: Performed by: EMERGENCY MEDICINE

## 2022-11-18 PROCEDURE — 96361 HYDRATE IV INFUSION ADD-ON: CPT | Performed by: EMERGENCY MEDICINE

## 2022-11-18 PROCEDURE — 250N000013 HC RX MED GY IP 250 OP 250 PS 637: Performed by: STUDENT IN AN ORGANIZED HEALTH CARE EDUCATION/TRAINING PROGRAM

## 2022-11-18 PROCEDURE — 99222 1ST HOSP IP/OBS MODERATE 55: CPT | Mod: GC | Performed by: STUDENT IN AN ORGANIZED HEALTH CARE EDUCATION/TRAINING PROGRAM

## 2022-11-18 PROCEDURE — 258N000003 HC RX IP 258 OP 636: Performed by: STUDENT IN AN ORGANIZED HEALTH CARE EDUCATION/TRAINING PROGRAM

## 2022-11-18 PROCEDURE — 250N000009 HC RX 250: Performed by: STUDENT IN AN ORGANIZED HEALTH CARE EDUCATION/TRAINING PROGRAM

## 2022-11-18 PROCEDURE — 120N000002 HC R&B MED SURG/OB UMMC

## 2022-11-18 PROCEDURE — 250N000011 HC RX IP 250 OP 636: Performed by: STUDENT IN AN ORGANIZED HEALTH CARE EDUCATION/TRAINING PROGRAM

## 2022-11-18 PROCEDURE — 96375 TX/PRO/DX INJ NEW DRUG ADDON: CPT | Performed by: EMERGENCY MEDICINE

## 2022-11-18 PROCEDURE — C9803 HOPD COVID-19 SPEC COLLECT: HCPCS | Performed by: EMERGENCY MEDICINE

## 2022-11-18 PROCEDURE — 99285 EMERGENCY DEPT VISIT HI MDM: CPT | Mod: 25 | Performed by: EMERGENCY MEDICINE

## 2022-11-18 RX ORDER — DULOXETIN HYDROCHLORIDE 30 MG/1
30 CAPSULE, DELAYED RELEASE ORAL EVERY MORNING
Status: DISCONTINUED | OUTPATIENT
Start: 2022-11-19 | End: 2022-11-19 | Stop reason: HOSPADM

## 2022-11-18 RX ORDER — DULOXETIN HYDROCHLORIDE 60 MG/1
60 CAPSULE, DELAYED RELEASE ORAL EVERY EVENING
Status: DISCONTINUED | OUTPATIENT
Start: 2022-11-18 | End: 2022-11-19 | Stop reason: HOSPADM

## 2022-11-18 RX ORDER — LISINOPRIL 20 MG/1
20 TABLET ORAL DAILY
Status: DISCONTINUED | OUTPATIENT
Start: 2022-11-18 | End: 2022-11-19 | Stop reason: HOSPADM

## 2022-11-18 RX ORDER — MULTIPLE VITAMINS W/ MINERALS TAB 9MG-400MCG
1 TAB ORAL DAILY
Status: DISCONTINUED | OUTPATIENT
Start: 2022-11-18 | End: 2022-11-19 | Stop reason: HOSPADM

## 2022-11-18 RX ORDER — MAGNESIUM SULFATE HEPTAHYDRATE 40 MG/ML
2 INJECTION, SOLUTION INTRAVENOUS ONCE
Status: COMPLETED | OUTPATIENT
Start: 2022-11-18 | End: 2022-11-18

## 2022-11-18 RX ORDER — CARBOXYMETHYLCELLULOSE SODIUM 5 MG/ML
1 SOLUTION/ DROPS OPHTHALMIC 4 TIMES DAILY PRN
Status: DISCONTINUED | OUTPATIENT
Start: 2022-11-18 | End: 2022-11-19 | Stop reason: HOSPADM

## 2022-11-18 RX ORDER — METOCLOPRAMIDE HYDROCHLORIDE 5 MG/ML
10 INJECTION INTRAMUSCULAR; INTRAVENOUS ONCE
Status: COMPLETED | OUTPATIENT
Start: 2022-11-18 | End: 2022-11-18

## 2022-11-18 RX ORDER — BUPRENORPHINE AND NALOXONE 8; 2 MG/1; MG/1
1 FILM, SOLUBLE BUCCAL; SUBLINGUAL 3 TIMES DAILY
Status: DISCONTINUED | OUTPATIENT
Start: 2022-11-18 | End: 2022-11-18

## 2022-11-18 RX ORDER — METFORMIN HCL 500 MG
500 TABLET, EXTENDED RELEASE 24 HR ORAL 2 TIMES DAILY WITH MEALS
Status: DISCONTINUED | OUTPATIENT
Start: 2022-11-18 | End: 2022-11-19 | Stop reason: HOSPADM

## 2022-11-18 RX ORDER — ALBUTEROL SULFATE 90 UG/1
1-2 AEROSOL, METERED RESPIRATORY (INHALATION) EVERY 6 HOURS PRN
Status: DISCONTINUED | OUTPATIENT
Start: 2022-11-18 | End: 2022-11-19 | Stop reason: HOSPADM

## 2022-11-18 RX ORDER — BUPRENORPHINE AND NALOXONE 8; 2 MG/1; MG/1
1 FILM, SOLUBLE BUCCAL; SUBLINGUAL 3 TIMES DAILY
Status: DISCONTINUED | OUTPATIENT
Start: 2022-11-18 | End: 2022-11-19 | Stop reason: HOSPADM

## 2022-11-18 RX ORDER — GABAPENTIN 300 MG/1
300 CAPSULE ORAL EVERY MORNING
Status: DISCONTINUED | OUTPATIENT
Start: 2022-11-19 | End: 2022-11-19 | Stop reason: HOSPADM

## 2022-11-18 RX ORDER — LIDOCAINE 40 MG/G
CREAM TOPICAL
Status: DISCONTINUED | OUTPATIENT
Start: 2022-11-18 | End: 2022-11-19 | Stop reason: HOSPADM

## 2022-11-18 RX ORDER — BUPRENORPHINE AND NALOXONE 8; 2 MG/1; MG/1
1 FILM, SOLUBLE BUCCAL; SUBLINGUAL DAILY
Status: DISCONTINUED | OUTPATIENT
Start: 2022-11-18 | End: 2022-11-18

## 2022-11-18 RX ORDER — GABAPENTIN 300 MG/1
600 CAPSULE ORAL EVERY EVENING
Status: DISCONTINUED | OUTPATIENT
Start: 2022-11-18 | End: 2022-11-19 | Stop reason: HOSPADM

## 2022-11-18 RX ADMIN — TIZANIDINE 8 MG: 4 TABLET ORAL at 20:16

## 2022-11-18 RX ADMIN — LISINOPRIL 20 MG: 20 TABLET ORAL at 13:03

## 2022-11-18 RX ADMIN — OMEPRAZOLE 40 MG: 20 CAPSULE, DELAYED RELEASE ORAL at 13:01

## 2022-11-18 RX ADMIN — CARBOXYMETHYLCELLULOSE SODIUM 1 DROP: 5 SOLUTION/ DROPS OPHTHALMIC at 13:03

## 2022-11-18 RX ADMIN — METFORMIN ER 500 MG 500 MG: 500 TABLET ORAL at 13:04

## 2022-11-18 RX ADMIN — METOCLOPRAMIDE 10 MG: 5 INJECTION, SOLUTION INTRAMUSCULAR; INTRAVENOUS at 18:04

## 2022-11-18 RX ADMIN — METFORMIN ER 500 MG 500 MG: 500 TABLET ORAL at 18:03

## 2022-11-18 RX ADMIN — GABAPENTIN 600 MG: 300 CAPSULE ORAL at 20:16

## 2022-11-18 RX ADMIN — MAGNESIUM SULFATE IN WATER 2 G: 40 INJECTION, SOLUTION INTRAVENOUS at 17:04

## 2022-11-18 RX ADMIN — BUPRENORPHINE AND NALOXONE 1 FILM: 8; 2 FILM, SOLUBLE BUCCAL; SUBLINGUAL at 20:16

## 2022-11-18 RX ADMIN — TIZANIDINE 4 MG: 4 TABLET ORAL at 13:02

## 2022-11-18 RX ADMIN — SODIUM CHLORIDE 1000 MG: 9 INJECTION, SOLUTION INTRAVENOUS at 18:04

## 2022-11-18 RX ADMIN — Medication 1 TABLET: at 16:01

## 2022-11-18 RX ADMIN — SODIUM CHLORIDE, POTASSIUM CHLORIDE, SODIUM LACTATE AND CALCIUM CHLORIDE 1000 ML: 600; 310; 30; 20 INJECTION, SOLUTION INTRAVENOUS at 16:35

## 2022-11-18 RX ADMIN — OXYCODONE HYDROCHLORIDE 5 MG: 5 TABLET ORAL at 00:02

## 2022-11-18 RX ADMIN — BUPRENORPHINE AND NALOXONE 1 FILM: 8; 2 FILM, SOLUBLE BUCCAL; SUBLINGUAL at 11:51

## 2022-11-18 RX ADMIN — DULOXETINE HYDROCHLORIDE 60 MG: 60 CAPSULE, DELAYED RELEASE ORAL at 20:17

## 2022-11-18 ASSESSMENT — ACTIVITIES OF DAILY LIVING (ADL)
VISION_MANAGEMENT: CONTACTS
ADLS_ACUITY_SCORE: 35
ADLS_ACUITY_SCORE: 35
DRESSING/BATHING_DIFFICULTY: NO
ADLS_ACUITY_SCORE: 35
TOILETING_ISSUES: NO
ADLS_ACUITY_SCORE: 35
ADLS_ACUITY_SCORE: 35
CONCENTRATING,_REMEMBERING_OR_MAKING_DECISIONS_DIFFICULTY: NO
DOING_ERRANDS_INDEPENDENTLY_DIFFICULTY: NO
ADLS_ACUITY_SCORE: 35
WALKING_OR_CLIMBING_STAIRS_DIFFICULTY: NO
FALL_HISTORY_WITHIN_LAST_SIX_MONTHS: NO
CHANGE_IN_FUNCTIONAL_STATUS_SINCE_ONSET_OF_CURRENT_ILLNESS/INJURY: NO
ADLS_ACUITY_SCORE: 35
DIFFICULTY_COMMUNICATING: NO
ADLS_ACUITY_SCORE: 20
HEARING_DIFFICULTY_OR_DEAF: NO
ADLS_ACUITY_SCORE: 35
ADLS_ACUITY_SCORE: 20
WEAR_GLASSES_OR_BLIND: YES
ADLS_ACUITY_SCORE: 20
DIFFICULTY_EATING/SWALLOWING: NO
ADLS_ACUITY_SCORE: 20

## 2022-11-18 ASSESSMENT — VISUAL ACUITY: OU: NORMAL ACUITY

## 2022-11-18 NOTE — ED NOTES
Called [facility], states they don't have staff to take pt but hopeful they will take pt on day shift.

## 2022-11-18 NOTE — ED NOTES
Sign out note: Carlos Hamilton is a 42 year old male was signed out to me by Dr. Carver at 0700 am.  Please see initial dictation for full details and history. Carlos Hamilton is a 42 year old male with a history of cerebral aneurysm clipping who initially presented yesterday with headache.  He was evaluated with a CT and CT angio of the head and neck.  CT angio showed significant artifact in the area of his aneurysm clipping as well as segmental stenosis of the mid to distal right MCA.  He was admitted to the neurology service yesterday.  Unfortunately, he has been boarding at the [facility] emergency department since that time.  There is no neurology available on this side of the [place] and he has not seen a neurologist since his admission.    Course in the ED:     I spoke with Dr. Mtz of Neurology, admitting neurologist, at approximately 11 AM on 11/18 .  The neurology service has been unable to evaluate the patient because he is boarding at the [facility].  I have reevaluated the patient.  He states his headache still comes in waves on the right side.  It was better after receiving Reglan this morning.  I have treated him with his Suboxone film as well as lisinopril and metformin.    I spoke with Dr. Pressley of the [place] ED at about 1130 am.   The plan is now to transfer the patient to the [place] ED to board until inpatient bed is available.  In this way, neurology can evaluate the patient while still in the ER.  He was transferred to the [place] ED board in the early afternoon.          This note was created in part by the use of Dragon voice recognition dictation system. Inadvertent grammatical errors and typographical errors may still exist.  MD Rosalind Gomez Alda L, MD  11/18/22 1076

## 2022-11-19 ENCOUNTER — APPOINTMENT (OUTPATIENT)
Dept: MRI IMAGING | Facility: CLINIC | Age: 42
End: 2022-11-19
Attending: STUDENT IN AN ORGANIZED HEALTH CARE EDUCATION/TRAINING PROGRAM
Payer: COMMERCIAL

## 2022-11-19 VITALS
WEIGHT: 215 LBS | OXYGEN SATURATION: 96 % | SYSTOLIC BLOOD PRESSURE: 136 MMHG | HEART RATE: 62 BPM | RESPIRATION RATE: 16 BRPM | TEMPERATURE: 98 F | DIASTOLIC BLOOD PRESSURE: 90 MMHG | HEIGHT: 71 IN | BODY MASS INDEX: 30.1 KG/M2

## 2022-11-19 PROCEDURE — 250N000013 HC RX MED GY IP 250 OP 250 PS 637: Performed by: EMERGENCY MEDICINE

## 2022-11-19 PROCEDURE — 99239 HOSP IP/OBS DSCHRG MGMT >30: CPT | Mod: GC | Performed by: STUDENT IN AN ORGANIZED HEALTH CARE EDUCATION/TRAINING PROGRAM

## 2022-11-19 PROCEDURE — 70553 MRI BRAIN STEM W/O & W/DYE: CPT

## 2022-11-19 PROCEDURE — A9585 GADOBUTROL INJECTION: HCPCS | Performed by: STUDENT IN AN ORGANIZED HEALTH CARE EDUCATION/TRAINING PROGRAM

## 2022-11-19 PROCEDURE — 72158 MRI LUMBAR SPINE W/O & W/DYE: CPT

## 2022-11-19 PROCEDURE — 72158 MRI LUMBAR SPINE W/O & W/DYE: CPT | Mod: 26 | Performed by: STUDENT IN AN ORGANIZED HEALTH CARE EDUCATION/TRAINING PROGRAM

## 2022-11-19 PROCEDURE — 250N000013 HC RX MED GY IP 250 OP 250 PS 637: Performed by: STUDENT IN AN ORGANIZED HEALTH CARE EDUCATION/TRAINING PROGRAM

## 2022-11-19 PROCEDURE — 255N000002 HC RX 255 OP 636: Performed by: STUDENT IN AN ORGANIZED HEALTH CARE EDUCATION/TRAINING PROGRAM

## 2022-11-19 PROCEDURE — 250N000013 HC RX MED GY IP 250 OP 250 PS 637

## 2022-11-19 PROCEDURE — 70553 MRI BRAIN STEM W/O & W/DYE: CPT | Mod: 26 | Performed by: STUDENT IN AN ORGANIZED HEALTH CARE EDUCATION/TRAINING PROGRAM

## 2022-11-19 RX ORDER — GABAPENTIN 300 MG/1
600 CAPSULE ORAL EVERY EVENING
Qty: 90 CAPSULE | Refills: 3 | Status: SHIPPED | OUTPATIENT
Start: 2022-11-19 | End: 2022-12-05

## 2022-11-19 RX ORDER — DULOXETIN HYDROCHLORIDE 30 MG/1
30 CAPSULE, DELAYED RELEASE ORAL EVERY MORNING
Qty: 30 CAPSULE | Refills: 3 | Status: SHIPPED | OUTPATIENT
Start: 2022-11-20 | End: 2023-01-30 | Stop reason: DRUGHIGH

## 2022-11-19 RX ORDER — LORAZEPAM 1 MG/1
1 TABLET ORAL ONCE
Status: COMPLETED | OUTPATIENT
Start: 2022-11-19 | End: 2022-11-19

## 2022-11-19 RX ORDER — GADOBUTROL 604.72 MG/ML
0.1 INJECTION INTRAVENOUS ONCE
Status: COMPLETED | OUTPATIENT
Start: 2022-11-19 | End: 2022-11-19

## 2022-11-19 RX ORDER — DULOXETIN HYDROCHLORIDE 60 MG/1
60 CAPSULE, DELAYED RELEASE ORAL EVERY EVENING
Qty: 30 CAPSULE | Refills: 3 | Status: SHIPPED | OUTPATIENT
Start: 2022-11-19 | End: 2023-01-30

## 2022-11-19 RX ORDER — GABAPENTIN 300 MG/1
300 CAPSULE ORAL EVERY MORNING
Qty: 30 CAPSULE | Refills: 3 | Status: SHIPPED | OUTPATIENT
Start: 2022-11-20 | End: 2022-12-05

## 2022-11-19 RX ORDER — ACETAMINOPHEN 325 MG/1
650 TABLET ORAL EVERY 6 HOURS PRN
Status: DISCONTINUED | OUTPATIENT
Start: 2022-11-19 | End: 2022-11-19 | Stop reason: HOSPADM

## 2022-11-19 RX ADMIN — ACETAMINOPHEN 650 MG: 325 TABLET, FILM COATED ORAL at 12:12

## 2022-11-19 RX ADMIN — TIZANIDINE 4 MG: 4 TABLET ORAL at 08:15

## 2022-11-19 RX ADMIN — GABAPENTIN 300 MG: 300 CAPSULE ORAL at 08:15

## 2022-11-19 RX ADMIN — BUPRENORPHINE AND NALOXONE 1 FILM: 8; 2 FILM, SOLUBLE BUCCAL; SUBLINGUAL at 08:16

## 2022-11-19 RX ADMIN — GADOBUTROL 9.7 ML: 604.72 INJECTION INTRAVENOUS at 12:26

## 2022-11-19 RX ADMIN — LORAZEPAM 1 MG: 1 TABLET ORAL at 12:12

## 2022-11-19 RX ADMIN — LISINOPRIL 20 MG: 20 TABLET ORAL at 08:15

## 2022-11-19 RX ADMIN — OMEPRAZOLE 40 MG: 20 CAPSULE, DELAYED RELEASE ORAL at 08:16

## 2022-11-19 RX ADMIN — METFORMIN ER 500 MG 500 MG: 500 TABLET ORAL at 08:15

## 2022-11-19 RX ADMIN — Medication 1 TABLET: at 08:15

## 2022-11-19 RX ADMIN — DULOXETINE HYDROCHLORIDE 30 MG: 30 CAPSULE, DELAYED RELEASE ORAL at 08:15

## 2022-11-19 ASSESSMENT — ACTIVITIES OF DAILY LIVING (ADL)
ADLS_ACUITY_SCORE: 20
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
## 2022-11-19 NOTE — PLAN OF CARE
Status: Admit 11/17 for ongoing H/A. Hx brain aneurysm clipping in 2013.  Vitals: VSS, afebrile.  Neuros: A/ox4. C/o baseline numbness to toes. Strength 5/5 throughout.  IV: PIV x2 SL  Resp/trach: WNL RA  Diet: Regular  Bowel status: LBM PTA  : Voiding  Skin: WNL  Pain: C/o chronic back pain, scheduled suboxone. C/o h/a 2-5/10, denies need for additional prns.  Activity: Independent  Plan: Continue current POC.  Updates this shift: MRI completed. See results, pt discharged after.    Discharge time/date: 3:26 PM  Walked or Wheelchair: walked  PIV removed: yes  Reviewed AVS with patient: yes  Medication due times added to AVS in EPIC: yes  Verbalized understanding of discharge with teachback: yes  Medications retrieved from pharmacy: pt to pickup on way out to car-going home via uber.  Supplies sent home: N/A.   Belongings returned to patient.

## 2022-11-19 NOTE — PLAN OF CARE
Status: Admit 11/17 for ongoing H/A. Hx brain aneurysm clipping in 2013.  Vitals: VSS, afebrile.  Neuros: A/ox4. C/o baseline numbness to toes. Strength 5/5 throughout.  IV: PIV x2 SL  Resp/trach: WNL RA  Diet: Regular  Bowel status: LBM PTA  : Voiding  Skin: WNL  Pain: C/o chronic back pain, scheduled suboxone. C/o h/a 2-5/10, denies need for additional prns.  Activity: Independent  Plan: Continue current POC. Plan for MRI. Possible discharge in AM.  Updates this shift: none

## 2022-11-19 NOTE — PROVIDER NOTIFICATION
Notified the neuro resident on call regardin-1 TRINO  NON-URGENT: Do you know how we need to scan this MRI pt? Checklist being sent but MRI states not sure what to do with coiling. Call to discuss when able. Thanks Landry [id number]

## 2022-11-19 NOTE — PLAN OF CARE
Belongings/meds: remain with patient  2 RN Skin Assessment Completed by: Olivia SHAIKH RN and Cinda HUGGINS RN    Non-intact findings documented (yes/no/NA): yes     Status: Admit on 11/17 for ongoing HA. Hx brain aneurysm post clipping in 2013.  Vitals: VSS  Neuros: AO x 4. Baseline numbness to bilateral toes.  IV: PIV SL x 2  Labs/Electrolytes: WNL  Resp/trach: WNL, on RA.  Diet: Regular diet, good po  Bowel status: LBM PTA  : voiding without difficulty   Skin: small scab above coccyx  Pain: head and back pain - migraine cocktail effective, on PTA suboxone.  Activity: up independently   Plan: Migraine cocktail given this shift, possible discharge home tomorrow if HA manageable.   Updates this shift: MRI pending, awaiting information on aneurysm coil.

## 2022-11-21 ENCOUNTER — MYC MEDICAL ADVICE (OUTPATIENT)
Dept: FAMILY MEDICINE | Facility: CLINIC | Age: 42
End: 2022-11-21

## 2022-11-21 ENCOUNTER — HEALTH MAINTENANCE LETTER (OUTPATIENT)
Age: 42
End: 2022-11-21

## 2022-11-21 ENCOUNTER — PATIENT OUTREACH (OUTPATIENT)
Dept: CARE COORDINATION | Facility: CLINIC | Age: 42
End: 2022-11-21

## 2022-11-21 DIAGNOSIS — F11.21 OPIOID USE DISORDER, SEVERE, IN SUSTAINED REMISSION (H): ICD-10-CM

## 2022-11-21 DIAGNOSIS — F17.200 TOBACCO DEPENDENCE SYNDROME: Primary | ICD-10-CM

## 2022-11-21 DIAGNOSIS — F10.21 ALCOHOL DEPENDENCE IN REMISSION (H): Primary | ICD-10-CM

## 2022-11-21 DIAGNOSIS — F43.21 ADJUSTMENT DISORDER WITH DEPRESSED MOOD: ICD-10-CM

## 2022-11-21 DIAGNOSIS — F41.1 ANXIETY STATE: ICD-10-CM

## 2022-11-21 RX ORDER — NICOTINE 21 MG/24HR
1 PATCH, TRANSDERMAL 24 HOURS TRANSDERMAL EVERY 24 HOURS
Qty: 28 PATCH | Refills: 3 | Status: SHIPPED | OUTPATIENT
Start: 2022-11-21 | End: 2023-06-07

## 2022-11-21 NOTE — PROGRESS NOTES
Clinic Care Coordination Contact  [facility]: Post-Discharge Note  SITUATION                                                      Admission:    Admission Date: 11/17/22   Reason for Admission: severe right sided headache that  resolved with interventions you received in the hospital  Discharge:   Discharge Date: 11/19/22  Discharge Diagnosis: severe right sided headache that  resolved with interventions you received in the hospital    BACKGROUND                                                      Per hospital discharge summary and inpatient provider notes:    Carlos Hamilton is a 42 year old male with history of opioid use disorder in remission with Suboxone, and past history of a brain aneurysm status post clipping in 2013 who presented to [facility] emergency department on 11/17/2022 with concerns of a 3-day history of a unilateral right-sided headache in the frontal temporal region.  Per emergency department documentation at [facility], the patient noted that he has been frequently taking ibuprofen as well as a scheduled Suboxone which often helps his headache which usually abates the headache for 10 to 12 hours before returning.  Per the documentation, the patient denies recent head trauma, blurry vision, or other neurologic lateralizing symptoms, however there is documentation to suggest that he has residual left-sided weakness secondary to postsurgical changes from the brain aneurysm.    ASSESSMENT           Discharge Assessment  How are you doing now that you are home?: I am a little shaky but other than I am doing good.  How are your symptoms? (Red Flag symptoms escalate to triage hotline per guidelines): Improved  Do you feel your condition is stable enough to be safe at home until your provider visit?: Yes  Does the patient have their discharge instructions? : Yes  Does the patient have questions regarding their discharge instructions? : No  Were you started on any new medications or were there  changes to any of your previous medications? : Yes (Patient did not start any new medications but there was a few changes to them.)  Does the patient have all of their medications?: Yes  Do you have questions regarding any of your medications? : No  Discharge follow-up appointment scheduled within 14 calendar days? : No (Patient is waiting to hear from Neurology to schedule follow up.)  Is patient agreeable to assistance with scheduling? : No                  PLAN                                                      Outpatient Plan: Follow up with general neurology in 4-8 weeks. A referral has been  placed on your behalf.    Future Appointments   Date Time Provider Department Center   12/2/2022  2:20 PM Lucero Cardenas MD [facility]   12/5/2022  2:00 PM Lucero Cardenas MD [facility]         For any urgent concerns, please contact our 24 hour nurse triage line: [phone] ([phone])         KOMAL Centeno  [phone]  [facility]

## 2022-11-23 ENCOUNTER — VIRTUAL VISIT (OUTPATIENT)
Dept: FAMILY MEDICINE | Facility: CLINIC | Age: 42
End: 2022-11-23
Payer: COMMERCIAL

## 2022-11-23 DIAGNOSIS — F41.9 ANXIETY: Primary | ICD-10-CM

## 2022-11-23 PROCEDURE — 99207 PR NO SHOW FOR SCHEDULED APPT: CPT | Mod: 93 | Performed by: FAMILY MEDICINE

## 2022-12-05 ENCOUNTER — VIRTUAL VISIT (OUTPATIENT)
Dept: FAMILY MEDICINE | Facility: CLINIC | Age: 42
End: 2022-12-05
Payer: COMMERCIAL

## 2022-12-05 DIAGNOSIS — R51.9 NONINTRACTABLE HEADACHE, UNSPECIFIED CHRONICITY PATTERN, UNSPECIFIED HEADACHE TYPE: ICD-10-CM

## 2022-12-05 DIAGNOSIS — F17.200 TOBACCO DEPENDENCE SYNDROME: ICD-10-CM

## 2022-12-05 DIAGNOSIS — F41.1 GAD (GENERALIZED ANXIETY DISORDER): ICD-10-CM

## 2022-12-05 DIAGNOSIS — F11.20 SEVERE OPIOID USE DISORDER ON MAINTENANCE THERAPY (H): Primary | ICD-10-CM

## 2022-12-05 DIAGNOSIS — M51.379 DEGENERATION OF LUMBAR/LUMBOSACRAL DISC WITHOUT MYELOPATHY: ICD-10-CM

## 2022-12-05 DIAGNOSIS — I67.1 BRAIN ANEURYSM: ICD-10-CM

## 2022-12-05 PROCEDURE — 99214 OFFICE O/P EST MOD 30 MIN: CPT | Mod: 95 | Performed by: FAMILY MEDICINE

## 2022-12-05 RX ORDER — BUPRENORPHINE AND NALOXONE 8; 2 MG/1; MG/1
1 FILM, SOLUBLE BUCCAL; SUBLINGUAL 3 TIMES DAILY
Qty: 90 FILM | Refills: 1 | Status: SHIPPED | OUTPATIENT
Start: 2022-12-05 | End: 2023-02-06

## 2022-12-05 NOTE — PROGRESS NOTES
"Carlos is a 42 year old who is being evaluated via a billable telephone visit.        Assessment & Plan     Severe opioid use disorder on maintenance therapy (H): stable from an opioid use disorder perspective. Had some cravings to drink after getting out of the hospital but he remained sober. Cravings are less now  - buprenorphine HCl-naloxone HCl (SUBOXONE) 8-2 MG per film  Dispense: 90 Film; Refill: 1    Nonintractable headache, unspecified chronicity pattern, unspecified headache type: improved but waiting to see Neurology. Will focus on treating his stress and anxiety for today rather than the headache per se, since he has already tried several things.     MARY (generalized anxiety disorder): referred for psychiatry referral. Did not have improvement with selective serotonin reuptake inhibitor. Benzos have helped in the past and I did prescribe these for 1 trip with flights, but have not done so long-term due to his addiction history.   - Adult Mental Health  Referral    Degeneration of lumbar/lumbosacral disc without myelopathy:spine referral. Has multiple levels of degenerated discs. He has some transportation struggles but it would be great if he could do PT.    - Spine  Referral    Tobacco dependence syndrome: started the patch.     Brain aneurysm: history of this, was reportedly stable post-clipping on recent imaging       Nicotine/Tobacco Cessation:  He reports that he has been smoking cigarettes and vaping device. He has a 13.50 pack-year smoking history. He has quit using smokeless tobacco.  Nicotine/Tobacco Cessation Plan:   using nicotine patches      BMI:   Estimated body mass index is 29.99 kg/m  as calculated from the following:    Height as of 11/17/22: 1.803 m (5' 11\").    Weight as of 11/18/22: 97.5 kg (215 lb).       Return for suboxone. in 2 months, in person.     Lucero Cardenas MD  [facility]    Tali Gonzalez is a 42 year old, presenting for the " "following health issues: follow up on Suboxone therapy, headaches, anxiety and back pain.   No chief complaint on file.      HPI     He has been very stressed recently ever since being hospitalized for night due to his headaches.  At the time, there was concern for ruptured aneurysm because he has a known aneurysm that was clipped in the past, but imaging did not show any issues with the aneurysm. The headache pain is not as bad as when he was in the hospital but it still kind of flares sometimes. Can't see Neurology until April. Used to have migraines as a teenager and doesn't think this is the same thing. This just feels like a lot of pressure, almost a like a \"pimple being pinched\" on the side of his head. Ibuprofen and tylenol take the edge off.     He is getting support from , his sponsor and family and some friends. He's also stressed because his dad is not doing too well in [place]. He's not eating or swallowing well, according to Carlos's mom.     In general, he feels more tired, achy, worse than usual. Part of it is his back pain, but he's achy besides that, too. He takes extra gabapentin sometimes- when pain is worse, anxiety is worse.           Objective           Vitals:  No vitals were obtained today due to virtual visit.    Physical Exam   healthy, alert and no distress  PSYCH: Alert and oriented times 3; coherent speech, normal   rate and volume, able to articulate logical thoughts, able   to abstract reason, no tangential thoughts, no hallucinations   or delusions  His affect is normal  RESP: No cough, no audible wheezing, able to talk in full sentences  Remainder of exam unable to be completed due to telephone visits          Phone call duration: 25 minutes      "

## 2022-12-13 ENCOUNTER — TELEPHONE (OUTPATIENT)
Dept: PHYSICAL MEDICINE AND REHAB | Facility: CLINIC | Age: 42
End: 2022-12-13

## 2022-12-13 NOTE — TELEPHONE ENCOUNTER
"Call placed to pt. Pt reports no loss of bowel/bladder control. \"I'm just going more frequently\". Denies new weakness in his legs (he does have baseline weakness in his left leg from a previous stroke). Denies saddle anesthesia. Explained this would not be related to his spine. Confirmed date/time of his appt. He stated understanding.     "

## 2022-12-13 NOTE — TELEPHONE ENCOUNTER
[facility] Call Center    Phone Message    May a detailed message be left on voicemail: yes     Reason for Call: Other: Patient states that he is going to the bathroom more frequently     Action Taken: Message routed to:  Other: [facility]  jean    Travel Screening: Not Applicable

## 2022-12-15 ENCOUNTER — NURSE TRIAGE (OUTPATIENT)
Dept: NURSING | Facility: CLINIC | Age: 42
End: 2022-12-15

## 2022-12-15 ENCOUNTER — HOSPITAL ENCOUNTER (EMERGENCY)
Facility: CLINIC | Age: 42
Discharge: HOME | End: 2022-12-15
Attending: EMERGENCY MEDICINE | Admitting: EMERGENCY MEDICINE
Payer: COMMERCIAL

## 2022-12-15 ENCOUNTER — E-VISIT (OUTPATIENT)
Dept: FAMILY MEDICINE | Facility: CLINIC | Age: 42
End: 2022-12-15
Payer: COMMERCIAL

## 2022-12-15 VITALS
SYSTOLIC BLOOD PRESSURE: 169 MMHG | RESPIRATION RATE: 18 BRPM | HEIGHT: 71 IN | BODY MASS INDEX: 30.1 KG/M2 | DIASTOLIC BLOOD PRESSURE: 96 MMHG | TEMPERATURE: 98.1 F | WEIGHT: 215 LBS | OXYGEN SATURATION: 98 % | HEART RATE: 115 BPM

## 2022-12-15 DIAGNOSIS — F41.9 ANXIETY: Primary | ICD-10-CM

## 2022-12-15 DIAGNOSIS — F40.243 ANXIETY WITH FLYING: ICD-10-CM

## 2022-12-15 PROCEDURE — 99207 PR NON-BILLABLE SERV PER CHARTING: CPT | Performed by: FAMILY MEDICINE

## 2022-12-15 PROCEDURE — 99283 EMERGENCY DEPT VISIT LOW MDM: CPT | Performed by: EMERGENCY MEDICINE

## 2022-12-15 RX ORDER — LORAZEPAM 0.5 MG/1
0.5 TABLET ORAL EVERY 6 HOURS PRN
Qty: 4 TABLET | Refills: 0 | Status: SHIPPED | OUTPATIENT
Start: 2022-12-15 | End: 2023-01-30

## 2022-12-15 NOTE — ED TRIAGE NOTES
Triage Assessment     Row Name 12/15/22 0322       Triage Assessment (Adult)    Airway WDL WDL       Respiratory WDL    Respiratory WDL WDL       Skin Circulation/Temperature WDL    Skin Circulation/Temperature WDL WDL       Cardiac WDL    Cardiac WDL WDL       Peripheral/Neurovascular WDL    Peripheral Neurovascular WDL WDL       Cognitive/Neuro/Behavioral WDL    Cognitive/Neuro/Behavioral WDL WDL

## 2022-12-15 NOTE — ED PROVIDER NOTES
ED Provider Note  [facility]      History     Chief Complaint   Patient presents with     Medication Refill     Patient is looking for medication for anxiety regarding flying - has flight this morning. Unable to get a hold of PCP     TALISHA  Carlos Hamilton is a 42 year old male with a history of generalized anxiety disorder, opioid use disorder on Suboxone maintenance, alcohol dependence in remission, hypertension and history of brain aneurysm and stroke who presents to the emergency department for anxiety associated with flying.  He reports that his father is quite ill in the ICU and found this out yesterday afternoon.  He has a flight booked to [place] this morning.  He has significant anxiety associated with flying and typically receives a benzodiazepine from his primary care provider for flying however was unable to contact the clinic yesterday and leaves this morning.  He is requesting medications to help with his flight.  He denies any other acute medical concerns at this time.  Denies any other changes in his medications.    Past Medical History  Past Medical History:   Diagnosis Date     Alcohol abuse      Asthma      Brain aneurysm     s/p clip in 2013     Brain aneurysm      Chronic back pain      HTN (hypertension)      Seizures (H)      Stroke (H)      Past Surgical History:   Procedure Laterality Date     aneursym clipping      Brain aneursym in 2013     ARTHROPLASTY HIP Right      ARTHROPLASTY REVISION HIP Right      BRAIN SURGERY       KNEE SURGERY      right knee     LORazepam (ATIVAN) 0.5 MG tablet  acetaminophen (TYLENOL) 325 MG tablet  buprenorphine HCl-naloxone HCl (SUBOXONE) 8-2 MG per film  DULoxetine (CYMBALTA) 30 MG capsule  DULoxetine (CYMBALTA) 60 MG capsule  gabapentin (NEURONTIN) 300 MG capsule  ibuprofen (ADVIL/MOTRIN) 200 MG tablet  lisinopril (ZESTRIL) 20 MG tablet  metFORMIN (GLUCOPHAGE-XR) 500 MG 24 hr tablet  multivitamin w/minerals (THERA-VIT-M)  "tablet  nicotine (NICODERM CQ) 14 MG/24HR 24 hr patch  nicotine (NICORETTE) 2 MG gum  omeprazole (PRILOSEC) 40 MG DR capsule  order for DME  tiZANidine (ZANAFLEX) 4 MG tablet  VENTOLIN  (90 Base) MCG/ACT inhaler      Allergies   Allergen Reactions     Diphenhydramine Swelling     Per patient, tongue/lips swelling, itchy eyes with both generic diphenhydramine and brand Benadryl      Naproxen Swelling     Pistachio Nut Extract Skin Test      Toradol [Ketorolac] Itching     Family History  Family History   Problem Relation Age of Onset     Glaucoma Mother      Uveitis Brother      Alcoholism No family hx of      Macular Degeneration No family hx of      Social History   Social History     Tobacco Use     Smoking status: Every Day     Packs/day: 0.50     Years: 27.00     Pack years: 13.50     Types: Cigarettes, Vaping Device     Smokeless tobacco: Former     Tobacco comments:     half a pack a day   Substance Use Topics     Alcohol use: Not Currently     Drug use: Not Currently      Past medical history, past surgical history, medications, allergies, family history, and social history were reviewed with the patient. No additional pertinent items.       Review of Systems  A complete review of systems was performed with pertinent positives and negatives noted in the HPI, and all other systems negative.    Physical Exam   BP: (!) 169/96  Pulse: 115  Temp: 98.1  F (36.7  C)  Resp: 18  Height: 180.3 cm (5' 11\")  Weight: 97.5 kg (215 lb)  SpO2: 98 %  Physical Exam  General: patient is alert and oriented and in no acute distress   Head: atraumatic and normocephalic   EENT: moist mucus membranes, sclera anicteric    Neck: supple   Cardiovascular: tachycardic, no murmur appreciated  Pulmonary: lungs clear to auscultation bilaterally    Musculoskeletal: normal range of motion   Neurological: alert and oriented, moving all extremities symmetrically, gait normal   Skin: warm, dry   Psych: Speech is normal in rate and tone, " anxious affect, does not appear to be responding to internal stimuli    ED Course      Procedures                     No results found for any visits on 12/15/22.  Medications - No data to display     Assessments & Plan (with Medical Decision Making)   Mr. Hamilton is a 42 year old male with a history of generalized anxiety disorder, opioid use disorder on Suboxone maintenance, alcohol dependence in remission, hypertension and history of brain aneurysm and stroke who presents to the emergency department for anxiety associated with flying.  He is noted to be moderately hypertensive and tachycardic and does appear somewhat anxious about his upcoming flight.  I did review the [facility] database and he last received lorazepam in May of this year.  It is noted in his recent primary care note that he has received benzodiazepines for flights in the past.  He denies any other acute medical or mental health concerns.  He was given a prescription for 4 tabs of lorazepam for his flight to and back from [place].  He was instructed to follow-up with his primary care provider for further medication management.    I have reviewed the nursing notes. I have reviewed the findings, diagnosis, plan and need for follow up with the patient.    New Prescriptions    LORAZEPAM (ATIVAN) 0.5 MG TABLET    Take 1 tablet (0.5 mg) by mouth every 6 hours as needed for anxiety       Final diagnoses:   Anxiety with flying       --  Sarah De La Torre MD  [facility] EMERGENCY DEPARTMENT  12/15/2022     Sarah De La Torre MD  12/15/22 0410

## 2022-12-15 NOTE — TELEPHONE ENCOUNTER
Pt calling back with the same issue.  Wants anti-anxiety medication prior to flying to [facility] in the morning.      Warm Transferred to  so she can discuss early morning appointment options.    Namrata Yang RN        Reason for Disposition    [1] Follow-up call to recent contact AND [2] information only call, no triage required    Additional Information    Caller has already spoken to PCP or another triager    Negative: [1] Caller is not with the adult (patient) AND [2] reporting urgent symptoms    Negative: Lab result questions    Negative: Medication questions    Negative: Caller can't be reached by phone    Negative: Caller has already spoken to PCP or another triager    Negative: RN needs further essential information from caller in order to complete triage    Negative: [1] Caller is not with the adult (patient) AND [2] probable NON-URGENT symptoms    Negative: Question about upcoming scheduled test, no triage required and triager able to answer question    Negative: General information question, no triage required and triager able to answer question    Negative: Health Information question, no triage required and triager able to answer question    Negative: Requesting regular office appointment    Negative: [1] Caller requesting NON-URGENT health information AND [2] PCP's office is the best resource    Protocols used: INFORMATION ONLY CALL - NO TRIAGE-A-, INFORMATION ONLY CALL-A-AH

## 2022-12-15 NOTE — DISCHARGE INSTRUCTIONS
Please make an appointment to follow up with Your Primary Care Provider for further medication management.      Take 1 tablet prior to flying.      
English

## 2022-12-15 NOTE — TELEPHONE ENCOUNTER
Pt says his PCP has given him medication to help decrease anxiety when he travels.  Has to fly to [place] in the the morning because his father is criically ill; says he can't wait until Clinic opens because he will be already at the airport.    Advised to go through [employer] to see if he can get a virtual visit tonight to talk to a provider.  Pt will try this and call back if not successful.    Namrata Yang RN         Reason for Disposition    Prescription request for new medicine (not a refill)    Additional Information    Negative: Drug overdose and triager unable to answer question    Negative: Caller requesting a renewal or refill of a medicine patient is currently taking    Negative: Caller requesting information unrelated to medicine    Negative: Caller requesting information about COVID-19 Vaccine    Negative: Caller requesting information about Emergency Contraception    Negative: Caller requesting information about Combined Birth Control Pills    Negative: Caller requesting information about Progestin Birth Control Pills    Negative: Caller requesting information about Post-Op pain or medicines    Negative: Caller requesting a prescription antibiotic (such as Penicillin) for Strep throat and has a positive culture result    Negative: Caller requesting a prescription anti-viral med (such as Tamiflu) and has influenza (flu) symptoms    Negative: Immunization reaction suspected    Negative: Rash while taking a medicine or within 3 days of stopping it    Negative: [1] Asthma and [2] having symptoms of asthma (cough, wheezing, etc.)    Negative: [1] Symptom of illness (e.g., headache, abdominal pain, earache, vomiting) AND [2] more than mild    Negative: Breastfeeding questions about mother's medicines and diet    Negative: MORE THAN A DOUBLE DOSE of a prescription or over-the-counter (OTC) drug    Negative: [1] DOUBLE DOSE (an extra dose or lesser amount) of prescription drug AND [2] any symptoms  (e.g., dizziness, nausea, pain, sleepiness)    Negative: [1] DOUBLE DOSE (an extra dose or lesser amount) of over-the-counter (OTC) drug AND [2] any symptoms (e.g., dizziness, nausea, pain, sleepiness)    Negative: Took another person's prescription drug    Negative: [1] DOUBLE DOSE (an extra dose or lesser amount) of prescription drug AND [2] NO symptoms (Exception: a double dose of antibiotics)    Negative: Diabetes drug error or overdose (e.g., took wrong type of insulin or took extra dose)    Negative: [1] Prescription not at pharmacy AND [2] was prescribed by PCP recently (Exception: triager has access to EMR and prescription is recorded there. Go to Home Care and confirm for pharmacy.)    Negative: [1] Pharmacy calling with prescription question AND [2] triager unable to answer question    Negative: [1] Caller has URGENT medicine question about med that PCP or specialist prescribed AND [2] triager unable to answer question    Negative: Medicine patch causing local rash or itching    Negative: [1] Caller has medicine question about med NOT prescribed by PCP AND [2] triager unable to answer question (e.g., compatibility with other med, storage)    Protocols used: MEDICATION QUESTION CALL-A-

## 2023-01-24 ENCOUNTER — VIRTUAL VISIT (OUTPATIENT)
Dept: PSYCHOLOGY | Facility: CLINIC | Age: 43
End: 2023-01-24
Payer: COMMERCIAL

## 2023-01-24 DIAGNOSIS — F32.1 MAJOR DEPRESSIVE DISORDER, SINGLE EPISODE, MODERATE (H): Primary | ICD-10-CM

## 2023-01-24 DIAGNOSIS — F41.1 GENERALIZED ANXIETY DISORDER: ICD-10-CM

## 2023-01-24 DIAGNOSIS — F10.21 ALCOHOL USE DISORDER, MODERATE, IN SUSTAINED REMISSION, DEPENDENCE (H): ICD-10-CM

## 2023-01-24 PROCEDURE — 90791 PSYCH DIAGNOSTIC EVALUATION: CPT | Mod: 95 | Performed by: MARRIAGE & FAMILY THERAPIST

## 2023-01-24 ASSESSMENT — ANXIETY QUESTIONNAIRES
8. IF YOU CHECKED OFF ANY PROBLEMS, HOW DIFFICULT HAVE THESE MADE IT FOR YOU TO DO YOUR WORK, TAKE CARE OF THINGS AT HOME, OR GET ALONG WITH OTHER PEOPLE?: SOMEWHAT DIFFICULT
1. FEELING NERVOUS, ANXIOUS, OR ON EDGE: NEARLY EVERY DAY
7. FEELING AFRAID AS IF SOMETHING AWFUL MIGHT HAPPEN: NOT AT ALL
IF YOU CHECKED OFF ANY PROBLEMS ON THIS QUESTIONNAIRE, HOW DIFFICULT HAVE THESE PROBLEMS MADE IT FOR YOU TO DO YOUR WORK, TAKE CARE OF THINGS AT HOME, OR GET ALONG WITH OTHER PEOPLE: SOMEWHAT DIFFICULT
4. TROUBLE RELAXING: NEARLY EVERY DAY
GAD7 TOTAL SCORE: 15
5. BEING SO RESTLESS THAT IT IS HARD TO SIT STILL: NEARLY EVERY DAY
GAD7 TOTAL SCORE: 15
3. WORRYING TOO MUCH ABOUT DIFFERENT THINGS: SEVERAL DAYS
GAD7 TOTAL SCORE: 15
5. BEING SO RESTLESS THAT IT IS HARD TO SIT STILL: NEARLY EVERY DAY
5. BEING SO RESTLESS THAT IT IS HARD TO SIT STILL: NEARLY EVERY DAY
2. NOT BEING ABLE TO STOP OR CONTROL WORRYING: MORE THAN HALF THE DAYS
4. TROUBLE RELAXING: NEARLY EVERY DAY
4. TROUBLE RELAXING: NEARLY EVERY DAY
2. NOT BEING ABLE TO STOP OR CONTROL WORRYING: MORE THAN HALF THE DAYS
GAD7 TOTAL SCORE: 15
3. WORRYING TOO MUCH ABOUT DIFFERENT THINGS: SEVERAL DAYS
3. WORRYING TOO MUCH ABOUT DIFFERENT THINGS: SEVERAL DAYS
1. FEELING NERVOUS, ANXIOUS, OR ON EDGE: NEARLY EVERY DAY
7. FEELING AFRAID AS IF SOMETHING AWFUL MIGHT HAPPEN: NOT AT ALL
7. FEELING AFRAID AS IF SOMETHING AWFUL MIGHT HAPPEN: NOT AT ALL
6. BECOMING EASILY ANNOYED OR IRRITABLE: NEARLY EVERY DAY
GAD7 TOTAL SCORE: 15
2. NOT BEING ABLE TO STOP OR CONTROL WORRYING: MORE THAN HALF THE DAYS
GAD7 TOTAL SCORE: 15
GAD7 TOTAL SCORE: 15
6. BECOMING EASILY ANNOYED OR IRRITABLE: NEARLY EVERY DAY
GAD7 TOTAL SCORE: 15
8. IF YOU CHECKED OFF ANY PROBLEMS, HOW DIFFICULT HAVE THESE MADE IT FOR YOU TO DO YOUR WORK, TAKE CARE OF THINGS AT HOME, OR GET ALONG WITH OTHER PEOPLE?: SOMEWHAT DIFFICULT
IF YOU CHECKED OFF ANY PROBLEMS ON THIS QUESTIONNAIRE, HOW DIFFICULT HAVE THESE PROBLEMS MADE IT FOR YOU TO DO YOUR WORK, TAKE CARE OF THINGS AT HOME, OR GET ALONG WITH OTHER PEOPLE: SOMEWHAT DIFFICULT
7. FEELING AFRAID AS IF SOMETHING AWFUL MIGHT HAPPEN: NOT AT ALL
1. FEELING NERVOUS, ANXIOUS, OR ON EDGE: NEARLY EVERY DAY
GAD7 TOTAL SCORE: 15
8. IF YOU CHECKED OFF ANY PROBLEMS, HOW DIFFICULT HAVE THESE MADE IT FOR YOU TO DO YOUR WORK, TAKE CARE OF THINGS AT HOME, OR GET ALONG WITH OTHER PEOPLE?: SOMEWHAT DIFFICULT
7. FEELING AFRAID AS IF SOMETHING AWFUL MIGHT HAPPEN: NOT AT ALL
7. FEELING AFRAID AS IF SOMETHING AWFUL MIGHT HAPPEN: NOT AT ALL
6. BECOMING EASILY ANNOYED OR IRRITABLE: NEARLY EVERY DAY
IF YOU CHECKED OFF ANY PROBLEMS ON THIS QUESTIONNAIRE, HOW DIFFICULT HAVE THESE PROBLEMS MADE IT FOR YOU TO DO YOUR WORK, TAKE CARE OF THINGS AT HOME, OR GET ALONG WITH OTHER PEOPLE: SOMEWHAT DIFFICULT

## 2023-01-24 ASSESSMENT — COLUMBIA-SUICIDE SEVERITY RATING SCALE - C-SSRS
2. HAVE YOU ACTUALLY HAD ANY THOUGHTS OF KILLING YOURSELF?: NO
1. HAVE YOU WISHED YOU WERE DEAD OR WISHED YOU COULD GO TO SLEEP AND NOT WAKE UP?: NO
ATTEMPT LIFETIME: NO

## 2023-01-24 ASSESSMENT — PATIENT HEALTH QUESTIONNAIRE - PHQ9
SUM OF ALL RESPONSES TO PHQ QUESTIONS 1-9: 18
10. IF YOU CHECKED OFF ANY PROBLEMS, HOW DIFFICULT HAVE THESE PROBLEMS MADE IT FOR YOU TO DO YOUR WORK, TAKE CARE OF THINGS AT HOME, OR GET ALONG WITH OTHER PEOPLE: SOMEWHAT DIFFICULT
SUM OF ALL RESPONSES TO PHQ QUESTIONS 1-9: 18

## 2023-01-24 NOTE — PROGRESS NOTES
"[facility]      PATIENT'S NAME: Carlos Hamilton  PREFERRED NAME: Carlos  PRONOUNS: He/Him   MRN: [id number]  : 1980  ADDRESS: [address]  [place] [address]  [facility]. NUMBER:  [id number]  DATE OF SERVICE: 23  START TIME: 11am  END TIME: 1150am  PREFERRED PHONE: [phone]  May we leave a program related message: Yes  SERVICE MODALITY:  Video Visit:      Telemedicine Visit: The patient's condition can be safely assessed and treated via synchronous audio and visual telemedicine encounter.      Reason for Telemedicine Visit: Patient has requested telehealth visit    Originating Site (Patient Location): Patient's home        Distant Location (provider location):  Off-site    Consent:  The patient/guardian has verbally consented to: the potential risks and benefits of telemedicine (video visit) versus in person care; bill my insurance or make self-payment for services provided; and responsibility for payment of non-covered services.     Mode of Communication:  Video Conference via [employer]    As the provider I attest to compliance with applicable laws and regulations related to telemedicine.  [place] ADULT Mental Health DIAGNOSTIC ASSESSMENT    Identifying Information:  Patient is a 42 year old,   individual.  Patient was referred for an assessment by referring provider.  Patient attended the session alone.    Chief Complaint:   The reason for seeking services at this time is: \"Depression anxiety\".  The problem(s) began 17.  He also has a lot of health issues that contribute to low mood.      Patient has attempted to resolve these concerns in the past through case management, therapy and inpatient and outpatient chemical health services .    Social/Family History:  Patient reported they grew up in [place], [place].  They were raised by biological parents  .  Parents were always together.  Patient reported that their childhood was overall okay but he " recently remembered a trauma he would like to talk through.  Patient described their current relationships with family of origin as good.  His father recently passed in December.      The patient describes their cultural background as .  Cultural influences and impact on patient's life structure, values, norms, and healthcare: None noted.  Contextual influences on patient's health include: None noted    These factors will be addressed in the Preliminary Treatment plan. Patient identified their preferred language to be English. Patient reported they does not need the assistance of an  or other support involved in therapy.     Patient reported had no significant delays in developmental tasks.   Patient's highest education level was GED  .  Patient identified the following learning problems: none reported.  Modifications will not be used to assist communication in therapy.  Patient reports they are  able to understand written materials.    Patient reported the following relationship history dating.  Patient's current relationship status is single for a couple of years.   Patient identified their sexual orientation as heterosexual.  Patient reported having 0 child(hector). Patient identified mother; co-worker as part of their support system.  Patient identified the quality of these relationships as fair,  .      Patient's current living/housing situation involves other.  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  Extended family is in [place].      Patient is currently employed fulltime.  Patient reports their finances are obtained through employment. Patient does identify finances as a current stressor.      Patient reported that they have been involved with the legal system.  Mostly drinking related issues. Patient does not report being under probation/ parole/ jurisdiction.     Patient's Strengths and Limitations:  Patient identified the following strengths or resources that will help  them succeed in treatment: commitment to health and well being, mikey / spirituality, friends / good social support, family support, insight, positive work environment, motivation, sober support group / recovery support , strong social skills and work ethic. Things that may interfere with the patient's success in treatment include: physical health concerns.     Assessments:  PHQ2:   PHQ-2 ( 1999 [employer]) 1/24/2023 7/25/2022 7/24/2022 4/25/2022 4/25/2022 3/18/2022 3/18/2022   Q1: Little interest or pleasure in doing things 3 - 3 - - - -   Q2: Feeling down, depressed or hopeless 3 - 2 - - - -   PHQ-2 Score 6 - 5 - - - -   PHQ-2 Total Score (12-17 Years)- Positive if 3 or more points; Administer PHQ-A if positive - - - - - - -   Q1: Little interest or pleasure in doing things Nearly every day - Nearly every day - - - -   Q2: Feeling down, depressed or hopeless Nearly every day - More than half the days - - - -   PHQ-2 Score 6 Incomplete 5 Incomplete Incomplete Incomplete Incomplete     PHQ9:   PHQ-9 SCORE 3/18/2022 4/25/2022 7/24/2022 12/15/2022 1/24/2023   PHQ-9 Total Score MyChart 13 (Moderate depression) 9 (Mild depression) 8 (Mild depression) 10 (Moderate depression) 18 (Moderately severe depression)   PHQ-9 Total Score 13 9 8 10 18   Some encounter information is confidential and restricted. Go to Review ThreatStream activity to see all data.     GAD2:   MARY-2 1/24/2023 1/24/2023 1/24/2023   Feeling nervous, anxious, or on edge 3 3 3   Not being able to stop or control worrying 1 1 1   MARY-2 Total Score 4 4 4     GAD7:   MARY-7 SCORE 3/18/2022 4/25/2022 7/25/2022 12/15/2022 1/24/2023 1/24/2023 1/24/2023   Total Score 16 (severe anxiety) 16 (severe anxiety) 17 (severe anxiety) 20 (severe anxiety) - - 15 (severe anxiety)   Total Score 16 16 17 20 15 15 15   Some encounter information is confidential and restricted. Go to Review ThreatStream activity to see all data.     PROMIS 10-Global Health (all questions and answers  displayed):   PROMIS 10 7/30/2019 1/24/2023   In general, would you say your health is: Good -   In general, would you say your quality of life is: Good -   In general, how would you rate your physical health? Poor -   In general, how would you rate your mental health, including your mood and your ability to think? Fair -   In general, how would you rate your satisfaction with your social activities and relationships? Good -   In general, please rate how well you carry out your usual social activities and roles Good -   To what extent are you able to carry out your everyday physical activities such as walking, climbing stairs, carrying groceries, or moving a chair? Completely -   How often have you been bothered by emotional problems such as feeling anxious, depressed or irritable? Sometimes -   How would you rate your fatigue on average? Severe -   How would you rate your pain on average?   0 = No Pain  to  10 = Worst Imaginable Pain 4 -   In general, would you say your health is: 3 2   In general, would you say your quality of life is: 3 2   In general, how would you rate your physical health? 1 1   In general, how would you rate your mental health, including your mood and your ability to think? 2 2   In general, how would you rate your satisfaction with your social activities and relationships? 3 3   In general, please rate how well you carry out your usual social activities and roles. (This includes activities at home, at work and in your community, and responsibilities as a parent, child, spouse, employee, friend, etc.) 3 4   To what extent are you able to carry out your everyday physical activities such as walking, climbing stairs, carrying groceries, or moving a chair? 5 5   In the past 7 days, how often have you been bothered by emotional problems such as feeling anxious, depressed, or irritable? 3 4   In the past 7 days, how would you rate your fatigue on average? 4 3   In the past 7 days, how would you  rate your pain on average, where 0 means no pain, and 10 means worst imaginable pain? 4 7   Global Mental Health Score 11 9   Global Physical Health Score 11 11   PROMIS TOTAL - SUBSCORES 22 20   Some recent data might be hidden     [place] Suicide Severity Rating Scale (Lifetime/Recent)  [place] Suicide Severity Rating (Lifetime/Recent) 2/27/2019 2/27/2019 3/12/2019 1/24/2023   Q1 Wished to be Dead (Past Month) no no no -   Q2 Suicidal Thoughts (Past Month) no yes no -   Q3 Suicidal Thought Method - no - -   Q4 Suicidal Intent without Specific Plan - yes - -   Q5 Suicide Intent with Specific Plan - no - -   Q6 Suicide Behavior (Lifetime) - no no -   1. Wish to be Dead (Lifetime) - - - 0   2. Non-Specific Active Suicidal Thoughts (Lifetime) - - - 0   Actual Attempt (Lifetime) - - - 0   Has subject engaged in non-suicidal self-injurious behavior? (Lifetime) - - - 0   Calculated C-SSRS Risk Score (Lifetime/Recent) - - - No Risk Indicated       Personal and Family Medical History:  Patient does report a family history of mental health concerns.  Patient reports family history includes Glaucoma in his mother; Uveitis in his brother..     Patient does report Mental Health Diagnosis and/or Treatment.  Patient Patient reported the following previous diagnoses which include(s): an Anxiety Disorder and Depression.  Patient reported symptoms began in 2019.   Patient has received mental health services in the past:Therapy, case management, inpatient and outpatient chemical health.   Psychiatric Hospitalizations: None.  Patient denies a history of civil commitment.  Patient is receiving other mental health services.  These include Psychiatry .         Patient has had a physical exam to rule out medical causes for current symptoms.  Date of last physical exam was within the past year. Client was encouraged to follow up with PCP if symptoms were to develop. The patient has a [facility] Primary Care Provider, who is named Theresa  Lucero JIMENEZ.  Patient reports the following current medical concerns: See EPIC problem list .  Patient reports pain concerns including back pain.  Patient does want help addressing pain concerns..   There are not significant appetite / nutritional concerns / weight changes.   Patient does not report a history of head injury / trauma / cognitive impairment.      Patient reports current meds as:   Outpatient Medications Marked as Taking for the 1/24/23 encounter (Virtual Visit) with Sheba Davison TH   Medication Sig     acetaminophen (TYLENOL) 325 MG tablet Take 650 mg by mouth every 6 hours as needed for mild pain or headaches     buprenorphine HCl-naloxone HCl (SUBOXONE) 8-2 MG per film Place 1 Film under the tongue 3 times daily     DULoxetine (CYMBALTA) 30 MG capsule Take 1 capsule (30 mg) by mouth every morning     DULoxetine (CYMBALTA) 60 MG capsule Take 1 capsule (60 mg) by mouth every evening     gabapentin (NEURONTIN) 300 MG capsule Take 2 capsules (600 mg) by mouth 3 times daily     ibuprofen (ADVIL/MOTRIN) 200 MG tablet Take 600 mg by mouth every 6 hours as needed for moderate pain (4-6)     lisinopril (ZESTRIL) 20 MG tablet Take 1 tablet (20 mg) by mouth daily     LORazepam (ATIVAN) 0.5 MG tablet Take 1 tablet (0.5 mg) by mouth every 6 hours as needed for anxiety     metFORMIN (GLUCOPHAGE-XR) 500 MG 24 hr tablet Take 1 tablet (500 mg) by mouth 2 times daily (with meals)     multivitamin w/minerals (THERA-VIT-M) tablet Take 1 tablet by mouth daily     nicotine (NICODERM CQ) 14 MG/24HR 24 hr patch Place 1 patch onto the skin every 24 hours     nicotine (NICORETTE) 2 MG gum Place 1 each (2 mg) inside cheek every hour as needed for smoking cessation     omeprazole (PRILOSEC) 40 MG DR capsule Take 1 capsule (40 mg) by mouth daily     order for DME Equipment being ordered: adult mask for neb machine with tubing     tiZANidine (ZANAFLEX) 4 MG tablet Take 1 tablet (4 mg) by mouth every 4 hours     VENTOLIN   (90 Base) MCG/ACT inhaler INHALE TWO PUFFS BY MOUTH EVERY SIX HOURS AS NEEDED FOR WHEEZING       Medication Adherence:  Patient reports taking.  taking prescribed medications as prescribed.    Patient Allergies:    Allergies   Allergen Reactions     Diphenhydramine Swelling     Per patient, tongue/lips swelling, itchy eyes with both generic diphenhydramine and brand Benadryl      Naproxen Swelling     Pistachio Nut Extract Skin Test      Toradol [Ketorolac] Itching       Medical History:    Past Medical History:   Diagnosis Date     Alcohol abuse      Asthma      Brain aneurysm     s/p clip in 2013     Brain aneurysm      Chronic back pain      HTN (hypertension)      Seizures (H)      Stroke (H)          Current Mental Status Exam:   Appearance:  Appropriate    Eye Contact:  Good   Psychomotor:  Normal       Gait / station:  no problem  Attitude / Demeanor: Cooperative   Speech      Rate / Production: Normal/ Responsive      Volume:  Normal  volume      Language:  intact  Mood:   Anxious  Depressed   Affect:   Worrisome    Thought Content: Clear   Thought Process: Coherent       Associations: No loosening of associations  Insight:   Good   Judgment:  Intact   Orientation:  All  Attention/concentration: Good      Substance Use:  Patient did not report a family history of substance use concerns; see medical history section for details.  Patient has received chemical dependency treatment in the past at both Inpatient and outpatient care in the [facility].  Patient has ever been to detox.      Patient is not currently receiving any chemical dependency treatment.           Substance History of use Age of first use Date of last use     Pattern and duration of use (include amounts and frequency)   Alcohol used in the past   10 03/17/19 Went into treatment at this time for help   Cannabis   used in the past 13 01/24/08      Amphetamines   used in the past   01/24/08    Cocaine/crack    used in the past 15   01/24/08     Hallucinogens used in the past   15  01/24/08     Inhalants used in the past   13  01/24/96     Heroin never used            Other Opiates never used        Benzodiazepine   never used        Barbiturates never used        Over the counter meds never used        Caffeine currently use Q      Nicotine  currently use 9 01/24/23    Other substances not listed above:  Identify:  never used          Patient reported the following problems as a result of their substance use: DUI; family problems; financial problems; legal issues; occupational/vocational problems; relationship problems.    Substance Use: No current symptoms/ sober for four years     Based on the negative CAGE score and clinical interview there  are not indications of drug or alcohol abuse.  Past substance use issues are being addressed.      Significant Losses / Trauma / Abuse / Neglect Issues:   Patient did not serve in the .  There are indications or report of significant loss, trauma, abuse or neglect issues related to: Loss of father in 2022  Patient had a recent flashback of a trauma he had blocked out.  Would like to process through this in session.    Concerns for possible neglect are not present.     Safety Assessment:   Patient denies current homicidal ideation and behaviors.  Patient denies current self-injurious ideation and behaviors.    Patient denied risk behaviors associated with substance use.  Patient denies any high risk behaviors associated with mental health symptoms.  Patient reports the following current concerns for their personal safety: None.  Patient reports there are not firearms in the house.        History of Safety Concerns:  Patient denied a history of homicidal ideation.     Patient denied a history of personal safety concerns.    Patient denied a history of assaultive behaviors.    Patient denied a history of sexual assault behaviors.     Patient reported a history of placing themselves in unsafe  environment(s) associated with substance use.  Patient reported a history of substance use associated with mental health symptoms.  Patient reports the following protective factors: forward or future oriented thinking; dedication to family or friends; safe and stable environment; sense of belonging; purpose; help seeking behaviors when distressed; adherence with prescribed medication; living with other people; daily obligations; structured day; effective problem solving skills    Risk Plan:  See Recommendations for Safety and Risk Management Plan    Review of Symptoms per patient report:   Depression: Change in sleep, Lack of interest, Excessive or inappropriate guilt, Change in energy level, Difficulties concentrating, Psychomotor slowing or agitation, Feeling sad, down, or depressed and Withdrawn  Jessika:  No Symptoms  Psychosis: No Symptoms  Anxiety: Excessive worry, Nervousness, Sleep disturbance, Psychomotor agitation, Ruminations and Poor concentration  Panic:  Palpitations, Shortness of breath and Sense of impending doom  Post Traumatic Stress Disorder:  Experienced traumatic event .   Eating Disorder: No Symptoms  ADD / ADHD:  No symptoms  Conduct Disorder: No symptoms  Autism Spectrum Disorder: No symptoms  Obsessive Compulsive Disorder: No Symptoms    Patient reports the following compulsive behaviors and treatment history: None.      Diagnostic Criteria:   Generalized Anxiety Disorder  A. Excessive anxiety and worry about a number of events or activities (such as work or school performance).   B. The person finds it difficult to control the worry.  C. Select 3 or more symptoms (required for diagnosis). Only one item is required in children.   - Restlessness or feeling keyed up or on edge.    - Being easily fatigued.    - Difficulty concentrating or mind going blank.    - Irritability.    - Muscle tension.    - Sleep disturbance (difficulty falling or staying asleep, or restless unsatisfying sleep).   D.  The focus of the anxiety and worry is not confined to features of an Axis I disorder.  E. The anxiety, worry, or physical symptoms cause clinically significant distress or impairment in social, occupational, or other important areas of functioning.   F. The disturbance is not due to the direct physiological effects of a substance (e.g., a drug of abuse, a medication) or a general medical condition (e.g., hyperthyroidism) and does not occur exclusively during a Mood Disorder, a Psychotic Disorder, or a Pervasive Developmental Disorder. Major Depressive Disorder  A) Single episode - symptoms have been present during the same 2-week period and represent a change from previous functioning 5 or more symptoms (required for diagnosis)   - Depressed mood. Note: In children and adolescents, can be irritable mood.     - Diminished interest or pleasure in all, or almost all, activities.    - Decreased sleep.    - Psychomotor activity retardation.    - Fatigue or loss of energy.    - Feelings of worthlessness or inappropriate guilt.    - Diminished ability to think or concentrate, or indecisiveness.  Substance Use Disorder Substance is often taken in larger amounts or over a longer period than was intended.  Met for:  Alcohol There is persistent desire or unsuccessful efforts to cut down or control use of the substance.  Met for:  Alcohol  A great deal of time is spent in activities necessary to obtain the substance, use the substance, or recover from its effects.  Met for:  Alcohol Craving, or a strong desire or urge to use the substance.  Met for:  Alcohol Recurrent use of the substance resulting in a failure to fulfill major role obligations at work, school, or home.  Met for:  Alcohol Important social, occupational, or recreational activities are given up or reduced because of the substance.  Met for:  Alcohol    Functional Status:  Patient reports the following functional impairments:  organization, relationship(s),  self-care and social interactions.     Nonprogrammatic care:  Patient is requesting basic services to address current mental health concerns.    Clinical Summary:  1. Reason for assessment: Depression, anxiety and health issues  .  2. Psychosocial, Cultural and Contextual Factors: None  3. 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  4. Other Diagnoses that is relevant to services:   Past polysubstance use in 2008  5. Provisional Diagnosis:  None  6. Prognosis: Expect Improvement.  7. Likely consequences of symptoms if not treated: Continued issues with depression, anxiety and health issues.  8. Client strengths include:  caring, committed to sobriety, empathetic, employed, goal-focused, good listener, has a previous history of therapy, insightful, intelligent, motivated, open to learning, open to suggestions / feedback, support of family, friends and providers, supportive, wants to learn, willing to ask questions, willing to relate to others and work history .     Recommendations:     1. Plan for Safety and Risk Management:   Safety and Risk: Recommended that patient call 911 or go to the local ED should there be a change in any of these risk factors..          Report to child / adult protection services was NA.     2. Patient's identified no other concerns.     3. Initial Treatment will focus on:    Depressed Mood    Anxiety    Chronic pain      4. Resources/Service Plan:    services are not indicated.   Modifications to assist communication are not indicated.   Additional disability accommodations are not indicated.      5. Collaboration:   Collaboration / coordination of treatment will be initiated with the following  support  professionals: primary care physician and psychiatry      6.  Referrals:   The following referral(s) will be initiated: None     A Release of Information has been obtained for the following: primary care physician and psychiatry.     Emergency Contact was obtained.          7. EDUAR:    EDUAR:  Discussed the general effects of drugs and alcohol on health and well-being and Attend a sober community support program including AA, [facility] Recovery, [facility], etc.).. Provider gave patient printed information about the effects of chemical use on their health and well being. Recommendations:  Patient has supports in place as noted above.      8. Records:   These were reviewed at time of assessment.   Information in this assessment was obtained from the medical record and  provided by patient who is a good historian.    Patient will have open access to their mental health medical record.    9.   Interactive Complexity: No      Provider Name/ Credentials:  Sheba Davison MA, LMFT January 24, 2023

## 2023-01-24 NOTE — Clinical Note
Carlos is getting started in the counseling center and will be working on managing depression, anxiety and coping with chronic pain.  Thank you, Sheba Davison

## 2023-01-30 ENCOUNTER — VIRTUAL VISIT (OUTPATIENT)
Dept: PSYCHIATRY | Facility: CLINIC | Age: 43
End: 2023-01-30
Attending: FAMILY MEDICINE
Payer: COMMERCIAL

## 2023-01-30 DIAGNOSIS — F10.21 ALCOHOL DEPENDENCE IN REMISSION (H): ICD-10-CM

## 2023-01-30 DIAGNOSIS — F11.20 SEVERE OPIOID USE DISORDER ON MAINTENANCE THERAPY (H): ICD-10-CM

## 2023-01-30 DIAGNOSIS — F41.1 GAD (GENERALIZED ANXIETY DISORDER): ICD-10-CM

## 2023-01-30 DIAGNOSIS — F33.1 MAJOR DEPRESSIVE DISORDER, RECURRENT EPISODE, MODERATE (H): Primary | ICD-10-CM

## 2023-01-30 PROCEDURE — 99205 OFFICE O/P NEW HI 60 MIN: CPT | Mod: 95 | Performed by: NURSE PRACTITIONER

## 2023-01-30 RX ORDER — DULOXETIN HYDROCHLORIDE 60 MG/1
CAPSULE, DELAYED RELEASE ORAL
Qty: 60 CAPSULE | Refills: 0 | Status: SHIPPED | OUTPATIENT
Start: 2023-01-30 | End: 2023-02-21

## 2023-01-30 RX ORDER — GABAPENTIN 100 MG/1
CAPSULE ORAL
Qty: 90 CAPSULE | Refills: 0 | Status: SHIPPED | OUTPATIENT
Start: 2023-01-30 | End: 2023-01-30

## 2023-01-30 RX ORDER — GABAPENTIN 100 MG/1
100 CAPSULE ORAL 3 TIMES DAILY
Qty: 90 CAPSULE | Refills: 0 | Status: SHIPPED | OUTPATIENT
Start: 2023-01-30 | End: 2023-02-21

## 2023-01-30 ASSESSMENT — PATIENT HEALTH QUESTIONNAIRE - PHQ9
SUM OF ALL RESPONSES TO PHQ QUESTIONS 1-9: 16
SUM OF ALL RESPONSES TO PHQ QUESTIONS 1-9: 16
10. IF YOU CHECKED OFF ANY PROBLEMS, HOW DIFFICULT HAVE THESE PROBLEMS MADE IT FOR YOU TO DO YOUR WORK, TAKE CARE OF THINGS AT HOME, OR GET ALONG WITH OTHER PEOPLE: SOMEWHAT DIFFICULT
SUM OF ALL RESPONSES TO PHQ QUESTIONS 1-9: 16
10. IF YOU CHECKED OFF ANY PROBLEMS, HOW DIFFICULT HAVE THESE PROBLEMS MADE IT FOR YOU TO DO YOUR WORK, TAKE CARE OF THINGS AT HOME, OR GET ALONG WITH OTHER PEOPLE: SOMEWHAT DIFFICULT

## 2023-01-30 NOTE — Clinical Note
Please call to schedule patient for follow-up in 3 weeks.  Thank you!   DAVID Barrera, ARIANE-BC [facility] ([facility]) [facility]

## 2023-01-30 NOTE — NURSING NOTE
Pt reported no changes with allergies.    Pt reported to not be using DME yet because he still hasn't received the equipment yet.    Pt currently out of medications:  -Duloxetine 30 mg      VITALIY Lagos January 30, 2023 1:25 PM
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
## 2023-01-30 NOTE — Clinical Note
Thank you for the Psychiatry referral to the [facility] Care Psychiatry Service ([facility]PS). Our psychiatry providers act as a specialty service for Primary Care Providers in the [facility] who seek to optimize medications for unstable patients.  Once medications have been optimized, our providers discharge the patient back to the referring Primary Care Provider for ongoing medication management.  This type of system allows our providers to serve a high volume of patients.   Please see my Impression and Plan. I agree that I would like to avoid benzodiazepines, oliver with concurrent Suboxone and will trial a slow increase in gabapentin for now. I will continue to work with them in stabilizing their mood.  If you have any questions or concerns, please let me know. Thank you again!  DAVID Barrera, PMDEBORAHP-BC Collaborative Care Psychiatry Service ([facility]PS) [facility]

## 2023-01-31 ENCOUNTER — VIRTUAL VISIT (OUTPATIENT)
Dept: PSYCHOLOGY | Facility: CLINIC | Age: 43
End: 2023-01-31
Payer: COMMERCIAL

## 2023-01-31 DIAGNOSIS — F41.1 GENERALIZED ANXIETY DISORDER: ICD-10-CM

## 2023-01-31 DIAGNOSIS — F32.1 MAJOR DEPRESSIVE DISORDER, SINGLE EPISODE, MODERATE (H): Primary | ICD-10-CM

## 2023-01-31 DIAGNOSIS — F10.21 ALCOHOL USE DISORDER, MODERATE, IN SUSTAINED REMISSION, DEPENDENCE (H): ICD-10-CM

## 2023-01-31 PROCEDURE — 90834 PSYTX W PT 45 MINUTES: CPT | Mod: 95 | Performed by: MARRIAGE & FAMILY THERAPIST

## 2023-01-31 NOTE — PATIENT INSTRUCTIONS
**For crisis resources, please see the information at the end of this document**     Thank you for coming to the [facility] [place] [facility].    TREATMENT PLAN:  Medications:   RESTART duloxetine: Week 1: Take 60 mg every day. Week 2: INCREASE to 60 mg twice daily. Script sent for 1 month.  INCREASE gabapentin: Day 1-3: Add 1 oral capsule to gabapentin 600 mg dose (daily dose: 700 mg, 600 mg, 600 mg). Day 4-6: Add 1 capsule to gabapentin twice daily (700 mg, 700 mg, 600 mg). Day 7: Add 1 capsule to gabapentin 600 mg three times daily.  Continue all other medications per primary care provider.     Consults / Referrals:   - Continue therapy  with established provider.  - Schedule for spine referral per PCP  - Continue with neurology referral per PCP as planned     Follow Up:  Schedule an appointment with me in 3 weeks or sooner as needed.  Call [facility] at [phone] to schedule.  Follow up with primary care provider as planned or sooner if needed for acute medical concerns.  Call the psychiatric nurse line with medication questions or concerns at [phone].  Video Passports may be used to communicate with your provider, but this is not intended to be used for emergencies.      MEDICATION REFILLS:  If you need any refills please call your pharmacy and they will contact us. Our fax number for refills is [phone]. Please allow three business for refill processing. If you need to  your refill at a new pharmacy, please contact the new pharmacy directly. The new pharmacy will help you get your medications transferred.       Financial Assistance [phone]  [facility] Billing [phone]  Central Billing Office, [facility]: [phone]  [facility]illing [phone]  Medical Records [phone]  [facility] Patient Bill of Rights [email or web address]       MENTAL HEALTH CRISIS RESOURCES:  For a emergency  help, please call 911 or go to the nearest Emergency Department.     Emergency Walk-In Options:   EmPATH Unit @ [facility] ([place]): [phone] - Specialized mental health emergency area designed to be calming  [facility] ([place]): [phone]  [facility] ([place]): [phone]  [facility]): [phone]    [facility] Crisis Information:   [place]: [phone]  [place]: [phone]  [place] (COPE) - Adult: [phone]     Child: [phone]  [place] - Adult: [phone]     Child: [phone]  [place]: [phone]  List of all [facility] resources:   [email or web address]-[email or web address]-care/[email or web address]/resources/crisis-contacts.jsp    National Crisis Information:   National Suicide & Crisis Lifeline: Call 478        For online chat options, visit https://suicidepreventionlifeline.org/chat/  Poison Control Center: 6-015-984-5021  Poison Control Center: 4-885-484-0804  Trans Lifeline: 8-745-725-2791 - Hotline for transgender people of all ages  The [place] Project: 2-105-273-8689 - Hotline for LGBT youth     For Non-Emergency Support:   Fast Tracker: Mental Health & Substance Use Disorder Resources -   https://www.fasttrackermn.org/       Again thank you for choosing [facility] [place] [facility] and please let us know how we can best partner with you to improve you and your family's health.    You may be receiving a survey regarding this appointment. We would love to have your feedback, both positive and negative. The survey is done by an external company, so your answers are anonymous.
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
## 2023-02-06 ENCOUNTER — VIRTUAL VISIT (OUTPATIENT)
Dept: FAMILY MEDICINE | Facility: CLINIC | Age: 43
End: 2023-02-06
Payer: COMMERCIAL

## 2023-02-06 DIAGNOSIS — J44.9 CHRONIC OBSTRUCTIVE PULMONARY DISEASE, UNSPECIFIED COPD TYPE (H): ICD-10-CM

## 2023-02-06 DIAGNOSIS — G89.29 CHRONIC LEFT-SIDED LOW BACK PAIN WITH LEFT-SIDED SCIATICA: ICD-10-CM

## 2023-02-06 DIAGNOSIS — F41.1 GAD (GENERALIZED ANXIETY DISORDER): ICD-10-CM

## 2023-02-06 DIAGNOSIS — Z15.89 HLA B27 (HLA B27 POSITIVE): ICD-10-CM

## 2023-02-06 DIAGNOSIS — F33.1 MAJOR DEPRESSIVE DISORDER, RECURRENT EPISODE, MODERATE (H): ICD-10-CM

## 2023-02-06 DIAGNOSIS — M54.42 CHRONIC LEFT-SIDED LOW BACK PAIN WITH LEFT-SIDED SCIATICA: ICD-10-CM

## 2023-02-06 DIAGNOSIS — M51.379 DEGENERATION OF LUMBAR/LUMBOSACRAL DISC WITHOUT MYELOPATHY: ICD-10-CM

## 2023-02-06 DIAGNOSIS — F11.20 SEVERE OPIOID USE DISORDER ON MAINTENANCE THERAPY (H): Primary | ICD-10-CM

## 2023-02-06 PROCEDURE — 99214 OFFICE O/P EST MOD 30 MIN: CPT | Mod: 95 | Performed by: FAMILY MEDICINE

## 2023-02-06 RX ORDER — BUPRENORPHINE AND NALOXONE 8; 2 MG/1; MG/1
1 FILM, SOLUBLE BUCCAL; SUBLINGUAL 2 TIMES DAILY
Qty: 60 FILM | Refills: 1 | Status: SHIPPED | OUTPATIENT
Start: 2023-02-06 | End: 2023-06-05

## 2023-02-06 ASSESSMENT — PATIENT HEALTH QUESTIONNAIRE - PHQ9
10. IF YOU CHECKED OFF ANY PROBLEMS, HOW DIFFICULT HAVE THESE PROBLEMS MADE IT FOR YOU TO DO YOUR WORK, TAKE CARE OF THINGS AT HOME, OR GET ALONG WITH OTHER PEOPLE: SOMEWHAT DIFFICULT
SUM OF ALL RESPONSES TO PHQ QUESTIONS 1-9: 16

## 2023-02-06 NOTE — PROGRESS NOTES
"Carlos is a 42 year old who is being evaluated via a billable telephone visit.      Distant Location (provider location):  On-site    Assessment & Plan     Severe opioid use disorder on maintenance therapy (H): refilled at lower dose of 8 mg BID. He would like to eventually taper off this. Will monitor month to month.   - buprenorphine HCl-naloxone HCl (SUBOXONE) 8-2 MG per film  Dispense: 60 Film; Refill: 1    Chronic left-sided low back pain with left-sided sciatica: re-[facility] to [facility] and he will call to schedule. Also will certify him for medical cannabis.   - Spine  Referral    Degeneration of lumbar/lumbosacral disc without myelopathy  - Spine  Referral    HLA B27 (HLA B27 positive)  - Spine  Referral    Chronic obstructive pulmonary disease, unspecified COPD type (H): well controlled with just albuterol PRN.     MARY (generalized anxiety disorder): congratulated Carlos on scheduling with a therapist and psychiatric provider. He is increasing his dosing of duloxetine and gabapentin and hoping these will be helpful.     Major depressive disorder, recurrent episode, moderate (H)       BMI:   Estimated body mass index is 29.99 kg/m  as calculated from the following:    Height as of 12/15/22: 1.803 m (5' 11\").    Weight as of 12/15/22: 97.5 kg (215 lb).       No follow-ups on file.    Lucero Cardenas MD  [facility]    Tali Gonzalez is a 42 year old presenting for the following health issues:  No chief complaint on file.      HPI       Doing telephone visit to follow up on suboxone, anxiety, back pain.     His Dad  in December and he has been having a hard time since then. He did connect with a therapist and a psychiatric NP. Also had some disagreements at work with his boss but that has resolved.     He's been taking just 2 8 mg doses of Suboxone per day. It doesn't seem to help with pain anymore. It used to make it manageable, doesn't anymore. He " states he never abused opioids, just wasn't allowed to use opioids for his back pain once he was in inpatient treatment.     He did use a cannabis edible once. It helped calm him down and did help with his pain a bit. It did make him paranoid, though.     Had [facility] for his back pain in [place]. Had steroid injections there and in his hip, too, and ended up with avascular necrosis of the hip, requiring a replacement. This was 6 or 7 years ago.     PEG-3 SCALE score is 6 today.       Objective           Vitals:  No vitals were obtained today due to virtual visit.    Physical Exam   healthy, alert and no distress  PSYCH: Alert and oriented times 3; coherent speech, normal   rate and volume, able to articulate logical thoughts, able   to abstract reason, no tangential thoughts, no hallucinations   or delusions  His affect is normal  RESP: No cough, no audible wheezing, able to talk in full sentences  Remainder of exam unable to be completed due to telephone visits          Phone call duration: 34 minutes      Answers for HPI/ROS submitted by the patient on 2/6/2023  If you checked off any problems, how difficult have these problems made it for you to do your work, take care of things at home, or get along with other people?: Somewhat difficult  PHQ9 TOTAL SCORE: 16

## 2023-02-19 ASSESSMENT — ANXIETY QUESTIONNAIRES
2. NOT BEING ABLE TO STOP OR CONTROL WORRYING: MORE THAN HALF THE DAYS
3. WORRYING TOO MUCH ABOUT DIFFERENT THINGS: SEVERAL DAYS
8. IF YOU CHECKED OFF ANY PROBLEMS, HOW DIFFICULT HAVE THESE MADE IT FOR YOU TO DO YOUR WORK, TAKE CARE OF THINGS AT HOME, OR GET ALONG WITH OTHER PEOPLE?: VERY DIFFICULT
GAD7 TOTAL SCORE: 11
5. BEING SO RESTLESS THAT IT IS HARD TO SIT STILL: SEVERAL DAYS
7. FEELING AFRAID AS IF SOMETHING AWFUL MIGHT HAPPEN: NOT AT ALL
GAD7 TOTAL SCORE: 11
1. FEELING NERVOUS, ANXIOUS, OR ON EDGE: MORE THAN HALF THE DAYS
7. FEELING AFRAID AS IF SOMETHING AWFUL MIGHT HAPPEN: NOT AT ALL
IF YOU CHECKED OFF ANY PROBLEMS ON THIS QUESTIONNAIRE, HOW DIFFICULT HAVE THESE PROBLEMS MADE IT FOR YOU TO DO YOUR WORK, TAKE CARE OF THINGS AT HOME, OR GET ALONG WITH OTHER PEOPLE: VERY DIFFICULT
4. TROUBLE RELAXING: MORE THAN HALF THE DAYS
GAD7 TOTAL SCORE: 11
6. BECOMING EASILY ANNOYED OR IRRITABLE: NEARLY EVERY DAY

## 2023-02-19 ASSESSMENT — PATIENT HEALTH QUESTIONNAIRE - PHQ9
10. IF YOU CHECKED OFF ANY PROBLEMS, HOW DIFFICULT HAVE THESE PROBLEMS MADE IT FOR YOU TO DO YOUR WORK, TAKE CARE OF THINGS AT HOME, OR GET ALONG WITH OTHER PEOPLE: VERY DIFFICULT
SUM OF ALL RESPONSES TO PHQ QUESTIONS 1-9: 11
SUM OF ALL RESPONSES TO PHQ QUESTIONS 1-9: 11

## 2023-02-20 NOTE — PROGRESS NOTES
"     PSYCHIATRIC MEDICATION FOLLOW UP APPT     Name: Carlos Hamilton   : 1980               Telemedicine Visit: The patient's condition can be safely assessed and treated via synchronous audio and visual telemedicine encounter.      Reason for Telemedicine Visit: COVID 19 pandemic and the social and physical recommendations by the CDC and MD. and Patient has requested telehealth visit      Originating Site (Patient Location): Patient's home     Distant Site (Provider Location): Provider Remote Setting     Consent:  The patient/guardian has verbally consented to: the potential risks and benefits of telemedicine (video visit or phone) versus in person care; bill my insurance or make self-payment for services provided; and responsibility for payment of non-covered services.     Mode of Communication:  [employer] video platform      As the provider I attest to compliance with applicable laws and regulations related to telemedicine.         Source of Referral / Care Team:  Primary Care Provider: Lucero Cardenas MD   Current Psychotherapist: Sheba Davison MA, LMFT    The [facility] psychiatry providers act as a specialty service for Primary Care Providers in the [facility] who seek to optimize medications for unstable patients. Once medications have been optimized, [facility] providers discharge the patient back to the referring Primary Care Provider for ongoing medication management. This type of system allows [facility] to serve a high volume of patients.     Patient Identification:  Patient is a 42 year old, single  White Not  or  male  who presents for return visit with me.   Patient prefers to be called: \"Carlos\".  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     Patient attended the session alone.    RECORDS AVAILABLE FOR REVIEW: EHR records through [employer] .       Interim History:  I last saw Carlos Hamilton for outpatient psychiatry Consultation 3 weeks ago on " "1/30/23. During that appointment, we restarted and increased to duloxetine 60 mg BID, and increased gabapentin to 700 mg TID. Patient reports ADHERING to prescribed medications. He denies any SE with either medication change, no significant change in depression, anxiety, or chronic pain which remains very high. He is meeting with new pain MD tomorrow (Dr. Ortiz). He denies any significant change in sleep or energy with increased gabapentin, has tried to reduce PM caffeine with little change to sleep. Still getting ~7 hours with frequent wake ups that he attributes to pain. He reports new medical marijuana rx that he started, denies significant benefit to pain but did improve his appetite which had been low. Denies significant panic. Reports irritability remains high. Denies any esther, psychosis, problematic substance use or urges / cravings. Denies SI/SIB/HI.       Initial Impression:   1/30/23: Carlos Hamilton is a 42 year old White, male who presents for initial visit with [facility] ([facility]) for medication management. He reports past diagnoses of MARY, depression (vs bipolar), alcohol use disorder, and opioid dependence currently on MAT (Suboxone 8-2 mg film per PCP). Followed by psychiatry in [place] for a couple years, 1 past hospitalization in early 20s, along with ~4 past CD tx. His mental health is complicated by additional history of brain aneurysm s/p clipping in 2013 (per chart \"reportedly stable post-clipping on recent imaging\"), past seizures, chronic headaches (neuro referral - sees 4/26/23), degeneration of lumbar (Spine referral from PCP- pt has not kept appt), asthma, COPD. Patient reports a high level of chronic pain (mostly back) that is no longer responding to suboxone. He denies history of opioid abuse, suboxone started for pain mgmt during inpt CD treatment. PCP started duloxetine ~10 months ago, at current dose (90 mg) since April 2022. Denies much benefit to depression or " anxiety with duloxetine, but tolerated well. He has been out of duloxetine for the last ~1.5 weeks without significant discontinuation or change in symptoms. His depression has worsened over 2 months in context of father's recent death, worsening pain. He denies SI/SIB/HI, no history of suicide attempts. Of note, will continue to monitor for mood lability given remote hx of BP diagnosis. No clear history of manic symptoms outside of substance use. He reports only med that has been helpful for anxiety is past clonazepam, recent short script of ativan for flying. Discussed recommendation not to continue benzodiazepines at this time given current suboxone use, along with risk for physical/psychological dependence. Patient verbalized understanding. Will trial increasing gabapentin dose slightly, along with restarting duloxetine and getting to max dose (120 mg). Recommending continuing with therapist. Patient agreeable to plan.       Current medications include:   Current Outpatient Medications   Medication Sig     acetaminophen (TYLENOL) 325 MG tablet Take 650 mg by mouth every 6 hours as needed for mild pain or headaches     buprenorphine HCl-naloxone HCl (SUBOXONE) 8-2 MG per film Place 1 Film under the tongue 2 times daily     DULoxetine (CYMBALTA) 60 MG capsule Take 1 capsule (60 mg) by mouth daily for 7 days, THEN 1 capsule (60 mg) 2 times daily for 23 days.     gabapentin (NEURONTIN) 100 MG capsule Take 1 capsule (100 mg) by mouth 3 times daily Day 1-3: Add 1 oral capsule to gabapentin 600 mg dose (daily dose: 700 mg, 600 mg, 600 mg). Day 4-6: Add 1 capsule to gabapentin twice daily (700 mg, 700 mg, 600 mg). Day 7: Add 1 capsule to gabapentin 600 mg three times daily.     gabapentin (NEURONTIN) 300 MG capsule Take 2 capsules (600 mg) by mouth 3 times daily     ibuprofen (ADVIL/MOTRIN) 200 MG tablet Take 600 mg by mouth every 6 hours as needed for moderate pain (4-6)     lisinopril (ZESTRIL) 20 MG tablet Take 1  "tablet (20 mg) by mouth daily     metFORMIN (GLUCOPHAGE-XR) 500 MG 24 hr tablet Take 1 tablet (500 mg) by mouth 2 times daily (with meals)     multivitamin w/minerals (THERA-VIT-M) tablet Take 1 tablet by mouth daily     nicotine (NICODERM CQ) 14 MG/24HR 24 hr patch Place 1 patch onto the skin every 24 hours     nicotine (NICORETTE) 2 MG gum Place 1 each (2 mg) inside cheek every hour as needed for smoking cessation     omeprazole (PRILOSEC) 40 MG DR capsule Take 1 capsule (40 mg) by mouth daily     order for DME Equipment being ordered: adult mask for neb machine with tubing     tiZANidine (ZANAFLEX) 4 MG tablet Take 1 tablet (4 mg) by mouth every 4 hours     VENTOLIN  (90 Base) MCG/ACT inhaler INHALE TWO PUFFS BY MOUTH EVERY SIX HOURS AS NEEDED FOR WHEEZING     No current facility-administered medications for this visit.         Side effects: Denies    The [place] Prescription Monitoring Program has been reviewed and there are no concerns about diversionary activity for controlled substances at this time.  Overdose Risk Score: 410 (000-999)    01/30/2023  1   01/30/2023  Gabapentin 100 MG Capsule  90.00  30 Cl Hil   [id number]   Sup (1809)   0/0   Medicaid [place]   01/22/2023  1   12/05/2022  Suboxone 8 Mg-2 MG SL Film  90.00  30 Ev Pes   [id number]   Sup (4408)   1/1  24.00 mg  Medicaid [place]   01/22/2023  1   05/25/2022  Gabapentin 300 MG Capsule  180.00  30 Ev Pes   [id number]   Sup (1043)   1/3   Medicaid [place]         Psychiatric ROS  Carlos Hamilton reports mood has been: \"Eh ok\"  Depression has been: depressed mood, little interest / pleasure, appetite change or significant weight loss / gain, sleep changes (insomnia or hypersomnia), fatigue of loss of energy, worthlessness or excessive guilt and difficulty concentrating or indecisiveness self rates as 5/10, where 0 is none at all and 10 being severe depression. Was 4-8/10 at last appt.  SI/SIB/HI: denies all  Anxiety has been: excessive worry, difficult to " "control, restlessness / feeling keyed up,  easily fatigued,  difficulty concentrating or mind going blank, irritability, muscle tension and sleep disturbances (difficulty falling / staying asleep, or restless / unsatisfying)  self rates as 5/10, where 0 is none at all and 10 being severe anxiety. Was 6/10 at last appt.  Sleep has been: \"eh, same as before, no big changes.\" Still typically sleeping 2A-9A (home from work 12:30AM), with several wakeups overnight sometimes up for hours. Unrested in AM.  Energy has been: denies change, remains low throughout day. No worsening of energy w/ gabapentin increase.   Appetite has been: improved a small amount w/medical marijuana (had gerald very low at last appt)  Jessika sxs: denies  Psychosis sxs: denies  PTSD sxs: inability to remember aspects, negative emotional state, diminished interest, detachment from others and inability to feel positive emotions. , irritability and  sleep disturbances  (Trauma sx: pt unsure of past trauma, exploring memories with therapist currently).    PHQ9 and GAD7 scores were reviewed today:  PHQ-9 scores:   PHQ-9 SCORE 1/30/2023 1/30/2023 2/19/2023   PHQ-9 Total Score MyChart - 16 (Moderately severe depression) 11 (Moderate depression)   PHQ-9 Total Score 16 16 11   Some encounter information is confidential and restricted. Go to Review Flowsheets activity to see all data.       MARY-7 scores:    MARY-7 SCORE 1/24/2023 1/24/2023 2/19/2023   Total Score - 15 (severe anxiety) 11 (moderate anxiety)   Total Score 15 15 11   Some encounter information is confidential and restricted. Go to Review Flowsheets activity to see all data.         Current stressors include: Symptoms, pain  Coping mechanisms and supports include: Therapy and Friends      Past Medical/Surgical History:  Past Medical History:   Diagnosis Date     Alcohol abuse      Asthma      Brain aneurysm     s/p clip in 2013     Brain aneurysm      Chronic back pain      HTN (hypertension)      " "Seizures (H)      Stroke (H)       has a past medical history of Alcohol abuse, Asthma, Brain aneurysm, Brain aneurysm, Chronic back pain, HTN (hypertension), Seizures (H), and Stroke (H).    Medication allergies:    Allergies   Allergen Reactions     Diphenhydramine Swelling     Per patient, tongue/lips swelling, itchy eyes with both generic diphenhydramine and brand Benadryl      Naproxen Swelling     Pistachio Nut Extract Skin Test      Toradol [Ketorolac] Itching     Past psychotropic medication trials include but are not limited to:   Buspirone: \"wasn't helping\"   Escitalopram 20 mg (11/21-3/22): \"wasn't helping\"  Mirtazapine 15 mg at bedtime: only used in hospital - denies memory of med.   Propranolol 10 mg TID: unsure of use / benefit.  trazodone 100 mg at bedtime: for sleep previously, unsure of benefit / SE.  Hydroxyzine: drowsiness SE  Depakote: at 16, then again in ~2010 in [place]. Stopped w/ mgmt of aneurysm and seizures (not rx for seizures, only mood per pt).    Social History:  Birth place: [place], [place]  Childhood: Yes intact home and Reported as raised by bio parents (always togehter)  Siblings: 3 full siblings  Highest education level was GED.   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  Relationship status: single   Current Living situation:  At sober living home.  Feels safe at home.  Children: zero   Firearms/Weapons Access:  No: Patient denies   Service: No  Support: mother, coworker.    Current Use of Drugs/Alcohol: denies all  Past Use of Drugs/Alcohol: history of alcohol use (stopped 2019), cannabis, amphetamine, cocaine, hallucinogens (stopped all ~2008). Denies history of opioid or heroine abuse.     Tobacco use: everyday vaping (previously 1/2 pack / day for last 27 years).  Caffeine: yes, multiple energy drinks / day, trying to reduce in PM.    Vital Signs:   There were no vitals taken for this visit.    Labs:  Most recent laboratory results reviewed and no new labs. "     Review of Systems:  10 systems (general, cardiovascular, respiratory, eyes, ENT, endocrine, GI, , M/S, neurological) were reviewed. Most pertinent finding(s) is/are: chronic back pain. Frequent headaches. Denies fever, malaise, SOB / wheezing, chest pain / tightness / palpitations, N/V/C/D, tics / tremors / abnormal muscle mvmts. The remaining systems are all unremarkable.      Mental Status Exam:  Alertness: alert  and oriented   Appearance: casually groomed  Behavior/Demeanor: cooperative and pained, wincing frequently, with good eye contact   Speech: normal and regular rate and rhythm  Language: intact and no problems  Psychomotor: restless and rigidity, frequent position changes  Mood: depressed and anxious  Affect: flat; was congruent to mood; was congruent to content  Thought Process/Associations: unremarkable  Thought Content:  Reports none;  Denies suicidal ideation, violent ideation and delusions  Perception:  Reports none;  Denies auditory hallucinations and visual hallucinations  Insight: intact  Judgment: intact  Cognition: does  appear grossly intact; formal cognitive testing was not done  Recent and Remote Memory: Intact to interview. Not formally assessed. No amnesia.  Attention Span and Concentration:  normal  Fund of Knowledge: appropriate  Gait and Station: remarkable for:  pained expression with movement (seated during exam)     Suicide Risk Assessment:  Today Carlos Hamilton reports no suicidal ideation. There are notable risk factors for self-harm, including single status, anxiety and comorbid medical condition of chronic pain. However, risk is mitigated by sobriety, absence of past attempts, history of seeking help when needed, future oriented, no access to firearms or weapons and denies suicidal intent or plan. Therefore, based on all available evidence including the factors cited above, Carlos Hamilton does not appear to be at imminent risk for self-harm, does not meet criteria for a  72-hr hold, and therefore remains appropriate for ongoing outpatient level of care.  A thorough assessment of risk factors related to suicide and self-harm have been reviewed and are noted above. The patient convincingly denies suicidality on several occasions. Local community safety resources printed and reviewed for patient to use if needed. There was no deceit detected, and the patient presented in a manner that was believable.     DSM5 Diagnosis:  296.32 (F33.1) Major Depressive Disorder, Recurrent Episode, Moderate _  300.02 (F41.1) Generalized Anxiety Disorder  Substance-Related & Addictive Disorders Alcohol Use Disorder   303.90 (F10.20) Severe In sustained remission  Opioid Use Disorder, Specify if:  On a maintenance therapy with a severity of:  304.00 (F11.20) Moderate     Medical comorbidities include:   Patient Active Problem List    Diagnosis Date Noted     Major depressive disorder, recurrent episode, moderate (H) 01/30/2023     Priority: Medium     Nonintractable headache, unspecified chronicity pattern, unspecified headache type 11/17/2022     Priority: Medium     History of cerebral aneurysm 11/17/2022     Priority: Medium     Brain aneurysm 11/17/2022     Priority: Medium     Prediabetes 07/27/2022     Priority: Medium     Essential hypertension 05/11/2022     Priority: Medium     Adjustment disorder with depressed mood 03/18/2022     Priority: Medium     Chronic obstructive pulmonary disease, unspecified COPD type (H) 02/23/2022     Priority: Medium     Tobacco dependence syndrome 11/10/2021     Priority: Medium     Severe opioid use disorder on maintenance therapy (H) 08/16/2021     Priority: Medium     HLA B27 (HLA B27 positive) 07/20/2020     Priority: Medium     Identified on lab testing for acute anterior uveitis left eye in 7/2020       High risk medication use 07/20/2020     Priority: Medium       Prednisone: Previously not well tolerated with alcohol use. Started 30/20. Doing well on  8/4/20. Taper 10 x 1 wk       Mixed dyslipidemia 07/10/2019     Priority: Medium     Mild persistent asthma, unspecified whether complicated 07/10/2019     Priority: Medium     Hx of avascular necrosis of capital femoral epiphysis 07/10/2019     Priority: Medium       AVN of his right hip possibly secondary to alcohol use for which he underwent a right JESSICA 7/2017    Hip pain left sided evaluated by Orthopedics 8/2019 with normal left hip MRI       Iron deficiency anemia secondary to inadequate dietary iron intake 07/10/2019     Priority: Medium     MARY (generalized anxiety disorder) 04/29/2019     Priority: Medium     Insomnia, unspecified type 04/29/2019     Priority: Medium     Alcohol dependence in remission (H) 04/29/2019     Priority: Medium     Substance abuse in remission (H) 04/29/2019     Priority: Medium     Anxiety state 03/28/2019     Priority: Medium     Degeneration of lumbar/lumbosacral disc without myelopathy 03/25/2019     Priority: Medium     Chronic left-sided low back pain with left-sided sciatica 03/25/2019     Priority: Medium       Psychosocial & Contextual Factors:  Occupational Difficulties and Medical Comorbidites    DIFFERENTIAL DIAGNOSIS: R/O MDD vs bipolar disorder     Medical comorbidities impacting or contributing to clinical picture: hx of cerebral aneurysm, chronic pain, chronic headaches.   Known issue that I take into account for their medical decisions, no current exacerbations or new concerns.    Impression:  Carlos Hamilton is a 42 year old White, male who presents for return visit with  [facility] ([facility]) for medication management. At initial appointment 3 weeks ago, we restarted and increased to duloxetine 60 mg BID, and increased gabapentin to 700 mg TID. Patient reports ADHERING to prescribed medications. He denies any SE with either medication change, no significant change in depression, anxiety, or chronic pain which remains very high. He is  meeting with new pain MD tomorrow (Dr. Ortiz). He reports new medical marijuana rx that he started, denies significant benefit to pain but did improve his appetite which had been low.  He denies any significant change in sleep or energy with increased gabapentin, has tried to reduce PM caffeine with little change to sleep. Still getting ~7 hours with frequent wake ups that he attributes to pain. Denies significant panic. Reports irritability remains high. Denies any esther, psychosis, problematic substance use or urges / cravings. Denies SI/SIB/HI. Recommending continuing current medication for another month, particularly if any additional medication changes may occur tomorrow. Continue in therapy. Follow-up with this provider in 1 month. Patient agreeable to plan.     Medication side effects and alternatives were reviewed. Health promotion activities recommended and reviewed today. All questions addressed. Education and counseling completed regarding risks and benefits of medications and psychotherapy options. Recommend therapy for additional support.       Treatment Plan:    CONTINUE duloxetine 60 mg twice daily. Script sent for 1 month.    CONTINUE gabapentin 100 mg with gabapentin 600 mg for 700 mg three times daily. Script sent for 1 month.    Continue all other medications per primary care provider.     Continue therapy with established provider.    Safety plan reviewed. To the Emergency Department as needed or call after hours crisis line at [phone] or [phone]. [place] Crisis Text Line. Text [place] to 507766 or Suicide LifeLine Chat: suicidepreventionlifeline.org/chat    Schedule an appointment with me in 4 weeks or sooner as needed. Call [facility] at [phone] to schedule.    Follow up with primary care provider as planned or for acute medical concerns.    Call the psychiatric nurse line with medication questions or concerns at [phone].    [employer] may be used to communicate  with your provider, but this is not intended to be used for emergencies.    Patient Education:  Medication side effects and alternatives reviewed. Health promotion activities recommended and reviewed today. All questions addressed. Education and counseling completed regarding risks and benefits of medications and psychotherapy options.  Consent provided by patient/guardian  Call the psychiatric nurse line with medication questions or concerns at [phone].  MyChart may be used to communicate with your provider, but this is not intended to be used for emergencies.  [employer] is information for patients.  It is run by the [employer] and it contains information about all disorders, diseases and all medications.      Community Resources:    National Suicide Prevention Lifeline: 773.919.2102 (TTY: 291.550.1384). Call anytime for help.  (www.suicidepreventionlifeline.org)  National [employer] on Mental Illness (www.adela.org): 703.322.8452 or 925-831-2839.   Mental Health Association (www.mentalhealth.org): 130.799.4584 or 568-513-3550.  [place] Crisis Text Line: Text [place] to 366816  Suicide LifeLine Chat: suicideCompact Particle Accelerationline.org/chat    Administrative Billin minutes spent on the date of the encounter doing chart review and reviewing referral documents, history and exam of patient, documentation and further activities as noted above.    Video/Phone Start Time: 9:32 AM  Video/Phone End Time: 9:44 AM      Patient Status:  [facility] MD/DO/NP/PA providers offer care a specialty service for Primary Care Providers in the [facility] that seek to optimize psychotropic medications for unstable patients.  Once medications have been optimized, our providers discharge the patient back to the referring Primary Care Provider for ongoing medication management.  This type of system allows our providers to serve a high volume of patients.   Patient will continue to be seen for ongoing consultation  and stabilization.    Signed:   Tiffanie Bennett, MSN, APRN, PMHNP-BC  [facility] ([facility])  [facility]    Chart documentation done in part with Dragon Voice Recognition software.  Although reviewed after completion, some word and grammatical errors may remain.

## 2023-02-21 ENCOUNTER — VIRTUAL VISIT (OUTPATIENT)
Dept: PSYCHIATRY | Facility: CLINIC | Age: 43
End: 2023-02-21
Payer: COMMERCIAL

## 2023-02-21 DIAGNOSIS — F10.21 ALCOHOL DEPENDENCE IN REMISSION (H): ICD-10-CM

## 2023-02-21 DIAGNOSIS — F33.1 MAJOR DEPRESSIVE DISORDER, RECURRENT EPISODE, MODERATE (H): Primary | ICD-10-CM

## 2023-02-21 DIAGNOSIS — F41.1 GAD (GENERALIZED ANXIETY DISORDER): ICD-10-CM

## 2023-02-21 DIAGNOSIS — F11.20 SEVERE OPIOID USE DISORDER ON MAINTENANCE THERAPY (H): ICD-10-CM

## 2023-02-21 PROCEDURE — 99214 OFFICE O/P EST MOD 30 MIN: CPT | Mod: VID | Performed by: NURSE PRACTITIONER

## 2023-02-21 RX ORDER — DULOXETIN HYDROCHLORIDE 60 MG/1
60 CAPSULE, DELAYED RELEASE ORAL 2 TIMES DAILY
Qty: 60 CAPSULE | Refills: 0 | Status: SHIPPED | OUTPATIENT
Start: 2023-02-21 | End: 2023-06-05

## 2023-02-21 RX ORDER — GABAPENTIN 100 MG/1
100 CAPSULE ORAL 3 TIMES DAILY
Qty: 90 CAPSULE | Refills: 0 | Status: SHIPPED | OUTPATIENT
Start: 2023-02-21 | End: 2023-06-05

## 2023-02-21 NOTE — PATIENT INSTRUCTIONS
**For crisis resources, please see the information at the end of this document**     Thank you for coming to the [facility] [place] [facility].    TREATMENT PLAN:  Medications:   CONTINUE duloxetine 60 mg twice daily. Script sent for 1 month.  CONTINUE gabapentin 100 mg with gabapentin 600 mg for 700 mg three times daily. Script sent for 1 month.  Continue all other medications per primary care provider.     Consults / Referrals:   - Continue therapy with established provider.    Follow Up:   Schedule an appointment with me in 4 weeks or sooner as needed. Call [facility] at [phone] to schedule.  Follow up with primary care provider as planned or for acute medical concerns.  Call the psychiatric nurse line with medication questions or concerns at [phone].  ONE Changet may be used to communicate with your provider, but this is not intended to be used for emergencies.    MEDICATION REFILLS:  If you need any refills please call your pharmacy and they will contact us. Our fax number for refills is [phone]. Please allow three business for refill processing. If you need to  your refill at a new pharmacy, please contact the new pharmacy directly. The new pharmacy will help you get your medications transferred.       Financial Assistance [phone]  [facility]illing [phone]  Central Billing Office, [facility]: [phone]  [facility] [phone]  Medical Records [phone]  [facility] Patient Bill of Rights [email or web address]       MENTAL HEALTH CRISIS RESOURCES:  For a emergency help, please call 911 or go to the nearest Emergency Department.     Emergency Walk-In Options:   EmPATH Unit @ [facility] ([place]): [phone] - Specialized mental health emergency area designed to be calming  [facility] ([place]): [phone]  [facility]  ([place]): [phone]  [facility] ([place]): [phone]    [facility] Crisis Information:   [place]: [phone]  [place]: [phone]  [place] ([place]) - Adult: [phone]     Child: [phone]  [place] - Adult: [phone]     Child: [phone]  [place]: [phone]  List of all [facility] resources:   https://[place].gov/dhs/people-we-serve/adults/health-care/mental-health/resources/crisis-contacts.jsp    National Crisis Information:   National Suicide & Crisis Lifeline: Call 522        For online chat options, visit https://suicidepreventionlifeline.org/chat/  Poison Control Center: 6-325-908-0541  Poison Control Center: 1-635-734-7721  Trans Lifeline: 1-206.780.7346 - Hotline for transgender people of all ages  The [place] [employer]: 9-813-834-7484 - Hotline for LGBT youth     For Non-Emergency Support:   Fast Tracker: Mental Health & Substance Use Disorder Resources -   https://www.fasttrackermn.org/       Again thank you for choosing [facility] [place] [facility] and please let us know how we can best partner with you to improve you and your family's health.    You may be receiving a survey regarding this appointment. We would love to have your feedback, both positive and negative. The survey is done by an external company, so your answers are anonymous.

## 2023-02-21 NOTE — Clinical Note
Please call to schedule patient for follow-up in 4 weeks.  Thank you!   DAVID Barrera, ARIANE-BC [facility] Service ([facility]) [facility]

## 2023-03-07 ENCOUNTER — VIRTUAL VISIT (OUTPATIENT)
Dept: PSYCHOLOGY | Facility: CLINIC | Age: 43
End: 2023-03-07
Payer: COMMERCIAL

## 2023-03-07 DIAGNOSIS — F41.1 GENERALIZED ANXIETY DISORDER: ICD-10-CM

## 2023-03-07 DIAGNOSIS — F32.1 MAJOR DEPRESSIVE DISORDER, SINGLE EPISODE, MODERATE (H): Primary | ICD-10-CM

## 2023-03-07 DIAGNOSIS — F10.21 ALCOHOL USE DISORDER, MODERATE, IN SUSTAINED REMISSION, DEPENDENCE (H): ICD-10-CM

## 2023-03-07 PROCEDURE — 90834 PSYTX W PT 45 MINUTES: CPT | Mod: VID | Performed by: MARRIAGE & FAMILY THERAPIST

## 2023-03-07 ASSESSMENT — ANXIETY QUESTIONNAIRES
2. NOT BEING ABLE TO STOP OR CONTROL WORRYING: MORE THAN HALF THE DAYS
5. BEING SO RESTLESS THAT IT IS HARD TO SIT STILL: MORE THAN HALF THE DAYS
4. TROUBLE RELAXING: NEARLY EVERY DAY
GAD7 TOTAL SCORE: 12
1. FEELING NERVOUS, ANXIOUS, OR ON EDGE: NEARLY EVERY DAY
7. FEELING AFRAID AS IF SOMETHING AWFUL MIGHT HAPPEN: NOT AT ALL
GAD7 TOTAL SCORE: 12
3. WORRYING TOO MUCH ABOUT DIFFERENT THINGS: SEVERAL DAYS
8. IF YOU CHECKED OFF ANY PROBLEMS, HOW DIFFICULT HAVE THESE MADE IT FOR YOU TO DO YOUR WORK, TAKE CARE OF THINGS AT HOME, OR GET ALONG WITH OTHER PEOPLE?: VERY DIFFICULT
7. FEELING AFRAID AS IF SOMETHING AWFUL MIGHT HAPPEN: NOT AT ALL
6. BECOMING EASILY ANNOYED OR IRRITABLE: SEVERAL DAYS
GAD7 TOTAL SCORE: 12
IF YOU CHECKED OFF ANY PROBLEMS ON THIS QUESTIONNAIRE, HOW DIFFICULT HAVE THESE PROBLEMS MADE IT FOR YOU TO DO YOUR WORK, TAKE CARE OF THINGS AT HOME, OR GET ALONG WITH OTHER PEOPLE: VERY DIFFICULT

## 2023-03-07 ASSESSMENT — PATIENT HEALTH QUESTIONNAIRE - PHQ9
10. IF YOU CHECKED OFF ANY PROBLEMS, HOW DIFFICULT HAVE THESE PROBLEMS MADE IT FOR YOU TO DO YOUR WORK, TAKE CARE OF THINGS AT HOME, OR GET ALONG WITH OTHER PEOPLE: VERY DIFFICULT
SUM OF ALL RESPONSES TO PHQ QUESTIONS 1-9: 15
SUM OF ALL RESPONSES TO PHQ QUESTIONS 1-9: 15
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
## 2023-03-12 ENCOUNTER — HOSPITAL ENCOUNTER (EMERGENCY)
Facility: CLINIC | Age: 43
Discharge: HOME | End: 2023-03-13
Attending: EMERGENCY MEDICINE | Admitting: EMERGENCY MEDICINE
Payer: COMMERCIAL

## 2023-03-12 DIAGNOSIS — H20.9 UVEITIS: ICD-10-CM

## 2023-03-12 PROCEDURE — 99284 EMERGENCY DEPT VISIT MOD MDM: CPT | Performed by: EMERGENCY MEDICINE

## 2023-03-12 PROCEDURE — 250N000013 HC RX MED GY IP 250 OP 250 PS 637: Performed by: EMERGENCY MEDICINE

## 2023-03-12 PROCEDURE — 250N000009 HC RX 250: Performed by: EMERGENCY MEDICINE

## 2023-03-12 RX ORDER — ATROPINE SULFATE 10 MG/ML
1 SOLUTION/ DROPS OPHTHALMIC ONCE
Status: COMPLETED | OUTPATIENT
Start: 2023-03-13 | End: 2023-03-13

## 2023-03-12 RX ORDER — PREDNISOLONE ACETATE 10 MG/ML
1 SUSPENSION/ DROPS OPHTHALMIC ONCE
Status: COMPLETED | OUTPATIENT
Start: 2023-03-13 | End: 2023-03-13

## 2023-03-12 RX ORDER — ATROPINE SULFATE 10 MG/ML
1 SOLUTION/ DROPS OPHTHALMIC 2 TIMES DAILY
Qty: 15 ML | Refills: 0 | Status: SHIPPED | OUTPATIENT
Start: 2023-03-12 | End: 2023-06-07

## 2023-03-12 RX ORDER — PREDNISOLONE ACETATE 10 MG/ML
1 SUSPENSION/ DROPS OPHTHALMIC
Qty: 15 ML | Refills: 0 | Status: SHIPPED | OUTPATIENT
Start: 2023-03-12 | End: 2023-06-07

## 2023-03-12 RX ORDER — ACETAMINOPHEN 500 MG
1000 TABLET ORAL ONCE
Status: COMPLETED | OUTPATIENT
Start: 2023-03-12 | End: 2023-03-12

## 2023-03-12 RX ORDER — PROPARACAINE HYDROCHLORIDE 5 MG/ML
1 SOLUTION/ DROPS OPHTHALMIC ONCE
Status: COMPLETED | OUTPATIENT
Start: 2023-03-12 | End: 2023-03-12

## 2023-03-12 RX ADMIN — ACETAMINOPHEN 1000 MG: 500 TABLET ORAL at 22:35

## 2023-03-12 RX ADMIN — PROPARACAINE HYDROCHLORIDE 1 DROP: 5 SOLUTION/ DROPS OPHTHALMIC at 22:39

## 2023-03-12 ASSESSMENT — ACTIVITIES OF DAILY LIVING (ADL): ADLS_ACUITY_SCORE: 35

## 2023-03-12 ASSESSMENT — VISUAL ACUITY
OD: 20/40
OS: 20/20

## 2023-03-13 VITALS
SYSTOLIC BLOOD PRESSURE: 139 MMHG | HEART RATE: 60 BPM | DIASTOLIC BLOOD PRESSURE: 92 MMHG | OXYGEN SATURATION: 99 % | TEMPERATURE: 98.7 F | RESPIRATION RATE: 18 BRPM

## 2023-03-13 PROCEDURE — 250N000009 HC RX 250: Performed by: EMERGENCY MEDICINE

## 2023-03-13 RX ADMIN — ATROPINE SULFATE 1 DROP: 10 SOLUTION/ DROPS OPHTHALMIC at 00:09

## 2023-03-13 RX ADMIN — PREDNISOLONE ACETATE 1 DROP: 10 SUSPENSION/ DROPS OPHTHALMIC at 00:09

## 2023-03-13 NOTE — ED NOTES
Pt received discharge paperwork and script to take home, pt was also sent home with two different eye drops with instructions on how to use them in his discharge paperwork, pt signed the discharge paperwork with no further questions for the RN of MD, vitals taken, pt ambulated out of the ED, pt stated they were able to get a ride to take them home

## 2023-03-13 NOTE — ED TRIAGE NOTES
Patient ambulatory to triage for right eye pain and vision changes.      Triage Assessment     Row Name 03/12/23 5318       Triage Assessment (Adult)    Airway WDL WDL       Respiratory WDL    Respiratory WDL WDL       Skin Circulation/Temperature WDL    Skin Circulation/Temperature WDL WDL       Cardiac WDL    Cardiac WDL WDL       Peripheral/Neurovascular WDL    Peripheral Neurovascular WDL WDL       Cognitive/Neuro/Behavioral WDL    Cognitive/Neuro/Behavioral WDL WDL

## 2023-03-13 NOTE — CONSULTS
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    He states that his right eye started becoming red on Thurs 3/9. States it started with a small of redness and has continued to get worse. Since then it has become painful, photophobic. He has had a small amount of discharge as well. He just started noticing some hazy/cloudy vision today and a headache. He denies any flashes, changes in floaters, fevers or chills. He does note that he has been more fatigued in the past week and having some musculoskeletal pain. No recent cold, cough, sore throat, rashes. He did have a tooth infection on his right upper jaw about 1 month ago. He did not use any antibiotics.    Review of systems were otherwise negative except for that which has been stated above.      OCULAR/MEDICAL/SURGICAL HISTORIES:     Past Ocular History:  Last eye exam: [facility] 2021. Last visit with Dr. Ingram 08/2020  Prior eye surgery/laser: None  Contact lens wear: Yes wears monthly contact lenses - removed them on Wednesday  Glasses: Yes  Eyedrops: artificial tears prn    Family History:  No family history of autoimmune disease  No family history of eye problems    Social History:  Lives nearby  No drug or alcohol use - sober from alcohol for 4 years  Uses medical marijuana for chronic pain and anxiety    Past Medical History:   Diagnosis Date     Alcohol abuse      Asthma      Brain aneurysm     s/p clip in 2013     Brain aneurysm      Chronic back pain      HTN (hypertension)      Seizures (H)      Stroke (H)        Past Surgical History:   Procedure Laterality Date     aneursym clipping      Brain aneursym in 2013     ARTHROPLASTY HIP Right      ARTHROPLASTY REVISION HIP  Right      BRAIN SURGERY       KNEE SURGERY      right knee       EXAMINATION:     Base Eye Exam     Visual Acuity (Snellen - Linear)       Right Left    Near cc 20/30+1 20//30          Tonometry (Tonopen, 11:11 PM)       Right Left    Pressure 12 13          Pupils       Pupils    Right PERRL    Left PERRL          Visual Fields       Left Right     Full Full          Extraocular Movement       Right Left     Full, Ortho Full, Ortho          Neuro/Psych     Oriented x3: Yes    Mood/Affect: Normal          Dilation     Both eyes: 1.0% Mydriacyl, 2.5% Colin Synephrine @ 11:12 PM            Slit Lamp and Fundus Exam     External Exam       Right Left    External Normal Normal          Slit Lamp Exam       Right Left    Lids/Lashes Tr protective ptosis Normal    Conjunctiva/Sclera 1+ diffuse injection White and quiet    Cornea Few inferior small, stellate KP. Superior and inferior PEE. No epi defects, dendrites or pseudodendrites Clear    Anterior Chamber 1-2+ cell, 1+ flare Deep and quiet    Iris Few small synechiae 1-2:00 with dilation causing mild oval shaped pupil with full dilation Round and reactive    Lens Clear Clear    Anterior Vitreous Normal, no haze Normal, no haze          Fundus Exam       Right Left    Disc Normal, no disc edema or hemorrhages Normal, no disc edema or hemorrhages    C/D Ratio 0.2 0.3    Macula Normal Normal    Vessels Normal, no vascular sheathing Normal, no vascular sheathing    Periphery Normal, no retinal hemorrhage or whitening Normal, no retinal hemorrhage or whitening                Labs/Studies/Imaging Performed:  None     ASSESSMENT/PLAN:     Carlos Hamilton is a 42 year old male who presents with     # Anterior uveitis, right eye  Patient with history of anterior uveitis in the left eye in 2020 and history of HLA B27 positivity. He is now presenting with similar symptoms in the right eye for the past 4 days.   - Lab workup in 2020:  --- Normal/negative: Labs: Normal/negative: ACE,  CMP, Treponema, Quantiferon, Toxoplasma IgM and IgG. Abnormal: ESR 31, CRP 24. HLA-B27 positive     Exam findings 3/12/23:  - VA 20/30+2 OD, 20/30 OS  - PERRL, EOMs and CVF full  - Anterior exam notable in the right eye for diffuse 1+ injection and 1-2+ AC cell with few inferior, stellate KP. Few synechiae right eye 1-2:00 Left eye quiet without signs of anterior chamber inflammation  - Dilated exam      RECOMMENDATIONS:  - Prednisolone Q2h in the right eye  - Atropine BID in the right eye  - Could consider rheumatology consult/referral due to ongoing musculoskeletal pain  - Follow up in [facility] on Thursday 3/16/23 at 1:30 pm (offered patient earlier appointment but he states Thursday would work best for him). We will arrange.    It is our pleasure to participate in this patient's care and treatment. Please contact us with any further questions or concerns.      Katelynn Bowie MD  Resident Physician, PGY-2  Department of Ophthalmology

## 2023-03-13 NOTE — ED PROVIDER NOTES
[place] EMERGENCY DEPARTMENT ([facility])  March 12, 2023  History     Chief Complaint   Patient presents with     Eye Pain     HPI  Carlos Hamilton is a 42 year old male who presents with right eye pain.  He has a history of prior issues in this eye diagnosed such as acute anterior uveitis which was diagnosed August 2020.  Treated with steroids.  He is worried that his eye has returned to that today.  His onset of symptoms was 3/9/2023.  They started as mild redness as well as drainage, and now has pain and clouding of the vision. Visual acuity in that eye is 20/40 with correction compared to 20/20 in the left eye.  He does wear contact lenses, but stopped wearing them on Thursday when this began. His symptoms are associated with a headache and a feeling of pressure/bruise behind the eye.  Patient is not anticoagulated     This part of the medical record was transcribed by Bladimir Randall, Medical Scribe, from a dictation done by Becky Galeas MD.     Past Medical History  Past Medical History:   Diagnosis Date     Alcohol abuse      Asthma      Brain aneurysm     s/p clip in 2013     Brain aneurysm      Chronic back pain      HTN (hypertension)      Seizures (H)      Stroke (H)      Past Surgical History:   Procedure Laterality Date     aneursym clipping      Brain aneursym in 2013     ARTHROPLASTY HIP Right      ARTHROPLASTY REVISION HIP Right      BRAIN SURGERY       KNEE SURGERY      right knee     atropine 1 % ophthalmic solution  prednisoLONE acetate (PRED FORTE) 1 % ophthalmic suspension  acetaminophen (TYLENOL) 325 MG tablet  buprenorphine HCl-naloxone HCl (SUBOXONE) 8-2 MG per film  DULoxetine (CYMBALTA) 60 MG capsule  gabapentin (NEURONTIN) 100 MG capsule  gabapentin (NEURONTIN) 300 MG capsule  ibuprofen (ADVIL/MOTRIN) 200 MG tablet  lisinopril (ZESTRIL) 20 MG tablet  metFORMIN (GLUCOPHAGE-XR) 500 MG 24 hr tablet  multivitamin w/minerals (THERA-VIT-M) tablet  nicotine (NICODERM CQ) 14  MG/24HR 24 hr patch  nicotine (NICORETTE) 2 MG gum  omeprazole (PRILOSEC) 40 MG DR capsule  order for DME  tiZANidine (ZANAFLEX) 4 MG tablet  VENTOLIN  (90 Base) MCG/ACT inhaler      Allergies   Allergen Reactions     Diphenhydramine Swelling     Per patient, tongue/lips swelling, itchy eyes with both generic diphenhydramine and brand Benadryl      Naproxen Swelling     P[place]achio Nut Extract Skin Test      Toradol [Ketorolac] Itching     Family History  Family History   Problem Relation Age of Onset     Glaucoma Mother      Uveitis Brother      Alcoholism No family hx of      Macular Degeneration No family hx of      Social History   Social History     Tobacco Use     Smoking status: Every Day     Packs/day: 0.50     Years: 27.00     Pack years: 13.50     Types: Cigarettes, Vaping Device     Smokeless tobacco: Former     Tobacco comments:     half a pack a day   Substance Use Topics     Alcohol use: Not Currently     Drug use: Not Currently      Past medical history, past surgical history, medications, allergies, family history, and social history were reviewed with the patient. No additional pertinent items.      A medically appropriate review of systems was performed with pertinent positives and negatives noted in the HPI, and all other systems negative.    Physical Exam   BP: (!) 159/100  Pulse: 93  Temp: 98.5  F (36.9  C)  Resp: 18  SpO2: 96 %     Physical Exam  Gen:A&Ox3, no acute distress  Eyes: EOMI. PERRL, pupil shape is round. Lids and lashes free of lesions.  No foreign bodies noted in the eye on lid eversion. No conjunctival injection. Anterior chamber deep and quiet, free of hyphema or hypopion. No fluorescene uptake by the cornea.  Visual fields intact on confrontation. Intra-occular pressure: right 10, left 12. Visual acuity: right 20/40, left 20/20 with correction.      ED Course, Procedures, & Data      Procedures          No results found for any visits on 03/12/23.  Medications    proparacaine (ALCAINE) 0.5 % ophthalmic solution 1 drop (1 drop Right Eye $Given by Other Clinician 3/12/23 2239)   acetaminophen (TYLENOL) tablet 1,000 mg (1,000 mg Oral $Given 3/12/23 2235)   atropine 1 % ophthalmic solution 1 drop (1 drop Right Eye $Given 3/13/23 0009)   prednisoLONE acetate (PRED FORTE) 1 % ophthalmic susp 1 drop (1 drop Right Eye $Given 3/13/23 0009)     Labs Ordered and Resulted from Time of ED Arrival to Time of ED Departure - No data to display  No orders to display          Critical care was not performed.     Medical Decision Making  The patient's presentation was of moderate complexity (a chronic illness mild to moderate exacerbation, progression, or side effect of treatment).    The patient's evaluation involved:  discussion of management or test interpretation with another health professional (ophthalmology consult)    The patient's management necessitated moderate risk (prescription drug management including medications given in the ED).      Assessment & Plan    This is a 42-year-old male presenting with right-sided eye pain and erythema concerning for conjunctivitis versus recurrent anterior uveitis.  Visual acuity moderately decreased. Eye exam notable for significant photophobia and injection. Ophthalmology consulted.    This part of the medical record was transcribed by Bladimir Randall, Medical Scribe, from a dictation done by Becky Galeas MD.      11:41 PM  Pt seen by Optho. Has recurrent uveitis.   Recommended referral to Rheumatology.   Started on atropin BID and prednisolone q2 hours in the right eye.   Follow up on Thursday in Ophthalmology clinic at 1:30pm.     I have reviewed the nursing notes. I have reviewed the findings, diagnosis, plan and need for follow up with the patient.    Discharge Medication List as of 3/13/2023 12:12 AM      START taking these medications    Details   atropine 1 % ophthalmic solution Place 1 drop into the right eye 2 times daily, Disp-15 mL,  R-0, Local Print      prednisoLONE acetate (PRED FORTE) 1 % ophthalmic suspension Place 1 drop into the right eye every 2 hours, Disp-15 mL, R-0, Local Print             Final diagnoses:   Uveitis   I, Bladimir Randall, am serving as a trained medical scribe to document services personally performed by Becky Galeas MD, based on the provider's statements to me.     I, Becky Galeas MD, was physically present and have reviewed and verified the accuracy of this note documented by Bladimir Randall.      Becky Galeas MD  [facility] EMERGENCY DEPARTMENT  3/12/2023     Becky Galeas MD  03/13/23 0150

## 2023-03-13 NOTE — DISCHARGE INSTRUCTIONS
Thank you for coming to the [facility] Emergency Department.     Expect a call from the Rheumatology Clinic for further evaluation for causes of your recurrent uveitis.     Start the following treatment for the right eye:  Atropine 1 drop twice daily  Prednisolone 1 drop every 2 hours  OK to use tylenol or ibuprofen as needed for pain  Protect your eyes from light  Do not wear your contact lenses    Follow up in the Ophthalmology Clinic on Thursday at 1:30pm

## 2023-03-16 ENCOUNTER — OFFICE VISIT (OUTPATIENT)
Dept: OPHTHALMOLOGY | Facility: CLINIC | Age: 43
End: 2023-03-16
Attending: OPHTHALMOLOGY
Payer: COMMERCIAL

## 2023-03-16 DIAGNOSIS — H20.00 ACUTE ANTERIOR UVEITIS OF RIGHT EYE: ICD-10-CM

## 2023-03-16 DIAGNOSIS — Z15.89 HLA B27 (HLA B27 POSITIVE): Primary | ICD-10-CM

## 2023-03-16 PROCEDURE — 99214 OFFICE O/P EST MOD 30 MIN: CPT | Mod: GC | Performed by: OPHTHALMOLOGY

## 2023-03-16 PROCEDURE — G0463 HOSPITAL OUTPT CLINIC VISIT: HCPCS | Performed by: GENERAL ACUTE CARE HOSPITAL

## 2023-03-16 ASSESSMENT — REFRACTION_WEARINGRX
OD_SPHERE: -4.00
OS_AXIS: 095
SPECS_TYPE: SVL
OD_CYLINDER: +0.75
OS_CYLINDER: +0.75
OD_AXIS: 079
OS_SPHERE: -4.25

## 2023-03-16 ASSESSMENT — CONF VISUAL FIELD
OS_INFERIOR_NASAL_RESTRICTION: 0
OD_SUPERIOR_TEMPORAL_RESTRICTION: 0
OD_INFERIOR_TEMPORAL_RESTRICTION: 0
OS_INFERIOR_TEMPORAL_RESTRICTION: 0
OD_SUPERIOR_NASAL_RESTRICTION: 0
OS_NORMAL: 1
OS_SUPERIOR_NASAL_RESTRICTION: 0
METHOD: COUNTING FINGERS
OD_NORMAL: 1
OD_INFERIOR_NASAL_RESTRICTION: 0
OS_SUPERIOR_TEMPORAL_RESTRICTION: 0

## 2023-03-16 ASSESSMENT — VISUAL ACUITY
OD_CC: 20/25
CORRECTION_TYPE: GLASSES
OS_CC: 20/20
METHOD: SNELLEN - LINEAR
OD_CC+: +1

## 2023-03-16 ASSESSMENT — EXTERNAL EXAM - LEFT EYE: OS_EXAM: NORMAL

## 2023-03-16 ASSESSMENT — CUP TO DISC RATIO
OD_RATIO: 0.2
OS_RATIO: 0.3

## 2023-03-16 ASSESSMENT — TONOMETRY
OS_IOP_MMHG: 13
OD_IOP_MMHG: 13
IOP_METHOD: TONOPEN

## 2023-03-16 ASSESSMENT — EXTERNAL EXAM - RIGHT EYE: OD_EXAM: NORMAL

## 2023-03-16 ASSESSMENT — SLIT LAMP EXAM - LIDS
COMMENTS: TR PROTECTIVE PTOSIS
COMMENTS: NORMAL

## 2023-03-16 NOTE — PATIENT INSTRUCTIONS
Stop atropine drop    Go down to prednisolone four times a day for 1 week  Three times a day for 7 days, then twice a day for 7 days, then once a day for 7 days.    If feeling great, then don't need to come back.    Let us know if you are having new issues.

## 2023-03-16 NOTE — NURSING NOTE
Chief Complaints and History of Present Illnesses   Patient presents with     Uveitis Follow-Up     Chief Complaint(s) and History of Present Illness(es)     Uveitis Follow-Up            Laterality: right eye    Onset: gradual    Onset: months ago    Quality: Feels the va is better    Associated symptoms: headache (some) and floaters.  Negative for eye pain, photophobia (has decreased) and flashes    Treatments tried: eye drops    Pain scale: 0/10          Comments    Here for Acute anterior uveitis of right eye that started on Wed or Thursday.  States decrease in cloudiness   Prednisone q2h right eye   Red top BID right eye   Shivani Ward COT 1:39 PM March 16, 2023

## 2023-03-16 NOTE — PROGRESS NOTES
Chief Complaint/Presenting Concern:  Uveitis follow up     Interval History of Present Ocular Illness:  Carlos Hamilton is a 39 year old patient who returns for follow up of his acute anterior uveitis of the left eye. Last seen in uveitis clinic in 2020. Recurrence of right eye anterior uveitis 3/2023. Restarted on prednisolone q2h, atropine BID.    For 1 week, he noticed the right eye has become red, photosensitive. It has progressively been getting worse and then the vision got worse. He went to the ED. Dr. Bowie saw carlos, and prescribed prednisolone q2h and atropine BID. He feels almost back to normal. The eye is less red and he is more comfortable.      Interval Updates to Medical/Family/Social History:  No other health or medicine issues.     Relevant Review of Systems Updates:  No major change in back pain. No fevers, rashes, mouth sores.      Laboratory Testing:   - Lab workup in 2020:  --- Normal/negative: Labs: Normal/negative: ACE, CMP, Treponema, Quantiferon, Toxoplasma IgM and IgG. Abnormal: ESR 31, CRP 24. HLA-B27 positive       Current eye related medications: Prednisone 20 mg daily, Prednisolone 4x/day left eye (ran out three days ago)     Retina/Uveitis Imaging: None today     Assessment:      1. Acute anterior uveitis of right eye  -Cell is improved to trace from 1-2+ cell, injection is resolved. KP's are resolved.  -Symptoms improved  -Left eye continues to be quiet     2. HLA B27 (HLA B27 positive)  With possible joint associations     3. High risk medication use  Tolerating prednisone now 20 mg daily.      Plan/Recommendations:    -Decrease Prednisolone to QID in the right eye. Taper by 1 drop over a month, 4-3-2-1  -Stop Atropine in the right eye    Will hold off on Rheumatology now, but can consider later.     If feeling fine, do not need to come back.     Return precautions given.     Attending Physician Attestation:  Complete documentation of historical and exam elements from today's  encounter can be found in the full encounter summary report (not reduplicated in this progress note).  I personally obtained the chief complaint(s) and history of present illness.  I confirmed and edited as necessary the review of systems, past medical/surgical history, family history, social history, and examination findings as documented by others; and I examined the patient myself.  I personally reviewed the relevant tests, images, and reports as documented above.  I formulated and edited as necessary the assessment and plan and discussed the findings and management plan with the patient and family. . - Zeyad Chris MD

## 2023-03-21 ENCOUNTER — TELEPHONE (OUTPATIENT)
Dept: PSYCHOLOGY | Facility: CLINIC | Age: 43
End: 2023-03-21
Payer: COMMERCIAL

## 2023-03-21 NOTE — TELEPHONE ENCOUNTER
"Called about appointment.  Phone message said \" the subscriber is not taking calls at this time, good bye.\"  Was not given an options to leave a voicemail.    "

## 2023-04-03 ENCOUNTER — OFFICE VISIT (OUTPATIENT)
Dept: FAMILY MEDICINE | Facility: CLINIC | Age: 43
End: 2023-04-03
Payer: COMMERCIAL

## 2023-04-03 VITALS
HEART RATE: 72 BPM | DIASTOLIC BLOOD PRESSURE: 77 MMHG | RESPIRATION RATE: 18 BRPM | TEMPERATURE: 98 F | OXYGEN SATURATION: 97 % | WEIGHT: 212 LBS | HEIGHT: 71 IN | SYSTOLIC BLOOD PRESSURE: 129 MMHG | BODY MASS INDEX: 29.68 KG/M2

## 2023-04-03 DIAGNOSIS — F17.200 NICOTINE DEPENDENCE, UNCOMPLICATED, UNSPECIFIED NICOTINE PRODUCT TYPE: ICD-10-CM

## 2023-04-03 DIAGNOSIS — F41.9 ACUTE ANXIETY: ICD-10-CM

## 2023-04-03 DIAGNOSIS — M54.42 CHRONIC LEFT-SIDED LOW BACK PAIN WITH LEFT-SIDED SCIATICA: ICD-10-CM

## 2023-04-03 DIAGNOSIS — J44.9 CHRONIC OBSTRUCTIVE PULMONARY DISEASE, UNSPECIFIED COPD TYPE (H): ICD-10-CM

## 2023-04-03 DIAGNOSIS — G89.29 CHRONIC LEFT-SIDED LOW BACK PAIN WITH LEFT-SIDED SCIATICA: ICD-10-CM

## 2023-04-03 DIAGNOSIS — F11.20 CONTINUOUS OPIOID DEPENDENCE (H): Primary | ICD-10-CM

## 2023-04-03 DIAGNOSIS — F41.1 GAD (GENERALIZED ANXIETY DISORDER): ICD-10-CM

## 2023-04-03 DIAGNOSIS — F11.20 SEVERE OPIOID USE DISORDER ON MAINTENANCE THERAPY (H): ICD-10-CM

## 2023-04-03 DIAGNOSIS — Z15.89 HLA B27 (HLA B27 POSITIVE): ICD-10-CM

## 2023-04-03 LAB
AMPHETAMINES UR QL: NOT DETECTED
BARBITURATES UR QL SCN: NOT DETECTED
BENZODIAZ UR QL SCN: NOT DETECTED
BUPRENORPHINE UR QL: DETECTED
CANNABINOIDS UR QL: DETECTED
COCAINE UR QL SCN: NOT DETECTED
D-METHAMPHET UR QL: NOT DETECTED
METHADONE UR QL SCN: NOT DETECTED
OPIATES UR QL SCN: NOT DETECTED
OXYCODONE UR QL SCN: NOT DETECTED
PCP UR QL SCN: NOT DETECTED
PROPOXYPH UR QL: NOT DETECTED
TRICYCLICS UR QL SCN: NOT DETECTED

## 2023-04-03 PROCEDURE — 99214 OFFICE O/P EST MOD 30 MIN: CPT | Performed by: FAMILY MEDICINE

## 2023-04-03 PROCEDURE — 80306 DRUG TEST PRSMV INSTRMNT: CPT | Performed by: FAMILY MEDICINE

## 2023-04-03 RX ORDER — LORAZEPAM 0.5 MG/1
0.5 TABLET ORAL EVERY 6 HOURS PRN
Qty: 6 TABLET | Refills: 0 | Status: SHIPPED | OUTPATIENT
Start: 2023-04-03 | End: 2023-06-07

## 2023-04-03 ASSESSMENT — ANXIETY QUESTIONNAIRES
2. NOT BEING ABLE TO STOP OR CONTROL WORRYING: NEARLY EVERY DAY
GAD7 TOTAL SCORE: 21
IF YOU CHECKED OFF ANY PROBLEMS ON THIS QUESTIONNAIRE, HOW DIFFICULT HAVE THESE PROBLEMS MADE IT FOR YOU TO DO YOUR WORK, TAKE CARE OF THINGS AT HOME, OR GET ALONG WITH OTHER PEOPLE: EXTREMELY DIFFICULT
GAD7 TOTAL SCORE: 21
7. FEELING AFRAID AS IF SOMETHING AWFUL MIGHT HAPPEN: NEARLY EVERY DAY
GAD7 TOTAL SCORE: 21
3. WORRYING TOO MUCH ABOUT DIFFERENT THINGS: NEARLY EVERY DAY
5. BEING SO RESTLESS THAT IT IS HARD TO SIT STILL: NEARLY EVERY DAY
7. FEELING AFRAID AS IF SOMETHING AWFUL MIGHT HAPPEN: NEARLY EVERY DAY
1. FEELING NERVOUS, ANXIOUS, OR ON EDGE: NEARLY EVERY DAY
4. TROUBLE RELAXING: NEARLY EVERY DAY
8. IF YOU CHECKED OFF ANY PROBLEMS, HOW DIFFICULT HAVE THESE MADE IT FOR YOU TO DO YOUR WORK, TAKE CARE OF THINGS AT HOME, OR GET ALONG WITH OTHER PEOPLE?: EXTREMELY DIFFICULT
6. BECOMING EASILY ANNOYED OR IRRITABLE: NEARLY EVERY DAY
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
## 2023-04-03 NOTE — LETTER
Opioid / Opioid Plus Controlled Substance Agreement    This is an agreement between you and your provider about the safe and appropriate use of controlled substance/opioids prescribed by your care team. Controlled substances are medicines that can cause physical and mental dependence (abuse).    There are strict laws about having and using these medicines. We here at [facility] are committing to working with you in your efforts to get better. To support you in this work, we ll help you schedule regular office appointments for medicine refills. If we must cancel or change your appointment for any reason, we ll make sure you have enough medicine to last until your next appointment.     As a Provider, I will:  Listen carefully to your concerns and treat you with respect.   Recommend a treatment plan that I believe is in your best interest. This plan may involve therapies other than opioid pain medication.   Talk with you often about the possible benefits, and the risk of harm of any medicine that we prescribe for you.   Provide a plan on how to taper (discontinue or go off) using this medicine if the decision is made to stop its use.    As a Patient, I understand that opioid(s):   Are a controlled substance prescribed by my care team to help me function or work and manage my condition(s).   Are strong medicines and can cause serious side effects such as:  Drowsiness, which can seriously affect my driving ability  A lower breathing rate, enough to cause death  Harm to my thinking ability   Depression   Abuse of and addiction to this medicine  Need to be taken exactly as prescribed. Combining opioids with certain medicines or chemicals (such as illegal drugs, sedatives, sleeping pills, and benzodiazepines) can be dangerous or even fatal. If I stop opioids suddenly, I may have severe withdrawal symptoms.  Do not work for all types of pain nor for all patients. If they re not helpful, I may be asked to stop  them.        The risks, benefits and side effects of these medicine(s) were explained to me. I agree that:  I will take part in other treatments as advised by my care team. This may be psychiatry or counseling, physical therapy, behavioral therapy, group treatment or a referral to a specialist.     I will keep all my appointments. I understand that this is part of the monitoring of opioids. My care team may require an office visit for EVERY opioid/controlled substance refill. If I miss appointments or don t follow instructions, my care team may stop my medicine.    I will take my medicines as prescribed. I will not change the dose or schedule unless my care team tells me to. There will be no refills if I run out early.     I may be asked to come to the clinic and complete a urine drug test or complete a pill count at any time. If I don t give a urine sample or participate in a pill count, the care team may stop my medicine.    I will only receive prescriptions from this clinic for chronic pain. If I am treated by another provider for acute pain issues, I will tell them that I am taking opioid pain medication for chronic pain and that I have a treatment agreement with this provider. I will inform my [facility] care team within one business day if I am given a prescription for any pain medication by another healthcare provider. My [facility] care team can contact other providers and pharmacists about my use of any medicines.    It is up to me to make sure that I don t run out of my medicines on weekends or holidays. If my care team is willing to refill my opioid prescription without a visit, I must request refills only during office hours. Refills may take up to 3 business days to process. I will use one pharmacy to fill all my opioid and other controlled substance prescriptions. I will notify the clinic about any changes to my insurance or medication availability.    I am responsible for my  prescriptions. If the medicine/prescription is lost, stolen or destroyed, it will not be replaced. I also agree not to share controlled substance medicines with anyone.    I am aware I should not use any illegal or recreational drugs. I agree not to drink alcohol unless my care team says I can.       If I enroll in the [place] [facility] program, I will tell my care team prior to my next refill.     I will tell my care team right away if I become pregnant, have a new medical problem treated outside of my regular clinic, or have a change in my medications.    I understand that this medicine can affect my thinking, judgment and reaction time. Alcohol and drugs affect the brain and body, which can affect the safety of my driving. Being under the influence of alcohol or drugs can affect my decision-making, behaviors, personal safety, and the safety of others. Driving while impaired (DWI) can occur if a person is driving, operating, or in physical control of a car, motorcycle, boat, snowmobile, ATV, motorbike, off-road vehicle, or any other motor vehicle ([place] Statute 169A.20). I understand the risk if I choose to drive or operate any vehicle or machinery.    I understand that if I do not follow any of the conditions above, my prescriptions or treatment may be stopped or changed.          Opioids  What You Need to Know    What are opioids?   Opioids are pain medicines that must be prescribed by a doctor. They are also known as narcotics.     Examples are:   morphine (MS Contin, Junie)  oxycodone (Oxycontin)  oxycodone and acetaminophen (Percocet)  hydrocodone and acetaminophen (Vicodin, Norco)   fentanyl patch (Duragesic)   hydromorphone (Dilaudid)   methadone  codeine (Tylenol #3)     What do opioids do well?   Opioids are best for severe short-term pain such as after a surgery or injury. They may work well for cancer pain. They may help some people with long-lasting (chronic) pain.     What do opioids NOT do  well?   Opioids never get rid of pain entirely, and they don t work well for most patients with chronic pain. Opioids don t reduce swelling, one of the causes of pain.                                    Other ways to manage chronic pain and improve function include:     Treat the health problem that may be causing pain  Anti-inflammation medicines, which reduce swelling and tenderness, such as ibuprofen (Advil, Motrin) or naproxen (Aleve)  Acetaminophen (Tylenol)  Antidepressants and anti-seizure medicines, especially for nerve pain  Topical treatments such as patches or creams  Injections or nerve blocks  Chiropractic or osteopathic treatment  Acupuncture, massage, deep breathing, meditation, visual imagery, aromatherapy  Use heat or ice at the pain site  Physical therapy   Exercise  Stop smoking  Take part in therapy       Risks and side effects     Talk to your doctor before you start or decide to keep taking opioids. Possible side effects include:    Lowering your breathing rate enough to cause death  Overdose, including death, especially if taking higher than prescribed doses  Worse depression symptoms; less pleasure in things you usually enjoy  Feeling tired or sluggish  Slower thoughts or cloudy thinking  Being more sensitive to pain over time; pain is harder to control  Trouble sleeping or restless sleep  Changes in hormone levels (for example, less testosterone)  Changes in sex drive or ability to have sex  Constipation  Unsafe driving  Itching and sweating  Dizziness  Nausea, throwing up and dry mouth    What else should I know about opioids?    Opioids may lead to dependence, tolerance, or addiction.    Dependence means that if you stop or reduce the medicine too quickly, you will have withdrawal symptoms. These include loose poop (diarrhea), jitters, flu-like symptoms, nervousness and tremors. Dependence is not the same as addiction.                     Tolerance means needing higher doses over time to  get the same effect. This may increase the chance of serious side effects.    Addiction is when people improperly use a substance that harms their body, their mind or their relations with others. Use of opiates can cause a relapse of addiction if you have a history of drug or alcohol abuse.    People who have used opioids for a long time may have a lower quality of life, worse depression, higher levels of pain and more visits to doctors.    You can overdose on opioids. Take these steps to lower your risk of overdose:    Recognize the signs:  Signs of overdose include decrease or loss of consciousness (blackout), slowed breathing, trouble waking up and blue lips. If someone is worried about overdose, they should call 911.    Talk to your doctor about Narcan (naloxone).   If you are at risk for overdose, you may be given a prescription for Narcan. This medicine very quickly reverses the effects of opioids.   If you overdose, a friend or family member can give you Narcan while waiting for the ambulance. They need to know the signs of overdose and how to give Narcan.     Don't use alcohol or street drugs.   Taking them with opioids can cause death.    Do not take any of these medicines unless your doctor says it s OK. Taking these with opioids can cause death:  Benzodiazepines, such as lorazepam (Ativan), alprazolam (Xanax) or diazepam (Valium)  Muscle relaxers, such as cyclobenzaprine (Flexeril)  Sleeping pills like zolpidem (Ambien)   Other opioids      How to keep you and other people safe while taking opioids:    Never share your opioids with others.  Opioid medicines are regulated by the Drug Enforcement Agency ([employer]RI[employer]). Selling or sharing medications is a criminal act.    2. Be sure to store opioids in a secure place, locked up if possible. Young children can easily swallow them and overdose.    3. When you are traveling with your medicines, keep them in the original bottles. If you use a pill box, be sure you also  carry a copy of your medicine list from your clinic or pharmacy.    4. Safe disposal of opioids    Most pharmacies have places to get rid of medicine, called disposal kiosks. Medicine disposal options are also available in every [facility]. Search your county and  medication disposal  to find more options. You can find more details at:  https://www.pca.[facility].[place]./living-green/managing-unwanted-medications     I agree that my provider, clinic care team, and pharmacy may work with any city, state or federal law enforcement agency that investigates the misuse, sale, or other diversion of my controlled medicine. I will allow my provider to discuss my care with, or share a copy of, this agreement with any other treating provider, pharmacy or emergency room where I receive care.    I have read this agreement and have asked questions about anything I did not understand.    _______________________________________________________  Patient Signature - Carlos Hamilton _____________________                   Date     _______________________________________________________  Provider Signature - Lucero Cardenas MD   _____________________                   Date     _______________________________________________________  Witness Signature (required if provider not present while patient signing)   _____________________                   Date

## 2023-04-03 NOTE — PATIENT INSTRUCTIONS
"    How to Quit Smoking  Smoking is a hard habit to break. About half of all people who've ever smoked have been able to quit. Most people who still smoke want to quit. Here are some of the best ways to stop smoking.     Keep in mind the health benefits of quitting  The health benefits of quitting start right away. They keep improving the longer you go without smoking. Knowing this can help inspire you to stay on track. These benefits occur at any age. If you're 17 or 70, quitting is a good choice. Some of the health benefits after your last cigarette include:     After 20 minutes:  Your blood pressure and pulse return to normal.    After 8 hours: Your oxygen levels return to normal.    After 2 days: Your ability to smell and taste start to improve as damaged nerves regrow.    After 2 to 3 weeks: Your circulation and lung function improve.    After 1 to 9 months: Your coughing, congestion, and shortness of breath decrease. Your tiredness decreases.    After 1 year: Your risk of heart attack decreases by 50%.    After 5 years: Your risk of lung cancer decreases by 50%. Your risk of stroke becomes the same as a nonsmoker s.  What about going cold turkey?  You may have heard about quitting \"cold turkey.\" This means stopping all at once. Going cold turkey is an option. But it's not the most successful way to quit smoking. Also, trying to cut back slowly often doesn't work as well. This may be because it continues the habit of smoking. You may also inhale more smoke while smoking fewer cigarettes. This leads to the same amount of nicotine in your body.   But quitting cold turkey or cutting back slowly aren't your only choices. Using tobacco cessation medicines with behavioral counseling may be a better option to help you succeed. Talk with your provider about your options for support while you quit smoking.   Get support  Support programs can be a big help, especially for heavy smokers. These groups offer information, " ways to change behavior, and peer support. Ask your provider for some resources. Here are some other ways to find support:     Smokefree.gov at www.smokefree.gov  800-QUIT-NOW (463-586-5897)    American Lung Association at www.lung.org/quit-smoking  948.778.9438    American Cancer Society  at www.cancer.org/quitsmoking  482.897.4826  Support at home is important too. Family and friends can offer praise and reassurance. Ask your friends who smoke to support your decision. Try to stay away from situations that trigger the desire to smoke. This may include smoking with your morning coffee. Or smoking after a meal. Create new routines to help decrease your cravings.   If the smoker in your life finds it hard to quit, encourage them to keep trying. Remind them of all of the benefits to themselves and people they love.   Try over-the-counter nicotine replacements   Nicotine replacement therapy may make it easier to quit. You can buy some aids without a prescription. These include a nicotine patch, gum, and lozenges. But it's best to use these under the care of your healthcare provider. The skin patch gives a steady supply of nicotine. Nicotine gum and lozenges give short-time doses of low levels of nicotine. Both methods reduce the craving for cigarettes. If you have upset stomach (nausea), vomiting, dizziness, weakness, or a fast heartbeat, stop using these products and see your provider.   Ask about prescription medicine  After reviewing your smoking patterns and past attempts to quit, your provider may offer a prescription medicine. These include bupropion, varenicline, a nicotine inhaler, or nasal spray. Each has advantages and side effects. Your provider can go over these with you.   Keep trying  Most smokers try to quit many times before they succeed. It s important not to give up.   [employer] last reviewed this educational content on 12/1/2019 2000-2022 [employer]. All rights reserved. This  information is not intended as a substitute for professional medical care. Always follow your healthcare professional's instructions.

## 2023-04-16 ENCOUNTER — HEALTH MAINTENANCE LETTER (OUTPATIENT)
Age: 43
End: 2023-04-16

## 2023-04-26 ENCOUNTER — VIRTUAL VISIT (OUTPATIENT)
Dept: PSYCHOLOGY | Facility: CLINIC | Age: 43
End: 2023-04-26
Payer: COMMERCIAL

## 2023-04-26 DIAGNOSIS — F32.1 MAJOR DEPRESSIVE DISORDER, SINGLE EPISODE, MODERATE (H): Primary | ICD-10-CM

## 2023-04-26 DIAGNOSIS — F41.1 GENERALIZED ANXIETY DISORDER: ICD-10-CM

## 2023-04-26 DIAGNOSIS — F10.21 ALCOHOL USE DISORDER, MODERATE, IN SUSTAINED REMISSION, DEPENDENCE (H): ICD-10-CM

## 2023-04-26 PROCEDURE — 90834 PSYTX W PT 45 MINUTES: CPT | Mod: VID | Performed by: MARRIAGE & FAMILY THERAPIST

## 2023-04-26 ASSESSMENT — PATIENT HEALTH QUESTIONNAIRE - PHQ9
10. IF YOU CHECKED OFF ANY PROBLEMS, HOW DIFFICULT HAVE THESE PROBLEMS MADE IT FOR YOU TO DO YOUR WORK, TAKE CARE OF THINGS AT HOME, OR GET ALONG WITH OTHER PEOPLE: EXTREMELY DIFFICULT
SUM OF ALL RESPONSES TO PHQ QUESTIONS 1-9: 8
SUM OF ALL RESPONSES TO PHQ QUESTIONS 1-9: 8
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
## 2023-05-27 DIAGNOSIS — G89.29 CHRONIC BILATERAL LOW BACK PAIN WITH LEFT-SIDED SCIATICA: ICD-10-CM

## 2023-05-27 DIAGNOSIS — I10 ESSENTIAL HYPERTENSION: ICD-10-CM

## 2023-05-27 DIAGNOSIS — M54.42 CHRONIC BILATERAL LOW BACK PAIN WITH LEFT-SIDED SCIATICA: ICD-10-CM

## 2023-05-29 RX ORDER — GABAPENTIN 300 MG/1
600 CAPSULE ORAL 3 TIMES DAILY
Qty: 540 CAPSULE | Refills: 0 | Status: SHIPPED | OUTPATIENT
Start: 2023-05-29 | End: 2023-12-04

## 2023-05-29 RX ORDER — LISINOPRIL 20 MG/1
20 TABLET ORAL DAILY
Qty: 90 TABLET | Refills: 0 | Status: SHIPPED | OUTPATIENT
Start: 2023-05-29 | End: 2023-08-06

## 2023-06-05 ENCOUNTER — VIRTUAL VISIT (OUTPATIENT)
Dept: FAMILY MEDICINE | Facility: CLINIC | Age: 43
End: 2023-06-05
Payer: COMMERCIAL

## 2023-06-05 DIAGNOSIS — G89.29 CHRONIC RIGHT-SIDED LOW BACK PAIN WITH RIGHT-SIDED SCIATICA: ICD-10-CM

## 2023-06-05 DIAGNOSIS — F11.20 SEVERE OPIOID USE DISORDER ON MAINTENANCE THERAPY (H): ICD-10-CM

## 2023-06-05 DIAGNOSIS — M54.41 CHRONIC RIGHT-SIDED LOW BACK PAIN WITH RIGHT-SIDED SCIATICA: ICD-10-CM

## 2023-06-05 DIAGNOSIS — F41.1 GAD (GENERALIZED ANXIETY DISORDER): Primary | ICD-10-CM

## 2023-06-05 PROCEDURE — 99443 PR PHYSICIAN TELEPHONE EVALUATION 21-30 MIN: CPT | Mod: 95 | Performed by: FAMILY MEDICINE

## 2023-06-05 RX ORDER — BUPRENORPHINE AND NALOXONE 8; 2 MG/1; MG/1
1 FILM, SOLUBLE BUCCAL; SUBLINGUAL DAILY
Qty: 30 FILM | Refills: 1 | Status: SHIPPED | OUTPATIENT
Start: 2023-06-05 | End: 2024-01-17

## 2023-06-05 ASSESSMENT — ANXIETY QUESTIONNAIRES
IF YOU CHECKED OFF ANY PROBLEMS ON THIS QUESTIONNAIRE, HOW DIFFICULT HAVE THESE PROBLEMS MADE IT FOR YOU TO DO YOUR WORK, TAKE CARE OF THINGS AT HOME, OR GET ALONG WITH OTHER PEOPLE: VERY DIFFICULT
2. NOT BEING ABLE TO STOP OR CONTROL WORRYING: NEARLY EVERY DAY
3. WORRYING TOO MUCH ABOUT DIFFERENT THINGS: MORE THAN HALF THE DAYS
5. BEING SO RESTLESS THAT IT IS HARD TO SIT STILL: MORE THAN HALF THE DAYS
7. FEELING AFRAID AS IF SOMETHING AWFUL MIGHT HAPPEN: NOT AT ALL
4. TROUBLE RELAXING: NEARLY EVERY DAY
8. IF YOU CHECKED OFF ANY PROBLEMS, HOW DIFFICULT HAVE THESE MADE IT FOR YOU TO DO YOUR WORK, TAKE CARE OF THINGS AT HOME, OR GET ALONG WITH OTHER PEOPLE?: VERY DIFFICULT
6. BECOMING EASILY ANNOYED OR IRRITABLE: NEARLY EVERY DAY
1. FEELING NERVOUS, ANXIOUS, OR ON EDGE: NEARLY EVERY DAY
GAD7 TOTAL SCORE: 16
7. FEELING AFRAID AS IF SOMETHING AWFUL MIGHT HAPPEN: NOT AT ALL
GAD7 TOTAL SCORE: 16

## 2023-06-05 NOTE — PROGRESS NOTES
"Carlos is a 42 year old who is being evaluated via a billable telephone visit.      What phone number would you like to be contacted at? [phone]  How would you like to obtain your AVS? Teddy    Distant Location (provider location):  On-site    Assessment & Plan     Severe opioid use disorder on maintenance therapy (H): taking just 1 film per day and feels good he has been able to cut down to that amount without issue. Will refill at this dose.   - buprenorphine HCl-naloxone HCl (SUBOXONE) 8-2 MG per film  Dispense: 30 Film; Refill: 1    MARY (generalized anxiety disorder): discussed his experience with the behavioral health  and apologized that this happened. The suggestion that he was seeking benzos was inappropriate. I explained I will report this.     Chronic right-sided low back pain with right-sided sciatica: states this is a little better since he can walk around during work and doesn't have to stay seated so much. Will evaluate again at next visit.                BMI:   Estimated body mass index is 29.57 kg/m  as calculated from the following:    Height as of 4/3/23: 1.803 m (5' 11\").    Weight as of 4/3/23: 96.2 kg (212 lb).           Lucero Cardenas MD  [facility]   Carlos is a 42 year old, presenting for the following health issues:  Follow Up (suboxone)        6/5/2023     2:35 PM   Additional Questions   Roomed by Shraddha SHAW         6/5/2023     2:35 PM   Patient Reported Additional Medications   Patient reports taking the following new medications Claritan OTC for seasonal allergies.     History of Present Illness       Back Pain:  He presents for follow up of back pain. Patient's back pain is a chronic problem.  Location of back pain:  Right lower back, left lower back, right middle of back, right buttock and right hip  Description of back pain: gnawing and shooting  Back pain spreads: right thigh and right foot    Since patient first noticed back " pain, pain is: gradually worsening  Does back pain interfere with his job:  Yes      Mental Health Follow-up:  Patient presents to follow-up on Depression & Anxiety.Patient's depression since last visit has been:  Bad  The patient is having other symptoms associated with depression.  Patient's anxiety since last visit has been:  Worse  The patient is having other symptoms associated with anxiety.  Any significant life events: financial concerns and health concerns  Patient is feeling anxious or having panic attacks.  Patient has no concerns about alcohol or drug use.    Hypertension: He presents for follow up of hypertension.  He does not check blood pressure  regularly outside of the clinic. Outside blood pressures have been over 140/90. He does not follow a low salt diet.     He eats 0-1 servings of fruits and vegetables daily.He consumes 4 sweetened beverage(s) daily.He exercises with enough effort to increase his heart rate 10 to 19 minutes per day.  He exercises with enough effort to increase his heart rate 4 days per week.   He is taking medications regularly.  Today's MARY-7 Score: 16    Anxiety is still the same. He wasn't able to see psychiatrist. The person who called him (possibly a ?) first tried to convince him to keep seeing the same NP who he hadn't been happy with. Then,  they kept telling him he wasn't going to get benzos from the MD. He wasn't even talking about benzos and so he felt very insulted by this suggestion. Feels angry and discouraged because he has tried to get more help following his father's death and this is what happens.     Therapist Sheba Davison- this is going well. He feels it's helpful to talk to her and plans to schedule with her once his new insurance is active.     Hasn't had dluoxetine in a month and a half but doesn't think it was helping his pain or anxiety anyway.     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  When he finally left, the weight off his shoulders was incredible. He is living with a friend who is also in recovery so he is still in a supportive environment.       Thinks his physical health is worse than his mental health right now. Middle toes on left foot get numb. He doesn't think this is due to his back pain. When he gets pain radiating down his leg from his back, it goes down the right leg. He wants to schedule an annual physical.           Objective           Vitals:  No vitals were obtained today due to virtual visit.    Physical Exam   healthy, alert and no distress  PSYCH: Alert and oriented times 3; coherent speech, normal   rate and volume, able to articulate logical thoughts, able   to abstract reason, no tangential thoughts, no hallucinations   or delusions  His affect is normal  RESP: No cough, no audible wheezing, able to talk in full sentences  Remainder of exam unable to be completed due to telephone visits          Phone call duration: 23 minutes

## 2023-06-07 PROBLEM — M54.41 CHRONIC RIGHT-SIDED LOW BACK PAIN WITH RIGHT-SIDED SCIATICA: Status: ACTIVE | Noted: 2019-03-25

## 2023-07-09 DIAGNOSIS — K21.9 GASTROESOPHAGEAL REFLUX DISEASE WITHOUT ESOPHAGITIS: ICD-10-CM

## 2023-07-09 RX ORDER — OMEPRAZOLE 40 MG/1
40 CAPSULE, DELAYED RELEASE ORAL DAILY
Qty: 90 CAPSULE | Refills: 3 | Status: ON HOLD | OUTPATIENT
Start: 2023-07-09 | End: 2024-04-16

## 2023-07-09 NOTE — TELEPHONE ENCOUNTER
"Last Written Prescription Date:  7/27/22  Last Fill Quantity: 90,  # refills: 3   Last office visit provider:  6/5/23     Requested Prescriptions   Pending Prescriptions Disp Refills     omeprazole (PRILOSEC) 40 MG DR capsule [Pharmacy Med Name: Omeprazole Oral Capsule Delayed Release 40 MG] 90 capsule 0     Sig: Take 1 capsule (40 mg) by mouth daily       PPI Protocol Passed - 7/9/2023  4:10 PM        Passed - Not on Clopidogrel (unless Pantoprazole ordered)        Passed - No diagnosis of osteoporosis on record        Passed - Recent (12 mo) or future (30 days) visit within the authorizing provider's specialty     Patient has had an office visit with the authorizing provider or a provider within the authorizing providers department within the previous 12 mos or has a future within next 30 days. See \"Patient Info\" tab in inbasket, or \"Choose Columns\" in Meds & Orders section of the refill encounter.              Passed - Medication is active on med list        Passed - Patient is age 18 or older             Mitchel Tavera RN 07/09/23 4:10 PM  "

## 2023-08-06 DIAGNOSIS — I10 ESSENTIAL HYPERTENSION: ICD-10-CM

## 2023-08-06 RX ORDER — LISINOPRIL 20 MG/1
20 TABLET ORAL DAILY
Qty: 90 TABLET | Refills: 2 | Status: SHIPPED | OUTPATIENT
Start: 2023-08-06 | End: 2023-11-13

## 2023-08-07 NOTE — TELEPHONE ENCOUNTER
"Note: creatinine is low--OK to refill, see below  Last Written Prescription Date:  5/29/2023  Last Fill Quantity: 90,  # refills: 0   Last office visit provider:  Virtual visit 6/5/2023 with PCP Dr ARGENTINA Cardenas,   4/3/2023 Office visit with Dr ARGENTINA Cardenas      Requested Prescriptions   Pending Prescriptions Disp Refills    lisinopril (ZESTRIL) 20 MG tablet [Pharmacy Med Name: Lisinopril Oral Tablet 20 MG] 90 tablet 0     Sig: TAKE ONE TABLET BY MOUTH ONE TIME DAILY       ACE Inhibitors (Including Combos) Protocol Failed - 8/6/2023  9:02 AM        Failed - Normal serum creatinine on file in past 12 months     Recent Labs   Lab Test 11/17/22  1050   CR 0.50*       Ok to refill medication if creatinine is low          Passed - Blood pressure under 140/90 in past 12 months     BP Readings from Last 3 Encounters:   04/03/23 129/77   03/13/23 (!) 139/92   12/15/22 (!) 169/96                 Passed - Recent (12 mo) or future (30 days) visit within the authorizing provider's specialty     Patient has had an office visit with the authorizing provider or a provider within the authorizing providers department within the previous 12 mos or has a future within next 30 days. See \"Patient Info\" tab in inbasket, or \"Choose Columns\" in Meds & Orders section of the refill encounter.              Passed - Medication is active on med list        Passed - Patient is age 18 or older        Passed - Normal serum potassium on file in past 12 months     Recent Labs   Lab Test 11/17/22  1050   POTASSIUM 3.6                  Ania Michael RN 08/06/23 7:45 PM  "

## 2023-09-07 ENCOUNTER — OFFICE VISIT (OUTPATIENT)
Dept: RHEUMATOLOGY | Facility: CLINIC | Age: 43
End: 2023-09-07
Attending: FAMILY MEDICINE
Payer: COMMERCIAL

## 2023-09-07 ENCOUNTER — LAB (OUTPATIENT)
Dept: LAB | Facility: CLINIC | Age: 43
End: 2023-09-07
Payer: COMMERCIAL

## 2023-09-07 ENCOUNTER — HOSPITAL ENCOUNTER (OUTPATIENT)
Dept: GENERAL RADIOLOGY | Facility: HOSPITAL | Age: 43
Discharge: HOME | End: 2023-09-07
Attending: INTERNAL MEDICINE
Payer: COMMERCIAL

## 2023-09-07 VITALS
OXYGEN SATURATION: 97 % | WEIGHT: 188.9 LBS | SYSTOLIC BLOOD PRESSURE: 110 MMHG | HEART RATE: 71 BPM | BODY MASS INDEX: 26.35 KG/M2 | DIASTOLIC BLOOD PRESSURE: 66 MMHG

## 2023-09-07 DIAGNOSIS — M25.50 POLYARTHRALGIA: ICD-10-CM

## 2023-09-07 DIAGNOSIS — Z15.89 HLA B27 (HLA B27 POSITIVE): ICD-10-CM

## 2023-09-07 DIAGNOSIS — M54.42 CHRONIC LEFT-SIDED LOW BACK PAIN WITH LEFT-SIDED SCIATICA: ICD-10-CM

## 2023-09-07 DIAGNOSIS — G89.29 CHRONIC LEFT-SIDED LOW BACK PAIN WITH LEFT-SIDED SCIATICA: ICD-10-CM

## 2023-09-07 DIAGNOSIS — M25.50 POLYARTHRALGIA: Primary | ICD-10-CM

## 2023-09-07 LAB
ALBUMIN SERPL BCG-MCNC: 5.1 G/DL (ref 3.5–5.2)
ALT SERPL W P-5'-P-CCNC: 24 U/L (ref 0–70)
BASOPHILS # BLD AUTO: 0.1 10E3/UL (ref 0–0.2)
BASOPHILS NFR BLD AUTO: 1 %
CREAT SERPL-MCNC: 0.61 MG/DL (ref 0.67–1.17)
CRP SERPL-MCNC: 9.98 MG/L
EGFRCR SERPLBLD CKD-EPI 2021: >90 ML/MIN/1.73M2
EOSINOPHIL # BLD AUTO: 0.1 10E3/UL (ref 0–0.7)
EOSINOPHIL NFR BLD AUTO: 1 %
ERYTHROCYTE [DISTWIDTH] IN BLOOD BY AUTOMATED COUNT: 13 % (ref 10–15)
ERYTHROCYTE [SEDIMENTATION RATE] IN BLOOD BY WESTERGREN METHOD: 7 MM/HR (ref 0–15)
FERRITIN SERPL-MCNC: 97 NG/ML (ref 31–409)
HCT VFR BLD AUTO: 42 % (ref 40–53)
HGB BLD-MCNC: 14.5 G/DL (ref 13.3–17.7)
IMM GRANULOCYTES # BLD: 0 10E3/UL
IMM GRANULOCYTES NFR BLD: 0 %
LYMPHOCYTES # BLD AUTO: 3.7 10E3/UL (ref 0.8–5.3)
LYMPHOCYTES NFR BLD AUTO: 39 %
MCH RBC QN AUTO: 30.8 PG (ref 26.5–33)
MCHC RBC AUTO-ENTMCNC: 34.5 G/DL (ref 31.5–36.5)
MCV RBC AUTO: 89 FL (ref 78–100)
MONOCYTES # BLD AUTO: 0.4 10E3/UL (ref 0–1.3)
MONOCYTES NFR BLD AUTO: 4 %
NEUTROPHILS # BLD AUTO: 5.1 10E3/UL (ref 1.6–8.3)
NEUTROPHILS NFR BLD AUTO: 55 %
PLATELET # BLD AUTO: 374 10E3/UL (ref 150–450)
RBC # BLD AUTO: 4.71 10E6/UL (ref 4.4–5.9)
URATE SERPL-MCNC: 5.8 MG/DL (ref 3.4–7)
WBC # BLD AUTO: 9.3 10E3/UL (ref 4–11)

## 2023-09-07 PROCEDURE — 84550 ASSAY OF BLOOD/URIC ACID: CPT

## 2023-09-07 PROCEDURE — 82728 ASSAY OF FERRITIN: CPT

## 2023-09-07 PROCEDURE — 86803 HEPATITIS C AB TEST: CPT

## 2023-09-07 PROCEDURE — 73562 X-RAY EXAM OF KNEE 3: CPT | Mod: 50

## 2023-09-07 PROCEDURE — 72202 X-RAY EXAM SI JOINTS 3/> VWS: CPT

## 2023-09-07 PROCEDURE — 87340 HEPATITIS B SURFACE AG IA: CPT

## 2023-09-07 PROCEDURE — 82040 ASSAY OF SERUM ALBUMIN: CPT

## 2023-09-07 PROCEDURE — 86481 TB AG RESPONSE T-CELL SUSP: CPT

## 2023-09-07 PROCEDURE — 86200 CCP ANTIBODY: CPT

## 2023-09-07 PROCEDURE — 99204 OFFICE O/P NEW MOD 45 MIN: CPT | Performed by: INTERNAL MEDICINE

## 2023-09-07 PROCEDURE — 85652 RBC SED RATE AUTOMATED: CPT

## 2023-09-07 PROCEDURE — 86038 ANTINUCLEAR ANTIBODIES: CPT

## 2023-09-07 PROCEDURE — 85025 COMPLETE CBC W/AUTO DIFF WBC: CPT

## 2023-09-07 PROCEDURE — 84460 ALANINE AMINO (ALT) (SGPT): CPT

## 2023-09-07 PROCEDURE — 86140 C-REACTIVE PROTEIN: CPT

## 2023-09-07 PROCEDURE — 73130 X-RAY EXAM OF HAND: CPT | Mod: 50

## 2023-09-07 PROCEDURE — 82565 ASSAY OF CREATININE: CPT

## 2023-09-07 PROCEDURE — 36415 COLL VENOUS BLD VENIPUNCTURE: CPT

## 2023-09-07 NOTE — PROGRESS NOTES
This document was created using a software with less than 100% fidelity, at times resulting in unintended, even erroneous syntax and grammar.  The reader is advised to keep this under consideration while reviewing, interpreting this note.      Rheumatology Consult Note      Carlos Hamilton     YOB: 1980 Age: 42 year old    Date of visit: 9/07/23    PCP: Lucero Cardenas    Chief Complaint   Patient presents with:  Consult      Assessment and Plan     Carlos was seen today for consult.    Diagnoses and all orders for this visit:    Polyarthralgia  -     XR Sacroiliac Joint G/E 3 Views; Future  -     XR Knee Standing Bilateral 3 Views; Future  -     XR Hand Bilateral G/E 3 Views; Future  -     Albumin level; Future  -     ALT; Future  -     Anti Nuclear Mayda IgG by IFA with Reflex; Future  -     CBC with Platelets & Differential; Future  -     Creatinine; Future  -     CRP inflammation; Future  -     Cyclic Citrullinated Peptide Antibody IgG; Future  -     Erythrocyte sedimentation rate auto; Future  -     Ferritin; Future  -     Hepatitis C antibody; Future  -     Uric acid; Future  -     Quantiferon-TB Gold Plus; Future  -     Hepatitis B surface antigen; Future    Chronic left-sided low back pain with left-sided sciatica  -     Adult Rheumatology  Referral  -     XR Sacroiliac Joint G/E 3 Views; Future  -     XR Knee Standing Bilateral 3 Views; Future  -     Albumin level; Future  -     ALT; Future  -     Anti Nuclear Mayda IgG by IFA with Reflex; Future  -     CBC with Platelets & Differential; Future  -     Creatinine; Future  -     CRP inflammation; Future  -     Cyclic Citrullinated Peptide Antibody IgG; Future  -     Erythrocyte sedimentation rate auto; Future  -     Ferritin; Future  -     Hepatitis C antibody; Future  -     Uric acid; Future  -     Quantiferon-TB Gold Plus; Future  -     Hepatitis B surface antigen; Future    HLA B27 (HLA B27 positive)  -     Adult Rheumatology   Referral  -     XR Sacroiliac Joint G/E 3 Views; Future  -     XR Knee Standing Bilateral 3 Views; Future           This patient with chronic low back pain with mixed picture of a mechanical as well as some signals of inflammatory back pain going on since at least his early 30s, more recently 2 episodes of iritis in the right eye,  positive HLA-B27 status and also known to have degenerative changes in the spine, multiple epidural injections of some benefit, right hip arthroplasty secondary to osteonecrosis, very likely family history of psoriasis, is here for evaluation and the question if he might have an inflammatory spondylitis such as ankylosing spondylitis.  His work-up so far besides above-noted has shown no evidence of spondylitis on a recent MRI of the lumbar spine which was done with and without contrast, multilevel mild to minimal degenerative changes noted.  Is had mild to moderate right neural foraminal stenosis without significant nerve root impingement.    I have outlined to him today that the key question that we need to find an answer to is if he has ankylosing spondylitis or associated conditions, especially given the type of back pain, the age of the pain started, association with iritis, HLA-B27 positive status as well as very likely brothers history of psoriasis.  Further work-up as noted.  Once these data are available we will get together further course to be charted accordingly.  He might require MRI of the SI joints once the current work-up is at hand.  We will meet here in the next 2 to 3 weeks.    Return to clinic: 3 weeks      HPI   Carlos Hamilton is a 42 year old male  is seen today for evaluation of Painful back.  He has been found to have HLA-B27 positive status.  He reports that his back pain has troubled him for many many years as far back as he can recall even in his late teens he would get some discomfort in the lower back by the time he was in his early 30s the pain had gotten  "worse he was in [place] at that time and would periodically get injections in his spine, later on epidurals which would help him some.  He recalls that the pain was and still is on the lumbar area more so on the right side after radiating down the right lower extremity. The left lower extremity would get a sense of pressure but not the kind of pain on the right side.  Over the years he would take ibuprofen and acetaminophen with marginal benefit.  In this background he reports that the pain that is troubling him over all these years is fairly typical for him, this is there constantly throughout the day, worse with activity but also waking him up from sleep.  He would then need hot showers icing and other measures to help with the pain.  He has been to physical therapy on multiple occasions.  Because in part the expense and relatively loss of effectiveness he stopped going.  He has had pain in his knees.  Sometimes they pop.  Similarly in his ankles.  He has noted little or no pain in upper extremity joints.  Sometimes his right fifth digit \"locks up\".  He developed what sounds like osteonecrosis of his right hip, decompression by his description was attempted he then went on to have right hip arthroplasty.  This was in 2018.  He also noted episodes of iritis x2 region during this past 12 months on the right side treated topically.  He used to have episodes of seizures, initially thought to be secondary to alcohol intake, then found to have an aneurysm which was clipped in 2013.  Since then he has not had seizures.  He describes no rash no inflammatory bowel disease or psoriasis himself.  He wonders if one of his brothers or possibly 2 of them have psoriasis.  He is going to check into that.  He is a half a pack a day smoker.  He works as a  at [employer].  He is single.  Once in his teenage he had chlamydia treated none since.           Active Problem List     Patient Active Problem List "   Diagnosis    Degeneration of lumbar/lumbosacral disc without myelopathy    Chronic right-sided low back pain with right-sided sciatica    Anxiety state    MARY (generalized anxiety disorder)    Insomnia, unspecified type    Alcohol dependence in remission (H)    Substance abuse in remission (H)    Mixed dyslipidemia    Mild persistent asthma, unspecified whether complicated    Hx of avascular necrosis of capital femoral epiphysis    Iron deficiency anemia secondary to inadequate dietary iron intake    HLA B27 (HLA B27 positive)    High risk medication use    Severe opioid use disorder on maintenance therapy (H)    Tobacco dependence syndrome    Chronic obstructive pulmonary disease, unspecified COPD type (H)    Adjustment disorder with depressed mood    Essential hypertension    Prediabetes    Nonintractable headache, unspecified chronicity pattern, unspecified headache type    History of cerebral aneurysm    Brain aneurysm    Major depressive disorder, recurrent episode, moderate (H)    F11.2 - Continuous opioid dependence (H)     Past Medical History     Past Medical History:   Diagnosis Date    Alcohol abuse     Asthma     Brain aneurysm     s/p clip in 2013    Brain aneurysm     Chronic back pain     HTN (hypertension)     Seizures (H)     Stroke (H)      Past Surgical History     Past Surgical History:   Procedure Laterality Date    aneursym clipping      Brain aneursym in 2013    ARTHROPLASTY HIP Right     ARTHROPLASTY REVISION HIP Right     BRAIN SURGERY      KNEE SURGERY      right knee     Allergy     Allergies   Allergen Reactions    Diphenhydramine Swelling     Per patient, tongue/lips swelling, itchy eyes with both generic diphenhydramine and brand Benadryl     Naproxen Swelling    [place]io Nut Extract Skin Test     Toradol [Ketorolac] Itching     Current Medication List     Current Outpatient Medications   Medication Sig    acetaminophen (TYLENOL) 325 MG tablet Take 650 mg by mouth every 6 hours as  needed for mild pain or headaches    buprenorphine HCl-naloxone HCl (SUBOXONE) 8-2 MG per film Place 1 Film under the tongue daily    gabapentin (NEURONTIN) 300 MG capsule Take 2 capsules (600 mg) by mouth 3 times daily    ibuprofen (ADVIL/MOTRIN) 200 MG tablet Take 600 mg by mouth every 6 hours as needed for moderate pain (4-6)    lisinopril (ZESTRIL) 20 MG tablet TAKE ONE TABLET BY MOUTH ONE TIME DAILY    multivitamin w/minerals (THERA-VIT-M) tablet Take 1 tablet by mouth daily    omeprazole (PRILOSEC) 40 MG DR capsule Take 1 capsule (40 mg) by mouth daily    order for DME Equipment being ordered: adult mask for neb machine with tubing    tiZANidine (ZANAFLEX) 4 MG tablet Take 1 tablet (4 mg) by mouth every 4 hours    VENTOLIN  (90 Base) MCG/ACT inhaler INHALE TWO PUFFS BY MOUTH EVERY SIX HOURS AS NEEDED FOR WHEEZING     No current facility-administered medications for this visit.            Family History     Family History   Problem Relation Age of Onset    Glaucoma Mother     Uveitis Brother     Alcoholism No family hx of     Macular Degeneration No family hx of          Physical Exam     COMPREHENSIVE EXAMINATION:  Vitals:    09/07/23 1526   BP: 110/66   Pulse: 71   SpO2: 97%   Weight: 85.7 kg (188 lb 14.4 oz)     A well appearing alert oriented male. Vital data as noted above. His eyes examined for inflammation/scleromalacia. ENT examined for oral mucositis, moisture, thrush, nasal deformity, external ear redness, deformity. His neck is examined for suppleness and lymphadenopathy.  Cardiopulmonary examination without dyspnea at rest, use of accessory muscles of breathing, wheezing, edema, peripheral or central cyanosis.  Abdomen examined for softness, tenderness, obvious organomegaly.  Skin examined for heliotrope, malar area eruption, lupus pernio, periungual erythema, sclerodactyly, papules, erythema nodosum, purpura, nail pitting, onycholysis, and obvious psoriasis lesion. Neurological examination  done for alertness, speech, facial symmetry,  tone and power in upper and lower extremities, and gait. The joint examination is performed for swelling, tenderness, warmth, erythema, and range of motion in the following joints: DIPs, PIPs, MCPs, wrists, first CMC's, elbows, shoulders, hips, knees, ankles, feet; spine for range of motion and paraspinal muscles for tenderness. The salient normal / abnormal findings are appended.  He does not have dactylitis of digits or toes there is no synovitis in any of the palpable joints of upper and lower extremities.  Occiput to wall distance is 0.  He has stigmata of cranial surgery on his right forehead.  He has no enthesopathy such as Achilles insertion, the knees are the trochanteric areas.    Labs / Imaging (select studies)     No results found for: PPDINDURATIO, PPDREDNESS, TBGOLT, RHF, CCPABG, URIC, [id number], [id number], ANTIDNA, ANTIDNAINT, CARIA, [id number], [id number], ENASM, ENASCL, JO1, ENASSA, ENASSB, CENTA, K6TCDPP, F3OZSMA, [id number]   Hemoglobin   Date Value Ref Range Status   11/17/2022 12.3 (L) 13.3 - 17.7 g/dL Final   07/02/2019 13.2 (L) 13.3 - 17.7 g/dL Final   03/28/2019 13.6 (L) 14.0 - 18.0 g/dL Final   03/12/2019 14.0 13.3 - 17.7 g/dL Final   02/28/2019 13.6 13.3 - 17.7 g/dL Final     Urea Nitrogen   Date Value Ref Range Status   11/17/2022 7 7 - 30 mg/dL Final   04/25/2022 12 8 - 22 mg/dL Final   07/15/2020 9 7 - 30 mg/dL Final   07/02/2019 8 7 - 30 mg/dL Final   03/28/2019 10 8 - 22 mg/dL Final   03/12/2019 11 7 - 30 mg/dL Final     Sed Rate   Date Value Ref Range Status   07/15/2020 31 (H) 0 - 15 mm/h Final     CRP Inflammation   Date Value Ref Range Status   07/15/2020 24.7 (H) 0.0 - 8.0 mg/L Final     AST   Date Value Ref Range Status   11/17/2022 18 0 - 45 U/L Final   04/25/2022 28 0 - 40 U/L Final   07/15/2020 19 0 - 45 U/L Final   07/02/2019 20 0 - 45 U/L Final   03/28/2019 36 0 - 40 U/L Final   03/12/2019 38 0 - 45 U/L Final     Albumin   Date Value Ref  Range Status   11/17/2022 3.5 3.4 - 5.0 g/dL Final   04/25/2022 3.9 3.5 - 5.0 g/dL Final   07/15/2020 3.8 3.4 - 5.0 g/dL Final   07/02/2019 3.8 3.4 - 5.0 g/dL Final   03/28/2019 3.5 3.5 - 5.0 g/dL Final   03/12/2019 3.8 3.4 - 5.0 g/dL Final     Alkaline Phosphatase   Date Value Ref Range Status   11/17/2022 120 40 - 150 U/L Final   04/25/2022 131 (H) 45 - 120 U/L Final   07/15/2020 131 40 - 150 U/L Final   07/02/2019 107 40 - 150 U/L Final   03/28/2019 142 (H) 45 - 120 U/L Final   03/12/2019 91 40 - 150 U/L Final     ALT   Date Value Ref Range Status   11/17/2022 40 0 - 70 U/L Final   04/25/2022 35 0 - 45 U/L Final   07/15/2020 34 0 - 70 U/L Final   07/02/2019 27 0 - 70 U/L Final   03/28/2019 56 (H) 0 - 45 U/L Final   03/12/2019 38 0 - 70 U/L Final          Immunization History     Immunization History   Administered Date(s) Administered    COVID-19 Bivalent 12+ (Pfizer) 10/31/2022    COVID-19 Monovalent 18+ (Moderna) 03/01/2021, 03/29/2021    COVID-19 Monovalent Booster 18+ (Moderna) 12/13/2021    TDAP (Adacel,Boostrix) 07/27/2022

## 2023-09-08 LAB
CCP AB SER IA-ACNC: 1.2 U/ML
HBV SURFACE AG SERPL QL IA: NONREACTIVE
HCV AB SERPL QL IA: NONREACTIVE

## 2023-09-09 LAB
GAMMA INTERFERON BACKGROUND BLD IA-ACNC: 0.02 IU/ML
M TB IFN-G BLD-IMP: NEGATIVE
M TB IFN-G CD4+ BCKGRND COR BLD-ACNC: 9.98 IU/ML
MITOGEN IGNF BCKGRD COR BLD-ACNC: 0.01 IU/ML
MITOGEN IGNF BCKGRD COR BLD-ACNC: 0.03 IU/ML
QUANTIFERON MITOGEN: 10 IU/ML
QUANTIFERON NIL TUBE: 0.02 IU/ML
QUANTIFERON TB1 TUBE: 0.03 IU/ML
QUANTIFERON TB2 TUBE: 0.05

## 2023-09-11 LAB — ANA SER QL IF: NEGATIVE

## 2023-09-12 ASSESSMENT — ENCOUNTER SYMPTOMS
HEARTBURN: 0
MYALGIAS: 1
HEMATOCHEZIA: 0
EYE PAIN: 0
HEADACHES: 1
NERVOUS/ANXIOUS: 1
NAUSEA: 0
CONSTIPATION: 0
CHILLS: 1
ARTHRALGIAS: 1
FEVER: 0
ABDOMINAL PAIN: 1
WEAKNESS: 1
HEMATURIA: 0
DIARRHEA: 0
PALPITATIONS: 1
COUGH: 0
PARESTHESIAS: 1
FREQUENCY: 1
SHORTNESS OF BREATH: 1
SORE THROAT: 0
DIZZINESS: 0
DYSURIA: 0
JOINT SWELLING: 1

## 2023-09-12 ASSESSMENT — PATIENT HEALTH QUESTIONNAIRE - PHQ9
10. IF YOU CHECKED OFF ANY PROBLEMS, HOW DIFFICULT HAVE THESE PROBLEMS MADE IT FOR YOU TO DO YOUR WORK, TAKE CARE OF THINGS AT HOME, OR GET ALONG WITH OTHER PEOPLE: EXTREMELY DIFFICULT
SUM OF ALL RESPONSES TO PHQ QUESTIONS 1-9: 22
SUM OF ALL RESPONSES TO PHQ QUESTIONS 1-9: 22

## 2023-09-13 ENCOUNTER — OFFICE VISIT (OUTPATIENT)
Dept: FAMILY MEDICINE | Facility: CLINIC | Age: 43
End: 2023-09-13
Payer: COMMERCIAL

## 2023-09-13 VITALS
BODY MASS INDEX: 26.92 KG/M2 | SYSTOLIC BLOOD PRESSURE: 108 MMHG | HEART RATE: 58 BPM | WEIGHT: 188 LBS | TEMPERATURE: 97.9 F | RESPIRATION RATE: 16 BRPM | DIASTOLIC BLOOD PRESSURE: 69 MMHG | OXYGEN SATURATION: 100 % | HEIGHT: 70 IN

## 2023-09-13 DIAGNOSIS — R63.4 WEIGHT LOSS: ICD-10-CM

## 2023-09-13 DIAGNOSIS — F11.20 SEVERE OPIOID USE DISORDER ON MAINTENANCE THERAPY (H): ICD-10-CM

## 2023-09-13 DIAGNOSIS — R20.2 NUMBNESS AND TINGLING OF BOTH FEET: ICD-10-CM

## 2023-09-13 DIAGNOSIS — Z11.59 NEED FOR HEPATITIS B SCREENING TEST: ICD-10-CM

## 2023-09-13 DIAGNOSIS — R73.03 PREDIABETES: ICD-10-CM

## 2023-09-13 DIAGNOSIS — J44.9 CHRONIC OBSTRUCTIVE PULMONARY DISEASE, UNSPECIFIED COPD TYPE (H): ICD-10-CM

## 2023-09-13 DIAGNOSIS — R20.0 NUMBNESS AND TINGLING OF BOTH FEET: ICD-10-CM

## 2023-09-13 DIAGNOSIS — Z00.00 ROUTINE GENERAL MEDICAL EXAMINATION AT A HEALTH CARE FACILITY: Primary | ICD-10-CM

## 2023-09-13 PROBLEM — Z79.899 HIGH RISK MEDICATION USE: Status: RESOLVED | Noted: 2020-07-20 | Resolved: 2023-09-13

## 2023-09-13 LAB
AMPHETAMINES UR QL: NOT DETECTED
BARBITURATES UR QL SCN: NOT DETECTED
BENZODIAZ UR QL SCN: NOT DETECTED
BUPRENORPHINE UR QL: DETECTED
CANNABINOIDS UR QL: DETECTED
CHOLEST SERPL-MCNC: 166 MG/DL
COCAINE UR QL SCN: NOT DETECTED
D-METHAMPHET UR QL: NOT DETECTED
HBA1C MFR BLD: 5.8 % (ref 0–5.6)
HBV CORE AB SERPL QL IA: NONREACTIVE
HBV SURFACE AB SERPL IA-ACNC: 0.26 M[IU]/ML
HBV SURFACE AB SERPL IA-ACNC: NONREACTIVE M[IU]/ML
HDLC SERPL-MCNC: 32 MG/DL
LDLC SERPL CALC-MCNC: 97 MG/DL
METHADONE UR QL SCN: NOT DETECTED
NONHDLC SERPL-MCNC: 134 MG/DL
OPIATES UR QL SCN: NOT DETECTED
OXYCODONE UR QL SCN: NOT DETECTED
PCP UR QL SCN: NOT DETECTED
TRICYCLICS UR QL SCN: NOT DETECTED
TRIGL SERPL-MCNC: 187 MG/DL

## 2023-09-13 PROCEDURE — 83036 HEMOGLOBIN GLYCOSYLATED A1C: CPT | Performed by: FAMILY MEDICINE

## 2023-09-13 PROCEDURE — 86706 HEP B SURFACE ANTIBODY: CPT | Performed by: FAMILY MEDICINE

## 2023-09-13 PROCEDURE — 99396 PREV VISIT EST AGE 40-64: CPT | Performed by: FAMILY MEDICINE

## 2023-09-13 PROCEDURE — 36415 COLL VENOUS BLD VENIPUNCTURE: CPT | Performed by: FAMILY MEDICINE

## 2023-09-13 PROCEDURE — 86704 HEP B CORE ANTIBODY TOTAL: CPT | Performed by: FAMILY MEDICINE

## 2023-09-13 PROCEDURE — 99214 OFFICE O/P EST MOD 30 MIN: CPT | Mod: 25 | Performed by: FAMILY MEDICINE

## 2023-09-13 PROCEDURE — 80306 DRUG TEST PRSMV INSTRMNT: CPT | Performed by: FAMILY MEDICINE

## 2023-09-13 PROCEDURE — 80061 LIPID PANEL: CPT | Performed by: FAMILY MEDICINE

## 2023-09-13 RX ORDER — BUPRENORPHINE AND NALOXONE 4; 1 MG/1; MG/1
1 FILM, SOLUBLE BUCCAL; SUBLINGUAL DAILY
Qty: 30 FILM | Refills: 1 | Status: SHIPPED | OUTPATIENT
Start: 2023-09-13 | End: 2024-01-17

## 2023-09-13 RX ORDER — BUPRENORPHINE AND NALOXONE 2; .5 MG/1; MG/1
1 FILM, SOLUBLE BUCCAL; SUBLINGUAL DAILY
Qty: 30 FILM | Refills: 1 | Status: SHIPPED | OUTPATIENT
Start: 2023-09-13 | End: 2024-03-18 | Stop reason: DRUGHIGH

## 2023-09-13 ASSESSMENT — ANXIETY QUESTIONNAIRES
GAD7 TOTAL SCORE: 21
7. FEELING AFRAID AS IF SOMETHING AWFUL MIGHT HAPPEN: NEARLY EVERY DAY
6. BECOMING EASILY ANNOYED OR IRRITABLE: NEARLY EVERY DAY
3. WORRYING TOO MUCH ABOUT DIFFERENT THINGS: NEARLY EVERY DAY
4. TROUBLE RELAXING: NEARLY EVERY DAY
1. FEELING NERVOUS, ANXIOUS, OR ON EDGE: NEARLY EVERY DAY
GAD7 TOTAL SCORE: 21
IF YOU CHECKED OFF ANY PROBLEMS ON THIS QUESTIONNAIRE, HOW DIFFICULT HAVE THESE PROBLEMS MADE IT FOR YOU TO DO YOUR WORK, TAKE CARE OF THINGS AT HOME, OR GET ALONG WITH OTHER PEOPLE: EXTREMELY DIFFICULT
5. BEING SO RESTLESS THAT IT IS HARD TO SIT STILL: NEARLY EVERY DAY
2. NOT BEING ABLE TO STOP OR CONTROL WORRYING: NEARLY EVERY DAY

## 2023-09-13 ASSESSMENT — ENCOUNTER SYMPTOMS
WEAKNESS: 1
HEARTBURN: 0
PALPITATIONS: 1
EYE PAIN: 0
SHORTNESS OF BREATH: 1
DIZZINESS: 0
FEVER: 0
ARTHRALGIAS: 1
HEADACHES: 1
COUGH: 0
HEMATURIA: 0
NERVOUS/ANXIOUS: 1
DIARRHEA: 0
CHILLS: 1
HEMATOCHEZIA: 0
SORE THROAT: 0
JOINT SWELLING: 1
DYSURIA: 0
PARESTHESIAS: 1
NAUSEA: 0
ABDOMINAL PAIN: 1
CONSTIPATION: 0
FREQUENCY: 1
MYALGIAS: 1

## 2023-09-13 NOTE — PROGRESS NOTES
"SUBJECTIVE:   CC: Carlos is an 42 year old who presents for preventative health visit.       9/13/2023     7:50 AM   Additional Questions   Roomed by Shraddha BRAGA.         9/13/2023     7:50 AM   Patient Reported Additional Medications   Patient reports taking the following new medications Metformin       Healthy Habits:     Getting at least 3 servings of Calcium per day:  NO    Bi-annual eye exam:  Yes    Dental care twice a year:  NO    Sleep apnea or symptoms of sleep apnea:  Excessive snoring    Diet:  Breakfast skipped    Frequency of exercise:  1 day/week    Duration of exercise:  15-30 minutes    Taking medications regularly:  Yes    Medication side effects:  None    Additional concerns today:  Yes      Today's PHQ-9 Score:       9/12/2023    10:11 AM   PHQ-9 SCORE   PHQ-9 Total Score MyChart 22 (Severe depression)   PHQ-9 Total Score 22       Had a traumatic last month or so. His roommate had a cousin come into town to stay with them. He was supposed to be going to treatment the next day but he committed suicide and Carlos found him the next morning. He had overdosed and left a note with everything he had taken. Carlos is still holding a lot of guilt that he couldn't have helped him sooner. He has talked to his sponsor and others about this and is also hoping to start therapy again soon (had to take a break due to an insurance change).  He has had a loss of appetite. Also feels a \"tightness,\" and \"dull ache\" in his epigastric region.     Left foot numb, right foot numb. First it was just the left foot, now right foot, too. They mostly feel numb after he's been on his feet at work all day. 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 Also noticing hair loss on his lower ankles and feet. Sometimes feet feel cold but also sometimes hot, depending on the temperature outside.     Went to see Dr. Bruno last week. Did some labs and going back in 3 weeks.     Has been taking 8 mg/day of buprenorphine. " "Wants to try tapering down to 6 mg/day.       Social History     Tobacco Use    Smoking status: Every Day     Packs/day: 0.50     Years: 27.00     Pack years: 13.50     Types: Cigarettes     Passive exposure: Current (outside only)    Smokeless tobacco: Former    Tobacco comments:     half a pack a day-1 pack. E-cig/vape not often, but occasionally.    Substance Use Topics    Alcohol use: Not Currently           9/12/2023    10:15 AM   Alcohol Use   Prescreen: >3 drinks/day or >7 drinks/week? Not Applicable       Last PSA: No results found for: PSA    Reviewed orders with patient. Reviewed health maintenance and updated orders accordingly - Yes      Reviewed and updated as needed this visit by clinical staff   Tobacco  Allergies  Meds              Reviewed and updated as needed this visit by Provider                     Review of Systems   Constitutional:  Positive for chills. Negative for fever.   HENT:  Positive for congestion and hearing loss. Negative for ear pain and sore throat.    Eyes:  Positive for visual disturbance. Negative for pain.   Respiratory:  Positive for shortness of breath. Negative for cough.    Cardiovascular:  Positive for chest pain and palpitations. Negative for peripheral edema.   Gastrointestinal:  Positive for abdominal pain. Negative for constipation, diarrhea, heartburn, hematochezia and nausea.   Genitourinary:  Positive for frequency and urgency. Negative for dysuria, genital sores, hematuria, impotence and penile discharge.   Musculoskeletal:  Positive for arthralgias, joint swelling and myalgias.   Skin:  Negative for rash.   Neurological:  Positive for weakness, headaches and paresthesias. Negative for dizziness.   Psychiatric/Behavioral:  Positive for mood changes. The patient is nervous/anxious.        OBJECTIVE:   /69   Pulse 58   Temp 97.9  F (36.6  C) (Oral)   Resp 16   Ht 1.778 m (5' 10\")   Wt 85.3 kg (188 lb)   SpO2 100%   BMI 26.98 kg/m      Physical " Exam  GENERAL: healthy, alert and no distress  EYES: Eyes grossly normal to inspection, PERRL and conjunctivae and sclerae normal  HENT: ear canals and TM's normal, nose and mouth without ulcers or lesions  NECK: no adenopathy, no asymmetry, masses, or scars and thyroid normal to palpation  RESP: lungs clear to auscultation - no rales, rhonchi or wheezes  CV: regular rate and rhythm, normal S1 S2, no S3 or S4, no murmur, click or rub, no peripheral edema and peripheral pulses strong  ABDOMEN: soft, nontender, no hepatosplenomegaly, no masses and bowel sounds normal  MS: Left and right ankles and feet are absent of hair, up to a couple of inches above the ankle. Monofilament testing revealed sensation intact on both soles. DP pulses 2+.   SKIN: no suspicious lesions or rashes  NEURO: Normal strength and tone, mentation intact and speech normal  PSYCH: mentation appears normal, affect normal/bright    Diagnostic Test Results:  Labs reviewed in Epic    ASSESSMENT/PLAN:   Carlos was seen today for physical.    Diagnoses and all orders for this visit:    Routine general medical examination at a health care facility    Chronic obstructive pulmonary disease, unspecified COPD type (H): taking inhaler PRN.     Severe opioid use disorder on maintenance therapy (H): doing well. Would like to taper down to 6 mg/day, will try this.   -     Drug Abuse Screen Panel 13, Urine (Pain Care Map) - lab collect; Future  -     buprenorphine HCl-naloxone HCl (SUBOXONE) 4-1 MG per film; Place 1 Film under the tongue daily  -     buprenorphine HCl-naloxone HCl (SUBOXONE) 2-0.5 MG per film; Place 1 Film under the tongue daily  -     Drug Abuse Screen Panel 13, Urine (Pain Care Map) - lab collect    Prediabetes:   -     Lipid Profile  -     Hemoglobin A1c    Weight loss: has lost 25 lbs in the past month.  Mostly attributed to anxiety and trauma event a month ago, witnessing an acquaintance who committed suicide. However, he also has a much  "more active job than he had in the past, which is likely contributing. Has \"dullness\"in epigastric region. If weight loss continues, would pursue workup of this, possibly EGD.    Numbness and tingling of both feet: unsure of etiology. Will check GARRETT first and if normal, would consider EMG. May be related to nerve impingement from spine. If peripheral cause is ruled out, would send back to spine clinic for evaluation and possible surgery.   -     US GARRETT Doppler No Exercise; Future    Need for hepatitis B screening test  -     Hepatitis B Surface Antibody  -     Hepatitis B core antibody    Other orders  -     PRIMARY CARE FOLLOW-UP SCHEDULING; Future        COUNSELING:   Reviewed preventive health counseling, as reflected in patient instructions       Regular exercise       Healthy diet/nutrition      BMI:   Estimated body mass index is 26.98 kg/m  as calculated from the following:    Height as of this encounter: 1.778 m (5' 10\").    Weight as of this encounter: 85.3 kg (188 lb).         He reports that he has been smoking cigarettes. He has a 13.50 pack-year smoking history. He has been exposed to tobacco smoke. He has quit using smokeless tobacco.  Nicotine/Tobacco Cessation Plan:   Self help information given to patient            Lucero Cardenas MD  [facility] submitted by the patient for this visit:  Patient Health Questionnaire (Submitted on 9/12/2023)  If you checked off any problems, how difficult have these problems made it for you to do your work, take care of things at home, or get along with other people?: Extremely difficult  PHQ9 TOTAL SCORE: 22  MARY-7 (Submitted on 9/13/2023)  MARY 7 TOTAL SCORE: 21    "

## 2023-10-04 ENCOUNTER — TELEPHONE (OUTPATIENT)
Dept: RHEUMATOLOGY | Facility: CLINIC | Age: 43
End: 2023-10-04

## 2023-10-04 ENCOUNTER — OFFICE VISIT (OUTPATIENT)
Dept: RHEUMATOLOGY | Facility: CLINIC | Age: 43
End: 2023-10-04
Payer: COMMERCIAL

## 2023-10-04 VITALS
DIASTOLIC BLOOD PRESSURE: 60 MMHG | HEART RATE: 76 BPM | WEIGHT: 187 LBS | BODY MASS INDEX: 26.83 KG/M2 | SYSTOLIC BLOOD PRESSURE: 100 MMHG

## 2023-10-04 DIAGNOSIS — Z15.89 HLA B27 (HLA B27 POSITIVE): ICD-10-CM

## 2023-10-04 DIAGNOSIS — M46.1 SACROILIITIS (H): ICD-10-CM

## 2023-10-04 DIAGNOSIS — M45.8 ANKYLOSING SPONDYLITIS OF SACRAL REGION (H): ICD-10-CM

## 2023-10-04 DIAGNOSIS — Z86.79 HISTORY OF CEREBRAL ANEURYSM: ICD-10-CM

## 2023-10-04 DIAGNOSIS — Z84.0 FAMILY HISTORY OF PSORIASIS: ICD-10-CM

## 2023-10-04 DIAGNOSIS — L40.50 PSORIATIC ARTHRITIS (H): Primary | ICD-10-CM

## 2023-10-04 PROCEDURE — 99214 OFFICE O/P EST MOD 30 MIN: CPT | Performed by: INTERNAL MEDICINE

## 2023-10-04 NOTE — PROGRESS NOTES
Rheumatology follow-up visit note     Carlos is a 42 year old male presents today for follow-up.    Carlos was seen today for recheck.    Diagnoses and all orders for this visit:    Psoriatic arthritis (H)  -     adalimumab (HUMIRA *CF*) 40 MG/0.4ML pen kit; Inject 0.4 mLs (40 mg) Subcutaneous every 14 days for 30 days Hold for signs of infection, then seek medical attention.    HLA B27 (HLA B27 positive)    History of cerebral aneurysm    Sacroiliitis (H24)  -     adalimumab (HUMIRA *CF*) 40 MG/0.4ML pen kit; Inject 0.4 mLs (40 mg) Subcutaneous every 14 days for 30 days Hold for signs of infection, then seek medical attention.    Ankylosing spondylitis of sacral region (H)    Family history of psoriasis        This patient has seronegative spondyloarthropathy in the background of chronic right lower back pain, for which he has had years of epidural injections in [place], osteonecrosis status post hip replacement on the right side and more recently bone infarct on a recent x-ray of the knees, has family history of psoriasis, x-rays of the sacroiliac joints show sacroiliitis predominantly right-sided, we discussed the diagnosis of ankylosing spondylitis/psoriatic sacroiliitis, more likely the latter.  It is as the score is very high.  He has been taking ibuprofen and therapeutic doses to 800 mg several times a day which does provide him some relief.  He is a candidate for Biologics.  Recommended Humira major side effects literature provided, recent hep B C QuantiFERON negative.  We will meet here in 3 months.    Follow up in 3 months.    HPI    Carlos Hamilton is a 42 year old male is here for follow-up of Painful back.  He has been found to have HLA-B27 positive status.  He reports that his back pain has troubled him for many many years as far back as he can recall even in his late teens he would get some discomfort in the lower back by the time he was in his early 30s the pain had gotten worse he was in  "[place] at that time and would periodically get injections in his spine, later on epidurals which would help him some.  Ibuprofen 800 mg up to 4 times a day has in the past been helpful to take the edge off and allow him to do day-to-day activities.  He has noted the worst of the pain is in the right lower back there is no radiation down the lower extremity.  He recalls that the pain was and still is on the lumbar area more so on the right side after radiating down the right lower extremity. The left lower extremity would get a sense of pressure but not the kind of pain on the right side.  Over the years he would take ibuprofen and acetaminophen with marginal benefit.  In this background he reports that the pain that is troubling him over all these years is fairly typical for him, this is there constantly throughout the day, worse with activity but also waking him up from sleep.  He would then need hot showers icing and other measures to help with the pain.  He has been to physical therapy on multiple occasions.  Because in part the expense and relatively loss of effectiveness he stopped going.  He has had pain in his knees.  Sometimes they pop.  Similarly in his ankles.  He has noted little or no pain in upper extremity joints.  Sometimes his right fifth digit \"locks up\".  He developed what sounds like osteonecrosis of his right hip, decompression by his description was attempted he then went on to have right hip arthroplasty.  This was in 2018.  He also noted episodes of iritis x2 region during this past 12 months on the right side treated topically.  He used to have episodes of seizures, initially thought to be secondary to alcohol intake, then found to have an aneurysm which was clipped in 2013.  Since then he has not had seizures.  He describes no rash no inflammatory bowel disease or psoriasis himself.  He wonders if one of his brothers or possibly 2 of them have psoriasis.  He is going to check into that.  " He is a half a pack a day smoker.  He works as a  at [employer].  He is single.  Once in his teenage he had chlamydia treated none since               Back Pain (BASDAI Question 2) [0-10]   7     Peripheral Pain/Swelling (BASDAI Question 3) [0-10]   0  Duration Morning Stiffness (BASDAI Question 6) [0-10]   8     Patient Global [0-10]   6  Clear  C-Reactive Protein                        mg/l  mg/dl   9.9     A CRP value <2mg/l (0.2 mg/dl) is not allowed. If CRP is below the limit of detection or is <2 mg/l (<0.2 mg/dl), the fixed value of 2 mg/l (0.2 mg/dl) will be entered.    Erythrocyte Sedimentation Rate                        mm/hr      ASDAS-CRP   3.4        DETAILED EXAMINATION  10/04/23  :    Vitals:    10/04/23 1149   BP: 100/60   Pulse: 76   Weight: 84.8 kg (187 lb)     Alert oriented. Head including the face is examined for malar rash, heliotropes, scarring, lupus pernio. Eyes examined for redness such as in episcleritis/scleritis, periorbital lesions.   Neck/ Face examined for parotid gland swelling, range of motion of neck.  Left upper and lower and right upper and lower extremities examined for tenderness, swelling, warmth of the appendicular joints, range of motion, edema, rash.  Some of the important findings included: he does not have evidence of synovitis in the palpable joints of the upper extremities.  No significant deformities of the digits.  no Heberden nodes.  Range of motion of the shoulders  show full abduction.  No JLT effusion or warmth of the knees.  he does not have dactylitis of the digits.     Patient Active Problem List    Diagnosis Date Noted    F11.2 - Continuous opioid dependence (H) 04/03/2023     Priority: Medium    Major depressive disorder, recurrent episode, moderate (H) 01/30/2023     Priority: Medium    Nonintractable headache, unspecified chronicity pattern, unspecified headache type 11/17/2022     Priority: Medium    History of cerebral aneurysm  11/17/2022     Priority: Medium    Brain aneurysm 11/17/2022     Priority: Medium    Prediabetes 07/27/2022     Priority: Medium    Essential hypertension 05/11/2022     Priority: Medium    Adjustment disorder with depressed mood 03/18/2022     Priority: Medium    Chronic obstructive pulmonary disease, unspecified COPD type (H) 02/23/2022     Priority: Medium    Tobacco dependence syndrome 11/10/2021     Priority: Medium    Severe opioid use disorder on maintenance therapy (H) 08/16/2021     Priority: Medium    HLA B27 (HLA B27 positive) 07/20/2020     Priority: Medium     Identified on lab testing for acute anterior uveitis left eye in 7/2020      Mixed dyslipidemia 07/10/2019     Priority: Medium    Mild persistent asthma, unspecified whether complicated 07/10/2019     Priority: Medium    Hx of avascular necrosis of capital femoral epiphysis 07/10/2019     Priority: Medium     AVN of his right hip possibly secondary to alcohol use for which he underwent a right JESSICA 7/2017  Hip pain left sided evaluated by Orthopedics 8/2019 with normal left hip MRI      Iron deficiency anemia secondary to inadequate dietary iron intake 07/10/2019     Priority: Medium    MARY (generalized anxiety disorder) 04/29/2019     Priority: Medium    Insomnia, unspecified type 04/29/2019     Priority: Medium    Alcohol dependence in remission (H) 04/29/2019     Priority: Medium    Substance abuse in remission (H) 04/29/2019     Priority: Medium    Anxiety state 03/28/2019     Priority: Medium    Degeneration of lumbar/lumbosacral disc without myelopathy 03/25/2019     Priority: Medium    Chronic right-sided low back pain with right-sided sciatica 03/25/2019     Priority: Medium     Past Surgical History:   Procedure Laterality Date    aneursym clipping      Brain aneursym in 2013    ARTHROPLASTY HIP Right     ARTHROPLASTY REVISION HIP Right     BRAIN SURGERY      KNEE SURGERY      right knee      Past Medical History:   Diagnosis Date     Alcohol abuse     Asthma     Brain aneurysm     s/p clip in 2013    Brain aneurysm     Chronic back pain     HTN (hypertension)     Seizures (H)     Stroke (H)      Allergies   Allergen Reactions    Diphenhydramine Swelling     Per patient, tongue/lips swelling, itchy eyes with both generic diphenhydramine and brand Benadryl     Naproxen Swelling    Pistachio Nut Extract Skin Test     Toradol [Ketorolac] Itching     Current Outpatient Medications   Medication Sig Dispense Refill    acetaminophen (TYLENOL) 325 MG tablet Take 650 mg by mouth every 6 hours as needed for mild pain or headaches      buprenorphine HCl-naloxone HCl (SUBOXONE) 2-0.5 MG per film Place 1 Film under the tongue daily 30 Film 1    buprenorphine HCl-naloxone HCl (SUBOXONE) 4-1 MG per film Place 1 Film under the tongue daily 30 Film 1    buprenorphine HCl-naloxone HCl (SUBOXONE) 8-2 MG per film Place 1 Film under the tongue daily 30 Film 1    gabapentin (NEURONTIN) 300 MG capsule Take 2 capsules (600 mg) by mouth 3 times daily 540 capsule 0    ibuprofen (ADVIL/MOTRIN) 200 MG tablet Take 600 mg by mouth every 6 hours as needed for moderate pain (4-6)      lisinopril (ZESTRIL) 20 MG tablet TAKE ONE TABLET BY MOUTH ONE TIME DAILY 90 tablet 2    multivitamin w/minerals (THERA-VIT-M) tablet Take 1 tablet by mouth daily      omeprazole (PRILOSEC) 40 MG DR capsule Take 1 capsule (40 mg) by mouth daily 90 capsule 3    order for DME Equipment being ordered: adult mask for neb machine with tubing 1 each 0    tiZANidine (ZANAFLEX) 4 MG tablet Take 1 tablet (4 mg) by mouth every 4 hours 540 tablet 3    VENTOLIN  (90 Base) MCG/ACT inhaler INHALE TWO PUFFS BY MOUTH EVERY SIX HOURS AS NEEDED FOR WHEEZING 18 g 11     family history includes Glaucoma in his mother; Uveitis in his brother.  Social Connections: Not on file          WBC   Date Value Ref Range Status   07/02/2019 9.0 4.0 - 11.0 10e9/L Final     WBC Count   Date Value Ref Range Status    09/07/2023 9.3 4.0 - 11.0 10e3/uL Final     RBC Count   Date Value Ref Range Status   09/07/2023 4.71 4.40 - 5.90 10e6/uL Final   07/02/2019 4.51 4.4 - 5.9 10e12/L Final     Hemoglobin   Date Value Ref Range Status   09/07/2023 14.5 13.3 - 17.7 g/dL Final   07/02/2019 13.2 (L) 13.3 - 17.7 g/dL Final     Hematocrit   Date Value Ref Range Status   09/07/2023 42.0 40.0 - 53.0 % Final   07/02/2019 40.6 40.0 - 53.0 % Final     MCV   Date Value Ref Range Status   09/07/2023 89 78 - 100 fL Final   07/02/2019 90 78 - 100 fl Final     MCH   Date Value Ref Range Status   09/07/2023 30.8 26.5 - 33.0 pg Final   07/02/2019 29.3 26.5 - 33.0 pg Final     Platelet Count   Date Value Ref Range Status   09/07/2023 374 150 - 450 10e3/uL Final   07/02/2019 345 150 - 450 10e9/L Final     % Lymphocytes   Date Value Ref Range Status   09/07/2023 39 % Final   07/02/2019 33.7 % Final     AST   Date Value Ref Range Status   11/17/2022 18 0 - 45 U/L Final   07/15/2020 19 0 - 45 U/L Final     ALT   Date Value Ref Range Status   09/07/2023 24 0 - 70 U/L Final     Comment:     Reference intervals for this test were updated on 6/12/2023 to more accurately reflect our healthy population. There may be differences in the flagging of prior results with similar values performed with this method. Interpretation of those prior results can be made in the context of the updated reference intervals.     07/15/2020 34 0 - 70 U/L Final     Albumin   Date Value Ref Range Status   09/07/2023 5.1 3.5 - 5.2 g/dL Final   11/17/2022 3.5 3.4 - 5.0 g/dL Final   07/15/2020 3.8 3.4 - 5.0 g/dL Final     Alkaline Phosphatase   Date Value Ref Range Status   11/17/2022 120 40 - 150 U/L Final   07/15/2020 131 40 - 150 U/L Final     Creatinine   Date Value Ref Range Status   09/07/2023 0.61 (L) 0.67 - 1.17 mg/dL Final   07/15/2020 0.59 (L) 0.66 - 1.25 mg/dL Final     GFR Estimate   Date Value Ref Range Status   09/07/2023 >90 >60 mL/min/1.73m2 Final   07/15/2020 >90 >60  mL/min/[1.73_m2] Final     Comment:     Non  GFR Calc  Starting 12/18/2018, serum creatinine based estimated GFR (eGFR) will be   calculated using the Chronic Kidney Disease Epidemiology Collaboration   (CKD-EPI) equation.       GFR, ESTIMATED POCT   Date Value Ref Range Status   11/17/2022 >60 >60 mL/min/1.73m2 Final     GFR Estimate If Black   Date Value Ref Range Status   07/15/2020 >90 >60 mL/min/[1.73_m2] Final     Comment:      GFR Calc  Starting 12/18/2018, serum creatinine based estimated GFR (eGFR) will be   calculated using the Chronic Kidney Disease Epidemiology Collaboration   (CKD-EPI) equation.       Sed Rate   Date Value Ref Range Status   07/15/2020 31 (H) 0 - 15 mm/h Final     Erythrocyte Sedimentation Rate   Date Value Ref Range Status   09/07/2023 7 0 - 15 mm/hr Final     CRP Inflammation   Date Value Ref Range Status   07/15/2020 24.7 (H) 0.0 - 8.0 mg/L Final

## 2023-10-04 NOTE — TELEPHONE ENCOUNTER
PA Initiation    Medication: HUMIRA *CF* PEN 40 MG/0.4ML SC PNKT  Insurance Company: [employer] - Phone [phone] Fax [phone]  Pharmacy Filling the Rx: [facility] - [address]  Filling Pharmacy Phone:    Filling Pharmacy Fax:    Start Date: 10/4/2023      Prior Authorization Approval    Medication: HUMIRA *CF* PEN 40 MG/0.4ML SC PNKT  Authorization Effective Date: 10/11/2023  Authorization Expiration Date: 4/1/2024  Approved Dose/Quantity: 2 each  Reference #: : [id number]   Insurance Company: [employer] - Phone [phone] Fax [phone]  Expected CoPay: $ 35  CoPay Card Available:      Financial Assistance Needed: NA  Which Pharmacy is filling the prescription: [facility] - [address]  Pharmacy Notified: yes  Patient Notified: yes          Davina Ross  BGeraS, [facility]  Specialty Pharmacy Clinic Liaison     [facility]    [email or web address]     Phone: [phone]  Fax: [phone]

## 2023-10-10 ENCOUNTER — TELEPHONE (OUTPATIENT)
Dept: RHEUMATOLOGY | Facility: CLINIC | Age: 43
End: 2023-10-10

## 2023-10-10 NOTE — TELEPHONE ENCOUNTER
[facility] Prior Authorization Team   Phone: [phone]  Fax: [phone]    PA Needed    Medication: Humira - New Insurance  QTY/DS: 2 each / 28 days  NEW INS: Yes, [employer] is new primary, [employer] is now secondary.  Insurance Company:  [employer]  Pharmacy Filling the Rx:  [facility] - [place], [place] - [address]  PA :  N/A  Date of last fill: Working on first fill.

## 2023-10-11 NOTE — TELEPHONE ENCOUNTER
PA Initiation    Medication: HUMIRA *CF* PEN 40 MG/0.4ML SC PNKT  Insurance Company: [employer] - Phone [phone] Fax [phone]  Pharmacy Filling the Rx: [facility] - [place], [place] - [facility] [address]  Filling Pharmacy Phone:    Filling Pharmacy Fax:    Start Date: 10/11/2023     Carlos Hamilton (Davis: [id number])

## 2023-10-11 NOTE — TELEPHONE ENCOUNTER
Prior Authorization Approval    Medication: HUMIRA *CF* PEN 40 MG/0.4ML SC PNKT  Authorization Effective Date: 9/11/2023  Authorization Expiration Date: 10/11/2024  Approved Dose/Quantity: 2 per 28  Reference #: [id number]   Insurance Company: [employer] - Phone [phone] Fax [phone]  Expected CoPay: $    CoPay Card Available:      Financial Assistance Needed: copay card  Which Pharmacy is filling the prescription: [facility] - [facility] [address]  Patient Notified: yes

## 2023-11-13 ENCOUNTER — FCC EXTENDED DOCUMENTATION (OUTPATIENT)
Dept: PSYCHOLOGY | Facility: CLINIC | Age: 43
End: 2023-11-13

## 2023-11-13 ENCOUNTER — VIRTUAL VISIT (OUTPATIENT)
Dept: FAMILY MEDICINE | Facility: CLINIC | Age: 43
End: 2023-11-13
Attending: FAMILY MEDICINE
Payer: COMMERCIAL

## 2023-11-13 DIAGNOSIS — I10 ESSENTIAL HYPERTENSION: ICD-10-CM

## 2023-11-13 DIAGNOSIS — Z15.89 HLA B27 (HLA B27 POSITIVE): ICD-10-CM

## 2023-11-13 DIAGNOSIS — M45.8 ANKYLOSING SPONDYLITIS OF SACRAL REGION (H): ICD-10-CM

## 2023-11-13 DIAGNOSIS — M51.379 DEGENERATION OF LUMBAR/LUMBOSACRAL DISC WITHOUT MYELOPATHY: ICD-10-CM

## 2023-11-13 DIAGNOSIS — F11.20 CONTINUOUS OPIOID DEPENDENCE (H): ICD-10-CM

## 2023-11-13 DIAGNOSIS — F41.1 GAD (GENERALIZED ANXIETY DISORDER): Primary | ICD-10-CM

## 2023-11-13 PROCEDURE — 99214 OFFICE O/P EST MOD 30 MIN: CPT | Mod: 93 | Performed by: FAMILY MEDICINE

## 2023-11-13 RX ORDER — LISINOPRIL 20 MG/1
20 TABLET ORAL DAILY
Qty: 90 TABLET | Refills: 2 | Status: ON HOLD | OUTPATIENT
Start: 2023-11-13 | End: 2024-04-16

## 2023-11-13 RX ORDER — BUPRENORPHINE AND NALOXONE 8; 2 MG/1; MG/1
1 FILM, SOLUBLE BUCCAL; SUBLINGUAL DAILY
Qty: 30 FILM | Refills: 1 | Status: SHIPPED | OUTPATIENT
Start: 2023-11-13 | End: 2024-01-10

## 2023-11-13 NOTE — PROGRESS NOTES
Discharge Summary  Multiple Sessions    Client Name: Carlos Hamilton MRN#: [id number] YOB: 1980      Intake / Discharge Date: 1-24-23 and 11-13-23      DSM5 Diagnoses: (Sustained by DSM5 Criteria Listed Above)  296.22 (F32.1)  Major Depressive Disorder, Single Episode, Moderate _ and With anxious distress  300.02 (F41.1) Generalized Anxiety Disorder  Substance-Related & Addictive Disorders Alcohol Use Disorder   303.90 (F10.21) Moderate In sustained remission.        Presenting Concern:  Depression  Anxiety  Managing sobriety       Reason for Discharge:  Got a new job and stopped attending       Disposition at Time of Last Encounter:   Comments:   Was starting a new job, which he was feeling pretty good about      Risk Management:   Client denies a history of suicidal ideation, suicide attempts, self-injurious behavior, homicidal ideation, homicidal behavior, and and other safety concerns  Recommended that patient call 911 or go to the local ED should there be a change in any of these risk factors.      Referred To:  Patient can return if needing services in the future.          Sheba Davison, MANFRED   11/13/2023

## 2023-11-13 NOTE — PROGRESS NOTES
"Carlos is a 43 year old who is being evaluated via a billable telephone visit.          Distant Location (provider location):  On-site    Assessment & Plan     MARY (generalized anxiety disorder): doing better than in the past. Taking a break from therapy for now due to cost but hopes to resume in the future.     F11.2 - Continuous opioid dependence (H): He was taking 6 mg of buprenorphine but had to pay 2 co-pays and so we will do an 8 mg film and he will try cutting this in half to try 4 mg but can increase back up if needed.   - buprenorphine HCl-naloxone HCl (SUBOXONE) 8-2 MG per film  Dispense: 30 Film; Refill: 1    Essential hypertension: refilled  - lisinopril (ZESTRIL) 20 MG tablet  Dispense: 90 tablet; Refill: 2    Degeneration of lumbar/lumbosacral disc without myelopathy: We will continue Humira and will help to get some relief from this.  However, he does have degenerative disc disease in his lumbar spine and he may be a surgical candidate, though he does not want to see a spine surgeon right now    HLA-B27 positive    Ankylosing spondylitis of sacral region         BMI:   Estimated body mass index is 26.83 kg/m  as calculated from the following:    Height as of 9/13/23: 1.778 m (5' 10\").    Weight as of 10/4/23: 84.8 kg (187 lb).       Lucero Cardenas MD  [facility]   Carlos is a 43 year old, presenting for the following health issues:    History of Present Illness       Hypertension: He presents for follow up of hypertension.  He does not check blood pressure  regularly outside of the clinic.  He does not follow a low salt diet.     He eats 0-1 servings of fruits and vegetables daily.He consumes 6 sweetened beverage(s) daily. He exercises with enough effort to increase his heart rate 3 or less days per week.   He is taking medications regularly.       Started humira over a month ago for ankylosing spondylitis. 3rd injection yesterday, every 2 weeks. Back pain is the " same so far. Dr. Bruno told him that sometimes it can take 2-3 months to get a full effect.       Thinks he might want to go back up to 8 mg suboxone because he has to pay 2 copays for 4 mg plus 2 mg.    Was seeing therapist but with money being tight, going to stop seeing her for now.     Work and living situation are good.     Objective           Vitals:  No vitals were obtained today due to virtual visit.    Physical Exam   healthy, alert, and no distress  PSYCH: Alert and oriented times 3; coherent speech, normal   rate and volume, able to articulate logical thoughts, able   to abstract reason, no tangential thoughts, no hallucinations   or delusions  His affect is normal  RESP: No cough, no audible wheezing, able to talk in full sentences  Remainder of exam unable to be completed due to telephone visits            Phone call duration: 16 minutes

## 2023-11-16 PROBLEM — M45.8 ANKYLOSING SPONDYLITIS OF SACRAL REGION (H): Status: ACTIVE | Noted: 2023-11-16

## 2023-12-02 DIAGNOSIS — J45.30 MILD PERSISTENT ASTHMA, UNSPECIFIED WHETHER COMPLICATED: ICD-10-CM

## 2023-12-02 DIAGNOSIS — M54.42 CHRONIC BILATERAL LOW BACK PAIN WITH LEFT-SIDED SCIATICA: ICD-10-CM

## 2023-12-02 DIAGNOSIS — Z76.0 ENCOUNTER FOR MEDICATION REFILL: ICD-10-CM

## 2023-12-02 DIAGNOSIS — G89.29 CHRONIC BILATERAL LOW BACK PAIN WITH LEFT-SIDED SCIATICA: ICD-10-CM

## 2023-12-04 RX ORDER — GABAPENTIN 300 MG/1
600 CAPSULE ORAL 3 TIMES DAILY
Qty: 540 CAPSULE | Refills: 0 | Status: ON HOLD | OUTPATIENT
Start: 2023-12-04 | End: 2024-04-16

## 2023-12-04 RX ORDER — ALBUTEROL SULFATE 90 UG/1
AEROSOL, METERED RESPIRATORY (INHALATION)
Qty: 18 G | Refills: 0 | Status: SHIPPED | OUTPATIENT
Start: 2023-12-04 | End: 2024-01-17

## 2024-01-09 ENCOUNTER — OFFICE VISIT (OUTPATIENT)
Dept: RHEUMATOLOGY | Facility: CLINIC | Age: 44
End: 2024-01-09
Payer: COMMERCIAL

## 2024-01-09 ENCOUNTER — LAB (OUTPATIENT)
Dept: LAB | Facility: CLINIC | Age: 44
End: 2024-01-09
Payer: COMMERCIAL

## 2024-01-09 VITALS
SYSTOLIC BLOOD PRESSURE: 138 MMHG | DIASTOLIC BLOOD PRESSURE: 80 MMHG | BODY MASS INDEX: 27.22 KG/M2 | OXYGEN SATURATION: 99 % | WEIGHT: 189.7 LBS | HEART RATE: 86 BPM

## 2024-01-09 DIAGNOSIS — Z15.89 HLA B27 (HLA B27 POSITIVE): ICD-10-CM

## 2024-01-09 DIAGNOSIS — M45.8 ANKYLOSING SPONDYLITIS OF SACRAL REGION (H): Primary | ICD-10-CM

## 2024-01-09 DIAGNOSIS — M45.8 ANKYLOSING SPONDYLITIS OF SACRAL REGION (H): ICD-10-CM

## 2024-01-09 DIAGNOSIS — Z84.0 FAMILY HISTORY OF PSORIASIS: ICD-10-CM

## 2024-01-09 DIAGNOSIS — Z86.79 HISTORY OF CEREBRAL ANEURYSM: ICD-10-CM

## 2024-01-09 DIAGNOSIS — L40.50 PSORIATIC ARTHRITIS (H): ICD-10-CM

## 2024-01-09 LAB
ALBUMIN SERPL BCG-MCNC: 4.6 G/DL (ref 3.5–5.2)
ALT SERPL W P-5'-P-CCNC: 24 U/L (ref 0–70)
CREAT SERPL-MCNC: 0.61 MG/DL (ref 0.67–1.17)
CRP SERPL-MCNC: 3.95 MG/L
EGFRCR SERPLBLD CKD-EPI 2021: >90 ML/MIN/1.73M2
ERYTHROCYTE [DISTWIDTH] IN BLOOD BY AUTOMATED COUNT: 13 % (ref 10–15)
ERYTHROCYTE [SEDIMENTATION RATE] IN BLOOD BY WESTERGREN METHOD: 4 MM/HR (ref 0–15)
HCT VFR BLD AUTO: 40.8 % (ref 40–53)
HGB BLD-MCNC: 14.6 G/DL (ref 13.3–17.7)
MCH RBC QN AUTO: 31.7 PG (ref 26.5–33)
MCHC RBC AUTO-ENTMCNC: 35.8 G/DL (ref 31.5–36.5)
MCV RBC AUTO: 89 FL (ref 78–100)
PLATELET # BLD AUTO: 315 10E3/UL (ref 150–450)
RBC # BLD AUTO: 4.6 10E6/UL (ref 4.4–5.9)
WBC # BLD AUTO: 8.8 10E3/UL (ref 4–11)

## 2024-01-09 PROCEDURE — 85652 RBC SED RATE AUTOMATED: CPT

## 2024-01-09 PROCEDURE — 84460 ALANINE AMINO (ALT) (SGPT): CPT

## 2024-01-09 PROCEDURE — 85027 COMPLETE CBC AUTOMATED: CPT

## 2024-01-09 PROCEDURE — 36415 COLL VENOUS BLD VENIPUNCTURE: CPT

## 2024-01-09 PROCEDURE — 99214 OFFICE O/P EST MOD 30 MIN: CPT | Performed by: INTERNAL MEDICINE

## 2024-01-09 PROCEDURE — 82040 ASSAY OF SERUM ALBUMIN: CPT

## 2024-01-09 PROCEDURE — 82565 ASSAY OF CREATININE: CPT

## 2024-01-09 PROCEDURE — 86140 C-REACTIVE PROTEIN: CPT

## 2024-01-09 NOTE — PROGRESS NOTES
Rheumatology follow-up visit note     Carlos is a 43 year old male presents today for follow-up.    Carlos was seen today for recheck.    Diagnoses and all orders for this visit:    Ankylosing spondylitis of sacral region (H)  -     ALT; Standing  -     Albumin level; Standing  -     CBC with platelets; Standing  -     Creatinine; Standing  -     Erythrocyte sedimentation rate auto; Future  -     CRP inflammation; Future    Family history of psoriasis    HLA B27 (HLA B27 positive)    Psoriatic arthritis (H)    History of cerebral aneurysm        Ankylosing spondylitis sacroiliitis patient with psoriatic arthritis, tolerated Humira so far no significant improvement.  His back pain may be secondary to combination of radiologically mild lumbar spondylosis as well as the inflammatory component.  He is going to continue Humira another 3 months we will meet here after that.  Meanwhile he is going to check with his primary physician if Cymbalta might be a suitable option for him.    Follow up in 3 months.    HPI    Carlos [place] is a 43 year old male is here for follow-up.  This is for ankylosing spondylitis/sacroiliitis in the context of family history of psoriasis HLA-B27 positive status, and inflammatory back symptoms, he has tolerated Humira injections, he is not sure if there has been any significant improvement so far.  The pain does get more troublesome as he sits for a length of time usually within 15 minutes the more he is up and about the more he walks the better this is this is typically more so on the right lower back there is no radicular component so far.  He does have mild lumbar spondylosis.  In [place] he used to get epidurals.  Then he developed osteonecrosis and has not had those done since.  He noted pain level at 6.0/10 yet.  Interfering with some of the day-to-day activities.  He is concerned that he might need some help with mental health issues and is going to talk to his primary  "physician..    Following is the excerpt from a previous note:     He has been found to have HLA-B27 positive status.  He reports that his back pain has troubled him for many many years as far back as he can recall even in his late teens he would get some discomfort in the lower back by the time he was in his early 30s the pain had gotten worse he was in [place] at that time and would periodically get injections in his spine, later on epidurals which would help him some.  Ibuprofen 800 mg up to 4 times a day has in the past been helpful to take the edge off and allow him to do day-to-day activities.  He has noted the worst of the pain is in the right lower back there is no radiation down the lower extremity.  He recalls that the pain was and still is on the lumbar area more so on the right side after radiating down the right lower extremity. The left lower extremity would get a sense of pressure but not the kind of pain on the right side.  Over the years he would take ibuprofen and acetaminophen with marginal benefit.  In this background he reports that the pain that is troubling him over all these years is fairly typical for him, this is there constantly throughout the day, worse with activity but also waking him up from sleep.  He would then need hot showers icing and other measures to help with the pain.  He has been to physical therapy on multiple occasions.  Because in part the expense and relatively loss of effectiveness he stopped going.  He has had pain in his knees.  Sometimes they pop.  Similarly in his ankles.  He has noted little or no pain in upper extremity joints.  Sometimes his right fifth digit \"locks up\".  He developed what sounds like osteonecrosis of his right hip, decompression by his description was attempted he then went on to have right hip arthroplasty.  This was in 2018.  He also noted episodes of iritis x2 region during this past 12 months on the right side treated topically.  He used to " have episodes of seizures, initially thought to be secondary to alcohol intake, then found to have an aneurysm which was clipped in 2013.  Since then he has not had seizures.  He describes no rash no inflammatory bowel disease or psoriasis himself.  He wonders if one of his brothers or possibly 2 of them have psoriasis.  He is going to check into that.  He is a half a pack a day smoker.  He works as a  at [employer].  He is single.  Once in his teenage he had chlamydia treated none since        DETAILED EXAMINATION  01/09/24  :    There were no vitals filed for this visit.  Alert oriented. Head including the face is examined for malar rash, heliotropes, scarring, lupus pernio. Eyes examined for redness such as in episcleritis/scleritis, periorbital lesions.   Neck/ Face examined for parotid gland swelling, range of motion of neck.  Left upper and lower and right upper and lower extremities examined for tenderness, swelling, warmth of the appendicular joints, range of motion, edema, rash.  Some of the important findings included: he does not have evidence of synovitis in the palpable joints of the upper extremities.  No significant deformities of the digits.  no Heberden nodes.  Range of motion of the shoulders   show full abduction.  No JLT effusion or warmth of the knees.  he does not have dactylitis of the digits.     Patient Active Problem List    Diagnosis Date Noted    Ankylosing spondylitis of sacral region (H) 11/16/2023     Priority: Medium    F11.2 - Continuous opioid dependence (H) 04/03/2023     Priority: Medium    Major depressive disorder, recurrent episode, moderate (H) 01/30/2023     Priority: Medium    Nonintractable headache, unspecified chronicity pattern, unspecified headache type 11/17/2022     Priority: Medium    History of cerebral aneurysm 11/17/2022     Priority: Medium    Brain aneurysm 11/17/2022     Priority: Medium    Prediabetes 07/27/2022     Priority: Medium     Essential hypertension 05/11/2022     Priority: Medium    Adjustment disorder with depressed mood 03/18/2022     Priority: Medium    Chronic obstructive pulmonary disease, unspecified COPD type (H) 02/23/2022     Priority: Medium    Tobacco dependence syndrome 11/10/2021     Priority: Medium    Severe opioid use disorder on maintenance therapy (H) 08/16/2021     Priority: Medium    HLA B27 (HLA B27 positive) 07/20/2020     Priority: Medium     Identified on lab testing for acute anterior uveitis left eye in 7/2020      Mixed dyslipidemia 07/10/2019     Priority: Medium    Mild persistent asthma, unspecified whether complicated 07/10/2019     Priority: Medium    Hx of avascular necrosis of capital femoral epiphysis 07/10/2019     Priority: Medium     AVN of his right hip possibly secondary to alcohol use for which he underwent a right JESSICA 7/2017  Hip pain left sided evaluated by Orthopedics 8/2019 with normal left hip MRI      Iron deficiency anemia secondary to inadequate dietary iron intake 07/10/2019     Priority: Medium    MARY (generalized anxiety disorder) 04/29/2019     Priority: Medium    Insomnia, unspecified type 04/29/2019     Priority: Medium    Alcohol dependence in remission (H) 04/29/2019     Priority: Medium    Substance abuse in remission (H) 04/29/2019     Priority: Medium    Anxiety state 03/28/2019     Priority: Medium    Degeneration of lumbar/lumbosacral disc without myelopathy 03/25/2019     Priority: Medium    Chronic right-sided low back pain with right-sided sciatica 03/25/2019     Priority: Medium     Past Surgical History:   Procedure Laterality Date    aneursym clipping      Brain aneursym in 2013    ARTHROPLASTY HIP Right     ARTHROPLASTY REVISION HIP Right     BRAIN SURGERY      KNEE SURGERY      right knee      Past Medical History:   Diagnosis Date    Alcohol abuse     Asthma     Brain aneurysm     s/p clip in 2013    Brain aneurysm     Chronic back pain     HTN (hypertension)      Seizures (H)     Stroke (H)      Allergies   Allergen Reactions    Diphenhydramine Swelling     Per patient, tongue/lips swelling, itchy eyes with both generic diphenhydramine and brand Benadryl     Naproxen Swelling    Pistachio Nut Extract Skin Test     Toradol [Ketorolac] Itching     Current Outpatient Medications   Medication Sig Dispense Refill    acetaminophen (TYLENOL) 325 MG tablet Take 650 mg by mouth every 6 hours as needed for mild pain or headaches      adalimumab (HUMIRA *CF*) 40 MG/0.4ML pen kit Inject 0.4 mLs (40 mg) Subcutaneous every 14 days for 30 days Hold for signs of infection, then seek medical attention. 1 each 5    buprenorphine HCl-naloxone HCl (SUBOXONE) 2-0.5 MG per film Place 1 Film under the tongue daily 30 Film 1    buprenorphine HCl-naloxone HCl (SUBOXONE) 4-1 MG per film Place 1 Film under the tongue daily 30 Film 1    buprenorphine HCl-naloxone HCl (SUBOXONE) 8-2 MG per film Place 1 Film under the tongue daily 30 Film 1    buprenorphine HCl-naloxone HCl (SUBOXONE) 8-2 MG per film Place 1 Film under the tongue daily 30 Film 1    gabapentin (NEURONTIN) 300 MG capsule TAKE TWO CAPSULES BY MOUTH THREE TIMES DAILY 540 capsule 0    ibuprofen (ADVIL/MOTRIN) 200 MG tablet Take 600 mg by mouth every 6 hours as needed for moderate pain (4-6)      lisinopril (ZESTRIL) 20 MG tablet Take 1 tablet (20 mg) by mouth daily 90 tablet 2    multivitamin w/minerals (THERA-VIT-M) tablet Take 1 tablet by mouth daily      omeprazole (PRILOSEC) 40 MG DR capsule Take 1 capsule (40 mg) by mouth daily 90 capsule 3    order for DME Equipment being ordered: adult mask for neb machine with tubing 1 each 0    tiZANidine (ZANAFLEX) 4 MG tablet TAKE ONE TABLET BY MOUTH EVERY FOUR HOURS 540 tablet 0    VENTOLIN  (90 Base) MCG/ACT inhaler INHALE TWO PUFFS BY MOUTH EVERY SIX HOURS AS NEEDED FOR WHEEZING 18 g 0     family history includes Glaucoma in his mother; Uveitis in his brother.  Social Connections: Not on  file          WBC   Date Value Ref Range Status   07/02/2019 9.0 4.0 - 11.0 10e9/L Final     WBC Count   Date Value Ref Range Status   09/07/2023 9.3 4.0 - 11.0 10e3/uL Final     RBC Count   Date Value Ref Range Status   09/07/2023 4.71 4.40 - 5.90 10e6/uL Final   07/02/2019 4.51 4.4 - 5.9 10e12/L Final     Hemoglobin   Date Value Ref Range Status   09/07/2023 14.5 13.3 - 17.7 g/dL Final   07/02/2019 13.2 (L) 13.3 - 17.7 g/dL Final     Hematocrit   Date Value Ref Range Status   09/07/2023 42.0 40.0 - 53.0 % Final   07/02/2019 40.6 40.0 - 53.0 % Final     MCV   Date Value Ref Range Status   09/07/2023 89 78 - 100 fL Final   07/02/2019 90 78 - 100 fl Final     MCH   Date Value Ref Range Status   09/07/2023 30.8 26.5 - 33.0 pg Final   07/02/2019 29.3 26.5 - 33.0 pg Final     Platelet Count   Date Value Ref Range Status   09/07/2023 374 150 - 450 10e3/uL Final   07/02/2019 345 150 - 450 10e9/L Final     % Lymphocytes   Date Value Ref Range Status   09/07/2023 39 % Final   07/02/2019 33.7 % Final     AST   Date Value Ref Range Status   11/17/2022 18 0 - 45 U/L Final   07/15/2020 19 0 - 45 U/L Final     ALT   Date Value Ref Range Status   09/07/2023 24 0 - 70 U/L Final     Comment:     Reference intervals for this test were updated on 6/12/2023 to more accurately reflect our healthy population. There may be differences in the flagging of prior results with similar values performed with this method. Interpretation of those prior results can be made in the context of the updated reference intervals.     07/15/2020 34 0 - 70 U/L Final     Albumin   Date Value Ref Range Status   09/07/2023 5.1 3.5 - 5.2 g/dL Final   11/17/2022 3.5 3.4 - 5.0 g/dL Final   07/15/2020 3.8 3.4 - 5.0 g/dL Final     Alkaline Phosphatase   Date Value Ref Range Status   11/17/2022 120 40 - 150 U/L Final   07/15/2020 131 40 - 150 U/L Final     Creatinine   Date Value Ref Range Status   09/07/2023 0.61 (L) 0.67 - 1.17 mg/dL Final   07/15/2020 0.59 (L)  0.66 - 1.25 mg/dL Final     GFR Estimate   Date Value Ref Range Status   09/07/2023 >90 >60 mL/min/1.73m2 Final   07/15/2020 >90 >60 mL/min/[1.73_m2] Final     Comment:     Non  GFR Calc  Starting 12/18/2018, serum creatinine based estimated GFR (eGFR) will be   calculated using the Chronic Kidney Disease Epidemiology Collaboration   (CKD-EPI) equation.       GFR, ESTIMATED POCT   Date Value Ref Range Status   11/17/2022 >60 >60 mL/min/1.73m2 Final     GFR Estimate If Black   Date Value Ref Range Status   07/15/2020 >90 >60 mL/min/[1.73_m2] Final     Comment:      GFR Calc  Starting 12/18/2018, serum creatinine based estimated GFR (eGFR) will be   calculated using the Chronic Kidney Disease Epidemiology Collaboration   (CKD-EPI) equation.       Sed Rate   Date Value Ref Range Status   07/15/2020 31 (H) 0 - 15 mm/h Final     Erythrocyte Sedimentation Rate   Date Value Ref Range Status   09/07/2023 7 0 - 15 mm/hr Final     CRP Inflammation   Date Value Ref Range Status   07/15/2020 24.7 (H) 0.0 - 8.0 mg/L Final

## 2024-01-10 DIAGNOSIS — F11.20 CONTINUOUS OPIOID DEPENDENCE (H): ICD-10-CM

## 2024-01-10 RX ORDER — BUPRENORPHINE AND NALOXONE 8; 2 MG/1; MG/1
1 FILM, SOLUBLE BUCCAL; SUBLINGUAL DAILY
Qty: 30 EACH | Refills: 0 | Status: SHIPPED | OUTPATIENT
Start: 2024-01-10 | End: 2024-03-18

## 2024-01-11 ENCOUNTER — MYC MEDICAL ADVICE (OUTPATIENT)
Dept: FAMILY MEDICINE | Facility: CLINIC | Age: 44
End: 2024-01-11
Payer: COMMERCIAL

## 2024-01-12 NOTE — TELEPHONE ENCOUNTER
Called pt and regarding symptoms. Pt stated he has appt with Dr. Cardenas on Wednesday and will wait to discuss that with her. Pt's appt is a telephone appt. Recommended an in-person appt so PCP can further assess. Pt stated he will talk to his boss and call us back regarding appt type.      Pt would like message routed to covering provider regarding pseudoephedrine and bezonatate . Pt is wondering if that is safe for him to take. Please advise.      Ravinder Mckinnon, BSN RN  [facility]

## 2024-01-16 ASSESSMENT — PATIENT HEALTH QUESTIONNAIRE - PHQ9
10. IF YOU CHECKED OFF ANY PROBLEMS, HOW DIFFICULT HAVE THESE PROBLEMS MADE IT FOR YOU TO DO YOUR WORK, TAKE CARE OF THINGS AT HOME, OR GET ALONG WITH OTHER PEOPLE: SOMEWHAT DIFFICULT
SUM OF ALL RESPONSES TO PHQ QUESTIONS 1-9: 12
SUM OF ALL RESPONSES TO PHQ QUESTIONS 1-9: 12

## 2024-01-17 ENCOUNTER — VIRTUAL VISIT (OUTPATIENT)
Dept: FAMILY MEDICINE | Facility: CLINIC | Age: 44
End: 2024-01-17
Payer: COMMERCIAL

## 2024-01-17 DIAGNOSIS — J45.30 MILD PERSISTENT ASTHMA, UNSPECIFIED WHETHER COMPLICATED: ICD-10-CM

## 2024-01-17 DIAGNOSIS — F11.20 CONTINUOUS OPIOID DEPENDENCE (H): ICD-10-CM

## 2024-01-17 DIAGNOSIS — F11.20 SEVERE OPIOID USE DISORDER ON MAINTENANCE THERAPY (H): Primary | ICD-10-CM

## 2024-01-17 DIAGNOSIS — Z76.0 ENCOUNTER FOR MEDICATION REFILL: ICD-10-CM

## 2024-01-17 DIAGNOSIS — F33.1 MAJOR DEPRESSIVE DISORDER, RECURRENT EPISODE, MODERATE (H): ICD-10-CM

## 2024-01-17 PROCEDURE — 99442 PR PHYSICIAN TELEPHONE EVALUATION 11-20 MIN: CPT | Mod: 93 | Performed by: FAMILY MEDICINE

## 2024-01-17 RX ORDER — ALBUTEROL SULFATE 90 UG/1
2 AEROSOL, METERED RESPIRATORY (INHALATION) EVERY 6 HOURS PRN
Qty: 18 G | Refills: 3 | Status: SHIPPED | OUTPATIENT
Start: 2024-01-17 | End: 2024-07-31

## 2024-01-17 RX ORDER — BUPRENORPHINE AND NALOXONE 8; 2 MG/1; MG/1
1 FILM, SOLUBLE BUCCAL; SUBLINGUAL DAILY
Qty: 30 FILM | Refills: 1 | Status: SHIPPED | OUTPATIENT
Start: 2024-01-17 | End: 2024-03-18

## 2024-01-17 NOTE — PROGRESS NOTES
"Carlos is a 43 year old who is being evaluated via a billable telephone visit.      What phone number would you like to be contacted at? [phone]  How would you like to obtain your AVS? Teddy    Distant Location (provider location):  On-site    Assessment & Plan     Severe opioid use disorder on maintenance therapy (H): refilled Suboxone at 8 mg/day, though he is trying to take 6 mg.   - buprenorphine HCl-naloxone HCl (SUBOXONE) 8-2 MG per film  Dispense: 30 Film; Refill: 1    Continuous opioid dependence (H)    Encounter for medication refill  - albuterol (VENTOLIN HFA) 108 (90 Base) MCG/ACT inhaler  Dispense: 18 g; Refill: 3    Mild persistent asthma, unspecified whether complicated:  - albuterol (VENTOLIN HFA) 108 (90 Base) MCG/ACT inhaler  Dispense: 18 g; Refill: 3    Major depressive disorder, recurrent episode, moderate (H): mood is doing ok, more down but ok overall.  plans to start therapy again        BMI  Estimated body mass index is 27.22 kg/m  as calculated from the following:    Height as of 9/13/23: 1.778 m (5' 10\").    Weight as of 1/9/24: 86 kg (189 lb 11.2 oz).       Subjective   Carlos is a 43 year old, presenting for the following health issues:  Follow Up (From last visit) and Abdominal Pain (x1 week)      1/17/2024     3:01 PM   Additional Questions   Roomed by Shraddha SHAW     History of Present Illness       Reason for visit:  Abd pain      Stomach hurts, wonders if because too much ibuprofen.     Switched insurances. Living in [place]. Wondering about switching to a new provider closer to where he lives.     Holidays, had a hard December. Has been feeling more down since that time, hoping to start therapy again soon.             Objective           Vitals:  No vitals were obtained today due to virtual visit.      Physical Exam   General: Alert and no distress //Respiratory: No audible wheeze, cough, or shortness of breath // Psychiatric:  Appropriate affect, tone, and pace of " words          Phone call duration: 17 minutes  Signed Electronically by: Lucero Cardenas MD

## 2024-01-19 DIAGNOSIS — R73.03 PREDIABETES: Primary | ICD-10-CM

## 2024-01-19 RX ORDER — METFORMIN HCL 500 MG
500 TABLET, EXTENDED RELEASE 24 HR ORAL 2 TIMES DAILY WITH MEALS
Qty: 180 TABLET | Refills: 0 | Status: ON HOLD | OUTPATIENT
Start: 2024-01-19 | End: 2024-04-16

## 2024-03-17 ASSESSMENT — PATIENT HEALTH QUESTIONNAIRE - PHQ9
SUM OF ALL RESPONSES TO PHQ QUESTIONS 1-9: 18
SUM OF ALL RESPONSES TO PHQ QUESTIONS 1-9: 18
10. IF YOU CHECKED OFF ANY PROBLEMS, HOW DIFFICULT HAVE THESE PROBLEMS MADE IT FOR YOU TO DO YOUR WORK, TAKE CARE OF THINGS AT HOME, OR GET ALONG WITH OTHER PEOPLE: VERY DIFFICULT

## 2024-03-18 ENCOUNTER — VIRTUAL VISIT (OUTPATIENT)
Dept: FAMILY MEDICINE | Facility: CLINIC | Age: 44
End: 2024-03-18
Payer: COMMERCIAL

## 2024-03-18 DIAGNOSIS — F10.20 ALCOHOL USE DISORDER, SEVERE, DEPENDENCE (H): Primary | ICD-10-CM

## 2024-03-18 DIAGNOSIS — F11.20 CONTINUOUS OPIOID DEPENDENCE (H): ICD-10-CM

## 2024-03-18 DIAGNOSIS — J44.9 CHRONIC OBSTRUCTIVE PULMONARY DISEASE, UNSPECIFIED COPD TYPE (H): ICD-10-CM

## 2024-03-18 PROCEDURE — 99443 PR PHYSICIAN TELEPHONE EVALUATION 21-30 MIN: CPT | Mod: 93 | Performed by: FAMILY MEDICINE

## 2024-03-18 RX ORDER — BUPRENORPHINE AND NALOXONE 8; 2 MG/1; MG/1
1 FILM, SOLUBLE BUCCAL; SUBLINGUAL DAILY
Qty: 60 EACH | Refills: 0 | Status: ON HOLD | OUTPATIENT
Start: 2024-03-18 | End: 2024-04-16

## 2024-03-18 NOTE — PROGRESS NOTES
"Carlos is a 43 year old who is being evaluated via a billable telephone visit.    What phone number would you like to be contacted at? [phone]  How would you like to obtain your AVS? Teddy  Originating Location (pt. Location): Home    Distant Location (provider location):  On-site    Assessment & Plan     Alcohol use disorder, severe, dependence (H): discussed withdrawal and his CIWA score appears to be around 20. Explained I strongly recommend he go to the hospital or detox. Will need medically managed detox and is too high risk to try to manage outpatient. He is contemplative. States he does not want to miss work. I discussed recommendations for providers in the [facility] who could see him more easily and gave him the number of a clinic closer to him. Will follow up in 1 month with me but I urged him to seek care sooner, either at the clinic or hospital. Discussed how alcohol withdrawal can be fatal. He understands this.     Chronic obstructive pulmonary disease, unspecified COPD type (H): breathing is stable, using inhalers    F11.2 - Continuous opioid dependence (H): doing ok on 6 mg suboxone, though has been taking 8 on harder days. Explained it is fine to take 8 mg and he does not need to try to cut down right now.   - buprenorphine HCl-naloxone HCl (SUBOXONE) 8-2 MG per film  Dispense: 60 each; Refill: 0      BMI  Estimated body mass index is 27.22 kg/m  as calculated from the following:    Height as of 9/13/23: 1.778 m (5' 10\").    Weight as of 1/9/24: 86 kg (189 lb 11.2 oz).           Subjective   Carlos is a 43 year old, presenting for the following health issues:  Follow Up (Suboxone )        3/18/2024     3:17 PM   Additional Questions   Roomed by Courtney QUIJANO     History of Present Illness       Reason for visit:  Follow up suboxone      \"Fell off the wagon\" about a month ago- started drinking again. He is now starting to withdraw again if he goes a day without drinking.     He thinks the trigger " was financial stress. He recently started a debt relief program. Stress from that. He has been going to AA meetings but is still drinking daily. Has gone over a full day now without drinking but is starting to withdraw.  He has a history of withdrawal seizures.     Living in [place], [place]. Limited transportation and would like to get a doctor on that side of town if possible.      Went to [facility] urgent care and was told he had gallstones but not infected at the time. This was about a month ago.     Hasn't gotten set up with a therapist.            Objective           Vitals:  No vitals were obtained today due to virtual visit.    Physical Exam   General: Alert and no distress //Respiratory: No audible wheeze, cough, or shortness of breath // Psychiatric:  Appropriate affect, tone, and pace of words            Phone call duration: 25 minutes  Signed Electronically by: Lucero Cardenas MD

## 2024-03-19 ENCOUNTER — TELEPHONE (OUTPATIENT)
Dept: FAMILY MEDICINE | Facility: CLINIC | Age: 44
End: 2024-03-19
Payer: COMMERCIAL

## 2024-03-20 NOTE — TELEPHONE ENCOUNTER
Please schedule this patient for 4/17 at 11:40 AM for a telephone visit for follow up. I already spoke to him about it. Thank you!     Lucero Cardenas MD

## 2024-03-21 ENCOUNTER — TELEPHONE (OUTPATIENT)
Dept: ADDICTION MEDICINE | Facility: CLINIC | Age: 44
End: 2024-03-21

## 2024-03-21 ENCOUNTER — TELEPHONE (OUTPATIENT)
Dept: FAMILY MEDICINE | Facility: CLINIC | Age: 44
End: 2024-03-21
Payer: COMMERCIAL

## 2024-03-21 ENCOUNTER — MYC MEDICAL ADVICE (OUTPATIENT)
Dept: FAMILY MEDICINE | Facility: CLINIC | Age: 44
End: 2024-03-21
Payer: COMMERCIAL

## 2024-03-21 NOTE — TELEPHONE ENCOUNTER
Please reach out to patient to invite him to be seen in recovery clinic.  Per PCP, patient recently moved to [place] and having difficulty getting to her clinic in [facility] and he needs something a bit more convenient while finding closer Suboxone prescriber.      History of OUD, on Suboxone, and AUD.  Would benefit from additional resources offered in  at this time as well.      Thanks!    Mervat Cheung, DO on 3/21/2024 at 9:52 AM

## 2024-03-21 NOTE — TELEPHONE ENCOUNTER
Called Carlos again. Left message with info about [facility]. Will also send MyChart message.   Lucero Cardenas MD

## 2024-03-21 NOTE — TELEPHONE ENCOUNTER
Writer contacted patient to welcome patient to the Recovery Clinic. Patient reported he is at work and does not have time right now to discuss anything. Writer informed patient the information for the Recovery Clinic will be sent via [employer] message, patient was appreciative and reported he will look at it when he has time.     Ena Casiano RN on 3/21/2024 at 11:16 AM

## 2024-03-25 ENCOUNTER — HOSPITAL ENCOUNTER (EMERGENCY)
Facility: CLINIC | Age: 44
Discharge: HOME | End: 2024-03-25
Attending: EMERGENCY MEDICINE | Admitting: EMERGENCY MEDICINE
Payer: COMMERCIAL

## 2024-03-25 ENCOUNTER — TELEPHONE (OUTPATIENT)
Dept: BEHAVIORAL HEALTH | Facility: CLINIC | Age: 44
End: 2024-03-25
Payer: COMMERCIAL

## 2024-03-25 ENCOUNTER — MYC MEDICAL ADVICE (OUTPATIENT)
Dept: BEHAVIORAL HEALTH | Facility: CLINIC | Age: 44
End: 2024-03-25
Payer: COMMERCIAL

## 2024-03-25 VITALS
HEART RATE: 78 BPM | SYSTOLIC BLOOD PRESSURE: 112 MMHG | HEIGHT: 71 IN | TEMPERATURE: 97.9 F | DIASTOLIC BLOOD PRESSURE: 78 MMHG | OXYGEN SATURATION: 96 % | WEIGHT: 200 LBS | BODY MASS INDEX: 28 KG/M2 | RESPIRATION RATE: 18 BRPM

## 2024-03-25 DIAGNOSIS — F10.230 ALCOHOL DEPENDENCE WITH UNCOMPLICATED WITHDRAWAL (H): ICD-10-CM

## 2024-03-25 PROCEDURE — 99284 EMERGENCY DEPT VISIT MOD MDM: CPT | Performed by: EMERGENCY MEDICINE

## 2024-03-25 PROCEDURE — 99283 EMERGENCY DEPT VISIT LOW MDM: CPT | Performed by: EMERGENCY MEDICINE

## 2024-03-25 RX ORDER — GABAPENTIN 400 MG/1
400 CAPSULE ORAL 3 TIMES DAILY
Qty: 12 CAPSULE | Refills: 0 | Status: SHIPPED | OUTPATIENT
Start: 2024-03-25 | End: 2024-04-14

## 2024-03-25 ASSESSMENT — COLUMBIA-SUICIDE SEVERITY RATING SCALE - C-SSRS
1. IN THE PAST MONTH, HAVE YOU WISHED YOU WERE DEAD OR WISHED YOU COULD GO TO SLEEP AND NOT WAKE UP?: NO
2. HAVE YOU ACTUALLY HAD ANY THOUGHTS OF KILLING YOURSELF IN THE PAST MONTH?: NO
6. HAVE YOU EVER DONE ANYTHING, STARTED TO DO ANYTHING, OR PREPARED TO DO ANYTHING TO END YOUR LIFE?: NO

## 2024-03-25 NOTE — TELEPHONE ENCOUNTER
Per ED note, patient declined Detox for AUD today.     Scheduled to establish with Addiction Medicine provider at the [facility], Friday, 3/29/24. Attempted to call patient re: appt. No answer; LVM. MyChart message sent.    [facility]   [address] [place] [address], [address]  [place], [place]  [phone]    Routing to Tiffanie KAUR.    Tish Sullivan RN on 3/25/2024 at 1:41 PM

## 2024-03-25 NOTE — TELEPHONE ENCOUNTER
"Patient presented to the Recovery Clinic as a walk-in. Patient reports being stable on Suboxone, but very concerned, \"terrified,\" about alcohol withdrawal. Patient visibly anxious and shaky. Patient reports drinking about 1/5 of alcohol daily; last drink approximately midnight. Patient reports history of alcohol withdrawal seizures. Scars on forehead from seizures. Patient reports 5 years of abstinence from alcohol with return to alcohol use about one month ago. He has been trying to stop drinking \"on and off\" for the last month, but has not been successful.    Per chart, patient was prescribed 2 months of Suboxone on 3/18/24 by [facility] provider. Patient reports being stable on Suboxone \"for years.\" Patient interested in finding a more convenient Suboxone provider, ideally a PCP who prescribes Suboxone. Informed patient that an appointment with one of the  Addiction Medicine clinics ([facility] or [facility]) offers virtual appointments (appointment available today), but today patient's priority is treatment for alcohol use disorder.    Discussed with patient significant concerns for alcohol withdrawal given his current state and history with alcohol withdrawal. Patient in agreement to go to the [facility] ED for evaluation of alcohol withdrawal. Escorted patient to ED.    Informed patient that this writer would follow up with him later this week to get plan in place for follow up with the [facility] clinic.    Routing to Dr. Jonatan KAUR.  Phone call informing ED RN that patient was escorted to ED for evaluation of alcohol withdrawal/detox.    Tish Sullivan RN on 3/25/2024 at 10:28 AM      "

## 2024-03-25 NOTE — ED TRIAGE NOTES
Patient states to be going alcohol withdrawal, his last drink was at sometime in the middle of the night, states to drink 1/5 a day. Has a seizure history, thinks the last one was 5 years ago.      Triage Assessment (Adult)       Row Name 03/25/24 1022          Triage Assessment    Airway WDL WDL        Respiratory WDL    Respiratory WDL WDL        Skin Circulation/Temperature WDL    Skin Circulation/Temperature WDL WDL        Cardiac WDL    Cardiac WDL WDL        Peripheral/Neurovascular WDL    Peripheral Neurovascular WDL WDL        Cognitive/Neuro/Behavioral WDL    Cognitive/Neuro/Behavioral WDL WDL

## 2024-03-25 NOTE — DISCHARGE INSTRUCTIONS
Follow-up with your primary clinic as needed.  Return to the emergency department if you do wish to go through detox treatment.    Take 400 mg of Neurontin (gabapentin) 3 times a day for the next 4 days then go back to taking your usual 300 mg 3 times per day.    Return to the emergency department for any problems.

## 2024-03-25 NOTE — ED PROVIDER NOTES
ED Provider Note  [facility]      History     Chief Complaint   Patient presents with    Alcohol Problem     Patient states to be going alcohol withdrawal, his last drink was at sometime in the middle of the night, states to drink 1/5 a day. Has a seizure history, thinks the last one was 5 years ago.      HPI  Carlos Hamilton is a 43 year old male with history of alcohol use disorder, opioid use disorder on Suboxone, MARY, and MDD presenting to the ED due to alcohol withdrawal.  Patient reports that he drinks 1/5 of alcohol a day and has been doing this for the past month.  Last drink was at midnight.  He does report that he feels somewhat shaky.  He reports he has had a seizure in the past but this was 5 years ago and may have been associated with a head injury.  He denies other drug use and denies suicidal or homicidal ideation.  He reports he does not want to be admitted for detox but does want to try something to help with the withdrawal.  He has no other complaints at this time.    Past Medical History  Past Medical History:   Diagnosis Date    Alcohol abuse     Asthma     Brain aneurysm     s/p clip in 2013    Brain aneurysm     Chronic back pain     HTN (hypertension)     Seizures (H)     Stroke (H)      Past Surgical History:   Procedure Laterality Date    aneursym clipping      Brain aneursym in 2013    ARTHROPLASTY HIP Right     ARTHROPLASTY REVISION HIP Right     BRAIN SURGERY      KNEE SURGERY      right knee     gabapentin (NEURONTIN) 400 MG capsule  acetaminophen (TYLENOL) 325 MG tablet  adalimumab (HUMIRA *CF*) 40 MG/0.4ML pen kit  albuterol (VENTOLIN HFA) 108 (90 Base) MCG/ACT inhaler  buprenorphine HCl-naloxone HCl (SUBOXONE) 8-2 MG per film  gabapentin (NEURONTIN) 300 MG capsule  ibuprofen (ADVIL/MOTRIN) 200 MG tablet  lisinopril (ZESTRIL) 20 MG tablet  metFORMIN (GLUCOPHAGE XR) 500 MG 24 hr tablet  multivitamin w/minerals (THERA-VIT-M) tablet  omeprazole (PRILOSEC) 40 MG  " capsule  order for DME  tiZANidine (ZANAFLEX) 4 MG tablet      Allergies   Allergen Reactions    Diphenhydramine Swelling     Per patient, tongue/lips swelling, itchy eyes with both generic diphenhydramine and brand Benadryl     Naproxen Swelling    Pistachio Nut Extract     Toradol [Ketorolac] Itching     Family History  Family History   Problem Relation Age of Onset    Glaucoma Mother     Uveitis Brother     Alcoholism No family hx of     Macular Degeneration No family hx of      Social History   Social History     Tobacco Use    Smoking status: Every Day     Packs/day: 0.50     Years: 27.00     Additional pack years: 0.00     Total pack years: 13.50     Types: Cigarettes     Passive exposure: Current (outside only)    Smokeless tobacco: Former    Tobacco comments:     half a pack a day-1 pack. E-cig/vape not often, but occasionally.    Vaping Use    Vaping Use: Some days   Substance Use Topics    Alcohol use: Not Currently    Drug use: Not Currently     Types: Marijuana         A medically appropriate review of systems was performed with pertinent positives and negatives noted in the HPI, and all other systems negative.    Physical Exam   BP: 112/78  Pulse: 78  Temp: 97.9  F (36.6  C)  Resp: 18  Height: 180.3 cm (5' 11\")  Weight: 90.7 kg (200 lb)  SpO2: 96 %  Physical Exam  Vitals and nursing note reviewed.   Constitutional:       General: He is not in acute distress.     Appearance: He is well-developed. He is not ill-appearing or toxic-appearing.      Comments: Patient is awake and alert, he is mentating normally, no acute distress.   HENT:      Head: Normocephalic and atraumatic.   Eyes:      General: No scleral icterus.     Pupils: Pupils are equal, round, and reactive to light.   Cardiovascular:      Rate and Rhythm: Normal rate and regular rhythm.   Pulmonary:      Effort: Pulmonary effort is normal. No respiratory distress.   Musculoskeletal:      Cervical back: Normal range of motion and neck supple. "   Skin:     General: Skin is warm and dry.      Coloration: Skin is not pale.      Findings: No rash.   Neurological:      Mental Status: He is alert and oriented to person, place, and time.      Sensory: No sensory deficit.      Comments: Mild tremor noted.   Psychiatric:         Attention and Perception: Attention and perception normal.         Mood and Affect: Mood and affect normal.         Speech: Speech normal.         Behavior: Behavior normal.         Thought Content: Thought content is not paranoid or delusional. Thought content does not include homicidal or suicidal ideation.           ED Course, Procedures, & Data      Procedures               No results found for any visits on 03/25/24.  Medications - No data to display  Labs Ordered and Resulted from Time of ED Arrival to Time of ED Departure - No data to display  No orders to display              Assessment & Plan      This patient had presented to the emergency department for alcohol withdrawal.  He appears in no acute distress.  He is not tachycardic or hypertensive but does have a mild tremor.  I did offer admission for detox treatment but patient does not want to pursue this.  I will try modified Neurontin taper at home.  Patient is on Neurontin chronically so I will increase the dose for several days and have him go back to his regular dose.  He was told to return should he decide that he wants detox treatment or for any other problems and was discharged in good condition.    I have reviewed the nursing notes. I have reviewed the findings, diagnosis, plan and need for follow up with the patient.    New Prescriptions    GABAPENTIN (NEURONTIN) 400 MG CAPSULE    Take 1 capsule (400 mg) by mouth 3 times daily for 4 days       Final diagnoses:   Alcohol dependence with uncomplicated withdrawal (H)       Maximiliano Buchanan MD  [facility] EMERGENCY DEPARTMENT  3/25/2024     Maximiliano Buchanan MD  03/25/24 [id number]

## 2024-04-11 ASSESSMENT — PATIENT HEALTH QUESTIONNAIRE - PHQ9
SUM OF ALL RESPONSES TO PHQ QUESTIONS 1-9: 8
10. IF YOU CHECKED OFF ANY PROBLEMS, HOW DIFFICULT HAVE THESE PROBLEMS MADE IT FOR YOU TO DO YOUR WORK, TAKE CARE OF THINGS AT HOME, OR GET ALONG WITH OTHER PEOPLE: SOMEWHAT DIFFICULT
SUM OF ALL RESPONSES TO PHQ QUESTIONS 1-9: 8

## 2024-04-12 ENCOUNTER — OFFICE VISIT (OUTPATIENT)
Dept: ADDICTION MEDICINE | Facility: CLINIC | Age: 44
End: 2024-04-12
Payer: COMMERCIAL

## 2024-04-12 VITALS — DIASTOLIC BLOOD PRESSURE: 90 MMHG | HEART RATE: 76 BPM | SYSTOLIC BLOOD PRESSURE: 146 MMHG

## 2024-04-12 DIAGNOSIS — F10.21 ALCOHOL DEPENDENCE IN REMISSION (H): ICD-10-CM

## 2024-04-12 DIAGNOSIS — F11.20 SEVERE OPIOID USE DISORDER ON MAINTENANCE THERAPY (H): Primary | ICD-10-CM

## 2024-04-12 DIAGNOSIS — F10.930 ALCOHOL WITHDRAWAL SYNDROME WITHOUT COMPLICATION (H): ICD-10-CM

## 2024-04-12 LAB
AMPHETAMINE QUAL URINE POCT: NEGATIVE
BARBITURATE QUAL URINE POCT: NEGATIVE
BENZODIAZEPINE QUAL URINE POCT: NEGATIVE
BUPRENORPHINE QUAL URINE POCT: ABNORMAL
COCAINE QUAL URINE POCT: NEGATIVE
CREATININE QUAL URINE POCT: ABNORMAL
INTERNAL QC QUAL URINE POCT: ABNORMAL
MDMA QUAL URINE POCT: NEGATIVE
METHADONE QUAL URINE POCT: NEGATIVE
METHAMPHETAMINE QUAL URINE POCT: NEGATIVE
OPIATE QUAL URINE POCT: NEGATIVE
OXYCODONE QUAL URINE POCT: NEGATIVE
PH QUAL URINE POCT: ABNORMAL
PHENCYCLIDINE QUAL URINE POCT: NEGATIVE
POCT KIT EXPIRATION DATE: ABNORMAL
POCT KIT LOT NUMBER: ABNORMAL
SPECIFIC GRAVITY POCT: 1.01
TEMPERATURE URINE POCT: ABNORMAL
THC QUAL URINE POCT: NEGATIVE

## 2024-04-12 PROCEDURE — G2211 COMPLEX E/M VISIT ADD ON: HCPCS | Performed by: NURSE PRACTITIONER

## 2024-04-12 PROCEDURE — 99214 OFFICE O/P EST MOD 30 MIN: CPT | Performed by: NURSE PRACTITIONER

## 2024-04-12 PROCEDURE — 80305 DRUG TEST PRSMV DIR OPT OBS: CPT | Performed by: NURSE PRACTITIONER

## 2024-04-12 RX ORDER — LANOLIN ALCOHOL/MO/W.PET/CERES
100 CREAM (GRAM) TOPICAL DAILY
Qty: 30 TABLET | Refills: 0 | Status: ON HOLD | OUTPATIENT
Start: 2024-04-12 | End: 2024-04-16

## 2024-04-12 RX ORDER — FOLIC ACID 1 MG/1
1 TABLET ORAL DAILY
Qty: 30 TABLET | Refills: 0 | Status: ON HOLD | OUTPATIENT
Start: 2024-04-12 | End: 2024-04-16

## 2024-04-12 RX ORDER — MULTIVITAMIN
1 TABLET ORAL DAILY
Qty: 30 TABLET | Refills: 0 | Status: ON HOLD | OUTPATIENT
Start: 2024-04-12 | End: 2024-04-16

## 2024-04-12 ASSESSMENT — PAIN SCALES - GENERAL: PAINLEVEL: SEVERE PAIN (7)

## 2024-04-12 NOTE — PROGRESS NOTES
"    SUBJECTIVE                                                      Carlos Hamilton is a 43 year old male who presents for initial visit for addiction consultation and management referred by PCP due to concerns for Alcohol Use Disorder (AUD).     Visit performed In Person, face-to-face    HPI: Carlos Hamilton is a 43 year old male with history of alcohol use disorder, opioid use disorder on Suboxone, MARY, and MDD who presents for further evaluation of possible substance use disorder and management options.    Here today referred from PCP due to alcohol use. Reports sustained remission for almost 5 years, return to alcohol use in February 2024. Has had some financial problems and depression at the time of return to use. Also has a history of chronic pain in lower and mid back. Recently knee pain as well. Alcohol use escalated quickly, was drinking 1/5 of 1 L per day, up to 3/4 of 1 L. Has been able to taper his use, now drinking 1/2 pint per day. Finding it is hard to stop when he starts drinking. Has not taken medication for AUD in the past.     Started Suboxone when he started treatment in 2019. Was told he could no longer take pain killers and his options were transition to buprenorphine or methadone. He has decreased his Suboxone dose in attempts to taper, had decreased to 4 mg daily, but experiencing adverse SE. Now taking 6 mg per day but prescribed 8 mg daily. Reports he was previously on 24 mg daily in TID dosing, some pain relief but reports the medication has never been that effective for his pain. \"If it is not helping pain why am I taking this\" Not followed by pain medicine. Has h/o of RA, taking Humira for the past 6 months.     Reports he is not able to go to detox due to needing to work full time. He only has 1 day off per week. Also concerned about cost of detox. Has been taking 600 mg Gabapentin BID, missing afternoon dose. H/o seizures prior to aneurysm. Had brain surgery, aneurysm clip in 2013. No " "seizures since that time. Does not think seizures were due to alcohol withdrawal. Reports potential h/o DT's, he was in detox at the time, reports he had auditory hallucinations and shaking.     Currently reports feeling \"foggy\" and  thirsty. Mild tremor. No issues with balance or coordination. No current auditory or visual hallucination. Last drink 4/11/24 around midnight.     Declined detox when taken to ED on 3/25/24 at [place] and later declines detox admission in ED at [facility] . H/o seizures and possible DT's.     Substance Use History:   ALCOHOL - last use 04/11/2024 about a pint of Vodka.   CANNABIS - Pt was on Medical marijuana for about a year stopped about 2 months ago.   PRESCRIPTION STIMULANTS (includes Ritalin, Adderall, Vyvanse) - Pt has tried when he was younger, reports that he didn't abuse them or take daily   COCAINE/CRACK - Pt reports that he has tried it before about 25 years ago  METH/AMPHETAMINES (includes ecstasy, MDMA/argelia) - Pt reports trying it before in his late teens early twenties   OPIATES - Yes, pt was in pain management for a while about 5 years started around 2012  BENZODIAZEPINES (includes Ativan, Klonopin, Xanax) - Pt was prescribed when he was still a teenager doesn't remember why  KRATOM (mild opioid and stimulant effects) - Pt denies  KETAMINE - pt denies  HALLUCINOGENS (includes DXM) - Yes about 25 years ago  BEHAVIORAL (Gambling, Eating d/o, Compulsivity) - None  History of treatment - no  NICOTINE  Cigarettes: yes  Chew/snus: no  Vaping: yes  Past NRT/medication use: no      Previous withdrawal treatment episodes (e.g. detox): Yes about 2-3 times  Previous EDUAR treatment programs: Yes, about 2 programs 2019 after pt got out of inpatient at [facility], pt did an outpatient program called [facility].   Hospitalizations or overdose: Yes  Medical complications from substance use: No  IV Drug use?: Pt denies  Previous Medication for Addiction Tx: Yes- ativan and " Suboxone   Longest period of full abstinence: about 5 years ago   Activities that have previously supported abstinence: AA meetings  Current Recovery Activities: None      Infectious disease screening  Hep C:   Hepatitis C Antibody   Date Value Ref Range Status   09/07/2023 Nonreactive Nonreactive Final       HIV:   HIV Antigen Antibody Combo   Date Value Ref Range Status   07/02/2019 Nonreactive NR^Nonreactive     Final     Comment:     HIV-1 p24 Ag & HIV-1/HIV-2 Ab Not Detected       Psychiatric History (per patient report and problem list review)  Past diagnoses - None  Current or past psychiatrist: None  Current or past therapist:  none  Hospitalizations/TMS/ECT - No  Suicide Attempts - none  Medication trials - None      PHQ-9 scores:      1/16/2024     7:44 PM 3/17/2024     1:58 PM 4/11/2024     6:44 PM   PHQ   PHQ-9 Total Score 12 18 8   Q9: Thoughts of better off dead/self-harm past 2 weeks Not at all Not at all Not at all     MARY-7 scores:      4/3/2023    11:50 AM 6/5/2023     9:38 AM 9/13/2023     7:39 AM   MARY-7 SCORE   Total Score 21 (severe anxiety) 16 (severe anxiety) 21 (severe anxiety)   Total Score 21 16 21       SOCIAL HISTORY:  Housing status: with roommates   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 yes    3rd Party Involvement not today (please obtain VIDAL if pt would like to include)      Medical History:    Patient Active Problem List    Diagnosis Date Noted    Alcohol abuse 04/15/2024     Priority: Medium    Ankylosing spondylitis of sacral region (H) 11/16/2023     Priority: Medium    F11.2 - Continuous opioid dependence (H) 04/03/2023     Priority: Medium    Major depressive disorder, recurrent episode, moderate (H) 01/30/2023     Priority: Medium    Nonintractable headache, unspecified chronicity pattern, unspecified headache type 11/17/2022      Priority: Medium    History of cerebral aneurysm 11/17/2022     Priority: Medium    Brain aneurysm 11/17/2022     Priority: Medium    Prediabetes 07/27/2022     Priority: Medium    Essential hypertension 05/11/2022     Priority: Medium    Adjustment disorder with depressed mood 03/18/2022     Priority: Medium    Chronic obstructive pulmonary disease, unspecified COPD type (H) 02/23/2022     Priority: Medium    Tobacco dependence syndrome 11/10/2021     Priority: Medium    Severe opioid use disorder on maintenance therapy (H) 08/16/2021     Priority: Medium    HLA B27 (HLA B27 positive) 07/20/2020     Priority: Medium     Identified on lab testing for acute anterior uveitis left eye in 7/2020      Mixed dyslipidemia 07/10/2019     Priority: Medium    Mild persistent asthma, unspecified whether complicated 07/10/2019     Priority: Medium    Hx of avascular necrosis of capital femoral epiphysis 07/10/2019     Priority: Medium     AVN of his right hip possibly secondary to alcohol use for which he underwent a right JESSICA 7/2017  Hip pain left sided evaluated by Orthopedics 8/2019 with normal left hip MRI      Iron deficiency anemia secondary to inadequate dietary iron intake 07/10/2019     Priority: Medium    MARY (generalized anxiety disorder) 04/29/2019     Priority: Medium    Insomnia, unspecified type 04/29/2019     Priority: Medium    Alcohol dependence in remission (H) 04/29/2019     Priority: Medium    Substance abuse in remission (H) 04/29/2019     Priority: Medium    Anxiety state 03/28/2019     Priority: Medium    Degeneration of lumbar/lumbosacral disc without myelopathy 03/25/2019     Priority: Medium    Chronic right-sided low back pain with right-sided sciatica 03/25/2019     Priority: Medium       Past Medical History:   Diagnosis Date    Alcohol abuse     Asthma     Brain aneurysm     s/p clip in 2013    Brain aneurysm     Chronic back pain     HTN (hypertension)     Seizures (H)     Stroke (H)        Past  Surgical History:   Procedure Laterality Date    aneursym clipping      Brain aneursym in 2013    ARTHROPLASTY HIP Right     ARTHROPLASTY REVISION HIP Right     BRAIN SURGERY      KNEE SURGERY      right knee         Family History   Problem Relation Age of Onset    Glaucoma Mother     Uveitis Brother     Alcoholism No family hx of     Macular Degeneration No family hx of        No current outpatient medications on file.         Allergies   Allergen Reactions    Diphenhydramine Swelling     Per patient, tongue/lips swelling, itchy eyes with both generic diphenhydramine and brand Benadryl     Naproxen Swelling    Pistachio Nut Extract     Toradol [Ketorolac] Itching     OBJECTIVE                                                      EXAM    BP (!) 146/90 (BP Location: Right arm, Patient Position: Sitting, Cuff Size: Adult Regular)   Pulse 76     Physical Exam  Constitutional:       General: He is not in acute distress.     Appearance: Normal appearance. He is not ill-appearing or diaphoretic.   Eyes:      General: No scleral icterus.     Extraocular Movements: Extraocular movements intact.      Conjunctiva/sclera: Conjunctivae normal.      Pupils: Pupils are equal, round, and reactive to light.   Cardiovascular:      Rate and Rhythm: Normal rate.   Pulmonary:      Effort: Pulmonary effort is normal.   Skin:     Coloration: Skin is not jaundiced.   Neurological:      Mental Status: He is alert and oriented to person, place, and time.      Motor: Tremor present.      Gait: Gait normal.   Psychiatric:         Attention and Perception: Attention and perception normal.         Mood and Affect: Mood is anxious.         Speech: Speech normal.         Behavior: Behavior is cooperative.         Thought Content: Thought content normal. Thought content does not include suicidal ideation. Thought content does not include suicidal plan.      Comments: Mood and affect congruent with subject matter. Insight and judgment fair         LAB  Recent UDS Labs (may not contain today's lab data)    Lab Results   Component Value Date    BUP Screen Positive (A) 04/12/2024    BZO Negative 04/12/2024    BAR Negative 04/12/2024    DEEPAK Negative 04/12/2024    MAMP Negative 04/12/2024    AMP Negative 04/12/2024    MDMA Negative 04/12/2024    MTD Negative 04/12/2024    RJL999 Negative 04/12/2024    OXY Negative 04/12/2024    PCP Negative 04/12/2024    THC Negative 04/12/2024    TEMP 94 F 04/12/2024    SGPOCT 1.015 04/12/2024       Hepatic Function  AST   Date Value Ref Range Status   04/14/2024 21 0 - 45 U/L Final     Comment:     Reference intervals for this test were updated on 6/12/2023 to more accurately reflect our healthy population. There may be differences in the flagging of prior results with similar values performed with this method. Interpretation of those prior results can be made in the context of the updated reference intervals.   07/15/2020 19 0 - 45 U/L Final     ALT   Date Value Ref Range Status   04/14/2024 19 0 - 70 U/L Final     Comment:     Reference intervals for this test were updated on 6/12/2023 to more accurately reflect our healthy population. There may be differences in the flagging of prior results with similar values performed with this method. Interpretation of those prior results can be made in the context of the updated reference intervals.     07/15/2020 34 0 - 70 U/L Final     Bilirubin Total   Date Value Ref Range Status   04/14/2024 0.6 <=1.2 mg/dL Final   07/15/2020 0.3 0.2 - 1.3 mg/dL Final     Albumin   Date Value Ref Range Status   04/14/2024 4.1 3.5 - 5.2 g/dL Final   11/17/2022 3.5 3.4 - 5.0 g/dL Final   07/15/2020 3.8 3.4 - 5.0 g/dL Final     INR   Date Value Ref Range Status   03/28/2019 0.92 0.90 - 1.10 Final       CBC  WBC   Date Value Ref Range Status   07/02/2019 9.0 4.0 - 11.0 10e9/L Final     WBC Count   Date Value Ref Range Status   04/14/2024 10.0 4.0 - 11.0 10e3/uL Final     RBC Count   Date Value Ref  Range Status   04/14/2024 4.89 4.40 - 5.90 10e6/uL Final   07/02/2019 4.51 4.4 - 5.9 10e12/L Final     Hemoglobin   Date Value Ref Range Status   04/14/2024 16.0 13.3 - 17.7 g/dL Final   07/02/2019 13.2 (L) 13.3 - 17.7 g/dL Final     Hematocrit   Date Value Ref Range Status   04/14/2024 46.0 40.0 - 53.0 % Final   07/02/2019 40.6 40.0 - 53.0 % Final     MCV   Date Value Ref Range Status   04/14/2024 94 78 - 100 fL Final   07/02/2019 90 78 - 100 fl Final     MCH   Date Value Ref Range Status   04/14/2024 32.7 26.5 - 33.0 pg Final   07/02/2019 29.3 26.5 - 33.0 pg Final     MCHC   Date Value Ref Range Status   04/14/2024 34.8 31.5 - 36.5 g/dL Final   07/02/2019 32.5 31.5 - 36.5 g/dL Final     Platelet Count   Date Value Ref Range Status   04/14/2024 245 150 - 450 10e3/uL Final   07/02/2019 345 150 - 450 10e9/L Final     RDW   Date Value Ref Range Status   04/14/2024 14.0 10.0 - 15.0 % Final   07/02/2019 14.1 10.0 - 15.0 % Final       Today's lab data  Results for orders placed or performed in visit on 04/12/24   Drugs of Abuse Screen Urine (POC CUPS) POCT     Status: Abnormal   Result Value Ref Range    POCT Kit Lot Number [id number]     POCT Kit Expiration Date 2025-08-22     Temperature Urine POCT 94 F 90 F, 92 F, 94 F, 96 F, 98 F, 100 F    Specific [place] POCT 1.015 1.005, 1.015, 1.025    pH Qual Urine POCT 5 pH 4 pH, 5 pH, 7 pH, 9 pH    Creatinine Qual Urine POCT 50 mg/dL 20 mg/dL, 50 mg/dL, 100 mg/dL, 200 mg/dL    Internal QC Qual Urine POCT Valid Valid    Amphetamine Qual Urine POCT Negative Negative    Barbiturate Qual Urine POCT Negative Negative    Buprenorphine Qual Urine POCT Screen Positive (A) Negative    Benzodiazepine Qual Urine POCT Negative Negative    Cocaine Qual Urine POCT Negative Negative    Methamphetamine Qual Urine POCT Negative Negative    MDMA Qual Urine POCT Negative Negative    Methadone Qual Urine POCT Negative Negative    Opiate Qual Urine POCT Negative Negative    Oxycodone Qual Urine  POCT Negative Negative    Phencyclidine Qual Urine POCT Negative Negative    THC Qual Urine POCT Negative Negative           [place] Board  Pharmacy Data Base Reviewed; Consistent with patient reports and Epic records.  02/07/2024 01/17/2024 2 Buprenorphine-Nalox 8-2mg Film 30.00 30 Ev Pes              A/P                                                      ASSESSMENT/PLAN:  Carlos was seen today for intake.    Diagnoses and all orders for this visit:    Severe opioid use disorder on maintenance therapy (H)  -     Drugs of Abuse Screen Urine (POC CUPS) POCT; Standing  -     Drugs of Abuse Screen Urine (POC CUPS) POCT    Alcohol dependence in remission (H)  -     thiamine (B-1) 100 MG tablet; Take 1 tablet (100 mg) by mouth daily  -     folic acid (FOLVITE) 1 MG tablet; Take 1 tablet (1 mg) by mouth daily  -     multivitamin (ONE-DAILY) tablet; Take 1 tablet by mouth daily    Alcohol withdrawal syndrome without complication (H)      - pt presents for initial visits referred by PCP for alcohol use. Reports return to use in Feb of 2024 after period of sustained remission for 5 years. Drinking up to 750 mL daily. Has taper use to 0.5 -1 pint hard alcohol daily. Primary recommendation for medical detox. Pt declines citing financial and time concerns, he does not have time off work to facilitate this. Does endorse h/o seizures. Possible DT's.   - Unable to start Naltrexone to facilitate slow taper of current alcohol use outpatient, naltrexone is contraindicated due to maintenance Suboxone.   - H/o OUD severe in sustained remission currently taking 6 mg buprenorphine daily. Pt prescribed 8 mg daily dose, encouraged him to take this dose as it does help to address chronic pain as well as withdrawal symptoms he is citing. Long term pt is motivated to taper off buprenorphine. Briefly discussed transition to Sublocade to facilitate this.   - Consider Campral for AUD at follow up.   - Encouraged use of Gabapentin 600 mg  TID for alcohol withdrawal symptoms, as pt plans to continue taper of alcohol at home. No refill needed at this time.   - Reviewed risks of alcohol withdrawal, including risk for death if alcohol use is stopped abruptly, especially given pt's h/o seizures (unknown if due to alcohol withdrawal) and possible DT history.   - encouraged pt to tae thiamine/folic acid and multivitamin supplements.         Continued Complex Management  The longitudinal plan of care for Opioid Use Disorder (OUD) and Alcohol Use Disorder (AUD) was addressed during this visit. Due to the added complexity in care, I will continue to support Carlos in the subsequent management and with ongoing continuity of care.      Counseled the patient on the importance of having a recovery program in addition to medication to manage recovery.  Components include avoiding isolating, having willingness to change, avoiding triggers and managing cravings. Encouraged having some type of sober network and practicing honesty with trusted support person(s). Encouraged other services such as counseling, 12 step or other self-help organizations.      Opioid warning reviewed.  Risk of overdose following a period of abstinence due to decrease tolerance was discussed including risk of death.  Strongly recommended abstain from alcohol, benzodiazepines, THC, opioids and other drugs of abuse.  Increased risk of return to opioid use after use of these substances discussed.  Increased risk of overdose/death with use of other substances particularly benzodiazepines/alcohol reviewed.      RTC   2 weeks     DAVID Morales [facility]  [phone]    Answers submitted by the patient for this visit:  Patient Health Questionnaire (Submitted on 4/11/2024)  If you checked off any problems, how difficult have these problems made it for you to do your work, take care of things at home, or get along with other people?: Somewhat  difficult  PHQ9 TOTAL SCORE: 8

## 2024-04-14 ENCOUNTER — TELEPHONE (OUTPATIENT)
Dept: BEHAVIORAL HEALTH | Facility: CLINIC | Age: 44
End: 2024-04-14

## 2024-04-14 ENCOUNTER — HOSPITAL ENCOUNTER (INPATIENT)
Facility: CLINIC | Age: 44
LOS: 3 days | Discharge: HOME | DRG: 897 | End: 2024-04-18
Attending: EMERGENCY MEDICINE | Admitting: PSYCHIATRY & NEUROLOGY
Payer: COMMERCIAL

## 2024-04-14 ENCOUNTER — HOSPITAL ENCOUNTER (INPATIENT)
Facility: CLINIC | Age: 44
DRG: 897 | End: 2024-04-14
Attending: PSYCHIATRY & NEUROLOGY | Admitting: PSYCHIATRY & NEUROLOGY
Payer: COMMERCIAL

## 2024-04-14 ENCOUNTER — MYC MEDICAL ADVICE (OUTPATIENT)
Dept: FAMILY MEDICINE | Facility: CLINIC | Age: 44
End: 2024-04-14
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
## 2024-04-14 DIAGNOSIS — M54.42 CHRONIC BILATERAL LOW BACK PAIN WITH LEFT-SIDED SCIATICA: ICD-10-CM

## 2024-04-14 DIAGNOSIS — F10.239 ALCOHOL DEPENDENCE WITH WITHDRAWAL WITH COMPLICATION (H): ICD-10-CM

## 2024-04-14 DIAGNOSIS — F11.20 SEVERE OPIOID USE DISORDER ON MAINTENANCE THERAPY (H): ICD-10-CM

## 2024-04-14 DIAGNOSIS — F10.10 ALCOHOL ABUSE: ICD-10-CM

## 2024-04-14 DIAGNOSIS — F10.21 ALCOHOL DEPENDENCE IN REMISSION (H): ICD-10-CM

## 2024-04-14 DIAGNOSIS — I10 ESSENTIAL HYPERTENSION: ICD-10-CM

## 2024-04-14 DIAGNOSIS — G89.29 CHRONIC RIGHT-SIDED LOW BACK PAIN WITH RIGHT-SIDED SCIATICA: ICD-10-CM

## 2024-04-14 DIAGNOSIS — M54.41 CHRONIC RIGHT-SIDED LOW BACK PAIN WITH RIGHT-SIDED SCIATICA: ICD-10-CM

## 2024-04-14 DIAGNOSIS — R73.03 PRE-DIABETES: ICD-10-CM

## 2024-04-14 DIAGNOSIS — K59.00 CONSTIPATION, UNSPECIFIED CONSTIPATION TYPE: ICD-10-CM

## 2024-04-14 DIAGNOSIS — K21.9 GASTROESOPHAGEAL REFLUX DISEASE WITHOUT ESOPHAGITIS: ICD-10-CM

## 2024-04-14 DIAGNOSIS — G89.29 CHRONIC BILATERAL LOW BACK PAIN WITH LEFT-SIDED SCIATICA: ICD-10-CM

## 2024-04-14 LAB
ALBUMIN SERPL BCG-MCNC: 4.1 G/DL (ref 3.5–5.2)
ALCOHOL BREATH TEST: 0.18 (ref 0–0.01)
ALP SERPL-CCNC: 70 U/L (ref 40–150)
ALT SERPL W P-5'-P-CCNC: 19 U/L (ref 0–70)
ANION GAP SERPL CALCULATED.3IONS-SCNC: 12 MMOL/L (ref 7–15)
AST SERPL W P-5'-P-CCNC: 21 U/L (ref 0–45)
BASOPHILS # BLD AUTO: 0.1 10E3/UL (ref 0–0.2)
BASOPHILS NFR BLD AUTO: 1 %
BILIRUB SERPL-MCNC: 0.6 MG/DL
BUN SERPL-MCNC: 12.7 MG/DL (ref 6–20)
CALCIUM SERPL-MCNC: 8.7 MG/DL (ref 8.6–10)
CHLORIDE SERPL-SCNC: 101 MMOL/L (ref 98–107)
CREAT SERPL-MCNC: 0.67 MG/DL (ref 0.67–1.17)
DEPRECATED HCO3 PLAS-SCNC: 25 MMOL/L (ref 22–29)
EGFRCR SERPLBLD CKD-EPI 2021: >90 ML/MIN/1.73M2
EOSINOPHIL # BLD AUTO: 0 10E3/UL (ref 0–0.7)
EOSINOPHIL NFR BLD AUTO: 0 %
ERYTHROCYTE [DISTWIDTH] IN BLOOD BY AUTOMATED COUNT: 14 % (ref 10–15)
GLUCOSE SERPL-MCNC: 100 MG/DL (ref 70–99)
HCT VFR BLD AUTO: 46 % (ref 40–53)
HGB BLD-MCNC: 16 G/DL (ref 13.3–17.7)
IMM GRANULOCYTES # BLD: 0 10E3/UL
IMM GRANULOCYTES NFR BLD: 0 %
LYMPHOCYTES # BLD AUTO: 2.8 10E3/UL (ref 0.8–5.3)
LYMPHOCYTES NFR BLD AUTO: 28 %
MAGNESIUM SERPL-MCNC: 2 MG/DL (ref 1.7–2.3)
MCH RBC QN AUTO: 32.7 PG (ref 26.5–33)
MCHC RBC AUTO-ENTMCNC: 34.8 G/DL (ref 31.5–36.5)
MCV RBC AUTO: 94 FL (ref 78–100)
MONOCYTES # BLD AUTO: 0.5 10E3/UL (ref 0–1.3)
MONOCYTES NFR BLD AUTO: 5 %
NEUTROPHILS # BLD AUTO: 6.6 10E3/UL (ref 1.6–8.3)
NEUTROPHILS NFR BLD AUTO: 66 %
NRBC # BLD AUTO: 0 10E3/UL
NRBC BLD AUTO-RTO: 0 /100
PLATELET # BLD AUTO: 245 10E3/UL (ref 150–450)
POTASSIUM SERPL-SCNC: 3.8 MMOL/L (ref 3.4–5.3)
PROT SERPL-MCNC: 6.8 G/DL (ref 6.4–8.3)
RBC # BLD AUTO: 4.89 10E6/UL (ref 4.4–5.9)
SODIUM SERPL-SCNC: 138 MMOL/L (ref 135–145)
WBC # BLD AUTO: 10 10E3/UL (ref 4–11)

## 2024-04-14 PROCEDURE — 85025 COMPLETE CBC W/AUTO DIFF WBC: CPT | Performed by: EMERGENCY MEDICINE

## 2024-04-14 PROCEDURE — 99285 EMERGENCY DEPT VISIT HI MDM: CPT | Performed by: EMERGENCY MEDICINE

## 2024-04-14 PROCEDURE — 36415 COLL VENOUS BLD VENIPUNCTURE: CPT | Performed by: EMERGENCY MEDICINE

## 2024-04-14 PROCEDURE — 250N000013 HC RX MED GY IP 250 OP 250 PS 637: Performed by: EMERGENCY MEDICINE

## 2024-04-14 PROCEDURE — 80053 COMPREHEN METABOLIC PANEL: CPT | Performed by: EMERGENCY MEDICINE

## 2024-04-14 PROCEDURE — 82075 ASSAY OF BREATH ETHANOL: CPT | Performed by: EMERGENCY MEDICINE

## 2024-04-14 PROCEDURE — 83036 HEMOGLOBIN GLYCOSYLATED A1C: CPT | Performed by: PSYCHIATRY & NEUROLOGY

## 2024-04-14 PROCEDURE — 83735 ASSAY OF MAGNESIUM: CPT | Performed by: EMERGENCY MEDICINE

## 2024-04-14 PROCEDURE — HZ2ZZZZ DETOXIFICATION SERVICES FOR SUBSTANCE ABUSE TREATMENT: ICD-10-PCS | Performed by: PSYCHIATRY & NEUROLOGY

## 2024-04-14 PROCEDURE — 250N000011 HC RX IP 250 OP 636: Performed by: EMERGENCY MEDICINE

## 2024-04-14 PROCEDURE — 80307 DRUG TEST PRSMV CHEM ANLYZR: CPT | Performed by: EMERGENCY MEDICINE

## 2024-04-14 RX ORDER — ONDANSETRON 4 MG/1
4 TABLET, ORALLY DISINTEGRATING ORAL ONCE
Status: COMPLETED | OUTPATIENT
Start: 2024-04-14 | End: 2024-04-14

## 2024-04-14 RX ORDER — DIAZEPAM 5 MG
5-20 TABLET ORAL EVERY 30 MIN PRN
Status: DISCONTINUED | OUTPATIENT
Start: 2024-04-14 | End: 2024-04-15

## 2024-04-14 RX ORDER — FOLIC ACID 1 MG/1
1 TABLET ORAL ONCE
Status: COMPLETED | OUTPATIENT
Start: 2024-04-14 | End: 2024-04-14

## 2024-04-14 RX ORDER — MULTIPLE VITAMINS W/ MINERALS TAB 9MG-400MCG
1 TAB ORAL ONCE
Status: COMPLETED | OUTPATIENT
Start: 2024-04-14 | End: 2024-04-14

## 2024-04-14 RX ADMIN — THIAMINE HCL TAB 100 MG 100 MG: 100 TAB at 20:31

## 2024-04-14 RX ADMIN — Medication 1 TABLET: at 20:31

## 2024-04-14 RX ADMIN — NICOTINE POLACRILEX 4 MG: 4 GUM, CHEWING BUCCAL at 22:37

## 2024-04-14 RX ADMIN — DIAZEPAM 5 MG: 5 TABLET ORAL at 23:51

## 2024-04-14 RX ADMIN — FOLIC ACID 1 MG: 1 TABLET ORAL at 20:31

## 2024-04-14 RX ADMIN — DIAZEPAM 5 MG: 5 TABLET ORAL at 21:08

## 2024-04-14 RX ADMIN — ONDANSETRON 4 MG: 4 TABLET, ORALLY DISINTEGRATING ORAL at 19:24

## 2024-04-14 ASSESSMENT — ACTIVITIES OF DAILY LIVING (ADL)
ADLS_ACUITY_SCORE: 37
ADLS_ACUITY_SCORE: 35
ADLS_ACUITY_SCORE: 37

## 2024-04-14 ASSESSMENT — COLUMBIA-SUICIDE SEVERITY RATING SCALE - C-SSRS
6. HAVE YOU EVER DONE ANYTHING, STARTED TO DO ANYTHING, OR PREPARED TO DO ANYTHING TO END YOUR LIFE?: NO
1. IN THE PAST MONTH, HAVE YOU WISHED YOU WERE DEAD OR WISHED YOU COULD GO TO SLEEP AND NOT WAKE UP?: NO
2. HAVE YOU ACTUALLY HAD ANY THOUGHTS OF KILLING YOURSELF IN THE PAST MONTH?: NO

## 2024-04-14 NOTE — ED PROVIDER NOTES
[facility] EMERGENCY DEPARTMENT ([facility])    4/14/24      ED PROVIDER NOTE    History     Chief Complaint   Patient presents with    Alcohol Problem     Seeking detox from alcohol; last drink was this morning; drinks under 1/5th vodka daily; denies hx of withdrawal seizures or DT's       Alcohol Problem      Carlos Hamilton is a 43 year old male with PMH notable for alcohol use disorder, brain aneurysm s/p clip (2013) who presents to the ED seeking alcohol detox. He last drank this morning. He drinks around 1/5 of vodka per day since February. He was sober almost 5 years prior. No drug use. No SI/HI/mental health concerns or medical concerns. History of seizures, but unsure if related to alcohol. Not on a seizure medication.       Past Medical History  Past Medical History:   Diagnosis Date    Alcohol abuse     Asthma     Brain aneurysm     s/p clip in 2013    Brain aneurysm     Chronic back pain     HTN (hypertension)     Seizures (H)     Stroke (H)      Past Surgical History:   Procedure Laterality Date    aneursym clipping      Brain aneursym in 2013    ARTHROPLASTY HIP Right     ARTHROPLASTY REVISION HIP Right     BRAIN SURGERY      KNEE SURGERY      right knee     acetaminophen (TYLENOL) 325 MG tablet  adalimumab (HUMIRA *CF*) 40 MG/0.4ML pen kit  albuterol (VENTOLIN HFA) 108 (90 Base) MCG/ACT inhaler  buprenorphine HCl-naloxone HCl (SUBOXONE) 8-2 MG per film  folic acid (FOLVITE) 1 MG tablet  gabapentin (NEURONTIN) 300 MG capsule  gabapentin (NEURONTIN) 400 MG capsule  ibuprofen (ADVIL/MOTRIN) 200 MG tablet  lisinopril (ZESTRIL) 20 MG tablet  metFORMIN (GLUCOPHAGE XR) 500 MG 24 hr tablet  multivitamin (ONE-DAILY) tablet  multivitamin w/minerals (THERA-VIT-M) tablet  omeprazole (PRILOSEC) 40 MG DR capsule  order for DME  thiamine (B-1) 100 MG tablet  tiZANidine (ZANAFLEX) 4 MG tablet      Allergies   Allergen Reactions    Diphenhydramine Swelling     Per patient, tongue/lips swelling, itchy eyes  "with both generic diphenhydramine and brand Benadryl     Naproxen Swelling    Pistachio Nut Extract     Toradol [Ketorolac] Itching     Family History  Family History   Problem Relation Age of Onset    Glaucoma Mother     Uveitis Brother     Alcoholism No family hx of     Macular Degeneration No family hx of      Social History   Social History     Tobacco Use    Smoking status: Every Day     Current packs/day: 0.50     Average packs/day: 0.5 packs/day for 27.0 years (13.5 ttl pk-yrs)     Types: Cigarettes     Passive exposure: Current (outside only)    Smokeless tobacco: Former    Tobacco comments:     half a pack a day-1 pack. E-cig/vape not often, but occasionally.    Vaping Use    Vaping status: Some Days    Substances: Nicotine, Flavoring    Devices: Disposable   Substance Use Topics    Alcohol use: Yes     Comment: last use about a pint of Vodka 04/11/2024    Drug use: Yes     Types: Marijuana     Comment: using medical marijuana occasionally      Past medical history, past surgical history, medications, allergies, family history, and social history were reviewed with the patient. No additional pertinent items.      A medically appropriate review of systems was performed with pertinent positives and negatives noted in the HPI, and all other systems negative.    Physical Exam   BP: 114/77  Pulse: 85  Temp: 98.6  F (37  C)  Resp: 16  Height: 180.3 cm (5' 11\")  Weight: 86.2 kg (190 lb)  SpO2: 98 %  Physical Exam  Vitals and nursing note reviewed.   Constitutional:       General: He is not in acute distress.     Appearance: Normal appearance. He is well-developed. He is not ill-appearing, toxic-appearing or diaphoretic.   HENT:      Head: Normocephalic and atraumatic.      Nose: Nose normal.      Mouth/Throat:      Mouth: Mucous membranes are moist.   Eyes:      General: No scleral icterus.     Conjunctiva/sclera: Conjunctivae normal.   Cardiovascular:      Rate and Rhythm: Normal rate.      Heart sounds: Normal " heart sounds.   Pulmonary:      Effort: Pulmonary effort is normal. No respiratory distress.      Breath sounds: Normal breath sounds. No stridor.   Abdominal:      General: There is no distension.      Palpations: Abdomen is soft.      Tenderness: There is no abdominal tenderness.   Musculoskeletal:         General: No deformity or signs of injury. Normal range of motion.      Cervical back: Normal range of motion and neck supple.   Skin:     General: Skin is warm and dry.      Coloration: Skin is not jaundiced or pale.      Findings: No rash.   Neurological:      General: No focal deficit present.      Mental Status: He is alert and oriented to person, place, and time.      Gait: Gait normal.   Psychiatric:         Attention and Perception: Attention normal.         Mood and Affect: Mood normal.         Behavior: Behavior normal. Behavior is cooperative.         Thought Content: Thought content normal. Thought content does not include homicidal or suicidal ideation.           ED Course, Procedures, & Data      Procedures               Results for orders placed or performed during the hospital encounter of 04/14/24   Comprehensive metabolic panel     Status: Abnormal   Result Value Ref Range    Sodium 138 135 - 145 mmol/L    Potassium 3.8 3.4 - 5.3 mmol/L    Carbon Dioxide (CO2) 25 22 - 29 mmol/L    Anion Gap 12 7 - 15 mmol/L    Urea Nitrogen 12.7 6.0 - 20.0 mg/dL    Creatinine 0.67 0.67 - 1.17 mg/dL    GFR Estimate >90 >60 mL/min/1.73m2    Calcium 8.7 8.6 - 10.0 mg/dL    Chloride 101 98 - 107 mmol/L    Glucose 100 (H) 70 - 99 mg/dL    Alkaline Phosphatase 70 40 - 150 U/L    AST 21 0 - 45 U/L    ALT 19 0 - 70 U/L    Protein Total 6.8 6.4 - 8.3 g/dL    Albumin 4.1 3.5 - 5.2 g/dL    Bilirubin Total 0.6 <=1.2 mg/dL   Magnesium     Status: Normal   Result Value Ref Range    Magnesium 2.0 1.7 - 2.3 mg/dL   CBC with platelets and differential     Status: None   Result Value Ref Range    WBC Count 10.0 4.0 - 11.0 10e3/uL     RBC Count 4.89 4.40 - 5.90 10e6/uL    Hemoglobin 16.0 13.3 - 17.7 g/dL    Hematocrit 46.0 40.0 - 53.0 %    MCV 94 78 - 100 fL    MCH 32.7 26.5 - 33.0 pg    MCHC 34.8 31.5 - 36.5 g/dL    RDW 14.0 10.0 - 15.0 %    Platelet Count 245 150 - 450 10e3/uL    % Neutrophils 66 %    % Lymphocytes 28 %    % Monocytes 5 %    % Eosinophils 0 %    % Basophils 1 %    % Immature Granulocytes 0 %    NRBCs per 100 WBC 0 <1 /100    Absolute Neutrophils 6.6 1.6 - 8.3 10e3/uL    Absolute Lymphocytes 2.8 0.8 - 5.3 10e3/uL    Absolute Monocytes 0.5 0.0 - 1.3 10e3/uL    Absolute Eosinophils 0.0 0.0 - 0.7 10e3/uL    Absolute Basophils 0.1 0.0 - 0.2 10e3/uL    Absolute Immature Granulocytes 0.0 <=0.4 10e3/uL    Absolute NRBCs 0.0 10e3/uL   Alcohol breath test POCT     Status: Abnormal   Result Value Ref Range    Alcohol Breath Test 0.185 (A) 0.00 - 0.01   CBC with platelets differential     Status: None    Narrative    The following orders were created for panel order CBC with platelets differential.  Procedure                               Abnormality         Status                     ---------                               -----------         ------                     CBC with platelets and d...[[id number]]                      Final result                 Please view results for these tests on the individual orders.     Medications   diazepam (VALIUM) tablet 5-20 mg (5 mg Oral $Given 4/14/24 2108)   thiamine (B-1) tablet 100 mg (100 mg Oral $Given 4/14/24 2031)   folic acid (FOLVITE) tablet 1 mg (1 mg Oral $Given 4/14/24 2031)   multivitamin w/minerals (THERA-VIT-M) tablet 1 tablet (1 tablet Oral $Given 4/14/24 2031)   ondansetron (ZOFRAN ODT) ODT tab 4 mg (4 mg Oral $Given 4/14/24 1924)     Labs Ordered and Resulted from Time of ED Arrival to Time of ED Departure   COMPREHENSIVE METABOLIC PANEL - Abnormal       Result Value    Sodium 138      Potassium 3.8      Carbon Dioxide (CO2) 25      Anion Gap 12      Urea Nitrogen 12.7       Creatinine 0.67      GFR Estimate >90      Calcium 8.7      Chloride 101      Glucose 100 (*)     Alkaline Phosphatase 70      AST 21      ALT 19      Protein Total 6.8      Albumin 4.1      Bilirubin Total 0.6     ALCOHOL BREATH TEST POCT - Abnormal    Alcohol Breath Test 0.185 (*)    MAGNESIUM - Normal    Magnesium 2.0     CBC WITH PLATELETS AND DIFFERENTIAL    WBC Count 10.0      RBC Count 4.89      Hemoglobin 16.0      Hematocrit 46.0      MCV 94      MCH 32.7      MCHC 34.8      RDW 14.0      Platelet Count 245      % Neutrophils 66      % Lymphocytes 28      % Monocytes 5      % Eosinophils 0      % Basophils 1      % Immature Granulocytes 0      NRBCs per 100 WBC 0      Absolute Neutrophils 6.6      Absolute Lymphocytes 2.8      Absolute Monocytes 0.5      Absolute Eosinophils 0.0      Absolute Basophils 0.1      Absolute Immature Granulocytes 0.0      Absolute NRBCs 0.0       No orders to display          Critical care was not performed.     Medical Decision Making  The patient's presentation was of high complexity (an acute health issue posing potential threat to life or bodily function).    The patient's evaluation involved:  ordering and/or review of 3+ test(s) in this encounter (see separate area of note for details)  review of 3+ test result(s) ordered prior to this encounter (see separate area of note for details)  discussion of management or test interpretation with another health professional (MH intake)    The patient's management necessitated moderate risk (prescription drug management including medications given in the ED), moderate risk (limitations due to social determinants of health (alcohol abuse)), high risk (drug therapy requiring intensive monitoring (see separate area of note for details)), and high risk (a decision regarding hospitalization).      Assessment & Plan    Carlos Hamilton is a 43 year old male with PMH notable for alcohol use disorder, brain aneurysm s/p clip (2013) who  presents to the ED seeking alcohol detox.     Ddx: alcohol dependence/abuse, acute withdrawal, acute intoxication, polysubstance abuse, alcoholic hepatitis/gastritis    Labs wnl. Cleared for detox admission.        I have reviewed the nursing notes. I have reviewed the findings, diagnosis, plan and need for follow up with the patient.    New Prescriptions    No medications on file       Final diagnoses:   Alcohol abuse       Renetta BARBER [facility] EMERGENCY DEPARTMENT  4/14/2024     Renetta Santizo MD  04/14/24 [id number]

## 2024-04-14 NOTE — ED TRIAGE NOTES
Triage Assessment (Adult)       Row Name 04/14/24 1835          Triage Assessment    Airway WDL WDL        Respiratory WDL    Respiratory WDL WDL        Skin Circulation/Temperature WDL    Skin Circulation/Temperature WDL WDL        Cardiac WDL    Cardiac WDL WDL

## 2024-04-15 PROBLEM — F10.10 ALCOHOL ABUSE: Status: ACTIVE | Noted: 2024-04-15

## 2024-04-15 LAB
AMPHETAMINES UR QL SCN: NORMAL
BARBITURATES UR QL SCN: NORMAL
BENZODIAZ UR QL SCN: NORMAL
BZE UR QL SCN: NORMAL
CANNABINOIDS UR QL SCN: NORMAL
CHOLEST SERPL-MCNC: 168 MG/DL
FENTANYL UR QL: NORMAL
GGT SERPL-CCNC: 76 U/L (ref 8–61)
HBA1C MFR BLD: 5.2 %
HDLC SERPL-MCNC: 39 MG/DL
LDLC SERPL CALC-MCNC: ABNORMAL MG/DL
LDLC SERPL DIRECT ASSAY-MCNC: 34 MG/DL
NONHDLC SERPL-MCNC: 129 MG/DL
OPIATES UR QL SCN: NORMAL
PCP QUAL URINE (ROCHE): NORMAL
TRIGL SERPL-MCNC: 864 MG/DL
TSH SERPL DL<=0.005 MIU/L-ACNC: 2.34 UIU/ML (ref 0.3–4.2)

## 2024-04-15 PROCEDURE — 250N000013 HC RX MED GY IP 250 OP 250 PS 637: Performed by: PSYCHIATRY & NEUROLOGY

## 2024-04-15 PROCEDURE — 250N000013 HC RX MED GY IP 250 OP 250 PS 637: Performed by: STUDENT IN AN ORGANIZED HEALTH CARE EDUCATION/TRAINING PROGRAM

## 2024-04-15 PROCEDURE — 83721 ASSAY OF BLOOD LIPOPROTEIN: CPT | Performed by: PSYCHIATRY & NEUROLOGY

## 2024-04-15 PROCEDURE — 250N000011 HC RX IP 250 OP 636: Performed by: PSYCHIATRY & NEUROLOGY

## 2024-04-15 PROCEDURE — 84443 ASSAY THYROID STIM HORMONE: CPT | Performed by: PSYCHIATRY & NEUROLOGY

## 2024-04-15 PROCEDURE — 99223 1ST HOSP IP/OBS HIGH 75: CPT | Mod: AI | Performed by: PSYCHIATRY & NEUROLOGY

## 2024-04-15 PROCEDURE — 128N000004 HC R&B CD ADULT

## 2024-04-15 PROCEDURE — 250N000013 HC RX MED GY IP 250 OP 250 PS 637: Performed by: PHYSICIAN ASSISTANT

## 2024-04-15 PROCEDURE — 82977 ASSAY OF GGT: CPT | Performed by: PSYCHIATRY & NEUROLOGY

## 2024-04-15 PROCEDURE — 36415 COLL VENOUS BLD VENIPUNCTURE: CPT | Performed by: PSYCHIATRY & NEUROLOGY

## 2024-04-15 PROCEDURE — 82465 ASSAY BLD/SERUM CHOLESTEROL: CPT | Performed by: PSYCHIATRY & NEUROLOGY

## 2024-04-15 PROCEDURE — 99222 1ST HOSP IP/OBS MODERATE 55: CPT | Performed by: PHYSICIAN ASSISTANT

## 2024-04-15 RX ORDER — TRAZODONE HYDROCHLORIDE 50 MG/1
50 TABLET, FILM COATED ORAL
Status: DISCONTINUED | OUTPATIENT
Start: 2024-04-15 | End: 2024-04-18 | Stop reason: HOSPADM

## 2024-04-15 RX ORDER — PANTOPRAZOLE SODIUM 40 MG/1
40 TABLET, DELAYED RELEASE ORAL
Status: DISCONTINUED | OUTPATIENT
Start: 2024-04-15 | End: 2024-04-18 | Stop reason: HOSPADM

## 2024-04-15 RX ORDER — HYDROXYZINE HYDROCHLORIDE 25 MG/1
25 TABLET, FILM COATED ORAL EVERY 4 HOURS PRN
Status: DISCONTINUED | OUTPATIENT
Start: 2024-04-15 | End: 2024-04-18 | Stop reason: HOSPADM

## 2024-04-15 RX ORDER — CLONIDINE HYDROCHLORIDE 0.1 MG/1
0.1 TABLET ORAL 2 TIMES DAILY
Status: COMPLETED | OUTPATIENT
Start: 2024-04-15 | End: 2024-04-17

## 2024-04-15 RX ORDER — LISINOPRIL 20 MG/1
20 TABLET ORAL DAILY
Status: DISCONTINUED | OUTPATIENT
Start: 2024-04-15 | End: 2024-04-18 | Stop reason: HOSPADM

## 2024-04-15 RX ORDER — ATENOLOL 50 MG/1
50 TABLET ORAL DAILY PRN
Status: DISCONTINUED | OUTPATIENT
Start: 2024-04-15 | End: 2024-04-15

## 2024-04-15 RX ORDER — METFORMIN HCL 500 MG
500 TABLET, EXTENDED RELEASE 24 HR ORAL 2 TIMES DAILY WITH MEALS
Status: DISCONTINUED | OUTPATIENT
Start: 2024-04-15 | End: 2024-04-15

## 2024-04-15 RX ORDER — BUPRENORPHINE AND NALOXONE 8; 2 MG/1; MG/1
1 FILM, SOLUBLE BUCCAL; SUBLINGUAL DAILY
Status: DISCONTINUED | OUTPATIENT
Start: 2024-04-15 | End: 2024-04-15

## 2024-04-15 RX ORDER — ONDANSETRON 4 MG/1
4 TABLET, ORALLY DISINTEGRATING ORAL EVERY 6 HOURS PRN
Status: DISCONTINUED | OUTPATIENT
Start: 2024-04-15 | End: 2024-04-18 | Stop reason: HOSPADM

## 2024-04-15 RX ORDER — METFORMIN HCL 500 MG
500 TABLET, EXTENDED RELEASE 24 HR ORAL EVERY MORNING
Status: DISCONTINUED | OUTPATIENT
Start: 2024-04-16 | End: 2024-04-18 | Stop reason: HOSPADM

## 2024-04-15 RX ORDER — ACETAMINOPHEN 325 MG/1
650 TABLET ORAL EVERY 4 HOURS PRN
Status: DISCONTINUED | OUTPATIENT
Start: 2024-04-15 | End: 2024-04-18 | Stop reason: HOSPADM

## 2024-04-15 RX ORDER — DIAZEPAM 5 MG
5-20 TABLET ORAL EVERY 30 MIN PRN
Status: DISCONTINUED | OUTPATIENT
Start: 2024-04-15 | End: 2024-04-18

## 2024-04-15 RX ORDER — LOPERAMIDE HCL 2 MG
2 CAPSULE ORAL 4 TIMES DAILY PRN
Status: DISCONTINUED | OUTPATIENT
Start: 2024-04-15 | End: 2024-04-18 | Stop reason: HOSPADM

## 2024-04-15 RX ORDER — IBUPROFEN 600 MG/1
600 TABLET, FILM COATED ORAL EVERY 6 HOURS PRN
Status: DISCONTINUED | OUTPATIENT
Start: 2024-04-15 | End: 2024-04-18 | Stop reason: HOSPADM

## 2024-04-15 RX ORDER — BUPRENORPHINE AND NALOXONE 4; 1 MG/1; MG/1
1 FILM, SOLUBLE BUCCAL; SUBLINGUAL 2 TIMES DAILY
Status: DISCONTINUED | OUTPATIENT
Start: 2024-04-15 | End: 2024-04-18 | Stop reason: HOSPADM

## 2024-04-15 RX ORDER — GABAPENTIN 300 MG/1
600 CAPSULE ORAL 3 TIMES DAILY
Status: DISCONTINUED | OUTPATIENT
Start: 2024-04-15 | End: 2024-04-18 | Stop reason: HOSPADM

## 2024-04-15 RX ORDER — TIZANIDINE 2 MG/1
4 TABLET ORAL EVERY 4 HOURS PRN
Status: DISCONTINUED | OUTPATIENT
Start: 2024-04-15 | End: 2024-04-18 | Stop reason: HOSPADM

## 2024-04-15 RX ORDER — CLONIDINE HYDROCHLORIDE 0.1 MG/1
0.1 TABLET ORAL 3 TIMES DAILY PRN
Status: DISCONTINUED | OUTPATIENT
Start: 2024-04-15 | End: 2024-04-15

## 2024-04-15 RX ORDER — HYDRALAZINE HYDROCHLORIDE 25 MG/1
25 TABLET, FILM COATED ORAL EVERY 6 HOURS PRN
Status: DISCONTINUED | OUTPATIENT
Start: 2024-04-15 | End: 2024-04-18 | Stop reason: HOSPADM

## 2024-04-15 RX ORDER — LIDOCAINE 4 G/G
2 PATCH TOPICAL
Status: DISCONTINUED | OUTPATIENT
Start: 2024-04-15 | End: 2024-04-18 | Stop reason: HOSPADM

## 2024-04-15 RX ORDER — NICOTINE 21 MG/24HR
1 PATCH, TRANSDERMAL 24 HOURS TRANSDERMAL DAILY
Status: DISCONTINUED | OUTPATIENT
Start: 2024-04-15 | End: 2024-04-18 | Stop reason: HOSPADM

## 2024-04-15 RX ORDER — ALBUTEROL SULFATE 90 UG/1
2 AEROSOL, METERED RESPIRATORY (INHALATION) EVERY 6 HOURS PRN
Status: DISCONTINUED | OUTPATIENT
Start: 2024-04-15 | End: 2024-04-18 | Stop reason: HOSPADM

## 2024-04-15 RX ORDER — FOLIC ACID 1 MG/1
1 TABLET ORAL DAILY
Status: DISCONTINUED | OUTPATIENT
Start: 2024-04-15 | End: 2024-04-18 | Stop reason: HOSPADM

## 2024-04-15 RX ORDER — MULTIPLE VITAMINS W/ MINERALS TAB 9MG-400MCG
1 TAB ORAL DAILY
Status: DISCONTINUED | OUTPATIENT
Start: 2024-04-15 | End: 2024-04-18 | Stop reason: HOSPADM

## 2024-04-15 RX ORDER — AMOXICILLIN 250 MG
1 CAPSULE ORAL 2 TIMES DAILY PRN
Status: DISCONTINUED | OUTPATIENT
Start: 2024-04-15 | End: 2024-04-18 | Stop reason: HOSPADM

## 2024-04-15 RX ORDER — MAGNESIUM HYDROXIDE/ALUMINUM HYDROXICE/SIMETHICONE 120; 1200; 1200 MG/30ML; MG/30ML; MG/30ML
30 SUSPENSION ORAL EVERY 4 HOURS PRN
Status: DISCONTINUED | OUTPATIENT
Start: 2024-04-15 | End: 2024-04-18 | Stop reason: HOSPADM

## 2024-04-15 RX ADMIN — METFORMIN HYDROCHLORIDE 500 MG: 500 TABLET, EXTENDED RELEASE ORAL at 08:14

## 2024-04-15 RX ADMIN — THIAMINE HCL TAB 100 MG 100 MG: 100 TAB at 08:15

## 2024-04-15 RX ADMIN — NICOTINE POLACRILEX 4 MG: 4 GUM, CHEWING BUCCAL at 12:11

## 2024-04-15 RX ADMIN — NICOTINE POLACRILEX 4 MG: 4 GUM, CHEWING BUCCAL at 21:08

## 2024-04-15 RX ADMIN — DIAZEPAM 10 MG: 5 TABLET ORAL at 20:47

## 2024-04-15 RX ADMIN — TRAZODONE HYDROCHLORIDE 50 MG: 50 TABLET ORAL at 22:11

## 2024-04-15 RX ADMIN — TIZANIDINE 4 MG: 2 TABLET ORAL at 22:11

## 2024-04-15 RX ADMIN — GABAPENTIN 600 MG: 300 CAPSULE ORAL at 20:47

## 2024-04-15 RX ADMIN — NICOTINE POLACRILEX 4 MG: 4 GUM, CHEWING BUCCAL at 09:15

## 2024-04-15 RX ADMIN — PANTOPRAZOLE SODIUM 40 MG: 40 TABLET, DELAYED RELEASE ORAL at 08:15

## 2024-04-15 RX ADMIN — CLONIDINE HYDROCHLORIDE 0.1 MG: 0.1 TABLET ORAL at 20:47

## 2024-04-15 RX ADMIN — BUPRENORPHINE AND NALOXONE 1 FILM: 4; 1 FILM BUCCAL; SUBLINGUAL at 20:47

## 2024-04-15 RX ADMIN — GABAPENTIN 600 MG: 300 CAPSULE ORAL at 08:15

## 2024-04-15 RX ADMIN — ACETAMINOPHEN 650 MG: 325 TABLET, FILM COATED ORAL at 09:15

## 2024-04-15 RX ADMIN — TIZANIDINE 4 MG: 2 TABLET ORAL at 16:20

## 2024-04-15 RX ADMIN — FOLIC ACID 1 MG: 1 TABLET ORAL at 08:14

## 2024-04-15 RX ADMIN — DIAZEPAM 10 MG: 5 TABLET ORAL at 02:48

## 2024-04-15 RX ADMIN — NICOTINE 1 PATCH: 14 PATCH, EXTENDED RELEASE TRANSDERMAL at 07:23

## 2024-04-15 RX ADMIN — DIAZEPAM 10 MG: 5 TABLET ORAL at 14:52

## 2024-04-15 RX ADMIN — DIAZEPAM 10 MG: 5 TABLET ORAL at 09:24

## 2024-04-15 RX ADMIN — LISINOPRIL 20 MG: 20 TABLET ORAL at 08:15

## 2024-04-15 RX ADMIN — CLONIDINE HYDROCHLORIDE 0.1 MG: 0.1 TABLET ORAL at 12:12

## 2024-04-15 RX ADMIN — DIAZEPAM 10 MG: 5 TABLET ORAL at 16:56

## 2024-04-15 RX ADMIN — DIAZEPAM 10 MG: 5 TABLET ORAL at 12:11

## 2024-04-15 RX ADMIN — PANTOPRAZOLE SODIUM 40 MG: 40 TABLET, DELAYED RELEASE ORAL at 16:20

## 2024-04-15 RX ADMIN — GABAPENTIN 600 MG: 300 CAPSULE ORAL at 14:25

## 2024-04-15 RX ADMIN — BUPRENORPHINE AND NALOXONE 1 FILM: 8; 2 FILM BUCCAL; SUBLINGUAL at 08:14

## 2024-04-15 RX ADMIN — NICOTINE POLACRILEX 4 MG: 4 GUM, CHEWING BUCCAL at 07:26

## 2024-04-15 RX ADMIN — HYDROXYZINE HYDROCHLORIDE 25 MG: 25 TABLET ORAL at 16:23

## 2024-04-15 RX ADMIN — ONDANSETRON 4 MG: 4 TABLET, ORALLY DISINTEGRATING ORAL at 13:08

## 2024-04-15 RX ADMIN — LIDOCAINE 4% 2 PATCH: 40 PATCH TOPICAL at 20:47

## 2024-04-15 RX ADMIN — IBUPROFEN 600 MG: 600 TABLET, FILM COATED ORAL at 12:12

## 2024-04-15 RX ADMIN — Medication 1 TABLET: at 08:14

## 2024-04-15 RX ADMIN — DIAZEPAM 10 MG: 5 TABLET ORAL at 07:23

## 2024-04-15 ASSESSMENT — ACTIVITIES OF DAILY LIVING (ADL)
ADLS_ACUITY_SCORE: 37
ADLS_ACUITY_SCORE: 40
ADLS_ACUITY_SCORE: 40
ADLS_ACUITY_SCORE: 37
ADLS_ACUITY_SCORE: 57
LAUNDRY: WITH SUPERVISION
ADLS_ACUITY_SCORE: 40
ADLS_ACUITY_SCORE: 57
ORAL_HYGIENE: INDEPENDENT
ADLS_ACUITY_SCORE: 40
ADLS_ACUITY_SCORE: 40
DRESS: INDEPENDENT
ADLS_ACUITY_SCORE: 40
ADLS_ACUITY_SCORE: 40
DRESS: INDEPENDENT
ORAL_HYGIENE: INDEPENDENT
ADLS_ACUITY_SCORE: 40
ADLS_ACUITY_SCORE: 57
ADLS_ACUITY_SCORE: 40
HYGIENE/GROOMING: INDEPENDENT
ADLS_ACUITY_SCORE: 40
ADLS_ACUITY_SCORE: 40
HYGIENE/GROOMING: INDEPENDENT
ADLS_ACUITY_SCORE: 57
ADLS_ACUITY_SCORE: 57
ADLS_ACUITY_SCORE: 40

## 2024-04-15 ASSESSMENT — LIFESTYLE VARIABLES: SKIP TO QUESTIONS 9-10: 0

## 2024-04-15 NOTE — TELEPHONE ENCOUNTER
R:    9:43 PM Paged provider to review for [facility].    10:40 PM Called [facility] to present pt for PATRICIA/ Yefri.    11:01 PM Pt was accepted to [facility].    11:21 PM Report info exchanged and admit board updated.    11:28 PM Indicia completed.

## 2024-04-15 NOTE — H&P
"Psychiatry History and Physical    Carlos Hamilton MRN# [id number]   Age: 43 year old YOB: 1980     Date of Admission:  4/14/2024          Assessment:   This patient is a 43 year old male with history of alcohol use disorder, brain aneurysm s/p clips (2013), hypertension, seizures, who presented to ED seeking detox from alcohol. Medically cleared in ED, admitted to 3A as voluntary patient. Drinking about a pint daily, EtOH 0.185, UDS otherwise negative. MSSA with diazepam started for alcohol withdrawal. Withdrawal and seizure precautions in place. Admission labs ordered and reviewed those resulted. IM consult placed. Denies safety concerns including SI and HI. PTA medications continued where appropriate. Pt reports goals for hospitalization are to detox and return home.      Inpatient psychiatric hospitalization is warranted at this time for safety, stabilization, and possible adjustment in medications.         Diagnoses:   Alcohol use disorder, severe, dependence with withdrawal with complication   Chronic lower back pain, on buprenorphine   Brain aneurysm s/p clip 2013  Seizure   Hypertension  Nicotine use     Clinically Significant Risk Factors Present on Admission                  # Hypertension: Noted on problem list      # Overweight: Estimated body mass index is 26.5 kg/m  as calculated from the following:    Height as of this encounter: 1.803 m (5' 11\").    Weight as of this encounter: 86.2 kg (190 lb).       # Asthma: noted on problem list           Plan:     Alcohol use disorder, severe  Acute alcohol withdrawal with history of withdrawal seizures  Reports 1 pint to 1 fifth daily since February after 5 year long period of sobriety. Does have history of seizures, likely both withdrawal related and related to aneurysm which have now resolved. Plans to return home and continue with AA, does not wish to start inpatient or outpatient therapy.   -MSSA with diazepam, thiamine, folic acid, " "multivitamin and discontinued PRN atenolol due to asthma history   -Continue gabapentin 600mg TID, not a candidate for naltrexone, he is considering acamprosate and plans to follow up with DAVID Gonzalez after discharge   -PRN hydroxyzine 25mg every 4 hours for anxiety   -PRN trazodone 50mg at bedtime for sleep     Chronic lower back pain, on buprenorphine  Denies history of OUD, though this is documented in chart. Previously was on prescription full agonist opioids for LBP, later transitioned to bup up to doses of 24mg daily. Now tapering, hopes to receive Sublocade to facilitate this. Recently increased 6mg to 8mg daily on 4/12.   - Split buprenorphine dose to 4mg BID  - Appreciate IM assistance     Nicotine use - NRT      Laboratory/Imaging: CMP otherwise WNL; CBC otherwise WNL; TSH normal; HgbA1c normal; Lipid panel not obtained; GGT mildly elevated; UDS negative; EtOH 0.185    Patient will be treated in therapeutic milieu with appropriate individual and group therapies as described.    Medical treatment/interventions:  IM consult placed for management of other medical concerns, consult note pending.     Legal Status: Voluntary    Safety Assessment:   Behavioral Orders   Procedures    Code 1 - Restrict to Unit    Routine Programming     As clinically indicated    Seizure precautions    Status 15     Every 15 minutes.    Withdrawal precautions      Pt has not required locked seclusion or restraints in the past 24 hours to maintain safety, please refer to RN documentation for further details.    The risks, benefits, alternatives and side effects have been discussed and are understood by the patient.    Disposition: Pending clinical stabilization. Will likely discharge to home when stable.    Migue Huitron MD  Addiction Medicine Fellow  [facility]          Chief Complaint:     \"Detox\"         History of Present Illness:     43 year old male with past medical history as above who presented to the ED " yesterday for detox. Chart review completed including ED notes:    Per ED physician note: Carlos Hamilton is a 43 year old male with PMH notable for alcohol use disorder, brain aneurysm s/p clip () who presents to the ED seeking alcohol detox. He last drank this morning. He drinks around 1/5 of vodka per day since February. He was sober almost 5 years prior. No drug use. No SI/HI/mental health concerns or medical concerns. History of seizures, but unsure if related to alcohol. Not on a seizure medication.     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 This winter his father  and he had accumulating financial stress, he ended up returning to consistent alcohol use in February. Use is between a pint to a fifth daily. He does have a history of likely withdrawal related seizures as well as seizures related to his aneurysm. He started drinking at 9 and was drinking daily by 16. He has been in treatment about 5x in his life.     He also takes buprenorphine daily for chronic lower back pain. He was up to doses as high as 24 mg daily without relief, so he has been working on taper. Previous to this he was taking prescription full agonist opioids.     Patient has tolerance, withdrawal, progressive use, loss of control, spending more time and more amount than intended. Patient has made attempts to quit, is experiencing cravings, and reports negative consequences, including dysfunction at work and home, and adverse effects to health.             Psychiatric Review of Systems:   Depression:   Denies: depressed mood, suicidal ideation, decreased interest, changes in sleep, changes in appetite, guilt, hopelessness, helplessness, impaired concentration, decreased energy, irritability.  Jessika:   Denies: sleeplessness, impulsiveness, racing thoughts, increased goal-directed activities, pressured speech,  increase in energy  Psychosis:   Denies: visual hallucinations, auditory hallucinations, paranoia, grandiosity, ideas of reference, thought insertions, thought broadcasting.  Anxiety:   Denies: excessive worries that are difficult to control, panic attacks  PTSD:   Denies: re-experiencing past trauma, nightmares, increased arousal, avoidance of traumatic stimuli, impaired function.  OCD:   Denies: obsessions, checking, symmetry, cleaning, skin picking.         Medical Review of Systems:     10 point review of systems is otherwise negative unless noted above.            Psychiatric History:   Psychiatric Hospitalizations: Denies  History of Psychosis: Denies  Prior ECT: Denies  Court Commitment: Denies  Suicide Attempts: Denies  Self-injurious Behavior: Denies  Violence toward others: Denies  Use of Psychotropics: Denies          Substance Use History:   Alcohol: See HPI   Cannabis: none  Nicotine: Smokes 1/2 PPD   Cocaine: none  Methamphetamine: none  Opiates/Heroin: Denies history of abuse, was previously on chronic full agonist opioid therapy  Benzodiazepines: none  Hallucinogens: none  Inhalants: none    Prior Chemical Dependency treatment: Yes, about 5; first at 16 and last 5 years ago          Social History:     Educational History: High school  Relationships:Unmarried  Children: None  Current Living Situation:Independent  Occupational History: Manages a self-storage  Financial Support: Independent, has mounting financial problems         Family History:     H/o completed suicides in family: Denies  Mental Health- Brother with PTSD  CD- Denies  Family History   Problem Relation Age of Onset    Glaucoma Mother     Uveitis Brother     Alcoholism No family hx of     Macular Degeneration No family hx of            Past Medical History:     Past Medical History:   Diagnosis Date    Alcohol abuse     Asthma     Brain aneurysm     s/p clip in 2013    Brain aneurysm     Chronic back pain     HTN (hypertension)      Seizures (H)     Stroke (H)      History of seizures: Yes, 1-2x likely withdrawal related, otherwise multiple in the context of aneurysm now resolved          Past Surgical History:     Past Surgical History:   Procedure Laterality Date    aneursym clipping      Brain aneursym in 2013    ARTHROPLASTY HIP Right     ARTHROPLASTY REVISION HIP Right     BRAIN SURGERY      KNEE SURGERY      right knee          Allergies:      Allergies   Allergen Reactions    Diphenhydramine Swelling     Per patient, tongue/lips swelling, itchy eyes with both generic diphenhydramine and brand Benadryl     Naproxen Swelling    Pistachio Nut Extract     Toradol [Ketorolac] Itching          Medications:   I have reviewed this patient's current medications  Medications Prior to Admission   Medication Sig Dispense Refill Last Dose    adalimumab (HUMIRA *CF*) 40 MG/0.4ML pen kit Inject 0.4 mLs (40 mg) Subcutaneous every 14 days for 30 days Hold for signs of infection, then seek medical attention. 1 each 5 Past Month    albuterol (VENTOLIN HFA) 108 (90 Base) MCG/ACT inhaler Inhale 2 puffs into the lungs every 6 hours as needed for wheezing 18 g 3 4/13/2024    buprenorphine HCl-naloxone HCl (SUBOXONE) 8-2 MG per film Place 1 Film under the tongue daily 60 each 0 4/14/2024    folic acid (FOLVITE) 1 MG tablet Take 1 tablet (1 mg) by mouth daily 30 tablet 0 4/13/2024    gabapentin (NEURONTIN) 300 MG capsule TAKE TWO CAPSULES BY MOUTH THREE TIMES DAILY 540 capsule 0 4/14/2024 at am    lisinopril (ZESTRIL) 20 MG tablet Take 1 tablet (20 mg) by mouth daily 90 tablet 2 4/14/2024 at am    metFORMIN (GLUCOPHAGE XR) 500 MG 24 hr tablet TAKE 1 TABLET (500 MG) BY MOUTH 2 TIMES DAILY (WITH MEALS) 180 tablet 0 Past Month    multivitamin (ONE-DAILY) tablet Take 1 tablet by mouth daily 30 tablet 0 4/14/2024    multivitamin w/minerals (THERA-VIT-M) tablet Take 1 tablet by mouth daily   4/14/2024    omeprazole (PRILOSEC) 40 MG DR capsule Take 1 capsule (40 mg) by  mouth daily 90 capsule 3 4/14/2024 at pm    thiamine (B-1) 100 MG tablet Take 1 tablet (100 mg) by mouth daily 30 tablet 0 4/14/2024    tiZANidine (ZANAFLEX) 4 MG tablet TAKE ONE TABLET BY MOUTH EVERY FOUR HOURS (Patient taking differently: Take 4 mg by mouth every 4 hours as needed) 540 tablet 0 4/14/2024    acetaminophen (TYLENOL) 325 MG tablet Take 650 mg by mouth every 6 hours as needed for mild pain or headaches                Labs:     Recent Results (from the past 24 hour(s))   Alcohol breath test POCT    Collection Time: 04/14/24  6:41 PM   Result Value Ref Range    Alcohol Breath Test 0.185 (A) 0.00 - 0.01   Comprehensive metabolic panel    Collection Time: 04/14/24  7:49 PM   Result Value Ref Range    Sodium 138 135 - 145 mmol/L    Potassium 3.8 3.4 - 5.3 mmol/L    Carbon Dioxide (CO2) 25 22 - 29 mmol/L    Anion Gap 12 7 - 15 mmol/L    Urea Nitrogen 12.7 6.0 - 20.0 mg/dL    Creatinine 0.67 0.67 - 1.17 mg/dL    GFR Estimate >90 >60 mL/min/1.73m2    Calcium 8.7 8.6 - 10.0 mg/dL    Chloride 101 98 - 107 mmol/L    Glucose 100 (H) 70 - 99 mg/dL    Alkaline Phosphatase 70 40 - 150 U/L    AST 21 0 - 45 U/L    ALT 19 0 - 70 U/L    Protein Total 6.8 6.4 - 8.3 g/dL    Albumin 4.1 3.5 - 5.2 g/dL    Bilirubin Total 0.6 <=1.2 mg/dL   Magnesium    Collection Time: 04/14/24  7:49 PM   Result Value Ref Range    Magnesium 2.0 1.7 - 2.3 mg/dL   CBC with platelets and differential    Collection Time: 04/14/24  7:50 PM   Result Value Ref Range    WBC Count 10.0 4.0 - 11.0 10e3/uL    RBC Count 4.89 4.40 - 5.90 10e6/uL    Hemoglobin 16.0 13.3 - 17.7 g/dL    Hematocrit 46.0 40.0 - 53.0 %    MCV 94 78 - 100 fL    MCH 32.7 26.5 - 33.0 pg    MCHC 34.8 31.5 - 36.5 g/dL    RDW 14.0 10.0 - 15.0 %    Platelet Count 245 150 - 450 10e3/uL    % Neutrophils 66 %    % Lymphocytes 28 %    % Monocytes 5 %    % Eosinophils 0 %    % Basophils 1 %    % Immature Granulocytes 0 %    NRBCs per 100 WBC 0 <1 /100    Absolute Neutrophils 6.6 1.6 - 8.3  "10e3/uL    Absolute Lymphocytes 2.8 0.8 - 5.3 10e3/uL    Absolute Monocytes 0.5 0.0 - 1.3 10e3/uL    Absolute Eosinophils 0.0 0.0 - 0.7 10e3/uL    Absolute Basophils 0.1 0.0 - 0.2 10e3/uL    Absolute Immature Granulocytes 0.0 <=0.4 10e3/uL    Absolute NRBCs 0.0 10e3/uL   Urine Drug Screen Panel    Collection Time: 04/14/24 11:56 PM   Result Value Ref Range    Amphetamines Urine Screen Negative Screen Negative    Barbituates Urine Screen Negative Screen Negative    Benzodiazepine Urine Screen Negative Screen Negative    Cannabinoids Urine Screen Negative Screen Negative    Cocaine Urine Screen Negative Screen Negative    Fentanyl Qual Urine Screen Negative Screen Negative    Opiates Urine Screen Negative Screen Negative    PCP Urine Screen Negative Screen Negative       BP (!) 174/112 (BP Location: Left arm, Patient Position: Sitting, Cuff Size: Adult Regular)   Pulse 78   Temp 98.2  F (36.8  C) (Oral)   Resp 18   Ht 1.803 m (5' 11\")   Wt 86.2 kg (190 lb)   SpO2 98%   BMI 26.50 kg/m    Weight is 190 lbs 0 oz  Body mass index is 26.5 kg/m .         Psychiatric Mental Status Examination:   Appearance: awake, alert  Attitude: cooperative and pleasant  Eye Contact: good  Mood:  \"OK\"  Affect: mood congruent and full range  Speech:  clear, coherent and normal prosody  Language: fluent in English  Psychomotor Behavior:  no evidence of tardive dyskinesia, dystonia, or tics. Mild tremor   Gait/Station: normal  Thought Process:  linear, logical, goal oriented  Associations:  no loose associations  Thought Content:  Denying SI/HI/AVH; no evidence of psychotic thinking  Insight:  good  Judgement: intact  Oriented to:  time, person, and place  Attention Span and Concentration:  intact  Recent and Remote Memory:  intact  Fund of Knowledge: appropriate           Physical Exam:   Please refer to physical exam completed by ED provider, Renetta Santizo MD, on 4/14/24 as below. I agree with the findings and assessment and have no " additional findings to add at this time with exception that psychiatric findings now above in MSE.   Physical Exam  Vitals and nursing note reviewed.   Constitutional:       General: He is not in acute distress.     Appearance: Normal appearance. He is well-developed. He is not ill-appearing, toxic-appearing or diaphoretic.   HENT:      Head: Normocephalic and atraumatic.      Nose: Nose normal.      Mouth/Throat:      Mouth: Mucous membranes are moist.   Eyes:      General: No scleral icterus.     Conjunctiva/sclera: Conjunctivae normal.   Cardiovascular:      Rate and Rhythm: Normal rate.      Heart sounds: Normal heart sounds.   Pulmonary:      Effort: Pulmonary effort is normal. No respiratory distress.      Breath sounds: Normal breath sounds. No stridor.   Abdominal:      General: There is no distension.      Palpations: Abdomen is soft.      Tenderness: There is no abdominal tenderness.   Musculoskeletal:         General: No deformity or signs of injury. Normal range of motion.      Cervical back: Normal range of motion and neck supple.   Skin:     General: Skin is warm and dry.      Coloration: Skin is not jaundiced or pale.      Findings: No rash.   Neurological:      General: No focal deficit present.      Mental Status: He is alert and oriented to person, place, and time.      Gait: Gait normal.   Psychiatric:         Attention and Perception: Attention normal.         Mood and Affect: Mood normal.         Behavior: Behavior normal. Behavior is cooperative.         Thought Content: Thought content normal. Thought content does not include homicidal or suicidal ideation.

## 2024-04-15 NOTE — PLAN OF CARE
Goal Outcome Evaluation:  Patient continues in detox status on alcohol withdrawal protocols. MSSA scores 10 and 9. Medicated x2 with Valium 10 mg. B/P elevated 174/112, 169/112,190/104. Medical Notified. HR. 74-78. Appetite good. Fluids encouraged. Denies thoughts of self harm. Reports history of back pain.  Medicated with Tylenol and Ibuprofen with minimal relief. Spent most of the day resting in bed. Pleasant and cooperative. Will continue to monitor.

## 2024-04-15 NOTE — PLAN OF CARE
"  Problem: Substance Withdrawal  Goal: Substance Withdrawal  Description: Signs and symptoms of listed problems will be absent or manageable.  Outcome: Progressing   Goal Outcome Evaluation:         Pt.is a 43 year old male admitted from [facility] ED due to chronic alcohol abuse and intoxication. Pt.reported he was sober for 5 years. Relapsed in February 2024 due to \"financial\" hardship. He has been drinking about a liter of vodka daily. His drinking has been worsening and he wants to be sober again. He is voluntary. Seeking for detox.     Pt.arrived on the unit on a wheel chair. He was cooperative with admission search and interview. Endorsed history of seizure. Not sure if related to alcohol withdrawal. He is on seizure precaution. Seizure pads placed on both sides of his bed. Denied withdrawal DTs. Endorsed mental diagnosis of anxiety and depression.     Started on MSSA with valium protocol. Scored 9 & 8. Received 20mg valium in total. No medication adverse side effects observed or reported.     Staff started 15 minutes safety checks. Will continue to monitor.                "

## 2024-04-15 NOTE — MEDICATION SCRIBE - ADMISSION MEDICATION HISTORY
Medication Scribe Admission Medication History    Admission medication history is complete. The information provided in this note is only as accurate as the sources available at the time of the update.    Information Source(s): Patient and CareEverywhere/SureScripts via in-person    Pertinent Information: N/A    Changes made to PTA medication list:  Added: None  Deleted: gabapentin 400mg  Changed: None    Allergies reviewed with patient and updates made in EHR: yes    Medication History Completed By: Dottie Lynn 4/14/2024 10:57 PM    PTA Med List   Medication Sig Last Dose    adalimumab (HUMIRA *CF*) 40 MG/0.4ML pen kit Inject 0.4 mLs (40 mg) Subcutaneous every 14 days for 30 days Hold for signs of infection, then seek medical attention. Past Month    albuterol (VENTOLIN HFA) 108 (90 Base) MCG/ACT inhaler Inhale 2 puffs into the lungs every 6 hours as needed for wheezing 4/13/2024    buprenorphine HCl-naloxone HCl (SUBOXONE) 8-2 MG per film Place 1 Film under the tongue daily 4/14/2024    folic acid (FOLVITE) 1 MG tablet Take 1 tablet (1 mg) by mouth daily 4/13/2024    gabapentin (NEURONTIN) 300 MG capsule TAKE TWO CAPSULES BY MOUTH THREE TIMES DAILY 4/14/2024 at am    lisinopril (ZESTRIL) 20 MG tablet Take 1 tablet (20 mg) by mouth daily 4/14/2024 at am    metFORMIN (GLUCOPHAGE XR) 500 MG 24 hr tablet TAKE 1 TABLET (500 MG) BY MOUTH 2 TIMES DAILY (WITH MEALS) Past Month    multivitamin (ONE-DAILY) tablet Take 1 tablet by mouth daily 4/14/2024    multivitamin w/minerals (THERA-VIT-M) tablet Take 1 tablet by mouth daily 4/14/2024    omeprazole (PRILOSEC) 40 MG DR capsule Take 1 capsule (40 mg) by mouth daily 4/14/2024 at pm    thiamine (B-1) 100 MG tablet Take 1 tablet (100 mg) by mouth daily 4/14/2024    tiZANidine (ZANAFLEX) 4 MG tablet TAKE ONE TABLET BY MOUTH EVERY FOUR HOURS (Patient taking differently: Take 4 mg by mouth every 4 hours as needed) 4/14/2024

## 2024-04-15 NOTE — COMMUNITY RESOURCES LIST (ENGLISH)
April 15, 2024           YOUR PERSONALIZED LIST OF SERVICES & PROGRAMS           USE    Use Treatment      JOY Mosley Licensed Psychologist - Outpatient substance use treatment  [address] [place] [address] (Distance: 0.7 miles)  Phone: [phone]  Website: [email or web address]  Language: English  Fee: Sliding scale, Self pay, Insurance      [facility] ([facility])  Phone: [phone]  Website: https://1stdibs/programs-services/addiction-treatment/  Language: English  Hours: Mon 8:00 AM - 5:00 PM Tue 8:00 AM - 5:00 PM Wed 8:00 AM - 5:00 PM Thu 8:00 AM - 5:00 PM Fri 8:00 AM - 5:00 PM  Fee: Insurance, Self pay  Accessibility: Ada accessible      [facility] [place]  Phone: [phone]  Website: https://www.Market Track/  Language: English  Hours: Mon 7:00 AM - 7:00 PM Tue 7:00 AM - 7:00 PM Wed 7:00 AM - 7:00 PM Thu 7:00 AM - 7:00 PM Fri 7:00 AM - 7:00 PM  Fee: Free    Use Recovery Support      [facility]  [address] [place] [address] (Distance: 1.1 miles)  Phone: [phone]  Website: http://www.[employer] [place]/  Language: English  Fee: Free      [facility]  Phone: [phone]  Website: https://www.Market Track/  Language: English  Hours: Mon 7:00 AM - 7:00 PM Tue 7:00 AM - 7:00 PM Wed 7:00 AM - 7:00 PM Thu 7:00 AM - 7:00 PM Fri 7:00 AM - 7:00 PM  Fee: Free      in the [facility]  Phone: [phone]) [phone]  Website: https://AOTMP.org/vpsnhm  Language: English  Hours: Mon 9:00 AM - 5:00 PM Tue 9:00 AM - 5:00 PM Wed 9:00 AM - 5:00 PM Thu 9:00 AM - 5:00 PM Fri 9:00 AM - 5:00 PM  Fee: Free  Accessibility: Ada accessible, Translation services               IMPORTANT NUMBERS & WEBSITES        Emergency Services  911  .   [facility]  211 http://211unitedway.org  .   Poison Control  (461) 644-7900 http://mnpoison.org http://wisconsinpoison.org  .      Suicide and Crisis Lifeline  988 http://988lifeline.org  .   Childhelp [place] Child Abuse Hotline  480.557.1349 http://Childhelphotline.org   .   National Sexual Assault Hotline  (686) 520-5209 (HOPE) http://Rainn.org   .     National Runaway Safeline  (898) 398-1415 (RUNAWAY) http://[facility].Appforma  .   Pregnancy & Postpartum Support  Call/text 095-640-1036  [place]: http://ppsupportmn.org  [place]: http://psichapters.com/wi  .   Substance Abuse National Helpline ([facility])  245-590-HELP (8806) http://Findtreatment.gov   .                DISCLAIMER: These resources have been generated via the [employer] Platform. [employer] does not endorse any service providers mentioned in this resource list. [employer] does not guarantee that the services mentioned in this resource list will be available to you or will improve your health or wellness.    [facility]

## 2024-04-15 NOTE — CONSULTS
Internal Medicine Initial Visit      Carlos Hamilton MRN# [id number]   YOB: 1980 Age: 43 year old   Date of Admission: 4/14/2024  PCP: Lucero Cardenas    Referring Provider: Behavioral Health - Mariam Mancini MD  Reason for Visit: General Medical Evaluation     Assessment and Recommendations:   Carlos Hamilton is a 43 year old year old male with a history of HTN, prediabetes, psoriatic arthritis, ankylosing spondylitis, GERD, brain aneurysm s/p clip (2013), seizures, and alcohol use disorder, who was admitted to station 3A seeking detoxification from alcohol.    Alcohol withdrawal   Alcohol use disorder  Drinking about 1/5 of vodka per day. Last drink yesterday morning. He does have a prior history of seizures (possibly withdrawal related?) that resolved after his cerebral aneurysm was clipped in 2013. MSSA 10 this shift. Mag, LFTs wnl.    - Continue on MSSA protocol with Valium as indicated   - Seizure precautions   - Management per Psychiatry   - Agree with MVI, folic acid, and thiamine supplements    Hypertension: Maintained on lisinopril. BP acutely elevated in the setting of withdrawal.    - Continue PTA lisinopril 20 mg once daily   - Clonidine 0.1 mg TID PRN for SBP >170   - Anticipate his BP will improve as his withdrawal syndrome resolves. Please contact Medicine if SBP persistently >150 once he has completed withdrawal.     Addendum: Notified by RN that BP is persistently elevated despite AM lisinopril. His back pain is likely contributing to high BP as well. Will schedule clonidine 0.1 mg BID, add PRN hydralazine. Added lidocaine patches and Voltaren gel for pain relief. Recommend consideration of increased Suboxone dose.     Psoriatic arthritis  Ankylosing spondylitis  Chronic back pain  Follows with Rheumatology and Addiction Medicine. On Suboxone for pain. Maintained on Humira every 2 weeks, is a couple days behind on his injection.   - Suboxone mgmt per Psychiatry   - Tylenol, ibuprofen  PRN for pain   - Resume Humira on discharge. Confirmed with PharmD that this is restricted to outpatient use so unable to order while he is here.     Prediabetes: Continue PTA metformin 500 mg once daily.     GERD: Continue PPI.       Currently the patient is medically stable and Medicine will sign off. Please do not hesitate to contact if new questions or concerns arise.       Isa Brody PA-C  [facility]         Chief Complaint:   Alcohol withdrawal      History of Present Illness:     History is obtained from the patient and medical record.     Carlos Hamilton is a 43 year old year old male with a history of HTN, prediabetes, psoriatic arthritis, ankylosing spondylitis, GERD, brain aneurysm s/p clip (2013), seizures, and alcohol use disorder, who was admitted to station 3A seeking detoxification from alcohol. They have been drinking about 1/5 of vodka per day. Last drink was yesterday morning. No other substance use. He is maintained on suboxone for chronic back pain and has been working with addiction medicine to adjust the dose. He does have a prior history of seizures that are unclear if they were related to alcohol withdrawal or not. The seizures resolved after he had a cerebral aneurysm clipped in 2013.     Currently he is feeling tremulous, nauseated, tired. Has a headache and decreased appetite. Able to tolerate bites of food, liquids, and medications without vomiting.     He has been off his metformin for the past month due to heavy alcohol use, was previously taking this once daily in the morning at the direction of his PCP. He would like to resume his metformin. He is a couple days late on his Humira injection and is starting to feel his back pain getting worse.     He denies any chest pain, dyspnea, leg swelling, abdominal pain. He has no acute medical concerns today.            Review of Systems:   The 10 point Review of Systems is  negative other than noted in the HPI or here.           Past Medical History:   Reviewed and updated in Epic.  Past Medical History:   Diagnosis Date    Alcohol abuse     Asthma     Brain aneurysm     s/p clip in 2013    Brain aneurysm     Chronic back pain     HTN (hypertension)     Seizures (H)     Stroke (H)              Past Surgical History:   Reviewed and updated in Epic.  Past Surgical History:   Procedure Laterality Date    aneursym clipping      Brain aneursym in 2013    ARTHROPLASTY HIP Right     ARTHROPLASTY REVISION HIP Right     BRAIN SURGERY      KNEE SURGERY      right knee             Social History:     Social History     Tobacco Use    Smoking status: Every Day     Current packs/day: 0.50     Average packs/day: 0.5 packs/day for 27.0 years (13.5 ttl pk-yrs)     Types: Cigarettes     Passive exposure: Current (outside only)    Smokeless tobacco: Former    Tobacco comments:     half a pack a day-1 pack. E-cig/vape not often, but occasionally.    Vaping Use    Vaping status: Some Days    Substances: Nicotine, Flavoring    Devices: Disposable   Substance Use Topics    Alcohol use: Yes     Comment: last use about a pint of Vodka 04/11/2024    Drug use: Yes     Types: Marijuana     Comment: using medical marijuana occasionally             Family History:   Reviewed and updated in Epic.  Family History   Problem Relation Age of Onset    Glaucoma Mother     Uveitis Brother     Alcoholism No family hx of     Macular Degeneration No family hx of              Allergies:     Allergies   Allergen Reactions    Diphenhydramine Swelling     Per patient, tongue/lips swelling, itchy eyes with both generic diphenhydramine and brand Benadryl     Naproxen Swelling    Pistachio Nut Extract     Toradol [Ketorolac] Itching             Medications:     Medications Prior to Admission   Medication Sig Dispense Refill Last Dose    adalimumab (HUMIRA *CF*) 40 MG/0.4ML pen kit Inject 0.4 mLs (40 mg) Subcutaneous every 14 days  for 30 days Hold for signs of infection, then seek medical attention. 1 each 5 Past Month    albuterol (VENTOLIN HFA) 108 (90 Base) MCG/ACT inhaler Inhale 2 puffs into the lungs every 6 hours as needed for wheezing 18 g 3 4/13/2024    buprenorphine HCl-naloxone HCl (SUBOXONE) 8-2 MG per film Place 1 Film under the tongue daily 60 each 0 4/14/2024    folic acid (FOLVITE) 1 MG tablet Take 1 tablet (1 mg) by mouth daily 30 tablet 0 4/13/2024    gabapentin (NEURONTIN) 300 MG capsule TAKE TWO CAPSULES BY MOUTH THREE TIMES DAILY 540 capsule 0 4/14/2024 at am    lisinopril (ZESTRIL) 20 MG tablet Take 1 tablet (20 mg) by mouth daily 90 tablet 2 4/14/2024 at am    metFORMIN (GLUCOPHAGE XR) 500 MG 24 hr tablet TAKE 1 TABLET (500 MG) BY MOUTH 2 TIMES DAILY (WITH MEALS) 180 tablet 0 Past Month    multivitamin (ONE-DAILY) tablet Take 1 tablet by mouth daily 30 tablet 0 4/14/2024    multivitamin w/minerals (THERA-VIT-M) tablet Take 1 tablet by mouth daily   4/14/2024    omeprazole (PRILOSEC) 40 MG DR capsule Take 1 capsule (40 mg) by mouth daily 90 capsule 3 4/14/2024 at pm    thiamine (B-1) 100 MG tablet Take 1 tablet (100 mg) by mouth daily 30 tablet 0 4/14/2024    tiZANidine (ZANAFLEX) 4 MG tablet TAKE ONE TABLET BY MOUTH EVERY FOUR HOURS (Patient taking differently: Take 4 mg by mouth every 4 hours as needed) 540 tablet 0 4/14/2024    acetaminophen (TYLENOL) 325 MG tablet Take 650 mg by mouth every 6 hours as needed for mild pain or headaches           Current Facility-Administered Medications   Medication Dose Route Frequency Provider Last Rate Last Admin    acetaminophen (TYLENOL) tablet 650 mg  650 mg Oral Q4H PRN Mariam Mancini MD   650 mg at 04/15/24 0915    albuterol (PROVENTIL HFA/VENTOLIN HFA) inhaler  2 puff Inhalation Q6H PRN Cheri Asif MD        alum & mag hydroxide-simethicone (MAALOX) suspension 30 mL  30 mL Oral Q4H PRN Mariam Mancini MD        buprenorphine HCl-naloxone HCl (SUBOXONE) 8-2 MG per film 1  Film  1 Film Sublingual Daily Cheri Asif MD   1 Film at 04/15/24 0814    diazepam (VALIUM) tablet 5-20 mg  5-20 mg Oral Q30 Min PRN Mariam Mancini MD   10 mg at 04/15/24 0723    folic acid (FOLVITE) tablet 1 mg  1 mg Oral Daily Mariam Mancini MD   1 mg at 04/15/24 0814    gabapentin (NEURONTIN) capsule 600 mg  600 mg Oral TID Cheri Asif MD   600 mg at 04/15/24 0815    hydrOXYzine HCl (ATARAX) tablet 25 mg  25 mg Oral Q4H PRN Mariam Mancini MD        ibuprofen (ADVIL/MOTRIN) tablet 600 mg  600 mg Oral Q6H PRN Isa Brody PA-C        lisinopril (ZESTRIL) tablet 20 mg  20 mg Oral Daily Isa Brody PA-C   20 mg at 04/15/24 0815    loperamide (IMODIUM) capsule 2 mg  2 mg Oral 4x Daily PRN Mariam Mancini MD        [START ON 4/16/2024] metFORMIN (GLUCOPHAGE XR) 24 hr tablet 500 mg  500 mg Oral QAM Isa Brody PA-C        multivitamin w/minerals (THERA-VIT-M) tablet 1 tablet  1 tablet Oral Daily Mariam Mancini MD   1 tablet at 04/15/24 0814    nicotine (NICODERM CQ) 14 MG/24HR 24 hr patch 1 patch  1 patch Transdermal Daily Mariam Mancini MD   1 patch at 04/15/24 0723    nicotine polacrilex (NICORETTE) gum 4 mg  4 mg Buccal Q1H PRN Mariam Mancini MD   4 mg at 04/15/24 0915    ondansetron (ZOFRAN ODT) ODT tab 4 mg  4 mg Oral Q6H PRN Mariam Mancini MD        pantoprazole (PROTONIX) EC tablet 40 mg  40 mg Oral BID AC Cheri Asif MD   40 mg at 04/15/24 0815    senna-docusate (SENOKOT-S/PERICOLACE) 8.6-50 MG per tablet 1 tablet  1 tablet Oral BID PRN Mariam Mancini MD        thiamine (B-1) tablet 100 mg  100 mg Oral Daily Mariam Mancini MD   100 mg at 04/15/24 0815    tiZANidine (ZANAFLEX) tablet 4 mg  4 mg Oral Q4H PRN Cheri Asif MD        traZODone (DESYREL) tablet 50 mg  50 mg Oral At Bedtime PRN Mariam Mancini MD                Physical Exam:   Blood pressure (!) 169/112, pulse 75, temperature 98  F (36.7  C), temperature source Oral, resp. rate 16, height 1.803 m (5'  "11\"), weight 86.2 kg (190 lb), SpO2 100%.  Body mass index is 26.5 kg/m .  Constitutional: Awake and alert, in no apparent distress.   Eyes: Sclera clear, anicteric   Respiratory: Breathing non-labored. CTAB  Cardiovascular:  RRR, normal S1/S2. No rubs or murmurs. No peripheral edema.   GI: Non-distended. Non-tender. Normoactive bowel sounds.   Skin:  Good color. No jaundice. No visible rashes, lesions, or bruising of concern.   Neurologic: Alert and oriented. No focal deficits. Slightly tremulous in upper extremities.     Medical Decision Making       60 MINUTES SPENT BY ME on the date of service doing chart review, history, exam, documentation & further activities per the note.            Data:       Data   ------------------------- PAST 24 HR DATA REVIEWED -----------------------------------------------    I have personally reviewed the following data over the past 24 hrs:    10.0  \   16.0   / 245     138 101 12.7 /  100 (H)   3.8 25 0.67 \     ALT: 19 AST: 21 AP: 70 TBILI: 0.6   ALB: 4.1 TOT PROTEIN: 6.8 LIPASE: N/A     TSH: 2.34 T4: N/A A1C: 5.2       Imaging results reviewed over the past 24 hrs:   No results found for this or any previous visit (from the past 24 hour(s)).               "

## 2024-04-15 NOTE — PLAN OF CARE
"Goal Outcome Evaluation:    Problem: Substance Withdrawal  Goal: Substance Withdrawal  Description: Signs and symptoms of listed problems will be absent or manageable.  Outcome: Progressing     Patient was withdrawn and isolative. He continues to be monitored for alcohol withdrawal. He endorsed anxiety 8/10 and depression 2/10; vistaril 25mg was administered for anxiety. Patient denied SI/SIB/Hallucinations.     MSSA at 4pm was 9; 10mg of valium was administered.  BP (!) 152/106 (BP Location: Left arm)   Pulse 70   Temp 97.2  F (36.2  C) (Temporal)   Resp 16   Ht 1.803 m (5' 11\")   Wt 86.2 kg (190 lb)   SpO2 98%   BMI 26.50 kg/m      Patient endorsed back pain 5/10; zanaflex 4mg was administered x2. Scheduled lidocaine patch was placed on patient's lower back.    Patient was upset because his snack order was inaccurate. Staff was able to find him the snack that he requested.    MSSA at 8pm was 8; 10mg of valium was administered.  BP (!) 145/89 (BP Location: Right arm)   Pulse 71   Temp 97.6  F (36.4  C) (Oral)   Resp 16   Ht 1.803 m (5' 11\")   Wt 86.2 kg (190 lb)   SpO2 98%   BMI 26.50 kg/m      Discharge plans: detox only.    We'll continue to monitor.    "

## 2024-04-15 NOTE — DISCHARGE INSTRUCTIONS
Behavioral Discharge Planning and Instructions  [facility]  [phone]    Summary: You were admitted to Station 3A on 04/15/2024 for detoxification from alcohol.  A medical exam was performed that included lab work. You have met with a  and opted to decline all case management services and resources at this time.  Please take care and make your recovery a daily priority, Carlos!  It was a pleasure working with you and the entire treatment team here wishes you the very best in your recovery!     Recommendation:  Please seek CD treatment and or assessment as needed and requested. Please follow any and all recommendations as needed and requested from your assessment or the treatment program you choose.     To schedule an assessment:  Call the [place] assessment center at [phone] and ask to schedule a Chemical Health Assessment.    You can also call your [facility] Chemical Dependency unit (these are usually included under adult mental health services). Most [facility] have a number for you to call to schedule an assessment.  If you have private insurance or a PMAP you can call the customer service number on your insurance  card and they can give you a list of places to call for an assessment that are in network with your  insurance.      To find a treatment program:     SAMHSA.gov   Click on find treatment   Enter your zip code and select your city/state.  The Substance Abuse and Mental Health Services Administration (SAMHSA) is the agency within  the U.S. Department of Health and Human Services that leads public health efforts to advance the  behavioral health of the nation. SAMHSA's mission is to reduce the impact of substance abuse and  mental illness on [place] communities.    Inkiven.org select substance use disorder programs then select find services.  Fast Tracker is a virtual community and health care connection resource. We connect  individuals,  families, mental health and substance use disorder providers, physicians, care coordinators,  and others with a real-time, searchable directory of mental health and substance use disorder resources  and their availability within [place].    Main Diagnoses:  Per Dr. Migue Huitron MD:  303.90 (F10.20) Alcohol Use Disorder Severe  Alcohol use disorder, severe, dependence with withdrawal with complication   Chronic lower back pain, on buprenorphine   Brain aneurysm s/p clip 2013  Seizure   Hypertension  Nicotine use    Major Treatments, Procedures and Findings:  You were treated for alcohol detoxification using valium administered based on the [facility] protocol. As an outpatient you will resume your suboxone. Please take this as prescribed and keep it stored in a safe place. You did not complete a chemical dependency assessment. You had labs drawn and those results were reviewed with you. Please take a copy of your lab work with you to your next primary care provider appointment.    Symptoms to Report:  If you experience more anxiety, confusion, sleeplessness, deep sadness or thoughts of suicide, notify your treatment team or notify your primary care provider. IF ANY OF THE SYMPTOMS YOU ARE EXPERIENCING ARE A MEDICAL EMERGENCY CALL 911 IMMEDIATELY.     Lifestyle Adjustment: Adjust your lifestyle to get enough sleep, relaxation, exercise and good nutrition. Continue to develop healthy coping skills to decrease stress and promote a sober living environment. Do not use mood altering substances including alcohol, illegal drugs or addictive medications other than what is currently prescribed.     Disposition: Return home    Facts about COVID19 at www.cdc.gov/COVID19 and www.[place].gov/covid19    Keeping hands clean is one of the most important steps we can take to avoid getting sick and spreading germs to others.  Please wash your hands frequently and lather with soap for at least 20 seconds!    Follow-up Appointment:   You  declined to set up a follow up appointment stating you will contact her or walk-in to the clinic.  Follow up with Tiffanie Gonzalez  [facility]  [address] [place] [address], [address], [place] [address]  Get Directions  Phone: [phone]  Fax: [phone]    Recovery apps for your phone for educational purposes and to locate in person and zoom recovery meetings  Everything AA - educational purpose and river is a great resource  12 Step Toolkit - educational purpose learning about the 12 steps to recovery  [place] Cloud - meeting river  AA  - meeting river  Meeting guide - meeting river  Quick NA meeting - meeting river  Tyshawn- has various apps    Resources:  Some AA/NA meetings are being held online however most have returned to in-person or a hybrid combination please check online to verify*  Need Support Now? If you or someone you know is struggling or in crisis, help is available. Call or text 408 or chat Voice2Insight.org  AA meetings search for them at: https://aa-intergroup.org (worldwide meeting listings)  AA meetings for [place] area can be found online at: https://a[place].org (click local online meetings listings)  NA meetings for [place] area can be found online at: https://www.nam[place].org  (click find a meeting)  AA and NA Sponsors are excellent resources for support and you can find one at any support group meeting.   Alcoholics Anonymous (https://aa.org/): for information 24 hours/day  AA Intergroup service office in [place] (http://www.aast[place].org/) 694.331.9229  AA Intergroup service office in [facility]: 337.333.8222. (http://www.aamin[place].org/)  Narcotics Anonymous (www.nam[place].org) (459) 748-7200  https://aafairviewriverside.org/meetings  SMART Recovery - self management for addiction recovery:  www.smartrecovery.org  Pathways ~ A Health Crisis Resource & Support Center:   "578.851.2894.  https://prescribetoprevent.org/patient-education/videos/  http://www.harmreduction.org  Crisis Text Line  Text 100513  You will be connected with a trained live crisis counselor to provide support. Por oliveranol, texto  KATHY a 398789 o texto a 442-AYUDAME en WhatsApp  [place] Hope Line  1.800.SUICIDE [7095346]  [facility] 466-102-4571  Support Group:  AA/NA and Sponsor/support.  Fast Tracker  Linking people to mental health and substance use disorder resources  Talking DatackTripologyn.MentorDOTMe   [place] Mental Health Warm Line  Peer to peer support  Monday thru Saturday, 12 pm to 10 pm  942.779.5586 or 8.484.305.2872  Text \"Support\" to 76395  National [place] on Mental Illness (GEOVANNI)  978.999.4984 or 1.888.GEOVANNI.HELPS  Alcoholics Anonymous (www.alcoholics-anonymous.org): Check your phone book for your local chapter.  Suicide Awareness Voices of Education (SAVE) (www.save.org): 994-328-VZAW (7283)  National Suicide Prevention Line (www.mentalhealthmn.org): 147-803-NYZQ (0083)  Mental Health Consumer/Survivor Network of [place] (www.mhcsn.net): 299.429.3816 or 486-071-2300  Mental Health Association of [place] (www.mentalhealth.org): 875.642.8175 or 499-016-2820   Substance Abuse and Mental Health Services (www.samhsa.gov)  [place] Opioid Prevention Coalition: www.opioidcoalition.org    [place] Recovery Connection (MRC)  [facility] connects people seeking recovery to resources that help foster and sustain long-term recovery.  Whether you are seeking resources for treatment, transportation, housing, job training, education, health care or other pathways to recovery, [facility] is a great place to start.  214.344.9753.  www.[place]arecovery.org    Great Pod casts for nutrition and wellness  Listen on Apple Podcasts  Dishing Up Nutrition   Nutritional Weight & Wellness[employer]   Nutrition       Understand the connection between what you eat and how you feel. Hosted by licensed nutritionists and dietitians from " Nutritional Weight & Wellness we share practical, real-life solutions for healthier living through nutrition.     General Medication Instructions:   See your medication sheet(s) for instructions.   Take all medications as prescribed.  Make no changes unless your primary care provider suggests them.   Go to all your primary care provider visits.  Be sure to have all your required lab tests. This way, your medicines can be refilled on time.  Do not use any forms of alcohol.    Please Note:  If you have any questions at anytime after you are discharged please call [facility] detox unit 3AW at [phone].  [facility], Behavioral Intake [phone]  Medical Records call [phone]  Outpatient Behavioral Intake call [phone]  LP+ Wait List/Bed Availability call [phone]    Please remember to take all of your behavioral discharge planning and lab paperwork to any follow up appointments, it contains your lab results, diagnosis, medication list and discharge recommendations.      THANK YOU FOR CHOOSING [facility]

## 2024-04-15 NOTE — TELEPHONE ENCOUNTER
S: [facility] , Provider Renetta calling at 9:25 PM with clinical on a 43 year old/Male presenting for alcohol detox.     B: Pt presents for ETOH detox.   Currently reports drinking less than a fifth daily for since Feb 2024.    Patient reports last use was today.  Pt JAMES: .185  Pt  denies hx of DT  Pt  denies hx of seizures. Last seizure: N/A  Pt endorsing the following symptoms of withdrawal:  not reported  MSSA Score: 8 was given 5mg valium    Pt denies acute mental health or medical concerns.   Pt denies other drug use: None Amount/frequency: N/A    Does Pt have a detox care plan in [facility]? No  Does pt present with specific needs, assistive devices, or exclusionary criteria? None  Is the patient ambulating, eating and drinking in the ED? Yes    A: Pt meets criteria to be presented for IP detox admission. Patient is voluntary    COVID Symptoms: No  If yes, COVID test required   Utox: Ordered, not yet collected  Magnesium: WNL  CMP: Abnormalities: Glucose 100  CBC: WNL  HCG: N/A     R: Patient cleared and ready for behavioral bed placement: Yes    Pt is meeting criteria for presentation to 3A/CD    Does Patient need a Transfer Center request created? No, Pt is located within [facility] ED, [facility] ED, or [facility]

## 2024-04-15 NOTE — PLAN OF CARE
Behavioral Team Discussion: (4/15/2024)    Continued Stay Criteria/Rationale: Patient admitted for  Alcohol Use Disorder.  Plan: The following services will be provided to the patient; psychiatric assessment, medication management, therapeutic milieu, individual and group support, and skills groups.   Participants: 3A Provider: Dr. Cheri Asif MD; 3A RN: Cheri Cardenas, RN; 3A CM's: Yunier Loyd.  Summary/Recommendation: Providers will assess today for treatment recommendations, discharge planning, and aftercare plans. CM will meet with pt for discharge planning.   Medical/Physical: Patient reports a history of seizures and a brain aneurysm, no other medical or physical concerns at this time.   Precautions:   Behavioral Orders   Procedures    Code 1 - Restrict to Unit    Routine Programming     As clinically indicated    Seizure precautions    Status 15     Every 15 minutes.    Withdrawal precautions     Rationale for change in precautions or plan: N/A  Progress: Initial.    ASAM Dimension Scale Ratings:  Dimension 1: 3 Client tolerates and fredi with withdrawal discomfort poorly. Client has severe intoxication, such that the client endangers self or others, or intoxication has not abated with less intensive levels of services. Client displays severe signs and symptoms; or risk of severe, but manageable withdrawal; or withdrawal worsening despite detox at less intensive level.  Dimension 2: 1 Client tolerates and fredi with physical discomfort and is able to get the services that the client needs.  Dimension 3: 2 Client has difficulty with impulse control and lacks coping skills. Client has thoughts of suicide or harm to others without means; however, the thoughts may interfere with participation in some treatment activities. Client has difficulty functioning in significant life areas. Client has moderate symptoms of emotional, behavioral, or cognitive problems. Client is able to participate in most  treatment activities.  Dimension 4: 3 Client displays inconsistent compliance, minimal awareness of either the client's addiction or mental disorder, and is minimally cooperative.  Dimension 5: 3 Client has poor recognition and understanding of relapse and recidivism issues and displays moderately high vulnerability for further substance use or mental health problems. Client has few coping skills and rarely applies coping skills.  Dimension 6: 3 Client is not engaged in structured, meaningful activity and the client's peers, family, significant other, and living environment are unsupportive, or there is significant criminal justice system involvement.

## 2024-04-15 NOTE — PROGRESS NOTES
[facility]    Medicine Progress Note - Hospitalist Service    Date of Admission:  4/14/2024    Assessment & Plan   Carlos is a 43 y.o. male admitted on 4/14/2024. He has a PMHx of alcohol use disorder, seizures, hypertension,  opioid use disorder, MARY, MDD, asthma, chronic back pain, brain aneurysm s/p clip (2013) and stroke who was admitted to station 3A for alcohol detox.    Alcohol use disorder  Alcohol Withdrawal  Seizures  Patient reports being sober for 5 years prior. Now reports drinking around 1/5 of vodka per day since February. Reports potential history of DT's with auditory hallucinations and shaking per chart review. Patient reports history of seizures prior to aneurysm repair in 2013, no seizures since that time.  - continue folic acid, thiamine, and multivitamin  - continue seizure precautions  - continue MSSA     Hypertension  Elevated blood pressure due to alcohol withdrawal  - continue home lisinopril 20 mg daily    Opioid use disorder  MARY  MDD  - psychiatry primary    Asthma  - continue home albuterol 2 puff Q6H PRN    Chronic back pain  - continue home suboxone 1 film daily    Brain aneurysm s/p clip (2013)  Per chart review patient had brain aneurysm repaired in 2013 after which he had a stroke.        Diet: Regular Diet Adult    DVT Prophylaxis: Low Risk/Ambulatory with no VTE prophylaxis indicated  Beach Catheter: Not present  Lines: None     Cardiac Monitoring: None  Code Status: Full Code      Clinically Significant Risk Factors Present on Admission   { TIP  This section helps capture the illness of the patient on admission.     - Review diagnoses highlighted in blue; right click, edit & delete if not appropriate   - If blank, no additional diagnoses identified   :[id number]}               # Hypertension: Noted on problem list      # Overweight: Estimated body mass index is 26.5 kg/m  as calculated from the following:    Height as of this encounter:  "1.803 m (5' 11\").    Weight as of this encounter: 86.2 kg (190 lb).       # Asthma: noted on problem list        Disposition Plan   {TIP  It is advised the update the Medical Readiness for Discharge [MRD] daily, until the patient is 'Ready Now.' Last Documentation- Anticipated in 2-4 Days  . Use the SmartList below to update for today:[id number]}  Medically Ready for Discharge: Anticipated in 2-4 Days           The patient's care was discussed with the { :867469}.    PEARL Falcon Student  Hospitalist Service  [facility]  Securely message with Dat[employer] (more info)  Text page via [facility] Paging/Directory   ______________________________________________________________________    Interval History   {Pertinent overnight events  ;***}    Physical Exam   Vital Signs: Temp: 98  F (36.7  C) Temp src: Oral BP: (!) 169/112 Pulse: 75   Resp: 16 SpO2: 100 % O2 Device: None (Room air)    Weight: 190 lbs 0 oz    Constitutional: awake, alert, cooperative, no apparent distress, and appears stated age  HEENT: anicteric sclera, mucous membranes moist  Respiratory: No increased work of breathing, clear to auscultation bilaterally, no crackles or wheezing  Cardiovascular: regular rate and rhythm, no murmur noted  GI: Bowel sounds present, soft, non-distended, non-tender  Musculoskeletal: moves all extremities  Neurological: no focal deficits, moving all extremities symmetrically  Skin: intact. Warm and dry. No jaundice      Medical Decision Making   { TIP   MDM Calculator    MDM grid (w/ times)    Coding Support Chat  Billing is now based on time OR medical decision making complexity. Medical decision making included in your A&P does NOT need to be re-documented here.    :25867}    *** MINUTES SPENT BY ME on the date of service doing chart review, history, exam, documentation & further activities per the note.      Data     I have personally reviewed the following data over the past 24 " hrs:    10.0  \   16.0   / 245     138 101 12.7 /  100 (H)   3.8 25 0.67 \     ALT: 19 AST: 21 AP: 70 TBILI: 0.6   ALB: 4.1 TOT PROTEIN: 6.8 LIPASE: N/A     TSH: 2.34 T4: N/A A1C: 5.2

## 2024-04-15 NOTE — PROGRESS NOTES
[facility]     Case Management Encounter: Writer spoke with patient to discuss discharge planning and aftercare plans, patient stated he would like to detox only and return home. At the is time patient is declining all case management resources and services.     Insurance: [employer]      Legal Status: Voluntary      SUDs Assessment Status: None is needed at this time.     ROIs on file: None     Living Situation: Patient reports he lives in a home and reports the home is stable.      Current Providers, Supports & Collateral:  None    Current Plan/Referral Status: Detox Only Return Home     RN updated.    AMANDA GILL [facility]  [facility] - Adult Inpatient Addiction Psychiatry Unit

## 2024-04-15 NOTE — PROGRESS NOTES
04/15/24 0201   Patient Belongings   Did you bring any home meds/supplements to the hospital?  Yes   Disposition of meds  Other (see comment)   Patient Belongings other (see comments)   Belongings Search Yes   Clothing Search Yes   Second Staff Jones     Storage Bin: Shoes, backpack belt, six adapters with USB cables, 3 headphones, 2 case of cigarettes, 5 lighters, gloves, 3 battery, portable , 2 vapes,10 hygiene items, 3 socks,shirt, contact solution, contact case, remote, glasses in case, flashlight, 2 fidgets, 2 lip balm, plastic bracelet, book, single-use contacts.   Med-Room Bin: 3 sets of keys, phone, wallet.   Security Envelope: 2  license, 4 visa cards, 2 master cards, earring, 1.07 in coins, 92 dollars in cash.  ADMISSION:    I am responsible for any personal items that are not sent to the safe or pharmacy. [facility] is not responsible for loss, theft or damage of any property in my possession.    Patient Signature _________________________________________ Date/Time _____________________    Staff Signature ___________________________________________ Date/Time _____________________    2nd Staff person, if patient is unable/unwilling to sign    ________________________________________________________ Date/Time _____________________    DISCHARGE:    All personal items have been returned to me.    Patient Signature _________________________________________ Date/Time _____________________    Staff Signature ___________________________________________ Date/Time _____________________

## 2024-04-16 PROCEDURE — 250N000013 HC RX MED GY IP 250 OP 250 PS 637: Performed by: PSYCHIATRY & NEUROLOGY

## 2024-04-16 PROCEDURE — 128N000004 HC R&B CD ADULT

## 2024-04-16 PROCEDURE — 250N000013 HC RX MED GY IP 250 OP 250 PS 637: Performed by: STUDENT IN AN ORGANIZED HEALTH CARE EDUCATION/TRAINING PROGRAM

## 2024-04-16 PROCEDURE — H2032 ACTIVITY THERAPY, PER 15 MIN: HCPCS

## 2024-04-16 PROCEDURE — 250N000013 HC RX MED GY IP 250 OP 250 PS 637: Performed by: PHYSICIAN ASSISTANT

## 2024-04-16 PROCEDURE — 250N000011 HC RX IP 250 OP 636: Performed by: PSYCHIATRY & NEUROLOGY

## 2024-04-16 PROCEDURE — 99232 SBSQ HOSP IP/OBS MODERATE 35: CPT | Mod: GC | Performed by: PSYCHIATRY & NEUROLOGY

## 2024-04-16 RX ORDER — MULTIPLE VITAMINS W/ MINERALS TAB 9MG-400MCG
1 TAB ORAL DAILY
Qty: 30 TABLET | Refills: 0 | Status: SHIPPED | OUTPATIENT
Start: 2024-04-16 | End: 2024-05-04

## 2024-04-16 RX ORDER — LANOLIN ALCOHOL/MO/W.PET/CERES
100 CREAM (GRAM) TOPICAL DAILY
Qty: 30 TABLET | Refills: 0 | Status: SHIPPED | OUTPATIENT
Start: 2024-04-16 | End: 2024-05-04

## 2024-04-16 RX ORDER — METFORMIN HCL 500 MG
500 TABLET, EXTENDED RELEASE 24 HR ORAL EVERY MORNING
Qty: 30 TABLET | Refills: 0 | Status: SHIPPED | OUTPATIENT
Start: 2024-04-17

## 2024-04-16 RX ORDER — BUPRENORPHINE AND NALOXONE 4; 1 MG/1; MG/1
1 FILM, SOLUBLE BUCCAL; SUBLINGUAL 2 TIMES DAILY
Qty: 14 FILM | Refills: 0 | Status: SHIPPED | OUTPATIENT
Start: 2024-04-16 | End: 2024-04-18

## 2024-04-16 RX ORDER — FOLIC ACID 1 MG/1
1 TABLET ORAL DAILY
Qty: 30 TABLET | Refills: 0 | Status: SHIPPED | OUTPATIENT
Start: 2024-04-16 | End: 2024-05-04

## 2024-04-16 RX ORDER — LISINOPRIL 20 MG/1
20 TABLET ORAL DAILY
Qty: 30 TABLET | Refills: 0 | Status: SHIPPED | OUTPATIENT
Start: 2024-04-16 | End: 2024-07-17

## 2024-04-16 RX ORDER — OMEPRAZOLE 40 MG/1
40 CAPSULE, DELAYED RELEASE ORAL DAILY
Qty: 30 CAPSULE | Refills: 0 | Status: SHIPPED | OUTPATIENT
Start: 2024-04-16 | End: 2024-05-04

## 2024-04-16 RX ORDER — GABAPENTIN 300 MG/1
600 CAPSULE ORAL 3 TIMES DAILY
Qty: 180 CAPSULE | Refills: 0 | Status: SHIPPED | OUTPATIENT
Start: 2024-04-16 | End: 2024-05-03

## 2024-04-16 RX ORDER — LIDOCAINE 4 G/G
2 PATCH TOPICAL EVERY 24 HOURS
Qty: 60 PATCH | Refills: 0 | Status: SHIPPED | OUTPATIENT
Start: 2024-04-16

## 2024-04-16 RX ADMIN — FOLIC ACID 1 MG: 1 TABLET ORAL at 08:33

## 2024-04-16 RX ADMIN — DIAZEPAM 10 MG: 5 TABLET ORAL at 20:11

## 2024-04-16 RX ADMIN — LISINOPRIL 20 MG: 20 TABLET ORAL at 08:34

## 2024-04-16 RX ADMIN — ACETAMINOPHEN 650 MG: 325 TABLET, FILM COATED ORAL at 04:49

## 2024-04-16 RX ADMIN — DIAZEPAM 10 MG: 5 TABLET ORAL at 11:22

## 2024-04-16 RX ADMIN — METFORMIN HYDROCHLORIDE 500 MG: 500 TABLET, EXTENDED RELEASE ORAL at 08:34

## 2024-04-16 RX ADMIN — CLONIDINE HYDROCHLORIDE 0.1 MG: 0.1 TABLET ORAL at 20:12

## 2024-04-16 RX ADMIN — TIZANIDINE 4 MG: 2 TABLET ORAL at 11:22

## 2024-04-16 RX ADMIN — CLONIDINE HYDROCHLORIDE 0.1 MG: 0.1 TABLET ORAL at 08:34

## 2024-04-16 RX ADMIN — THIAMINE HCL TAB 100 MG 100 MG: 100 TAB at 08:34

## 2024-04-16 RX ADMIN — GABAPENTIN 600 MG: 300 CAPSULE ORAL at 20:11

## 2024-04-16 RX ADMIN — NICOTINE 1 PATCH: 14 PATCH, EXTENDED RELEASE TRANSDERMAL at 08:32

## 2024-04-16 RX ADMIN — LIDOCAINE 4% 2 PATCH: 40 PATCH TOPICAL at 20:12

## 2024-04-16 RX ADMIN — NICOTINE POLACRILEX 4 MG: 4 GUM, CHEWING BUCCAL at 11:23

## 2024-04-16 RX ADMIN — TRAZODONE HYDROCHLORIDE 50 MG: 50 TABLET ORAL at 00:55

## 2024-04-16 RX ADMIN — BUPRENORPHINE AND NALOXONE 1 FILM: 4; 1 FILM BUCCAL; SUBLINGUAL at 08:32

## 2024-04-16 RX ADMIN — IBUPROFEN 600 MG: 600 TABLET, FILM COATED ORAL at 16:22

## 2024-04-16 RX ADMIN — Medication 1 TABLET: at 08:34

## 2024-04-16 RX ADMIN — NICOTINE POLACRILEX 4 MG: 4 GUM, CHEWING BUCCAL at 20:15

## 2024-04-16 RX ADMIN — DIAZEPAM 10 MG: 5 TABLET ORAL at 16:22

## 2024-04-16 RX ADMIN — GABAPENTIN 600 MG: 300 CAPSULE ORAL at 13:39

## 2024-04-16 RX ADMIN — PANTOPRAZOLE SODIUM 40 MG: 40 TABLET, DELAYED RELEASE ORAL at 16:22

## 2024-04-16 RX ADMIN — BUPRENORPHINE AND NALOXONE 1 FILM: 4; 1 FILM BUCCAL; SUBLINGUAL at 20:12

## 2024-04-16 RX ADMIN — TRAZODONE HYDROCHLORIDE 50 MG: 50 TABLET ORAL at 21:04

## 2024-04-16 RX ADMIN — ONDANSETRON 4 MG: 4 TABLET, ORALLY DISINTEGRATING ORAL at 04:49

## 2024-04-16 RX ADMIN — DIAZEPAM 10 MG: 5 TABLET ORAL at 08:33

## 2024-04-16 RX ADMIN — NICOTINE POLACRILEX 4 MG: 4 GUM, CHEWING BUCCAL at 09:13

## 2024-04-16 RX ADMIN — HYDROXYZINE HYDROCHLORIDE 25 MG: 25 TABLET ORAL at 04:50

## 2024-04-16 RX ADMIN — GABAPENTIN 600 MG: 300 CAPSULE ORAL at 08:33

## 2024-04-16 RX ADMIN — PANTOPRAZOLE SODIUM 40 MG: 40 TABLET, DELAYED RELEASE ORAL at 08:34

## 2024-04-16 RX ADMIN — DIAZEPAM 10 MG: 5 TABLET ORAL at 13:40

## 2024-04-16 RX ADMIN — NICOTINE POLACRILEX 4 MG: 4 GUM, CHEWING BUCCAL at 13:40

## 2024-04-16 RX ADMIN — IBUPROFEN 600 MG: 600 TABLET, FILM COATED ORAL at 00:56

## 2024-04-16 RX ADMIN — ACETAMINOPHEN 650 MG: 325 TABLET, FILM COATED ORAL at 16:22

## 2024-04-16 ASSESSMENT — ACTIVITIES OF DAILY LIVING (ADL)
ADLS_ACUITY_SCORE: 40
DRESS: STREET CLOTHES;INDEPENDENT
ORAL_HYGIENE: INDEPENDENT
ADLS_ACUITY_SCORE: 40
HYGIENE/GROOMING: INDEPENDENT
ADLS_ACUITY_SCORE: 40

## 2024-04-16 NOTE — PLAN OF CARE
"Goal Outcome Evaluation:    Problem: Substance Withdrawal  Goal: Substance Withdrawal  Description: Signs and symptoms of listed problems will be absent or manageable.  4/16/2024 0052 by Jud Owen RN  Outcome: Progressing     Patient appeared to sleep for 4.5 hours.    MSSA at midnight was 4; patient was mildly tremulous.  /75   Pulse 65   Temp 97.5  F (36.4  C) (Temporal)   Resp 16   Ht 1.803 m (5' 11\")   Wt 86.2 kg (190 lb)   SpO2 96%   BMI 26.50 kg/m      Patient came up to the nursing station after being assessed and reported having trouble sleeping; trazodone 50mg was administered. Patient also endorsed back pain 5/10; ibuprofen 600mg was administered.    MSSA at 4am was 4; patient continues to be mildly tremulous.  /69   Pulse 61   Temp 97.1  F (36.2  C) (Temporal)   Resp 16   Ht 1.803 m (5' 11\")   Wt 86.2 kg (190 lb)   SpO2 97%   BMI 26.50 kg/m      We'll continue to monitor.    "

## 2024-04-16 NOTE — PLAN OF CARE
Problem: Substance Withdrawal  Goal: Substance Withdrawal  Description: Signs and symptoms of listed problems will be absent or manageable.  4/16/2024 1505 by Cheri Cardenas RN  Outcome: Progressing  Flowsheets (Taken 4/16/2024 1505)  Substance Withdrawal Assessed: all  Substance Withdrawal Present: anxiety  4/16/2024 1456 by Cheri Cardenas RN  Outcome: Not Progressing   Goal Outcome Evaluation:  Patient continues in detox status on alcohol withdrawal protocols. MSSA scores 12, 9 and 11. Medicated x3 with Valium. Angry and agitated about how he was medicated during the night. Requested Pt. Relations phone number to file a grievance. Appetite good. Continues to complain of back pain with little relief from current meds. Will continue to monitor.

## 2024-04-16 NOTE — PLAN OF CARE
04/15/24 2141   Group Therapy Session   Group Attendance attended group session   Time Session Began 1645   Time Session Ended 1730   Total Time (minutes) 45   Total # Attendees 6   Group Type psychotherapeutic;addiction   Group Topic Covered disease of addiction/choices in recovery;structured socialization   Group Session Detail Process   Patient Response/Contribution cooperative with task;discussed personal experience with topic;listened actively   Patient Participation Detail arrived late and at first said he didnt want to verbally participate, but actually was very conversational and insightful about the role of meetings in his recovery     Goal Outcome Evaluation:

## 2024-04-16 NOTE — PLAN OF CARE
"  Problem: Adult Behavioral Health Plan of Care  Goal: Adheres to Safety Considerations for Self and Others  Outcome: Progressing  Plan of Care Reviewed With: patient      Patient is up and visible in the milieu intermittently. Patient is ambulating independently. Patient is alert and oriented x 4. Affect is blunted, mood is calm/anxious. Patient denies SI/SIB/HI. Pt denies auditory/visual hallucinations. Patient verbally contracts for safety on the unit.     This RN administered the PRN medications Valium x 2, APAP, Ibuprofen,Trazodone (see MAR) at the request of the patient. Patient is tolerating medications well, denies any current side effects.     Pt's MSSA's = 8, 9 withdrawal assessment. Patient reports an increased appetite, and poor/broken sleep. Patient discharge plans to complete detox and go home, resume AA meetings/support. Rest, fluids, and food encouraged. Status 15 checks remain. Patient is able to make needs known appropriately. Patient denies any unmet needs at this time.     On call provider paged for a patient care order for pt to have his shoes with laces during the day for back issues/pain.   Pt was given the unit \"slides/sandals\" in the interim.     Blood pressure 104/73, pulse 77, temperature 97.1  F (36.2  C), temperature source Temporal, resp. rate 16, height 1.803 m (5' 11\"), weight 86.2 kg (190 lb), SpO2 96%.            "

## 2024-04-16 NOTE — PROGRESS NOTES
"[facility], [place]   Psychiatric Progress Note  Hospital Day: 1        Interim History:   The patient's care was discussed with the treatment team during the daily team meeting and/or staff's chart notes were reviewed.  Staff report patient was upset overnight about not receiving medications on time, and receiving incorrect snacks. This morning he was more interactive and calm.    Upon interview, the patient is apologetic for last night's behavior, he did take time to process and reflect on events, his frustrations and concerns were validated and he inquired about the process for requesting discharging if he is not able to calm and wanting to stay on the unit. Process of AMA discharge prior to being out of detox was reviewed, he expressed understanding. Also offered to provide number for patient relations however patient reports that he isn't wanting \"to blame anyone\" and again reviewed his frustrations with events overnight, did appear more calm at end of interview. He does feel that his withdrawal symptoms have improved since admission but are still notable. He is eating and drinking well. He has received diazepam twice today. Lower back pain has improved. Blood pressure also improving. Denying any safety concerns including SI and HI, denying AVH. No additional concerns.          Medications:     Current Facility-Administered Medications   Medication Dose Route Frequency Provider Last Rate Last Admin    buprenorphine HCl-naloxone HCl (SUBOXONE) 4-1 MG per film 1 Film  1 Film Sublingual BID Migue Huitron MD   1 Film at 04/15/24 2047    cloNIDine (CATAPRES) tablet 0.1 mg  0.1 mg Oral BID Isa Brody PA-C   0.1 mg at 04/15/24 2047    folic acid (FOLVITE) tablet 1 mg  1 mg Oral Daily Mariam Mancini MD   1 mg at 04/15/24 0814    gabapentin (NEURONTIN) capsule 600 mg  600 mg Oral TID Cheri Asif MD   600 mg at 04/15/24 2047    Lidocaine (LIDOCARE) 4 % Patch 2 patch "  2 patch Transdermal Q24h Isa Brody PA-C   2 patch at 04/15/24 2047    lisinopril (ZESTRIL) tablet 20 mg  20 mg Oral Daily Isa Bordy PA-C   20 mg at 04/15/24 0815    metFORMIN (GLUCOPHAGE XR) 24 hr tablet 500 mg  500 mg Oral QAM Isa Brody PA-C        multivitamin w/minerals (THERA-VIT-M) tablet 1 tablet  1 tablet Oral Daily Mariam Mancini MD   1 tablet at 04/15/24 0814    nicotine (NICODERM CQ) 14 MG/24HR 24 hr patch 1 patch  1 patch Transdermal Daily Mariam Mancini MD   1 patch at 04/15/24 0723    pantoprazole (PROTONIX) EC tablet 40 mg  40 mg Oral BID AC Cheri Asif MD   40 mg at 04/15/24 1620    thiamine (B-1) tablet 100 mg  100 mg Oral Daily Mariam Mancini MD   100 mg at 04/15/24 0815          Allergies:     Allergies   Allergen Reactions    Diphenhydramine Swelling     Per patient, tongue/lips swelling, itchy eyes with both generic diphenhydramine and brand Benadryl     Naproxen Swelling    Pistachio Nut Extract     Toradol [Ketorolac] Itching          Labs:     Recent Results (from the past 48 hour(s))   Alcohol breath test POCT    Collection Time: 04/14/24  6:41 PM   Result Value Ref Range    Alcohol Breath Test 0.185 (A) 0.00 - 0.01   Comprehensive metabolic panel    Collection Time: 04/14/24  7:49 PM   Result Value Ref Range    Sodium 138 135 - 145 mmol/L    Potassium 3.8 3.4 - 5.3 mmol/L    Carbon Dioxide (CO2) 25 22 - 29 mmol/L    Anion Gap 12 7 - 15 mmol/L    Urea Nitrogen 12.7 6.0 - 20.0 mg/dL    Creatinine 0.67 0.67 - 1.17 mg/dL    GFR Estimate >90 >60 mL/min/1.73m2    Calcium 8.7 8.6 - 10.0 mg/dL    Chloride 101 98 - 107 mmol/L    Glucose 100 (H) 70 - 99 mg/dL    Alkaline Phosphatase 70 40 - 150 U/L    AST 21 0 - 45 U/L    ALT 19 0 - 70 U/L    Protein Total 6.8 6.4 - 8.3 g/dL    Albumin 4.1 3.5 - 5.2 g/dL    Bilirubin Total 0.6 <=1.2 mg/dL   Magnesium    Collection Time: 04/14/24  7:49 PM   Result Value Ref Range    Magnesium 2.0 1.7 - 2.3 mg/dL    CBC with platelets and differential    Collection Time: 04/14/24  7:50 PM   Result Value Ref Range    WBC Count 10.0 4.0 - 11.0 10e3/uL    RBC Count 4.89 4.40 - 5.90 10e6/uL    Hemoglobin 16.0 13.3 - 17.7 g/dL    Hematocrit 46.0 40.0 - 53.0 %    MCV 94 78 - 100 fL    MCH 32.7 26.5 - 33.0 pg    MCHC 34.8 31.5 - 36.5 g/dL    RDW 14.0 10.0 - 15.0 %    Platelet Count 245 150 - 450 10e3/uL    % Neutrophils 66 %    % Lymphocytes 28 %    % Monocytes 5 %    % Eosinophils 0 %    % Basophils 1 %    % Immature Granulocytes 0 %    NRBCs per 100 WBC 0 <1 /100    Absolute Neutrophils 6.6 1.6 - 8.3 10e3/uL    Absolute Lymphocytes 2.8 0.8 - 5.3 10e3/uL    Absolute Monocytes 0.5 0.0 - 1.3 10e3/uL    Absolute Eosinophils 0.0 0.0 - 0.7 10e3/uL    Absolute Basophils 0.1 0.0 - 0.2 10e3/uL    Absolute Immature Granulocytes 0.0 <=0.4 10e3/uL    Absolute NRBCs 0.0 10e3/uL   Hemoglobin A1c    Collection Time: 04/14/24  7:50 PM   Result Value Ref Range    Hemoglobin A1C 5.2 <5.7 %   Urine Drug Screen Panel    Collection Time: 04/14/24 11:56 PM   Result Value Ref Range    Amphetamines Urine Screen Negative Screen Negative    Barbituates Urine Screen Negative Screen Negative    Benzodiazepine Urine Screen Negative Screen Negative    Cannabinoids Urine Screen Negative Screen Negative    Cocaine Urine Screen Negative Screen Negative    Fentanyl Qual Urine Screen Negative Screen Negative    Opiates Urine Screen Negative Screen Negative    PCP Urine Screen Negative Screen Negative   TSH with free T4 reflex and/or T3 as indicated    Collection Time: 04/15/24  6:52 AM   Result Value Ref Range    TSH 2.34 0.30 - 4.20 uIU/mL   GGT    Collection Time: 04/15/24  6:52 AM   Result Value Ref Range    GGT 76 (H) 8 - 61 U/L   Lipid panel reflex to direct LDL    Collection Time: 04/15/24  6:52 AM   Result Value Ref Range    Cholesterol 168 <200 mg/dL    Triglycerides 864 (H) <150 mg/dL    Direct Measure HDL 39 (L) >=40 mg/dL    LDL Cholesterol Calculated    "   Non HDL Cholesterol 129 <130 mg/dL   LDL cholesterol direct    Collection Time: 04/15/24  6:52 AM   Result Value Ref Range    LDL Cholesterol Direct 34 <100 mg/dL          Psychiatric Examination:     /69   Pulse 61   Temp 97.1  F (36.2  C) (Temporal)   Resp 16   Ht 1.803 m (5' 11\")   Wt 86.2 kg (190 lb)   SpO2 97%   BMI 26.50 kg/m    Weight is 190 lbs 0 oz  Body mass index is 26.5 kg/m .    Orthostatic Vitals       None        Appearance: awake, alert  Attitude: cooperative  Eye Contact: good  Mood:  \"OK\" some irritability present initially, improved as interview progressed  Affect: mood congruent and full range  Speech:  clear, coherent and normal prosody  Language: fluent in English  Psychomotor Behavior:  no evidence of tardive dyskinesia, dystonia, or tics. Mild tremor   Gait/Station: normal  Thought Process:  linear, logical, goal oriented  Associations:  no loose associations  Thought Content:  Denying SI/HI/AVH; no evidence of psychotic thinking  Insight:  good  Judgement: intact  Oriented to:  time, person, and place  Attention Span and Concentration:  intact  Recent and Remote Memory:  intact  Fund of Knowledge: appropriate         Precautions:     Behavioral Orders   Procedures    Code 1 - Restrict to Unit    Routine Programming     As clinically indicated    Seizure precautions    Status 15     Every 15 minutes.    Withdrawal precautions          Diagnoses:      Alcohol use disorder, severe, dependence with withdrawal with complication   Chronic lower back pain, on buprenorphine   Brain aneurysm s/p clip 2013  Seizure   Hypertension  Nicotine use    Clinically Significant Risk Factors                  # Hypertension: Noted on problem list        # Overweight: Estimated body mass index is 26.5 kg/m  as calculated from the following:    Height as of this encounter: 1.803 m (5' 11\").    Weight as of this encounter: 86.2 kg (190 lb)., PRESENT ON ADMISSION     # Asthma: noted on problem list "             Assessment & Plan:   Assessment and hospital summary:  This patient is a 43 year old male with history of alcohol use disorder, brain aneurysm s/p clips (2013), hypertension, seizures, who presented to ED seeking detox from alcohol. Medically cleared in ED, admitted to 3A as voluntary patient. Drinking about a pint daily, EtOH 0.185, UDS otherwise negative. MSSA with diazepam started for alcohol withdrawal. Withdrawal and seizure precautions in place. Admission labs ordered and reviewed those resulted. Denies safety concerns including SI and HI. PTA medications continued where appropriate. Pt reports goals for hospitalization are to detox and return home.      Inpatient psychiatric hospitalization is warranted at this time for safety, stabilization, and possible adjustment in medications.    Alcohol use disorder, severe  Acute alcohol withdrawal with history of withdrawal seizures  Reports 1 pint to 1 fifth daily since February after 5 year long period of sobriety. Does have history of seizures, likely both withdrawal related and related to aneurysm which have now resolved. Plans to return home and continue with AA, does not wish to start inpatient or outpatient therapy.   -MSSA with diazepam, thiamine, folic acid, multivitamin   -Continue gabapentin 600mg TID, not a candidate for naltrexone, he is considering acamprosate and plans to follow up with DAVID Gonzalez after discharge   -PRN hydroxyzine 25mg every 4 hours for anxiety   -PRN trazodone 50mg at bedtime for sleep      Chronic lower back pain, on buprenorphine  Denies history of OUD, though this is documented in chart. Previously was on prescription full agonist opioids for LBP, later transitioned to bup up to doses of 24mg daily. Now tapering, hopes to receive Sublocade to facilitate this. Recently increased 6mg to 8mg daily on 4/12.   - Split buprenorphine dose to 4mg BID  - Appreciate IM assistance     Nicotine use - NRT      The risks, benefits,  alternatives and side effects have been discussed and are understood by the patient.     Patient will be treated in therapeutic milieu with appropriate individual and group therapies as described.     Medical treatment/interventions:  Internal medicine consulted, followed LFT, signed off.     Disposition Plan   Reason for ongoing admission: requires detoxification from substance that poses a risk of bodily harm during withdrawal period  Discharge location: home with self-care  Discharge Medications: ordered.  Follow-up Appointments: not scheduled  Legal Status: voluntary    Migue Huitron MD  Addiction Medicine Fellow  [facility]

## 2024-04-17 PROCEDURE — 250N000013 HC RX MED GY IP 250 OP 250 PS 637: Performed by: PHYSICIAN ASSISTANT

## 2024-04-17 PROCEDURE — 128N000004 HC R&B CD ADULT

## 2024-04-17 PROCEDURE — 250N000013 HC RX MED GY IP 250 OP 250 PS 637: Performed by: STUDENT IN AN ORGANIZED HEALTH CARE EDUCATION/TRAINING PROGRAM

## 2024-04-17 PROCEDURE — 250N000013 HC RX MED GY IP 250 OP 250 PS 637: Performed by: PSYCHIATRY & NEUROLOGY

## 2024-04-17 PROCEDURE — 250N000011 HC RX IP 250 OP 636: Performed by: PSYCHIATRY & NEUROLOGY

## 2024-04-17 PROCEDURE — 99233 SBSQ HOSP IP/OBS HIGH 50: CPT | Performed by: PSYCHIATRY & NEUROLOGY

## 2024-04-17 RX ORDER — GABAPENTIN 300 MG/1
300 CAPSULE ORAL 3 TIMES DAILY PRN
Status: DISCONTINUED | OUTPATIENT
Start: 2024-04-17 | End: 2024-04-18 | Stop reason: HOSPADM

## 2024-04-17 RX ADMIN — IBUPROFEN 600 MG: 600 TABLET, FILM COATED ORAL at 16:38

## 2024-04-17 RX ADMIN — LISINOPRIL 20 MG: 20 TABLET ORAL at 08:20

## 2024-04-17 RX ADMIN — METFORMIN HYDROCHLORIDE 500 MG: 500 TABLET, EXTENDED RELEASE ORAL at 08:20

## 2024-04-17 RX ADMIN — NICOTINE POLACRILEX 4 MG: 4 GUM, CHEWING BUCCAL at 08:20

## 2024-04-17 RX ADMIN — PANTOPRAZOLE SODIUM 40 MG: 40 TABLET, DELAYED RELEASE ORAL at 16:39

## 2024-04-17 RX ADMIN — DIAZEPAM 10 MG: 5 TABLET ORAL at 08:21

## 2024-04-17 RX ADMIN — PANTOPRAZOLE SODIUM 40 MG: 40 TABLET, DELAYED RELEASE ORAL at 08:20

## 2024-04-17 RX ADMIN — NICOTINE POLACRILEX 4 MG: 4 GUM, CHEWING BUCCAL at 11:19

## 2024-04-17 RX ADMIN — NICOTINE 1 PATCH: 14 PATCH, EXTENDED RELEASE TRANSDERMAL at 08:21

## 2024-04-17 RX ADMIN — GABAPENTIN 300 MG: 300 CAPSULE ORAL at 12:21

## 2024-04-17 RX ADMIN — Medication 1 TABLET: at 08:20

## 2024-04-17 RX ADMIN — GABAPENTIN 600 MG: 300 CAPSULE ORAL at 20:37

## 2024-04-17 RX ADMIN — LIDOCAINE 4% 2 PATCH: 40 PATCH TOPICAL at 20:37

## 2024-04-17 RX ADMIN — GABAPENTIN 600 MG: 300 CAPSULE ORAL at 14:52

## 2024-04-17 RX ADMIN — NICOTINE POLACRILEX 4 MG: 4 GUM, CHEWING BUCCAL at 16:20

## 2024-04-17 RX ADMIN — TRAZODONE HYDROCHLORIDE 50 MG: 50 TABLET ORAL at 20:35

## 2024-04-17 RX ADMIN — BUPRENORPHINE AND NALOXONE 1 FILM: 4; 1 FILM BUCCAL; SUBLINGUAL at 08:21

## 2024-04-17 RX ADMIN — THIAMINE HCL TAB 100 MG 100 MG: 100 TAB at 08:20

## 2024-04-17 RX ADMIN — ONDANSETRON 4 MG: 4 TABLET, ORALLY DISINTEGRATING ORAL at 08:25

## 2024-04-17 RX ADMIN — NICOTINE POLACRILEX 4 MG: 4 GUM, CHEWING BUCCAL at 14:52

## 2024-04-17 RX ADMIN — HYDROXYZINE HYDROCHLORIDE 25 MG: 25 TABLET ORAL at 11:16

## 2024-04-17 RX ADMIN — GABAPENTIN 600 MG: 300 CAPSULE ORAL at 08:20

## 2024-04-17 RX ADMIN — BUPRENORPHINE AND NALOXONE 1 FILM: 4; 1 FILM BUCCAL; SUBLINGUAL at 20:37

## 2024-04-17 RX ADMIN — DIAZEPAM 10 MG: 5 TABLET ORAL at 00:53

## 2024-04-17 RX ADMIN — TIZANIDINE 4 MG: 2 TABLET ORAL at 11:16

## 2024-04-17 RX ADMIN — SENNOSIDES AND DOCUSATE SODIUM 1 TABLET: 8.6; 5 TABLET ORAL at 11:19

## 2024-04-17 RX ADMIN — IBUPROFEN 600 MG: 600 TABLET, FILM COATED ORAL at 08:20

## 2024-04-17 RX ADMIN — ACETAMINOPHEN 650 MG: 325 TABLET, FILM COATED ORAL at 11:16

## 2024-04-17 RX ADMIN — FOLIC ACID 1 MG: 1 TABLET ORAL at 08:20

## 2024-04-17 RX ADMIN — ALUMINUM HYDROXIDE, MAGNESIUM HYDROXIDE, AND SIMETHICONE 30 ML: 200; 200; 20 SUSPENSION ORAL at 11:19

## 2024-04-17 RX ADMIN — CLONIDINE HYDROCHLORIDE 0.1 MG: 0.1 TABLET ORAL at 08:20

## 2024-04-17 ASSESSMENT — ACTIVITIES OF DAILY LIVING (ADL)
DRESS: INDEPENDENT
ORAL_HYGIENE: INDEPENDENT
ADLS_ACUITY_SCORE: 40
ORAL_HYGIENE: INDEPENDENT
HYGIENE/GROOMING: INDEPENDENT
ADLS_ACUITY_SCORE: 40
LAUNDRY: WITH SUPERVISION
ADLS_ACUITY_SCORE: 40
DRESS: SCRUBS (BEHAVIORAL HEALTH)
ADLS_ACUITY_SCORE: 40
HYGIENE/GROOMING: INDEPENDENT

## 2024-04-17 NOTE — PLAN OF CARE
Goal Outcome Evaluation:    Plan of Care Reviewed With: patient Plan of Care Reviewed With: patient    Overall Patient Progress: improvingOverall Patient Progress: improving    Outcome Evaluation: Remains in alcohol withdrawal but is improving throughout the shift.    Pt reports having a nearly full appetite today. He is slightly sweaty and tremulous with a pulse of 96. MSSA score 10 and 7, 10mg po valium administered x 1. Total valium administered since arrival to the hospital = 150mg (10mg in ER, 140mg on 3A). Pt verbalizing interest in discharging stating he wouldn't have taken valium this morning if he knew it was going to hold up his discharge. Explained to him that he got valium prior to this morning today at 0053. When given paperwork to sign a 72 hour intent for discharge pt declines to sign and states wanting to talk to his doctor. Pt then was upset that he didn't qualify for valium in the afternoon. Pt states valium helps him feel better so he can socialize with his peers and is requesting to have valium ordered at discharge. Explained to patient this is not how medication are typically ordered for discharge. Dr. Marsh called and information shared with him about pt statements.     Pt states anxiety 7, depression 4 of 10 in severity. Pt reports having higher anxiety when out in the lounge interacting with his peers and states valium helps that. Informed pt we use valium strictly to treat the alcohol withdrawal. Informed pt that a PRN gabapentin 300mg is available TID to help cover his anxiety. Pt denies suicidal ideation plans or intent.     Pt with chronic back pain. Administered prn tylenol, ibuprofen, tizanidine, hydroxyzine, senna and mylanta to help offer comfort for patient.    Will continue to monitor and intervene as warranted.

## 2024-04-17 NOTE — TELEPHONE ENCOUNTER
"Dr. Cardenas-Please review update from patient and may close encounter.    \"I was supposed to leave today but now they saying I may meed to stay longer If they force me to stay. I guarantee it s going to make me lose my job and possibly my sobriety. I am to the point that I hate Dr olvera. I may never make an appointment again. I just don t get this f ing place!!! \"    Thank you!  MARYLU BetheaN, RN-BC  [facility]    "

## 2024-04-17 NOTE — PROGRESS NOTES
"[facility], [place]   Psychiatric Progress Note        Interim History:   The patient's care was discussed with the treatment team during the daily team meeting and/or staff's chart notes were reviewed.  Staff report patient is still in withdrawal. Does have a history of withdrawal seizures. Has a lot of anxiety.     The patient reports that he is \"all right.\" Says that he is \"still shaky.\" Did get some sleep yesterday. Has had some nausea and \"pressure\" that he feels is gas. Says that his bowel movements have been \"more sporadic\" with the BID dosing of Suboxone. Plans to try eating slower and smaller meals. Does report anxiety \"when I'm out around others.\" Says that the gabapentin helps \"a little, but not as much as it used to.\" Denies SI. Feels guilt for his relapse. Plans to do \"90 meetings in 90 days\" and is considering IOP \"if it's close to [place].\"          Medications:     Current Facility-Administered Medications   Medication Dose Route Frequency Provider Last Rate Last Admin    buprenorphine HCl-naloxone HCl (SUBOXONE) 4-1 MG per film 1 Film  1 Film Sublingual BID Migue Huitron MD   1 Film at 04/17/24 0821    folic acid (FOLVITE) tablet 1 mg  1 mg Oral Daily Mariam Mancini MD   1 mg at 04/17/24 0820    gabapentin (NEURONTIN) capsule 600 mg  600 mg Oral TID Cheri Asif MD   600 mg at 04/17/24 0820    Lidocaine (LIDOCARE) 4 % Patch 2 patch  2 patch Transdermal Q24h Isa Brody PA-C   2 patch at 04/16/24 2012    lisinopril (ZESTRIL) tablet 20 mg  20 mg Oral Daily Isa Brody PA-C   20 mg at 04/17/24 0820    metFORMIN (GLUCOPHAGE XR) 24 hr tablet 500 mg  500 mg Oral QAM Isa Brody PA-C   500 mg at 04/17/24 0820    multivitamin w/minerals (THERA-VIT-M) tablet 1 tablet  1 tablet Oral Daily Mariam Mancini MD   1 tablet at 04/17/24 0820    nicotine (NICODERM CQ) 14 MG/24HR 24 hr patch 1 patch  1 patch Transdermal Daily Aziz, " "MD Mariam   1 patch at 04/17/24 0821    pantoprazole (PROTONIX) EC tablet 40 mg  40 mg Oral BID AC Cheri Asif MD   40 mg at 04/17/24 0820    thiamine (B-1) tablet 100 mg  100 mg Oral Daily Mariam Mancini MD   100 mg at 04/17/24 0820          Allergies:     Allergies   Allergen Reactions    Diphenhydramine Swelling     Per patient, tongue/lips swelling, itchy eyes with both generic diphenhydramine and brand Benadryl     Naproxen Swelling    Pistachio Nut Extract     Toradol [Ketorolac] Itching          Labs:   No results found for this or any previous visit (from the past 24 hour(s)).       Psychiatric Examination:     /70   Pulse 111   Temp 98.7  F (37.1  C) (Oral)   Resp 16   Ht 1.803 m (5' 11\")   Wt 86.2 kg (190 lb)   SpO2 98%   BMI 26.50 kg/m    Weight is 190 lbs 0 oz  Body mass index is 26.5 kg/m .  Orthostatic Vitals       None              Appearance: awake, alert and adequately groomed  Attitude:  cooperative  Eye Contact:  good  Mood:  anxious  Affect:  mood congruent  Speech:  clear, coherent  Psychomotor Behavior:  no evidence of tardive dyskinesia, dystonia, or tics  Thought Process:  goal oriented  Associations:  no loose associations  Thought Content:  no evidence of suicidal ideation or homicidal ideation and no evidence of psychotic thought  Insight:  fair  Judgement:  fair  Oriented to:  time, person, and place  Attention Span and Concentration:  fair  Recent and Remote Memory:  fair           Precautions:     Behavioral Orders   Procedures    Code 1 - Restrict to Unit    Routine Programming     As clinically indicated    Seizure precautions    Status 15     Every 15 minutes.    Withdrawal precautions          DIagnoses:     Alcohol use disorder, severe, dependence with withdrawal with complication   Chronic lower back pain, on buprenorphine   Brain aneurysm s/p clip 2013  Seizure   Hypertension  Nicotine use    Clinically Significant Risk Factors                    # " "Hypertension: Noted on problem list          # Overweight: Estimated body mass index is 26.5 kg/m  as calculated from the following:    Height as of this encounter: 1.803 m (5' 11\").    Weight as of this encounter: 86.2 kg (190 lb)., PRESENT ON ADMISSION     # Asthma: noted on problem list                Plan:     1) Continue MSSA protocol.   2) Continue gabapentin 600mg TID. Will add gabapentin 300mg TID PRN anxiety.   3) Continue Suboxone.   4) Patient considering Campral at discharge.   5) Patient looking at 90 meetings in 90 days and also considering IOP.       Disposition Plan   Reason for ongoing admission: requires detoxification from substance that poses a risk of bodily harm during withdrawal period  Discharge location: home with self-care  Discharge Medications: not ordered  Follow-up Appointments: not scheduled  Legal Status: voluntary    Entered by: Gautam Marsh MD on April 17, 2024 at 11:51 AM         "

## 2024-04-17 NOTE — PLAN OF CARE
Goal Outcome Evaluation:      Problem: Substance Withdrawal  Goal: Substance Withdrawal  Description: Signs and symptoms of listed problems will be absent or manageable.  Outcome: Progressing     Pt's MSSA was 8 & 7, received PRN Valium 10 mg x 1. Slept most of the night, up x 1 for snack.

## 2024-04-17 NOTE — PROVIDER NOTIFICATION
"Updated Dr. Marsh initially about pt wanting to discharge. Pt was then asked to sign a 72 hour intent for discharge. He declined to sign. Had a discussion with the patient about why he wants to leave and he was centered on \"the 24 hours\" from last valium until being cleared for discharge. Instructed pt that staff is only treating when his symptoms warrant it. Pt then states the valium \"helps with my anxiety\". Informed pt we only use the valium for treating alcohol withdrawal. Discussed with Dr. Marsh who added in a PRN gabapentin 300mg tid.   "

## 2024-04-18 VITALS
BODY MASS INDEX: 26.6 KG/M2 | HEART RATE: 106 BPM | RESPIRATION RATE: 16 BRPM | TEMPERATURE: 97.3 F | DIASTOLIC BLOOD PRESSURE: 85 MMHG | OXYGEN SATURATION: 97 % | WEIGHT: 190 LBS | HEIGHT: 71 IN | SYSTOLIC BLOOD PRESSURE: 118 MMHG

## 2024-04-18 PROCEDURE — 250N000013 HC RX MED GY IP 250 OP 250 PS 637: Performed by: PHYSICIAN ASSISTANT

## 2024-04-18 PROCEDURE — 250N000013 HC RX MED GY IP 250 OP 250 PS 637: Performed by: PSYCHIATRY & NEUROLOGY

## 2024-04-18 PROCEDURE — 250N000013 HC RX MED GY IP 250 OP 250 PS 637: Performed by: STUDENT IN AN ORGANIZED HEALTH CARE EDUCATION/TRAINING PROGRAM

## 2024-04-18 PROCEDURE — 99239 HOSP IP/OBS DSCHRG MGMT >30: CPT | Performed by: PSYCHIATRY & NEUROLOGY

## 2024-04-18 RX ORDER — BUPRENORPHINE AND NALOXONE 8; 2 MG/1; MG/1
1 FILM, SOLUBLE BUCCAL; SUBLINGUAL DAILY
COMMUNITY
Start: 2024-04-19 | End: 2024-05-03

## 2024-04-18 RX ORDER — BUPRENORPHINE AND NALOXONE 8; 2 MG/1; MG/1
1 FILM, SOLUBLE BUCCAL; SUBLINGUAL DAILY
Status: DISCONTINUED | OUTPATIENT
Start: 2024-04-19 | End: 2024-04-18 | Stop reason: HOSPADM

## 2024-04-18 RX ORDER — AMOXICILLIN 250 MG
1 CAPSULE ORAL 2 TIMES DAILY PRN
Qty: 60 TABLET | Refills: 0 | Status: SHIPPED | OUTPATIENT
Start: 2024-04-18 | End: 2024-08-01

## 2024-04-18 RX ADMIN — FOLIC ACID 1 MG: 1 TABLET ORAL at 08:37

## 2024-04-18 RX ADMIN — TRAZODONE HYDROCHLORIDE 50 MG: 50 TABLET ORAL at 00:41

## 2024-04-18 RX ADMIN — LISINOPRIL 20 MG: 20 TABLET ORAL at 08:37

## 2024-04-18 RX ADMIN — NICOTINE 1 PATCH: 14 PATCH, EXTENDED RELEASE TRANSDERMAL at 08:37

## 2024-04-18 RX ADMIN — ALUMINUM HYDROXIDE, MAGNESIUM HYDROXIDE, AND SIMETHICONE 30 ML: 200; 200; 20 SUSPENSION ORAL at 08:39

## 2024-04-18 RX ADMIN — BUPRENORPHINE AND NALOXONE 1 FILM: 4; 1 FILM BUCCAL; SUBLINGUAL at 08:36

## 2024-04-18 RX ADMIN — TIZANIDINE 4 MG: 2 TABLET ORAL at 08:36

## 2024-04-18 RX ADMIN — METFORMIN HYDROCHLORIDE 500 MG: 500 TABLET, EXTENDED RELEASE ORAL at 08:37

## 2024-04-18 RX ADMIN — PANTOPRAZOLE SODIUM 40 MG: 40 TABLET, DELAYED RELEASE ORAL at 08:37

## 2024-04-18 RX ADMIN — IBUPROFEN 600 MG: 600 TABLET, FILM COATED ORAL at 08:37

## 2024-04-18 RX ADMIN — GABAPENTIN 600 MG: 300 CAPSULE ORAL at 08:37

## 2024-04-18 RX ADMIN — IBUPROFEN 600 MG: 600 TABLET, FILM COATED ORAL at 00:41

## 2024-04-18 RX ADMIN — NICOTINE POLACRILEX 4 MG: 4 GUM, CHEWING BUCCAL at 08:36

## 2024-04-18 RX ADMIN — Medication 1 TABLET: at 08:37

## 2024-04-18 RX ADMIN — THIAMINE HCL TAB 100 MG 100 MG: 100 TAB at 08:37

## 2024-04-18 RX ADMIN — SENNOSIDES AND DOCUSATE SODIUM 1 TABLET: 8.6; 5 TABLET ORAL at 08:37

## 2024-04-18 ASSESSMENT — ACTIVITIES OF DAILY LIVING (ADL)
ORAL_HYGIENE: INDEPENDENT
ADLS_ACUITY_SCORE: 40
HYGIENE/GROOMING: INDEPENDENT
ADLS_ACUITY_SCORE: 40
LAUNDRY: WITH SUPERVISION
DRESS: STREET CLOTHES
ADLS_ACUITY_SCORE: 40
ADLS_ACUITY_SCORE: 40

## 2024-04-18 NOTE — PLAN OF CARE
Goal Outcome Evaluation:    Plan of Care Reviewed With: patient Plan of Care Reviewed With: patient    Overall Patient Progress: improvingOverall Patient Progress: improving    Outcome Evaluation: Pt removed from alcohol withdrawal monitoring (OOD).    Patient has not required any valium for alcohol detox since 4/17/2024 at 0821  . All MSSA scores since that time have been less than 8. Pt is now removed from alcohol detox monitoring status. Await disposition likely to home later this morning. Total valium administered since arrival to the hospital = 150mg (10mg in ER, 140mg on 3A).

## 2024-04-18 NOTE — DISCHARGE SUMMARY
Psychiatric Discharge Summary    Carlos Hamilton MRN# [id number]   Age: 43 year old YOB: 1980     Date of Admission:  2024  Date of Discharge:  2024 10:24 AM  Admitting Physician:  Mariam Mancini MD  Discharge Physician:  Cheri Asif DO         Event Leading to Hospitalization:   43 year old male with past medical history as above who presented to the ED yesterday for detox. Chart review completed including ED notes:     Per ED physician note: Carlos Hamilton is a 43 year old male with PMH notable for alcohol use disorder, brain aneurysm s/p clip () who presents to the ED seeking alcohol detox. He last drank this morning. He drinks around 1/5 of vodka per day since February. He was sober almost 5 years prior. No drug use. No SI/HI/mental health concerns or medical concerns. History of seizures, but unsure if related to alcohol. Not on a seizure medication.      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 This winter his father  and he had accumulating financial stress, he ended up returning to consistent alcohol use in February. Use is between a pint to a fifth daily. He does have a history of likely withdrawal related seizures as well as seizures related to his aneurysm. He started drinking at 9 and was drinking daily by 16. He has been in treatment about 5x in his life.      He also takes buprenorphine daily for chronic lower back pain. He was up to doses as high as 24 mg daily without relief, so he has been working on taper. Previous to this he was taking prescription full agonist opioids.      Patient has tolerance, withdrawal, progressive use, loss of control, spending more time and more amount than intended. Patient has made attempts to quit, is experiencing cravings, and reports negative consequences, including dysfunction at work and home, and adverse  "effects to health.        See Admission note by Migue Huitron MD on 4/15/24 for additional details.          Diagnoses:     Alcohol use disorder, severe, dependence with withdrawal with complication   Chronic lower back pain, on buprenorphine   Brain aneurysm s/p clip 2013  Seizure   Hypertension  Nicotine use        Clinically Significant Risk Factors[]Expand by Default                  # Hypertension: Noted on problem list        # Overweight: Estimated body mass index is 26.5 kg/m  as calculated from the following:    Height as of this encounter: 1.803 m (5' 11\").    Weight as of this encounter: 86.2 kg (190 lb)., PRESENT ON ADMISSION     # Asthma: noted on problem list          Labs:     Recent Results (from the past 168 hour(s))   Drugs of Abuse Screen Urine (POC CUPS) POCT    Collection Time: 04/12/24 11:02 AM   Result Value Ref Range    POCT Kit Lot Number [id number]     POCT Kit Expiration Date 2025-08-22     Temperature Urine POCT 94 F 90 F, 92 F, 94 F, 96 F, 98 F, 100 F    Specific [place] POCT 1.015 1.005, 1.015, 1.025    pH Qual Urine POCT 5 pH 4 pH, 5 pH, 7 pH, 9 pH    Creatinine Qual Urine POCT 50 mg/dL 20 mg/dL, 50 mg/dL, 100 mg/dL, 200 mg/dL    Internal QC Qual Urine POCT Valid Valid    Amphetamine Qual Urine POCT Negative Negative    Barbiturate Qual Urine POCT Negative Negative    Buprenorphine Qual Urine POCT Screen Positive (A) Negative    Benzodiazepine Qual Urine POCT Negative Negative    Cocaine Qual Urine POCT Negative Negative    Methamphetamine Qual Urine POCT Negative Negative    MDMA Qual Urine POCT Negative Negative    Methadone Qual Urine POCT Negative Negative    Opiate Qual Urine POCT Negative Negative    Oxycodone Qual Urine POCT Negative Negative    Phencyclidine Qual Urine POCT Negative Negative    THC Qual Urine POCT Negative Negative   Alcohol breath test POCT    Collection Time: 04/14/24  6:41 PM   Result Value Ref Range    Alcohol Breath Test 0.185 (A) 0.00 - 0.01 "   Comprehensive metabolic panel    Collection Time: 04/14/24  7:49 PM   Result Value Ref Range    Sodium 138 135 - 145 mmol/L    Potassium 3.8 3.4 - 5.3 mmol/L    Carbon Dioxide (CO2) 25 22 - 29 mmol/L    Anion Gap 12 7 - 15 mmol/L    Urea Nitrogen 12.7 6.0 - 20.0 mg/dL    Creatinine 0.67 0.67 - 1.17 mg/dL    GFR Estimate >90 >60 mL/min/1.73m2    Calcium 8.7 8.6 - 10.0 mg/dL    Chloride 101 98 - 107 mmol/L    Glucose 100 (H) 70 - 99 mg/dL    Alkaline Phosphatase 70 40 - 150 U/L    AST 21 0 - 45 U/L    ALT 19 0 - 70 U/L    Protein Total 6.8 6.4 - 8.3 g/dL    Albumin 4.1 3.5 - 5.2 g/dL    Bilirubin Total 0.6 <=1.2 mg/dL   Magnesium    Collection Time: 04/14/24  7:49 PM   Result Value Ref Range    Magnesium 2.0 1.7 - 2.3 mg/dL   CBC with platelets and differential    Collection Time: 04/14/24  7:50 PM   Result Value Ref Range    WBC Count 10.0 4.0 - 11.0 10e3/uL    RBC Count 4.89 4.40 - 5.90 10e6/uL    Hemoglobin 16.0 13.3 - 17.7 g/dL    Hematocrit 46.0 40.0 - 53.0 %    MCV 94 78 - 100 fL    MCH 32.7 26.5 - 33.0 pg    MCHC 34.8 31.5 - 36.5 g/dL    RDW 14.0 10.0 - 15.0 %    Platelet Count 245 150 - 450 10e3/uL    % Neutrophils 66 %    % Lymphocytes 28 %    % Monocytes 5 %    % Eosinophils 0 %    % Basophils 1 %    % Immature Granulocytes 0 %    NRBCs per 100 WBC 0 <1 /100    Absolute Neutrophils 6.6 1.6 - 8.3 10e3/uL    Absolute Lymphocytes 2.8 0.8 - 5.3 10e3/uL    Absolute Monocytes 0.5 0.0 - 1.3 10e3/uL    Absolute Eosinophils 0.0 0.0 - 0.7 10e3/uL    Absolute Basophils 0.1 0.0 - 0.2 10e3/uL    Absolute Immature Granulocytes 0.0 <=0.4 10e3/uL    Absolute NRBCs 0.0 10e3/uL   Hemoglobin A1c    Collection Time: 04/14/24  7:50 PM   Result Value Ref Range    Hemoglobin A1C 5.2 <5.7 %   Urine Drug Screen Panel    Collection Time: 04/14/24 11:56 PM   Result Value Ref Range    Amphetamines Urine Screen Negative Screen Negative    Barbituates Urine Screen Negative Screen Negative    Benzodiazepine Urine Screen Negative Screen  Negative    Cannabinoids Urine Screen Negative Screen Negative    Cocaine Urine Screen Negative Screen Negative    Fentanyl Qual Urine Screen Negative Screen Negative    Opiates Urine Screen Negative Screen Negative    PCP Urine Screen Negative Screen Negative   TSH with free T4 reflex and/or T3 as indicated    Collection Time: 04/15/24  6:52 AM   Result Value Ref Range    TSH 2.34 0.30 - 4.20 uIU/mL   GGT    Collection Time: 04/15/24  6:52 AM   Result Value Ref Range    GGT 76 (H) 8 - 61 U/L   Lipid panel reflex to direct LDL    Collection Time: 04/15/24  6:52 AM   Result Value Ref Range    Cholesterol 168 <200 mg/dL    Triglycerides 864 (H) <150 mg/dL    Direct Measure HDL 39 (L) >=40 mg/dL    LDL Cholesterol Calculated      Non HDL Cholesterol 129 <130 mg/dL   LDL cholesterol direct    Collection Time: 04/15/24  6:52 AM   Result Value Ref Range    LDL Cholesterol Direct 34 <100 mg/dL              Consults:   Internal Medicine Initial Visit       Carlos Hamilton MRN# [id number]   YOB: 1980 Age: 43 year old   Date of Admission: 4/14/2024  PCP: Lucero Cardenas     Referring Provider: Behavioral Health - Mariam Mancini MD  Reason for Visit: General Medical Evaluation      Assessment and Recommendations:   Carlos Hamilton is a 43 year old year old male with a history of HTN, prediabetes, psoriatic arthritis, ankylosing spondylitis, GERD, brain aneurysm s/p clip (2013), seizures, and alcohol use disorder, who was admitted to station 3A seeking detoxification from alcohol.     Alcohol withdrawal   Alcohol use disorder  Drinking about 1/5 of vodka per day. Last drink yesterday morning. He does have a prior history of seizures (possibly withdrawal related?) that resolved after his cerebral aneurysm was clipped in 2013. MSSA 10 this shift. Mag, LFTs wnl.    - Continue on MSSA protocol with Valium as indicated   - Seizure precautions   - Management per Psychiatry   - Agree with MVI, folic acid, and thiamine  supplements     Hypertension: Maintained on lisinopril. BP acutely elevated in the setting of withdrawal.    - Continue PTA lisinopril 20 mg once daily   - Clonidine 0.1 mg TID PRN for SBP >170   - Anticipate his BP will improve as his withdrawal syndrome resolves. Please contact Medicine if SBP persistently >150 once he has completed withdrawal.      Addendum: Notified by RN that BP is persistently elevated despite AM lisinopril. His back pain is likely contributing to high BP as well. Will schedule clonidine 0.1 mg BID, add PRN hydralazine. Added lidocaine patches and Voltaren gel for pain relief. Recommend consideration of increased Suboxone dose.      Psoriatic arthritis  Ankylosing spondylitis  Chronic back pain  Follows with Rheumatology and Addiction Medicine. On Suboxone for pain. Maintained on Humira every 2 weeks, is a couple days behind on his injection.   - Suboxone mgmt per Psychiatry   - Tylenol, ibuprofen PRN for pain   - Resume Humira on discharge. Confirmed with PharmD that this is restricted to outpatient use so unable to order while he is here.      Prediabetes: Continue PTA metformin 500 mg once daily.      GERD: Continue PPI.         Currently the patient is medically stable and Medicine will sign off. Please do not hesitate to contact if new questions or concerns arise.         Isa Brody PA-C  [facility], [facility] Course:   Carlos Hamilton was admitted to Station 3A with attending Cheri Asif DO as a voluntary patient. The patient was placed under status 15 (15 minute checks) to ensure patient safety.   MSSA protocol was initiated due to the patient's history of alcohol abuse and concern for withdrawal symptoms. Admission labs ordered. Internal medicine consulted. PTA medications continued as appropriate. Patient expressed some frustrations with care and made statements of wanting to discharge prior to detox  being completed, he was offered opportunity to sign request to discharge but he declined and remained on unit.     The patient's symptoms of alcohol withdrawal improved. He was out of detox AM of 4/18 and requested to discharge home. He requested to be sent with PRN diazepam to utilize over next few days, was provided education on self tapering aspect of this medication and why it would not be prescribed on discharge. He planned to follow up with his outpatient care team including addiction medicine to discuss starting acamprosate and declined to start prior to discharge or on discharge. He also had some ongoing constipation and felt that the split dosing of suboxone that was attempted on admission to better cover his symptoms of chronic pain was making constipation worse and planned to resume taking 8mg daily and did not want to leave with any suboxone as he has supply at home, also sent senna/colace to home pharmacy for him to use on discharge and instructed pt to follow up with PCP.     Today Carlos Hamilton reports having no thoughts of harming self or others. In addition, he has notable risk factors for self-harm, including age, single status, anxiety, substance abuse, and comorbid medical condition of chronic pain. However, risk is mitigated by commitment to family, absence of past attempts, ability to volunteer a safety plan, history of seeking help when needed, and patient is future oriented. Therefore, based on all available evidence including the factors cited above, he does not appear to be at imminent risk for self-harm, does not meet criteria for a 72-hr hold, and therefore remains appropriate for ongoing outpatient level of care.    Carlos Hamilton was  discharged  to home. At the time of discharge Carlos Hamilton was determined to not be a danger to himself or others.          Discharge Medications:     Current Discharge Medication List        START taking these medications    Details   diclofenac  (VOLTAREN) 1 % topical gel Apply 4 g topically 4 times daily as needed for moderate pain  Qty: 100 g, Refills: 0    Associated Diagnoses: Chronic right-sided low back pain with right-sided sciatica      Lidocaine (LIDOCARE) 4 % Patch Place 2 patches onto the skin every 24 hours To prevent lidocaine toxicity, patient should be patch free for 12 hrs daily.  Qty: 60 patch, Refills: 0    Associated Diagnoses: Chronic right-sided low back pain with right-sided sciatica      senna-docusate (SENOKOT-S/PERICOLACE) 8.6-50 MG tablet Take 1 tablet by mouth 2 times daily as needed for constipation  Qty: 60 tablet, Refills: 0    Associated Diagnoses: Constipation, unspecified constipation type           CONTINUE these medications which have CHANGED    Details   buprenorphine HCl-naloxone HCl (SUBOXONE) 8-2 MG per film Place 1 Film under the tongue daily      folic acid (FOLVITE) 1 MG tablet Take 1 tablet (1 mg) by mouth daily  Qty: 30 tablet, Refills: 0    Associated Diagnoses: Alcohol dependence in remission (H)      gabapentin (NEURONTIN) 300 MG capsule Take 2 capsules (600 mg) by mouth 3 times daily  Qty: 180 capsule, Refills: 0    Associated Diagnoses: Chronic bilateral low back pain with left-sided sciatica      lisinopril (ZESTRIL) 20 MG tablet Take 1 tablet (20 mg) by mouth daily  Qty: 30 tablet, Refills: 0    Associated Diagnoses: Essential hypertension      metFORMIN (GLUCOPHAGE XR) 500 MG 24 hr tablet Take 1 tablet (500 mg) by mouth every morning  Qty: 30 tablet, Refills: 0    Associated Diagnoses: Pre-diabetes      multivitamin w/minerals (THERA-VIT-M) tablet Take 1 tablet by mouth daily  Qty: 30 tablet, Refills: 0    Associated Diagnoses: Alcohol dependence with withdrawal with complication (H)      omeprazole (PRILOSEC) 40 MG DR capsule Take 1 capsule (40 mg) by mouth daily  Qty: 30 capsule, Refills: 0    Associated Diagnoses: Gastroesophageal reflux disease without esophagitis      thiamine (B-1) 100 MG tablet Take  "1 tablet (100 mg) by mouth daily  Qty: 30 tablet, Refills: 0    Associated Diagnoses: Alcohol dependence in remission (H)           CONTINUE these medications which have NOT CHANGED    Details   adalimumab (HUMIRA *CF*) 40 MG/0.4ML pen kit Inject 0.4 mLs (40 mg) Subcutaneous every 14 days for 30 days Hold for signs of infection, then seek medical attention.  Qty: 1 each, Refills: 5    Comments: 1 EACH = 2 PENS = 1 Month  Associated Diagnoses: Psoriatic arthritis (H); Sacroiliitis (H24)      albuterol (VENTOLIN HFA) 108 (90 Base) MCG/ACT inhaler Inhale 2 puffs into the lungs every 6 hours as needed for wheezing  Qty: 18 g, Refills: 3    Comments: Pharmacy may dispense brand covered by insurance (Proair, or proventil or ventolin or generic albuterol inhaler)  Associated Diagnoses: Encounter for medication refill; Mild persistent asthma, unspecified whether complicated      tiZANidine (ZANAFLEX) 4 MG tablet TAKE ONE TABLET BY MOUTH EVERY FOUR HOURS  Qty: 540 tablet, Refills: 0    Associated Diagnoses: Encounter for medication refill      acetaminophen (TYLENOL) 325 MG tablet Take 650 mg by mouth every 6 hours as needed for mild pain or headaches           STOP taking these medications       multivitamin (ONE-DAILY) tablet Comments:   Reason for Stopping:                    Psychiatric Examination:   Appearance:  awake, alert and adequately groomed  Attitude:  cooperative  Eye Contact:  good  Mood:   \"okay\" some irritability present  Affect:  appropriate and in normal range and mood congruent  Speech:  clear, coherent and normal prosody  Psychomotor Behavior:  no evidence of tardive dyskinesia, dystonia, or tics  Thought Process:  goal oriented  Associations:  no loose associations  Thought Content:  no evidence of suicidal ideation or homicidal ideation and no evidence of psychotic thought  Insight:  fair  Judgment:  fair, adequate for safety   Oriented to:  time, person, and place  Attention Span and Concentration:  " intact  Recent and Remote Memory:  intact  Language: English, fluent  Fund of Knowledge: appropriate  Muscle Strength and Tone: normal  Gait and Station: ambulates independently          Discharge Plan:   Recommendation:  Please seek CD treatment and or assessment as needed and requested. Please follow any and all recommendations as needed and requested from your assessment or the treatment program you choose.      To schedule an assessment:  Call the [place] assessment center at [phone] and ask to schedule a Chemical Health Assessment.     You can also call your [facility] Dependency unit (these are usually included under adult mental health services). [facility] have a number for you to call to schedule an assessment.  If you have private insurance or a PMAP you can call the customer service number on your insurance  card and they can give you a list of places to call for an assessment that are in network with your  insurance.       To find a treatment program:      SAMHSA.gov   Click on find treatment   Enter your zip code and select your city/state.  The Substance Abuse and Mental Health Services Administration (SAMHSA) is the agency within  the U.S. Department of Health and Human Services that leads public health efforts to advance the  behavioral health of the nation. SAMHSA's mission is to reduce the impact of substance abuse and  mental illness on [place] communities.     [employer].org select substance use disorder programs then select find services.  [employer] is a virtual community and health care connection resource. We connect  individuals, families, mental health and substance use disorder providers, physicians, care coordinators,  and others with a real-time, searchable directory of mental health and substance use disorder resources  and their availability within [place].    Disposition: Return home    Follow-up Appointment:   You declined to set up a follow up appointment  stating you will contact her or walk-in to the clinic.  Follow up with Tiffanie Gonzalez  [facility]  [address], [address], [place] [address]  Get Directions  Phone: [phone]  Fax: [phone]    Attestation:  Patient has been seen and evaluated by me, Cheri Asif DO on day of discharge. 40 minutes were spent in coordination of discharge planning.

## 2024-04-18 NOTE — PLAN OF CARE
Problem: Substance Withdrawal  Goal: Substance Withdrawal  Description: Signs and symptoms of listed problems will be absent or manageable.  Outcome: Progressing   Goal Outcome Evaluation:       Pt.appeared asleep through the night after receiving second dose of prn trazodone. Breathing was even and unlabored. Complained of lower back pain. Received prn pain medication. No safety or behavioral concerns observed or reported. Will continue to monitor.

## 2024-04-18 NOTE — PLAN OF CARE
"Goal Outcome Evaluation:    Plan of Care Reviewed With: patient Plan of Care Reviewed With: patient    Overall Patient Progress: improvingOverall Patient Progress: improving    Outcome Evaluation: Pt is discharged.    Pt given copy of their discharge instructions and medication administration instructions. All discharge plans and labs were discussed with patient. Pt reports no questions at this time regarding discharge plans, labs or medications. Pt denies any suicidal ideation, plans or intent at this time. Patient discharge assisted via [facility] directly to the lob. Patient plan is to return home and resume AA. He declined to set up a follow up appointment prior to discharge stating he will either walk into the clinic to see if Tiffanie Gonzalez has an opening today or will set up an appointment after he returns home stating he has \"plenty\" of suboxone. Recommended discuss his high triglycerides at his follow up appointment. He is also aware he needs to  the senna from his home pharmacy. Patient is discharged at this time.        "

## 2024-04-18 NOTE — TELEPHONE ENCOUNTER
Please call Carlos and schedule a follow up virtual visit with me. Ok to use one of the [facility] 20 minute spots if this can help him get in sooner. Thank you     Lucero Cardenas MD

## 2024-04-18 NOTE — TELEPHONE ENCOUNTER
"Dr. Cardenas-Please review most recent update from patient. Would you like patient to schedule a follow up visit with you?  If so, is virtual or in-person preferred?    \"I was just released but still not feeling great. I rescheduled my appointment with Dr Balderas at 830 tomorrow morning. If you d like to check in with me just let me know. I truly appreciate your help during a difficult time. Thank you!! \"      Thank you!  MARYLU BetheaN, RN-BC  [facility]    "

## 2024-04-18 NOTE — PLAN OF CARE
Problem: Adult Inpatient Plan of Care  Goal: Plan of Care Review  Description: The Plan of Care Review/Shift note should be completed every shift.  The Outcome Evaluation is a brief statement about your assessment that the patient is improving, declining, or no change.  This information will be displayed automatically on your shift  note.  Outcome: Progressing     Problem: Substance Withdrawal  Goal: Substance Withdrawal  Description: Signs and symptoms of listed problems will be absent or manageable.  Outcome: Progressing   Goal Outcome Evaluation:    The patient reports noticing blood stains while wiping and attributes it to stress. Informed internal medicine about the patient's concerns, Internal medicine dvised monitoring for now. The patient has been highly agitated this evening, pacing the hallway, demanding to leave due to dissatisfaction with medication. Pt is demanding Valium despite not meeting the criteria per protocol. MSSA 6 and 6 this shift. Patient has already received a total of 150 mg of Valium since admission. Declined other PRN medications, stating that Gabapentin and Hydroxyzine would not be helpful. Patient also demanding for discharge. Offered a 72-hour intent-to-leave notice, which the patient declined. On-call psychiatric doctor has been notified. He is on SUBOXONE 4 mg x2 daily. Pt received scheduled SUBOXONE this shift. Received PRN traZODONE bed time.    Vitals: .BP 92/64 (BP Location: Left arm, Patient Position: Sitting, Cuff Size: Adult Regular)   Pulse 101   Temp 97  F (36.1  C) (Oral)   Resp 16   SpO2 95%

## 2024-04-19 ENCOUNTER — OFFICE VISIT (OUTPATIENT)
Dept: ADDICTION MEDICINE | Facility: CLINIC | Age: 44
End: 2024-04-19
Payer: COMMERCIAL

## 2024-04-19 ENCOUNTER — PATIENT OUTREACH (OUTPATIENT)
Dept: CARE COORDINATION | Facility: CLINIC | Age: 44
End: 2024-04-19

## 2024-04-19 VITALS — DIASTOLIC BLOOD PRESSURE: 83 MMHG | SYSTOLIC BLOOD PRESSURE: 140 MMHG | HEART RATE: 118 BPM

## 2024-04-19 DIAGNOSIS — K59.00 CONSTIPATION, UNSPECIFIED CONSTIPATION TYPE: ICD-10-CM

## 2024-04-19 DIAGNOSIS — F10.930 ALCOHOL WITHDRAWAL SYNDROME WITHOUT COMPLICATION (H): ICD-10-CM

## 2024-04-19 DIAGNOSIS — F11.20 SEVERE OPIOID USE DISORDER ON MAINTENANCE THERAPY (H): Primary | ICD-10-CM

## 2024-04-19 DIAGNOSIS — F41.1 ANXIETY STATE: ICD-10-CM

## 2024-04-19 DIAGNOSIS — F10.20 ALCOHOL USE DISORDER, SEVERE, DEPENDENCE (H): ICD-10-CM

## 2024-04-19 LAB
AMPHETAMINE QUAL URINE POCT: NEGATIVE
BARBITURATE QUAL URINE POCT: NEGATIVE
BENZODIAZEPINE QUAL URINE POCT: ABNORMAL
BUPRENORPHINE QUAL URINE POCT: ABNORMAL
COCAINE QUAL URINE POCT: NEGATIVE
CREATININE QUAL URINE POCT: ABNORMAL
INTERNAL QC QUAL URINE POCT: ABNORMAL
MDMA QUAL URINE POCT: NEGATIVE
METHADONE QUAL URINE POCT: NEGATIVE
METHAMPHETAMINE QUAL URINE POCT: NEGATIVE
OPIATE QUAL URINE POCT: NEGATIVE
OXYCODONE QUAL URINE POCT: NEGATIVE
PH QUAL URINE POCT: ABNORMAL
PHENCYCLIDINE QUAL URINE POCT: NEGATIVE
POCT KIT EXPIRATION DATE: ABNORMAL
POCT KIT LOT NUMBER: ABNORMAL
SPECIFIC GRAVITY POCT: 1.01
TEMPERATURE URINE POCT: ABNORMAL
THC QUAL URINE POCT: ABNORMAL

## 2024-04-19 PROCEDURE — 80305 DRUG TEST PRSMV DIR OPT OBS: CPT | Performed by: NURSE PRACTITIONER

## 2024-04-19 PROCEDURE — G2211 COMPLEX E/M VISIT ADD ON: HCPCS | Performed by: NURSE PRACTITIONER

## 2024-04-19 PROCEDURE — 99215 OFFICE O/P EST HI 40 MIN: CPT | Performed by: NURSE PRACTITIONER

## 2024-04-19 RX ORDER — DIAZEPAM 5 MG
TABLET ORAL
Qty: 4 TABLET | Refills: 0 | Status: SHIPPED | OUTPATIENT
Start: 2024-04-19 | End: 2024-04-22

## 2024-04-19 RX ORDER — POLYETHYLENE GLYCOL 3350 17 G/17G
1 POWDER, FOR SOLUTION ORAL DAILY
Qty: 578 G | Refills: 1 | Status: SHIPPED | OUTPATIENT
Start: 2024-04-19 | End: 2024-08-01

## 2024-04-19 RX ORDER — DOCUSATE SODIUM 100 MG/1
100 CAPSULE, LIQUID FILLED ORAL 2 TIMES DAILY
Qty: 60 CAPSULE | Refills: 0 | Status: ON HOLD | OUTPATIENT
Start: 2024-04-19 | End: 2024-08-14

## 2024-04-19 RX ORDER — ACAMPROSATE CALCIUM 333 MG/1
666 TABLET, DELAYED RELEASE ORAL 3 TIMES DAILY
Qty: 180 TABLET | Refills: 0 | Status: SHIPPED | OUTPATIENT
Start: 2024-04-19 | End: 2024-05-03

## 2024-04-19 NOTE — PROGRESS NOTES
[facility] Addiction Medicine    A/P                                                    ASSESSMENT/PLAN  1. Severe opioid use disorder on maintenance therapy (H)  - Controlled sustained remission.   - Continue Suboxone 4 mg BID, TDD 8 mg. No refill needed today. Revisit Sublocade for maintenance and eventual taper at follow up.   - Drugs of Abuse Screen Urine (POC CUPS) POCT  - naloxone (NARCAN) 4 MG/0.1ML nasal spray; Spray 1 spray (4 mg) into one nostril alternating nostrils as needed for opioid reversal every 2-3 minutes until assistance arrives  Dispense: 0.2 mL; Refill: 11    2. Alcohol use disorder, severe, dependence (H)  - Start Campral 666 mg TID. Reviewed MOA and common SE   - Continue Gabapentin 300- 600 mg TID PRN   - Continue thiamine, folic acid, and multivitamin.   - Resume individual psychotherapy  - Resume AA meetings   - Not interested in programmatic care at this time   - Continue thiamine, folic acid, and multivitamin.   - acamprosate (CAMPRAL) 333 MG EC tablet; Take 2 tablets (666 mg) by mouth 3 times daily  Dispense: 180 tablet; Refill: 0    3. Alcohol withdrawal syndrome without complication (H)  4. Anxiety state  - S/p detox admission from 4/14 - 4/18. Reports persistent tremor, shaking, severe anxiety causing difficulty working and thinking clearly. No issues with balance or coordination. Gait intact. Withdrawal symptoms are very triggering and causing persistent craving/desire to drink since discharge. Pt received Valium for detox 4/15 (70 mg), 4/16 (50 mg), 4/17 (20 mg). Reviewed this is a long acting medication and will continue to be metabolized even after last dose. However given current symptoms, Plan to extend valium taper at low dose for up to 3 days (10 mg x 1 day, 5 mg x 1 days, then 2.5 mg x 1 day). Reviewed risks of taking benzodiazepine in combination with buprenorphine. Pt verbalizes understanding. Do no take medications a the same time. Space doses by at least 4  hours.   - Ok to continue Gabapentin 300 - 600 mg TID PRN   - Continue thiamine, folic acid, and multivitamin.   - diazepam (VALIUM) 5 MG tablet; Take 1 tablet (5 mg) by mouth 2 times daily as needed for anxiety or withdrawal for 1 day, THEN 1 tablet (5 mg) daily as needed for anxiety or withdrawal for 1 day, THEN 0.5 tablets (2.5 mg) daily as needed for anxiety or withdrawal.  Dispense: 4 tablet; Refill: 0    5. Constipation, unspecified constipation type  - increase dietary fiber and daily water intake. Start miralax and colace as below. Once controlled, stop miralax and use only prn. Continue colace prn.   - docusate sodium (COLACE) 100 MG capsule; Take 1 capsule (100 mg) by mouth 2 times daily  Dispense: 60 capsule; Refill: 0  - polyethylene glycol (MIRALAX) 17 GM/Dose powder; Take 17 g (1 Capful) by mouth daily  Dispense: 578 g; Refill: 1        Continued Complex Management  The longitudinal plan of care for Opioid Use Disorder (OUD) and Alcohol Use Disorder (AUD) was addressed during this visit. Due to the added complexity in care, I will continue to support Carlos in the subsequent management and with ongoing continuity of care.      Last encounter A/P 4/12/24  - pt presents for initial visits referred by PCP for alcohol use. Reports return to use in Feb of 2024 after period of sustained remission for 5 years. Drinking up to 750 mL daily. Has taper use to 0.5 -1 pint hard alcohol daily. Primary recommendation for medical detox. Pt declines citing financial and time concerns, he does not have time off work to facilitate this. Does endorse h/o seizures. Possible DT's.   - Unable to start Naltrexone to facilitate slow taper of current alcohol use outpatient, naltrexone is contraindicated due to maintenance Suboxone.   - H/o OUD severe in sustained remission currently taking 6 mg buprenorphine daily. Pt prescribed 8 mg daily dose, encouraged him to take this dose as it does help to address chronic pain as  well as withdrawal symptoms he is citing. Long term pt is motivated to taper off buprenorphine. Briefly discussed transition to Sublocade to facilitate this.   - Consider Campral for AUD at follow up.   - Encouraged use of Gabapentin 600 mg TID for alcohol withdrawal symptoms, as pt plans to continue taper of alcohol at home. No refill needed at this time.   - Reviewed risks of alcohol withdrawal, including risk for death if alcohol use is stopped abruptly, especially given pt's h/o seizures (unknown if due to alcohol withdrawal) and possible DT history.   - encouraged pt to tae thiamine/folic acid and multivitamin supplements.      PDMP Review         Value Time User    State PDMP site checked  Yes 4/19/2024  9:05 AM Tiffanie Gonzalez APRN CNP          02/07/2024 01/17/2024 2 Buprenorphine-Nalox 8-2mg Film 30.00 30 Ev Pes [id number] Sup (3198) 0/1 8.00 mg       RTC  Return in about 2 weeks (around 5/3/2024) for Follow up, with me, using a video visit.      Counseled the patient on the importance of having a recovery program in addition to medication to manage recovery.  Components include avoiding isolating, having willingness to change, avoiding triggers and managing cravings. Encouraged having some type of sober network and practicing honesty with trusted support person(s). Encouraged other services such as counseling, 12 step or other self-help organizations.      Opioid warning reviewed.  Risk of overdose following a period of abstinence due to decrease tolerance was discussed including risk of death.  Strongly recommended abstain from alcohol, benzodiazepines, THC, opioids and other drugs of abuse.  Increased risk of return to opioid use after use of these substances discussed.  Increased risk of overdose/death with use of other substances particularly benzodiazepines/alcohol reviewed.        SUBJECTIVE                                                      Visit performed In Person, face-to-face    HPI: Carlos SILVEIRA  Joy is a 43 year old male with history of alcohol use disorder, opioid use disorder on Suboxone, MARY, and MDD who presents for follow up.     Brief Hx:    Referred from PCP due to alcohol use. At time of initial visit, sustained remission for almost 5 years, return to alcohol use in February 2024.  Alcohol use escalated quickly, was drinking 1/5 of 1 L per day, up to 3/4 of 1 L. Has been able to taper his use, now drinking 1/2 pint per day. Has not taken medication for AUD in the past. Unable to stop drinking on own. H/o seizures prior to aneurysm. Had brain surgery, aneurysm clip in 2013. No seizures since that time. Does not think seizures were due to alcohol withdrawal. Reports potential h/o DT's, he was in detox at the time, reports he had auditory hallucinations and shaking. Declined detox when taken to ED on 3/25/24 at [facility] and later declines detox admission in ED at [facility] . H/o seizures and possible DT's. Last drink 4/14/24. Presented for detox admission 4/14/- 4/18.      Started Suboxone when he started treatment in 2019. He has decreased his Suboxone dose in attempts to taper, had decreased to 4 mg daily, but experiencing adverse SE. Now taking 6 mg per day but prescribed 8 mg daily. previously on 24 mg daily in TID dosing, some pain relief but reports the medication has never been that effective for his pain. Not followed by pain medicine. Has h/o of RA, taking Humira for the past 6 months.        Substance Use History:   ALCOHOL - last use 04/11/2024 about a pint of Vodka.   CANNABIS - Pt was on Medical marijuana for about a year stopped about 2 months ago.   PRESCRIPTION STIMULANTS (includes Ritalin, Adderall, Vyvanse) - Pt has tried when he was younger, reports that he didn't abuse them or take daily   COCAINE/CRACK - Pt reports that he has tried it before about 25 years ago  METH/AMPHETAMINES (includes ecstasy, MDMA/argelia) - Pt reports trying it before in his late teens early  twenties   OPIATES - Yes, pt was in pain management for a while about 5 years started around 2012  BENZODIAZEPINES (includes Ativan, Klonopin, Xanax) - Pt was prescribed when he was still a teenager doesn't remember why  KRATOM (mild opioid and stimulant effects) - Pt denies  KETAMINE - pt denies  HALLUCINOGENS (includes DXM) - Yes about 25 years ago  BEHAVIORAL (Gambling, Eating d/o, Compulsivity) - None  History of treatment - no  NICOTINE  Cigarettes: yes  Chew/snus: no  Vaping: yes  Past NRT/medication use: no        Previous withdrawal treatment episodes (e.g. detox): Yes about 2-3 times  Previous EDUAR treatment programs: Yes, about 2 programs 2019 after pt got out of inpatient at [facility], pt did an outpatient program called [facility].   Hospitalizations or overdose: Yes  Medical complications from substance use: No  IV Drug use?: Pt denies  Previous Medication for Addiction Tx: Yes- ativan and Suboxone   Longest period of full abstinence: about 5 years ago   Activities that have previously supported abstinence: AA meetings  Current Recovery Activities: None    Psychiatric History (per patient report and problem list review)  Past diagnoses - None  Current or past psychiatrist: None  Current or past therapist:  none  Hospitalizations/TMS/ECT - No  Suicide Attempts - none  Medication trials - None     SOCIAL HISTORY:  Housing status: with roommates   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 yes      TODAY'S VISIT  [facility] Apr 19, 2024  - here today for follow up, not doing well. Reports last drink 4/14/24. Decided to go to detox, was not able to continue to taper alcohol use at home. Was drinking more than he intended, unable to stop or cut back. He was fed up with this and very motivated to stop drinking. Decided to go to detox on his day off. admission went ok,  reports 95% good but is not happy with the some of the care he received. He was not aware he would need to stay in hospital for 24 hrs after last valium dose. Feels the valium taper should have been extended. Reports since discharge he has continued to experience severe anxiety, tremor, shaking, cloudy thoughts. He does not wan to drink but these physical symptoms have been so persistent and bothersome he feels the only thing that will help is having a drink. has tried Gabapentin 600 mg TID for alcohol withdrawal symptoms, at times taking more. Report minimal benefit from this for withdrawal symptoms.   - noticed during hospital admission some blood in stool, more with wiping, bright red. Reports constipation. Has h/o hemorrhoids, painful. Some dark streaks, unsure if old blood. He was prescribed senna and colace in the hospital for this. Also using hemorrhoid wipes   - has been taking Suboxone 8 mg daily. Wondering if he should take BID or once daily. Persistent pain. Needs rheumatology follow up as well.     Admission medications administration reviewed:   4/15 - 70 mg Valium total   4/16 -  50 mg Valium total   4/17 -  20 mg valium total     OBJECTIVE  PHYSICAL EXAM:  BP (!) 140/83 (BP Location: Right arm, Patient Position: Sitting, Cuff Size: Adult Regular)   Pulse 118     Physical Exam  Constitutional:       General: He is awake.      Appearance: Normal appearance. He is normal weight. He is ill-appearing. He is not diaphoretic.   Eyes:      General: No scleral icterus.     Extraocular Movements: Extraocular movements intact.      Conjunctiva/sclera: Conjunctivae normal.      Pupils: Pupils are equal, round, and reactive to light.   Cardiovascular:      Rate and Rhythm: Tachycardia present.   Pulmonary:      Effort: Pulmonary effort is normal.   Skin:     Coloration: Skin is not jaundiced.   Neurological:      Mental Status: He is alert and oriented to person, place, and time.      Motor: Tremor present. No  weakness.      Coordination: Coordination normal.      Gait: Gait normal.   Psychiatric:         Attention and Perception: Attention and perception normal. He does not perceive auditory or visual hallucinations.         Mood and Affect: Mood is anxious.         Speech: Speech normal. Speech is not rapid and pressured.         Behavior: Behavior is cooperative.         Thought Content: Thought content normal. Thought content does not include suicidal ideation. Thought content does not include suicidal plan.         Cognition and Memory: Cognition and memory normal.      Comments: Insight and judgment fair              PHQ-9 Score:       1/16/2024     7:44 PM 3/17/2024     1:58 PM 4/11/2024     6:44 PM   PHQ   PHQ-9 Total Score 12 18 8   Q9: Thoughts of better off dead/self-harm past 2 weeks Not at all Not at all Not at all       MARY-7 Score:      4/3/2023    11:50 AM 6/5/2023     9:38 AM 9/13/2023     7:39 AM   MARY-7 SCORE   Total Score 21 (severe anxiety) 16 (severe anxiety) 21 (severe anxiety)   Total Score 21 16 21       LABS (may not contain today's labs)                                                      Today's lab data  Results for orders placed or performed in visit on 04/19/24   Drugs of Abuse Screen Urine (POC CUPS) POCT     Status: Abnormal   Result Value Ref Range    POCT Kit Lot Number [id number]     POCT Kit Expiration Date 2025-10-23     Temperature Urine POCT 92 F 90 F, 92 F, 94 F, 96 F, 98 F, 100 F    Specific [place] POCT 1.015 1.005, 1.015, 1.025    pH Qual Urine POCT 7 pH 4 pH, 5 pH, 7 pH, 9 pH    Creatinine Qual Urine POCT 100 mg/dL 20 mg/dL, 50 mg/dL, 100 mg/dL, 200 mg/dL    Internal QC Qual Urine POCT Valid Valid    Amphetamine Qual Urine POCT Negative Negative    Barbiturate Qual Urine POCT Negative Negative    Buprenorphine Qual Urine POCT Screen Positive (A) Negative    Benzodiazepine Qual Urine POCT Screen Positive (A) Negative    Cocaine Qual Urine POCT Negative Negative     Methamphetamine Qual Urine POCT Negative Negative    MDMA Qual Urine POCT Negative Negative    Methadone Qual Urine POCT Negative Negative    Opiate Qual Urine POCT Negative Negative    Oxycodone Qual Urine POCT Negative Negative    Phencyclidine Qual Urine POCT Negative Negative    THC Qual Urine POCT Screen Positive (A) Negative       HISTORY                                                    Problem list reviewed & adjusted, as indicated.  Patient Active Problem List   Diagnosis    Degeneration of lumbar/lumbosacral disc without myelopathy    Chronic right-sided low back pain with right-sided sciatica    Anxiety state    MARY (generalized anxiety disorder)    Insomnia, unspecified type    Alcohol dependence in remission (H)    Substance abuse in remission (H)    Mixed dyslipidemia    Mild persistent asthma, unspecified whether complicated    Hx of avascular necrosis of capital femoral epiphysis    Iron deficiency anemia secondary to inadequate dietary iron intake    HLA B27 (HLA B27 positive)    Severe opioid use disorder on maintenance therapy (H)    Tobacco dependence syndrome    Chronic obstructive pulmonary disease, unspecified COPD type (H)    Adjustment disorder with depressed mood    Essential hypertension    Prediabetes    Nonintractable headache, unspecified chronicity pattern, unspecified headache type    History of cerebral aneurysm    Brain aneurysm    Major depressive disorder, recurrent episode, moderate (H)    F11.2 - Continuous opioid dependence (H)    Ankylosing spondylitis of sacral region (H)    Alcohol abuse         MEDICATION LIST (prior to visit)  Current Outpatient Medications   Medication Sig Dispense Refill    acamprosate (CAMPRAL) 333 MG EC tablet Take 2 tablets (666 mg) by mouth 3 times daily 180 tablet 0    acetaminophen (TYLENOL) 325 MG tablet Take 650 mg by mouth every 6 hours as needed for mild pain or headaches      adalimumab (HUMIRA *CF*) 40 MG/0.4ML pen kit Inject 0.4 mLs (40 mg)  Subcutaneous every 14 days for 30 days Hold for signs of infection, then seek medical attention. 1 each 5    albuterol (VENTOLIN HFA) 108 (90 Base) MCG/ACT inhaler Inhale 2 puffs into the lungs every 6 hours as needed for wheezing 18 g 3    buprenorphine HCl-naloxone HCl (SUBOXONE) 8-2 MG per film Place 1 Film under the tongue daily      diazepam (VALIUM) 5 MG tablet Take 1 tablet (5 mg) by mouth 2 times daily as needed for anxiety or withdrawal for 1 day, THEN 1 tablet (5 mg) daily as needed for anxiety or withdrawal for 1 day, THEN 0.5 tablets (2.5 mg) daily as needed for anxiety or withdrawal. 4 tablet 0    diclofenac (VOLTAREN) 1 % topical gel Apply 4 g topically 4 times daily as needed for moderate pain 100 g 0    docusate sodium (COLACE) 100 MG capsule Take 1 capsule (100 mg) by mouth 2 times daily 60 capsule 0    folic acid (FOLVITE) 1 MG tablet Take 1 tablet (1 mg) by mouth daily 30 tablet 0    gabapentin (NEURONTIN) 300 MG capsule Take 2 capsules (600 mg) by mouth 3 times daily 180 capsule 0    Lidocaine (LIDOCARE) 4 % Patch Place 2 patches onto the skin every 24 hours To prevent lidocaine toxicity, patient should be patch free for 12 hrs daily. 60 patch 0    lisinopril (ZESTRIL) 20 MG tablet Take 1 tablet (20 mg) by mouth daily 30 tablet 0    metFORMIN (GLUCOPHAGE XR) 500 MG 24 hr tablet Take 1 tablet (500 mg) by mouth every morning 30 tablet 0    naloxone (NARCAN) 4 MG/0.1ML nasal spray Spray 1 spray (4 mg) into one nostril alternating nostrils as needed for opioid reversal every 2-3 minutes until assistance arrives 0.2 mL 11    omeprazole (PRILOSEC) 40 MG DR capsule Take 1 capsule (40 mg) by mouth daily 30 capsule 0    polyethylene glycol (MIRALAX) 17 GM/Dose powder Take 17 g (1 Capful) by mouth daily 578 g 1    senna-docusate (SENOKOT-S/PERICOLACE) 8.6-50 MG tablet Take 1 tablet by mouth 2 times daily as needed for constipation 60 tablet 0    thiamine (B-1) 100 MG tablet Take 1 tablet (100 mg) by mouth  daily 30 tablet 0    tiZANidine (ZANAFLEX) 4 MG tablet TAKE ONE TABLET BY MOUTH EVERY FOUR HOURS (Patient taking differently: Take 4 mg by mouth every 4 hours as needed) 540 tablet 0    multivitamin w/minerals (THERA-VIT-M) tablet Take 1 tablet by mouth daily 30 tablet 0     No current facility-administered medications for this visit.       MEDICATION LIST (after visit)  Current Outpatient Medications   Medication Sig Dispense Refill    acamprosate (CAMPRAL) 333 MG EC tablet Take 2 tablets (666 mg) by mouth 3 times daily 180 tablet 0    acetaminophen (TYLENOL) 325 MG tablet Take 650 mg by mouth every 6 hours as needed for mild pain or headaches      adalimumab (HUMIRA *CF*) 40 MG/0.4ML pen kit Inject 0.4 mLs (40 mg) Subcutaneous every 14 days for 30 days Hold for signs of infection, then seek medical attention. 1 each 5    albuterol (VENTOLIN HFA) 108 (90 Base) MCG/ACT inhaler Inhale 2 puffs into the lungs every 6 hours as needed for wheezing 18 g 3    buprenorphine HCl-naloxone HCl (SUBOXONE) 8-2 MG per film Place 1 Film under the tongue daily      diazepam (VALIUM) 5 MG tablet Take 1 tablet (5 mg) by mouth 2 times daily as needed for anxiety or withdrawal for 1 day, THEN 1 tablet (5 mg) daily as needed for anxiety or withdrawal for 1 day, THEN 0.5 tablets (2.5 mg) daily as needed for anxiety or withdrawal. 4 tablet 0    diclofenac (VOLTAREN) 1 % topical gel Apply 4 g topically 4 times daily as needed for moderate pain 100 g 0    docusate sodium (COLACE) 100 MG capsule Take 1 capsule (100 mg) by mouth 2 times daily 60 capsule 0    folic acid (FOLVITE) 1 MG tablet Take 1 tablet (1 mg) by mouth daily 30 tablet 0    gabapentin (NEURONTIN) 300 MG capsule Take 2 capsules (600 mg) by mouth 3 times daily 180 capsule 0    Lidocaine (LIDOCARE) 4 % Patch Place 2 patches onto the skin every 24 hours To prevent lidocaine toxicity, patient should be patch free for 12 hrs daily. 60 patch 0    lisinopril (ZESTRIL) 20 MG tablet  Take 1 tablet (20 mg) by mouth daily 30 tablet 0    metFORMIN (GLUCOPHAGE XR) 500 MG 24 hr tablet Take 1 tablet (500 mg) by mouth every morning 30 tablet 0    naloxone (NARCAN) 4 MG/0.1ML nasal spray Spray 1 spray (4 mg) into one nostril alternating nostrils as needed for opioid reversal every 2-3 minutes until assistance arrives 0.2 mL 11    omeprazole (PRILOSEC) 40 MG DR capsule Take 1 capsule (40 mg) by mouth daily 30 capsule 0    polyethylene glycol (MIRALAX) 17 GM/Dose powder Take 17 g (1 Capful) by mouth daily 578 g 1    senna-docusate (SENOKOT-S/PERICOLACE) 8.6-50 MG tablet Take 1 tablet by mouth 2 times daily as needed for constipation 60 tablet 0    thiamine (B-1) 100 MG tablet Take 1 tablet (100 mg) by mouth daily 30 tablet 0    tiZANidine (ZANAFLEX) 4 MG tablet TAKE ONE TABLET BY MOUTH EVERY FOUR HOURS (Patient taking differently: Take 4 mg by mouth every 4 hours as needed) 540 tablet 0    multivitamin w/minerals (THERA-VIT-M) tablet Take 1 tablet by mouth daily 30 tablet 0     No current facility-administered medications for this visit.         Allergies   Allergen Reactions    Diphenhydramine Swelling     Per patient, tongue/lips swelling, itchy eyes with both generic diphenhydramine and brand Benadryl     Naproxen Swelling    Pistachio Nut Extract     Toradol [Ketorolac] Itching       I sent a total of 40 minutes today, on the care of this patient. This consisted  of face-to-face time as well as time spent on pre-visit and post-visit activities including chart review, results review, and documentation. Tiffanie Gonzalez CNP.     DAVID Morales [facility] Addiction Medicine  [phone]

## 2024-04-19 NOTE — PROGRESS NOTES
[facility] - Addiction Medicine       Rooming information: Pt reports experiencing withdrawal symptoms, shaky, hot and cold sweats and body aches.    Point of care urine drug screen positive for:  Lab Results   Component Value Date    BUP Screen Positive (A) 04/19/2024    BZO Screen Positive (A) 04/19/2024    BAR Negative 04/19/2024    DEEPAK Negative 04/19/2024    MAMP Negative 04/19/2024    AMP Negative 04/19/2024    MDMA Negative 04/19/2024    MTD Negative 04/19/2024    MEG144 Negative 04/19/2024    OXY Negative 04/19/2024    PCP Negative 04/19/2024    THC Screen Positive (A) 04/19/2024    TEMP 92 F 04/19/2024    SGPOCT 1.015 04/19/2024       *POC urine drug screen does not screen for Fentanyl    PHQ-9 Scores:       1/16/2024     7:44 PM 3/17/2024     1:58 PM 4/11/2024     6:44 PM   PHQ   PHQ-9 Total Score 12 18 8   Q9: Thoughts of better off dead/self-harm past 2 weeks Not at all Not at all Not at all     MARY-7 Scores:      4/3/2023    11:50 AM 6/5/2023     9:38 AM 9/13/2023     7:39 AM   MARY-7 SCORE   Total Score 21 (severe anxiety) 16 (severe anxiety) 21 (severe anxiety)   Total Score 21 16 21       Any other recent substance use:     Denies    NICOTINE-Yes: cigarettes and Vaping  If using nicotine, ready to quit? No    Side effects related to medications pt would like to discuss with provider (constipation, dry mouth, HA, GI upset, sedation?) Yes: hiccups     Narcan currently available: No    Primary care provider: Lucero Cardenas MD     Mental health provider: No (follow up on MH referral if needed)    Any housing, insurance deficits?: Stable    Contact information up to date? Yes    3rd Party Involvement not today (please obtain VIDAL if pt would like to include)    Jeet Light MA  April 19, 2024  8:26 AM

## 2024-04-19 NOTE — PROGRESS NOTES
Connected Care Resource Center: [facility]    Background: Transitional Care Management program identified per system criteria and reviewed by [facility] team for possible outreach.    Assessment: Upon chart review, CCR Team member will not proceed with patient outreach related to this episode of Transitional Care Management program due to reason below:    Patient has active communication with a nurse, provider or care team for reason of post-hospital follow up plan.  Outreach call by CCRC team not indicated to minimize duplicative efforts.     Plan: Transitional Care Management episode addressed appropriately per reason noted above.      MICHELLE Sam  [facility] [place], [facility]    *Connected Care Resource Team does NOT follow patient ongoing. Referrals are identified based on internal discharge reports and the outreach is to ensure patient has an understanding of their discharge instructions.

## 2024-04-25 ENCOUNTER — MYC MEDICAL ADVICE (OUTPATIENT)
Dept: FAMILY MEDICINE | Facility: CLINIC | Age: 44
End: 2024-04-25
Payer: COMMERCIAL

## 2024-05-01 PROBLEM — F11.20 SEVERE OPIOID USE DISORDER ON MAINTENANCE THERAPY (H): Status: RESOLVED | Noted: 2021-08-16 | Resolved: 2024-05-01

## 2024-05-03 ENCOUNTER — VIRTUAL VISIT (OUTPATIENT)
Dept: ADDICTION MEDICINE | Facility: CLINIC | Age: 44
End: 2024-05-03
Payer: COMMERCIAL

## 2024-05-03 DIAGNOSIS — F41.1 GAD (GENERALIZED ANXIETY DISORDER): ICD-10-CM

## 2024-05-03 DIAGNOSIS — M54.42 CHRONIC BILATERAL LOW BACK PAIN WITH LEFT-SIDED SCIATICA: ICD-10-CM

## 2024-05-03 DIAGNOSIS — F33.1 MAJOR DEPRESSIVE DISORDER, RECURRENT EPISODE, MODERATE (H): ICD-10-CM

## 2024-05-03 DIAGNOSIS — F11.20 SEVERE OPIOID USE DISORDER ON MAINTENANCE THERAPY (H): ICD-10-CM

## 2024-05-03 DIAGNOSIS — F10.20 ALCOHOL USE DISORDER, SEVERE, DEPENDENCE (H): Primary | ICD-10-CM

## 2024-05-03 DIAGNOSIS — G89.29 CHRONIC BILATERAL LOW BACK PAIN WITH LEFT-SIDED SCIATICA: ICD-10-CM

## 2024-05-03 PROCEDURE — 99214 OFFICE O/P EST MOD 30 MIN: CPT | Mod: 95 | Performed by: NURSE PRACTITIONER

## 2024-05-03 PROCEDURE — G2211 COMPLEX E/M VISIT ADD ON: HCPCS | Mod: 95 | Performed by: NURSE PRACTITIONER

## 2024-05-03 RX ORDER — PROPRANOLOL HYDROCHLORIDE 10 MG/1
10-20 TABLET ORAL DAILY PRN
Qty: 60 TABLET | Refills: 0 | Status: SHIPPED | OUTPATIENT
Start: 2024-05-03 | End: 2024-08-12

## 2024-05-03 RX ORDER — ACAMPROSATE CALCIUM 333 MG/1
666 TABLET, DELAYED RELEASE ORAL 3 TIMES DAILY
Qty: 180 TABLET | Refills: 0 | Status: SHIPPED | OUTPATIENT
Start: 2024-05-03 | End: 2024-06-24

## 2024-05-03 RX ORDER — BUPRENORPHINE AND NALOXONE 8; 2 MG/1; MG/1
1 FILM, SOLUBLE BUCCAL; SUBLINGUAL DAILY
Qty: 30 FILM | Refills: 0 | Status: SHIPPED | OUTPATIENT
Start: 2024-05-22 | End: 2024-06-12

## 2024-05-03 RX ORDER — GABAPENTIN 300 MG/1
600 CAPSULE ORAL 3 TIMES DAILY
Qty: 180 CAPSULE | Refills: 0 | Status: SHIPPED | OUTPATIENT
Start: 2024-05-03

## 2024-05-03 RX ORDER — BUSPIRONE HYDROCHLORIDE 5 MG/1
5 TABLET ORAL 2 TIMES DAILY
Qty: 30 TABLET | Refills: 0 | Status: SHIPPED | OUTPATIENT
Start: 2024-05-03 | End: 2024-05-20

## 2024-05-03 ASSESSMENT — PAIN SCALES - GENERAL: PAINLEVEL: EXTREME PAIN (8)

## 2024-05-03 NOTE — PROGRESS NOTES
Virtual Visit Details    Type of service:  Video Visit   Start: 1:33 PM  Stop: 1:53 PM  Originating Location (pt. Location): Other work privacy verified  Distant Location (provider location):  On-site  Platform used for Video Visit: [employer] [facility]    A/P                                                    ASSESSMENT/PLAN  1. Alcohol use disorder, severe, dependence (H)  - Confirms date of last use 4/14/24. Overall cravings well controlled. Continue Campral 666 mg TID.   - Continue Gabapentin 600 mg TID   - Resume individual psychotherapy  - AA meetings   - Continue thiamine, folic acid, and multivitamin.  - acamprosate (CAMPRAL) 333 MG EC tablet; Take 2 tablets (666 mg) by mouth 3 times daily  Dispense: 180 tablet; Refill: 0    2. Severe opioid use disorder on maintenance therapy (H)  - Controlled sustained remission.   - Continue Suboxone 4 mg BID, TDD 8 mg.  Revisit Sublocade for maintenance and eventual taper at follow up.   - buprenorphine HCl-naloxone HCl (SUBOXONE) 8-2 MG per film; Place 1 Film under the tongue daily  Dispense: 30 Film; Refill: 0    3. Chronic bilateral low back pain with left-sided sciatica  - following with PCP. Continue Suboxone and Gabapentin as below.   - gabapentin (NEURONTIN) 300 MG capsule; Take 2 capsules (600 mg) by mouth 3 times daily  Dispense: 180 capsule; Refill: 0  - buprenorphine HCl-naloxone HCl (SUBOXONE) 8-2 MG per film; Place 1 Film under the tongue daily  Dispense: 30 Film; Refill: 0    4. MARY (generalized anxiety disorder)  - Persistent, needs improvement. Start Buspar 5 mg BID, titrate as needed.   - Propranolol 10-20 mg daily prn for known anxiety provoking situations (ie dentist)   - Continue Gabapentin 600 mg TID  Referral to psychiatry for further eval and management.   - busPIRone (BUSPAR) 5 MG tablet; Take 1 tablet (5 mg) by mouth 2 times daily  Dispense: 30 tablet; Refill: 0  - Adult Mental Health [facility] Referral; Future  -  propranolol (INDERAL) 10 MG tablet; Take 1-2 tablets (10-20 mg) by mouth daily as needed (take 30-60 minutes prior to know anxiety provoking situation)  Dispense: 60 tablet; Refill: 0    5. Major depressive disorder, recurrent episode, moderate (H)  Referral to psychiatry for further eval and management.   - Adult Mental Health  Referral; Future       Continued Complex Management  The longitudinal plan of care for Opioid Use Disorder (OUD) and Alcohol Use Disorder (AUD) was addressed during this visit. Due to the added complexity in care, I will continue to support Carlos in the subsequent management and with ongoing continuity of care.      Last encounter A/P 4/19/24  1. Severe opioid use disorder on maintenance therapy (H)  - Controlled sustained remission.   - Continue Suboxone 4 mg BID, TDD 8 mg. No refill needed today. Revisit Sublocade for maintenance and eventual taper at follow up.   - Drugs of Abuse Screen Urine (POC CUPS) POCT  - naloxone (NARCAN) 4 MG/0.1ML nasal spray; Spray 1 spray (4 mg) into one nostril alternating nostrils as needed for opioid reversal every 2-3 minutes until assistance arrives  Dispense: 0.2 mL; Refill: 11     2. Alcohol use disorder, severe, dependence (H)  - Start Campral 666 mg TID. Reviewed MOA and common SE   - Continue Gabapentin 300- 600 mg TID PRN   - Resume individual psychotherapy  - Resume AA meetings   - Not interested in programmatic care at this time   - Continue thiamine, folic acid, and multivitamin.   - acamprosate (CAMPRAL) 333 MG EC tablet; Take 2 tablets (666 mg) by mouth 3 times daily  Dispense: 180 tablet; Refill: 0     3. Alcohol withdrawal syndrome without complication (H)  4. Anxiety state  - S/p detox admission from 4/14 - 4/18. Reports persistent tremor, shaking, severe anxiety causing difficulty working and thinking clearly. No issues with balance or coordination. Gait intact. Withdrawal symptoms are very triggering and causing persistent  craving/desire to drink since discharge. Pt received Valium for detox 4/15 (70 mg), 4/16 (50 mg), 4/17 (20 mg). Reviewed this is a long acting medication and will continue to be metabolized even after last dose. However given current symptoms, Plan to extend valium taper at low dose for up to 3 days (10 mg x 1 day, 5 mg x 1 days, then 2.5 mg x 1 day). Reviewed risks of taking benzodiazepine in combination with buprenorphine. Pt verbalizes understanding. Do no take medications a the same time. Space doses by at least 4 hours.   - Ok to continue Gabapentin 300 - 600 mg TID PRN   - Continue thiamine, folic acid, and multivitamin.   - diazepam (VALIUM) 5 MG tablet; Take 1 tablet (5 mg) by mouth 2 times daily as needed for anxiety or withdrawal for 1 day, THEN 1 tablet (5 mg) daily as needed for anxiety or withdrawal for 1 day, THEN 0.5 tablets (2.5 mg) daily as needed for anxiety or withdrawal.  Dispense: 4 tablet; Refill: 0     5. Constipation, unspecified constipation type  - increase dietary fiber and daily water intake. Start miralax and colace as below. Once controlled, stop miralax and use only prn. Continue colace prn.   - docusate sodium (COLACE) 100 MG capsule; Take 1 capsule (100 mg) by mouth 2 times daily  Dispense: 60 capsule; Refill: 0  - polyethylene glycol (MIRALAX) 17 GM/Dose powder; Take 17 g (1 Capful) by mouth daily  Dispense: 578 g; Refill: 1      PDMP Review         Value Time User    State PDMP site checked  Yes 5/3/2024  1:48 PM Tiffanie Gonzalez APRN CNP          04/22/2024 01/17/2024 4 Buprenorphine-Nalox 8-2mg Film 30.00 30     RTC  Return in about 4 weeks (around 5/31/2024) for Follow up, with me, in person.      Counseled the patient on the importance of having a recovery program in addition to medication to manage recovery.  Components include avoiding isolating, having willingness to change, avoiding triggers and managing cravings. Encouraged having some type of sober network and practicing  honesty with trusted support person(s). Encouraged other services such as counseling, 12 step or other self-help organizations.      Opioid warning reviewed.  Risk of overdose following a period of abstinence due to decrease tolerance was discussed including risk of death.  Strongly recommended abstain from alcohol, benzodiazepines, THC, opioids and other drugs of abuse.  Increased risk of return to opioid use after use of these substances discussed.  Increased risk of overdose/death with use of other substances particularly benzodiazepines/alcohol reviewed.        SUBJECTIVE                                                      HPI: Carlos Hamilton is a 43 year old male with history of alcohol use disorder, opioid use disorder on Suboxone, MARY, and MDD who presents for follow up.      Brief Hx:    Referred from PCP due to alcohol use. At time of initial visit, sustained remission for almost 5 years, return to alcohol use in February 2024.  Alcohol use escalated quickly, was drinking 1/5 of 1 L per day, up to 3/4 of 1 L. Has been able to taper his use, now drinking 1/2 pint per day. Has not taken medication for AUD in the past. Unable to stop drinking on own. H/o seizures prior to aneurysm. Had brain surgery, aneurysm clip in 2013. No seizures since that time. Does not think seizures were due to alcohol withdrawal. Reports potential h/o DT's, he was in detox at the time, reports he had auditory hallucinations and shaking. Declined detox when taken to ED on 3/25/24 at [place] and later declines detox admission in ED at [facility] . H/o seizures and possible DT's. Last drink 4/14/24. Presented for detox admission 4/14/- 4/18.      Started Suboxone when he started treatment in 2019. He has decreased his Suboxone dose in attempts to taper, had decreased to 4 mg daily, but experiencing adverse SE. Now taking 6 mg per day but prescribed 8 mg daily. previously on 24 mg daily in TID dosing, some pain relief but  reports the medication has never been that effective for his pain. Not followed by pain medicine. Has h/o of RA, taking Humira for the past 6 months.         Substance Use History:   ALCOHOL - last use 04/11/2024 about a pint of Vodka.   CANNABIS - Pt was on Medical marijuana for about a year stopped about 2 months ago.   PRESCRIPTION STIMULANTS (includes Ritalin, Adderall, Vyvanse) - Pt has tried when he was younger, reports that he didn't abuse them or take daily   COCAINE/CRACK - Pt reports that he has tried it before about 25 years ago  METH/AMPHETAMINES (includes ecstasy, MDMA/argelia) - Pt reports trying it before in his late teens early twenties   OPIATES - Yes, pt was in pain management for a while about 5 years started around 2012  BENZODIAZEPINES (includes Ativan, Klonopin, Xanax) - Pt was prescribed when he was still a teenager doesn't remember why  KRATOM (mild opioid and stimulant effects) - Pt denies  KETAMINE - pt denies  HALLUCINOGENS (includes DXM) - Yes about 25 years ago  BEHAVIORAL (Gambling, Eating d/o, Compulsivity) - None  History of treatment - no  NICOTINE  Cigarettes: yes  Chew/snus: no  Vaping: yes  Past NRT/medication use: no        Previous withdrawal treatment episodes (e.g. detox): Yes about 2-3 times  Previous EDUAR treatment programs: Yes, about 2 programs 2019 after pt got out of inpatient at [facility], pt did an outpatient program called [facility].   Hospitalizations or overdose: Yes  Medical complications from substance use: No  IV Drug use?: Pt denies  Previous Medication for Addiction Tx: Yes- ativan and Suboxone   Longest period of full abstinence: about 5 years ago   Activities that have previously supported abstinence: AA meetings  Current Recovery Activities: None     Psychiatric History (per patient report and problem list review)  Past diagnoses - None  Current or past psychiatrist: None  Current or past therapist:  none  Hospitalizations/TMS/ECT - No  Suicide Attempts -  none  Medication trials - None     SOCIAL HISTORY:  Housing status: with roommates   Employment status: Employed full time- [employer]    Relationship status: Single  Children: 0  Legal concerns related to use: None  Contact information up to date? yes    Recent HPI Details:  Apr 19, 2024  - here today for follow up, not doing well. Reports last drink 4/14/24. Decided to go to detox, was not able to continue to taper alcohol use at home. Was drinking more than he intended, unable to stop or cut back. He was fed up with this and very motivated to stop drinking. Decided to go to detox on his day off. admission went ok, reports 95% good but is not happy with the some of the care he received. He was not aware he would need to stay in hospital for 24 hrs after last valium dose. Feels the valium taper should have been extended. Reports since discharge he has continued to experience severe anxiety, tremor, shaking, cloudy thoughts. He does not wan to drink but these physical symptoms have been so persistent and bothersome he feels the only thing that will help is having a drink. has tried Gabapentin 600 mg TID for alcohol withdrawal symptoms, at times taking more. Report minimal benefit from this for withdrawal symptoms.   - noticed during hospital admission some blood in stool, more with wiping, bright red. Reports constipation. Has h/o hemorrhoids, painful. Some dark streaks, unsure if old blood. He was prescribed senna and colace in the hospital for this. Also using hemorrhoid wipes   - has been taking Suboxone 8 mg daily. Wondering if he should take BID or once daily. Persistent pain. Needs rheumatology follow up as well.     TODAY'S VISIT  HPI May 3, 2024    - having 4 dental extractions tomorrow   - continues to have low back pain, using diclofenac gel and lidocaine patches   - Taking 4 mg BID, TDD 8 mg . Reports minimal analgesic benefits.   - Alcohol withdrawal improved.   - Taking Gabapentin  600 mg TID, does not feel this helps with anxiety. Gets very physical symptoms from anxiety, heart racing, tremor.   - he is open to seeing a psychiatrist for anxiety and depression.   - Continue Campral, no alcohol cravings.   - some financial issues a couple days ago, got angry and frustrated. Was able to stop and reflect, did not drink.   - he has renewed medical marijuana. He is using this for sleep. Does not take during the day.   - was previously on a medication for depression, not sure what the medication was, believes it was Duloxetine.     OBJECTIVE  PHYSICAL EXAM:  There were no vitals taken for this visit.    GENERAL: healthy, alert and no distress  RESP: No respiratory distress  MENTAL STATUS EXAM  Appearance/Behavior: No appearant distress and Neatly groomed  Speech: Normal  Mood/Affect: normal affect  Insight: Adequate      PHQ-9 Score:       1/16/2024     7:44 PM 3/17/2024     1:58 PM 4/11/2024     6:44 PM   PHQ   PHQ-9 Total Score 12 18 8   Q9: Thoughts of better off dead/self-harm past 2 weeks Not at all Not at all Not at all       MARY-7 Score:      4/3/2023    11:50 AM 6/5/2023     9:38 AM 9/13/2023     7:39 AM   MARY-7 SCORE   Total Score 21 (severe anxiety) 16 (severe anxiety) 21 (severe anxiety)   Total Score 21 16 21       LABS (may not contain today's labs)                                                        Today's lab data  No results found for any visits on 05/03/24.        HISTORY                                                    Problem list reviewed & adjusted, as indicated.  Patient Active Problem List   Diagnosis    Degeneration of lumbar/lumbosacral disc without myelopathy    Chronic right-sided low back pain with right-sided sciatica    Anxiety state    MARY (generalized anxiety disorder)    Insomnia, unspecified type    Alcohol dependence in remission (H)    Substance abuse in remission (H)    Mixed dyslipidemia    Mild persistent asthma, unspecified whether complicated    Hx of  avascular necrosis of capital femoral epiphysis    Iron deficiency anemia secondary to inadequate dietary iron intake    HLA B27 (HLA B27 positive)    Tobacco dependence syndrome    Chronic obstructive pulmonary disease, unspecified COPD type (H)    Adjustment disorder with depressed mood    Essential hypertension    Prediabetes    Nonintractable headache, unspecified chronicity pattern, unspecified headache type    History of cerebral aneurysm    Brain aneurysm    Major depressive disorder, recurrent episode, moderate (H)    F11.2 - Continuous opioid dependence (H)    Ankylosing spondylitis of sacral region (H)    Alcohol abuse         MEDICATION LIST (prior to visit)  Current Outpatient Medications   Medication Sig Dispense Refill    acamprosate (CAMPRAL) 333 MG EC tablet Take 2 tablets (666 mg) by mouth 3 times daily 180 tablet 0    busPIRone (BUSPAR) 5 MG tablet Take 1 tablet (5 mg) by mouth 2 times daily 30 tablet 0    gabapentin (NEURONTIN) 300 MG capsule Take 2 capsules (600 mg) by mouth 3 times daily 180 capsule 0    acetaminophen (TYLENOL) 325 MG tablet Take 650 mg by mouth every 6 hours as needed for mild pain or headaches      adalimumab (HUMIRA *CF*) 40 MG/0.4ML pen kit Inject 0.4 mLs (40 mg) Subcutaneous every 14 days for 30 days Hold for signs of infection, then seek medical attention. 1 each 5    albuterol (VENTOLIN HFA) 108 (90 Base) MCG/ACT inhaler Inhale 2 puffs into the lungs every 6 hours as needed for wheezing 18 g 3    [START ON 5/22/2024] buprenorphine HCl-naloxone HCl (SUBOXONE) 8-2 MG per film Place 1 Film under the tongue daily 30 Film 0    diclofenac (VOLTAREN) 1 % topical gel Apply 4 g topically 4 times daily as needed for moderate pain 100 g 0    docusate sodium (COLACE) 100 MG capsule Take 1 capsule (100 mg) by mouth 2 times daily 60 capsule 0    folic acid (FOLVITE) 1 MG tablet Take 1 tablet (1 mg) by mouth daily 30 tablet 0    Lidocaine (LIDOCARE) 4 % Patch Place 2 patches onto the  skin every 24 hours To prevent lidocaine toxicity, patient should be patch free for 12 hrs daily. 60 patch 0    lisinopril (ZESTRIL) 20 MG tablet Take 1 tablet (20 mg) by mouth daily 30 tablet 0    metFORMIN (GLUCOPHAGE XR) 500 MG 24 hr tablet Take 1 tablet (500 mg) by mouth every morning 30 tablet 0    multivitamin w/minerals (THERA-VIT-M) tablet Take 1 tablet by mouth daily 30 tablet 0    naloxone (NARCAN) 4 MG/0.1ML nasal spray Spray 1 spray (4 mg) into one nostril alternating nostrils as needed for opioid reversal every 2-3 minutes until assistance arrives 0.2 mL 11    omeprazole (PRILOSEC) 40 MG DR capsule Take 1 capsule (40 mg) by mouth daily 30 capsule 0    polyethylene glycol (MIRALAX) 17 GM/Dose powder Take 17 g (1 Capful) by mouth daily 578 g 1    senna-docusate (SENOKOT-S/PERICOLACE) 8.6-50 MG tablet Take 1 tablet by mouth 2 times daily as needed for constipation 60 tablet 0    thiamine (B-1) 100 MG tablet Take 1 tablet (100 mg) by mouth daily 30 tablet 0    tiZANidine (ZANAFLEX) 4 MG tablet TAKE ONE TABLET BY MOUTH EVERY FOUR HOURS (Patient taking differently: Take 4 mg by mouth every 4 hours as needed) 540 tablet 0     No current facility-administered medications for this visit.       MEDICATION LIST (after visit)  Current Outpatient Medications   Medication Sig Dispense Refill    acamprosate (CAMPRAL) 333 MG EC tablet Take 2 tablets (666 mg) by mouth 3 times daily 180 tablet 0    busPIRone (BUSPAR) 5 MG tablet Take 1 tablet (5 mg) by mouth 2 times daily 30 tablet 0    gabapentin (NEURONTIN) 300 MG capsule Take 2 capsules (600 mg) by mouth 3 times daily 180 capsule 0    acetaminophen (TYLENOL) 325 MG tablet Take 650 mg by mouth every 6 hours as needed for mild pain or headaches      adalimumab (HUMIRA *CF*) 40 MG/0.4ML pen kit Inject 0.4 mLs (40 mg) Subcutaneous every 14 days for 30 days Hold for signs of infection, then seek medical attention. 1 each 5    albuterol (VENTOLIN HFA) 108 (90 Base) MCG/ACT  inhaler Inhale 2 puffs into the lungs every 6 hours as needed for wheezing 18 g 3    [START ON 5/22/2024] buprenorphine HCl-naloxone HCl (SUBOXONE) 8-2 MG per film Place 1 Film under the tongue daily 30 Film 0    diclofenac (VOLTAREN) 1 % topical gel Apply 4 g topically 4 times daily as needed for moderate pain 100 g 0    docusate sodium (COLACE) 100 MG capsule Take 1 capsule (100 mg) by mouth 2 times daily 60 capsule 0    folic acid (FOLVITE) 1 MG tablet Take 1 tablet (1 mg) by mouth daily 30 tablet 0    Lidocaine (LIDOCARE) 4 % Patch Place 2 patches onto the skin every 24 hours To prevent lidocaine toxicity, patient should be patch free for 12 hrs daily. 60 patch 0    lisinopril (ZESTRIL) 20 MG tablet Take 1 tablet (20 mg) by mouth daily 30 tablet 0    metFORMIN (GLUCOPHAGE XR) 500 MG 24 hr tablet Take 1 tablet (500 mg) by mouth every morning 30 tablet 0    multivitamin w/minerals (THERA-VIT-M) tablet Take 1 tablet by mouth daily 30 tablet 0    naloxone (NARCAN) 4 MG/0.1ML nasal spray Spray 1 spray (4 mg) into one nostril alternating nostrils as needed for opioid reversal every 2-3 minutes until assistance arrives 0.2 mL 11    omeprazole (PRILOSEC) 40 MG DR capsule Take 1 capsule (40 mg) by mouth daily 30 capsule 0    polyethylene glycol (MIRALAX) 17 GM/Dose powder Take 17 g (1 Capful) by mouth daily 578 g 1    senna-docusate (SENOKOT-S/PERICOLACE) 8.6-50 MG tablet Take 1 tablet by mouth 2 times daily as needed for constipation 60 tablet 0    thiamine (B-1) 100 MG tablet Take 1 tablet (100 mg) by mouth daily 30 tablet 0    tiZANidine (ZANAFLEX) 4 MG tablet TAKE ONE TABLET BY MOUTH EVERY FOUR HOURS (Patient taking differently: Take 4 mg by mouth every 4 hours as needed) 540 tablet 0     No current facility-administered medications for this visit.         Allergies   Allergen Reactions    Diphenhydramine Swelling     Per patient, tongue/lips swelling, itchy eyes with both generic diphenhydramine and brand Benadryl      Naproxen Swelling    Pistachio Nut Extract     Toradol [Ketorolac] Itching       DAVID Morales [facility]  [phone]

## 2024-05-03 NOTE — NURSING NOTE
Is the patient currently in the state of [place]? YES    Visit mode:VIDEO    If the visit is dropped, the patient can be reconnected by: VIDEO VISIT: Text to cell phone:   Telephone Information:   Mobile [phone]       Will anyone else be joining the visit? NO  (If patient encounters technical issues they should call [phone]    How would you like to obtain your AVS? MyChart    Are changes needed to the allergy or medication list? No    Are refills needed on medications prescribed by this physician? YES    Reason for visit: RECHECK    Castro BURKS

## 2024-05-04 ENCOUNTER — MYC MEDICAL ADVICE (OUTPATIENT)
Dept: FAMILY MEDICINE | Facility: CLINIC | Age: 44
End: 2024-05-04
Payer: COMMERCIAL

## 2024-05-04 ENCOUNTER — MYC REFILL (OUTPATIENT)
Dept: FAMILY MEDICINE | Facility: CLINIC | Age: 44
End: 2024-05-04
Payer: COMMERCIAL

## 2024-05-04 DIAGNOSIS — G89.29 CHRONIC RIGHT-SIDED LOW BACK PAIN WITH RIGHT-SIDED SCIATICA: ICD-10-CM

## 2024-05-04 DIAGNOSIS — F10.239 ALCOHOL DEPENDENCE WITH WITHDRAWAL WITH COMPLICATION (H): ICD-10-CM

## 2024-05-04 DIAGNOSIS — F10.21 ALCOHOL DEPENDENCE IN REMISSION (H): ICD-10-CM

## 2024-05-04 DIAGNOSIS — K08.89 PAIN IN TOOTH: Primary | ICD-10-CM

## 2024-05-04 DIAGNOSIS — K21.9 GASTROESOPHAGEAL REFLUX DISEASE WITHOUT ESOPHAGITIS: ICD-10-CM

## 2024-05-04 DIAGNOSIS — M54.41 CHRONIC RIGHT-SIDED LOW BACK PAIN WITH RIGHT-SIDED SCIATICA: ICD-10-CM

## 2024-05-06 RX ORDER — OXYCODONE HYDROCHLORIDE 5 MG/1
5 TABLET ORAL EVERY 6 HOURS PRN
Qty: 12 TABLET | Refills: 0 | Status: SHIPPED | OUTPATIENT
Start: 2024-05-06 | End: 2024-05-09

## 2024-05-06 RX ORDER — LANOLIN ALCOHOL/MO/W.PET/CERES
100 CREAM (GRAM) TOPICAL DAILY
Qty: 30 TABLET | Refills: 0 | Status: SHIPPED | OUTPATIENT
Start: 2024-05-06 | End: 2024-07-31

## 2024-05-06 RX ORDER — OMEPRAZOLE 40 MG/1
40 CAPSULE, DELAYED RELEASE ORAL DAILY
Qty: 30 CAPSULE | Refills: 0 | Status: SHIPPED | OUTPATIENT
Start: 2024-05-06 | End: 2024-06-19

## 2024-05-06 RX ORDER — FOLIC ACID 1 MG/1
1 TABLET ORAL DAILY
Qty: 30 TABLET | Refills: 0 | Status: SHIPPED | OUTPATIENT
Start: 2024-05-06 | End: 2024-06-19

## 2024-05-06 RX ORDER — MULTIPLE VITAMINS W/ MINERALS TAB 9MG-400MCG
1 TAB ORAL DAILY
Qty: 30 TABLET | Refills: 0 | Status: SHIPPED | OUTPATIENT
Start: 2024-05-06

## 2024-05-07 DIAGNOSIS — L40.50 PSORIATIC ARTHRITIS (H): ICD-10-CM

## 2024-05-07 DIAGNOSIS — M46.1 SACROILIITIS (H): ICD-10-CM

## 2024-05-11 ENCOUNTER — MYC MEDICAL ADVICE (OUTPATIENT)
Dept: FAMILY MEDICINE | Facility: CLINIC | Age: 44
End: 2024-05-11
Payer: COMMERCIAL

## 2024-05-11 DIAGNOSIS — G89.29 CHRONIC RIGHT-SIDED LOW BACK PAIN WITH RIGHT-SIDED SCIATICA: Primary | ICD-10-CM

## 2024-05-11 DIAGNOSIS — M54.41 CHRONIC RIGHT-SIDED LOW BACK PAIN WITH RIGHT-SIDED SCIATICA: Primary | ICD-10-CM

## 2024-05-13 RX ORDER — DICLOFENAC SODIUM 75 MG/1
75 TABLET, DELAYED RELEASE ORAL 2 TIMES DAILY PRN
Qty: 180 TABLET | Refills: 3 | Status: SHIPPED | OUTPATIENT
Start: 2024-05-13

## 2024-05-18 DIAGNOSIS — F41.1 GAD (GENERALIZED ANXIETY DISORDER): ICD-10-CM

## 2024-05-20 RX ORDER — BUSPIRONE HYDROCHLORIDE 5 MG/1
5 TABLET ORAL 2 TIMES DAILY
Qty: 30 TABLET | Refills: 0 | Status: SHIPPED | OUTPATIENT
Start: 2024-05-20

## 2024-05-20 NOTE — TELEPHONE ENCOUNTER
Date of Last Office Visit: 5/3/2024  Date of Next Office Visit: Not scheduled. Called and LVM for patient and sent Loogla message requesting that he schedule follow up with Tiffanie Gonzalez CNP.  No shows since last visit: None  Cancellations since last visit: None    Medication requested: Buspar 5 mg tab Date last ordered: 5/3/2024 Qty: 30 Refills: 0     Review of MN ?: N/A    Lapse in medication adherence greater than 5 days?: No  If yes, call patient and gather details: N/A  Medication refill request verified as identical to current order?: Yes  Result of Last DAM, VPA, Li+ Level, CBC, or Carbamazepine Level (at or since last visit): N/A    Last visit treatment plan:   ASSESSMENT/PLAN  1. Alcohol use disorder, severe, dependence (H)  - Confirms date of last use 4/14/24. Overall cravings well controlled. Continue Campral 666 mg TID.   - Continue Gabapentin 600 mg TID   - Resume individual psychotherapy  - AA meetings   - Continue thiamine, folic acid, and multivitamin.  - acamprosate (CAMPRAL) 333 MG EC tablet; Take 2 tablets (666 mg) by mouth 3 times daily  Dispense: 180 tablet; Refill: 0     2. Severe opioid use disorder on maintenance therapy (H)  - Controlled sustained remission.   - Continue Suboxone 4 mg BID, TDD 8 mg.  Revisit Sublocade for maintenance and eventual taper at follow up.   - buprenorphine HCl-naloxone HCl (SUBOXONE) 8-2 MG per film; Place 1 Film under the tongue daily  Dispense: 30 Film; Refill: 0     3. Chronic bilateral low back pain with left-sided sciatica  - following with PCP. Continue Suboxone and Gabapentin as below.   - gabapentin (NEURONTIN) 300 MG capsule; Take 2 capsules (600 mg) by mouth 3 times daily  Dispense: 180 capsule; Refill: 0  - buprenorphine HCl-naloxone HCl (SUBOXONE) 8-2 MG per film; Place 1 Film under the tongue daily  Dispense: 30 Film; Refill: 0     4. MARY (generalized anxiety disorder)  - Persistent, needs improvement. Start Buspar 5 mg BID, titrate as needed.   -  Propranolol 10-20 mg daily prn for known anxiety provoking situations (ie dentist)   - Continue Gabapentin 600 mg TID  Referral to psychiatry for further eval and management.   - busPIRone (BUSPAR) 5 MG tablet; Take 1 tablet (5 mg) by mouth 2 times daily  Dispense: 30 tablet; Refill: 0  - Adult Mental Health  Referral; Future  - propranolol (INDERAL) 10 MG tablet; Take 1-2 tablets (10-20 mg) by mouth daily as needed (take 30-60 minutes prior to know anxiety provoking situation)  Dispense: 60 tablet; Refill: 0     5. Major depressive disorder, recurrent episode, moderate (H)  Referral to psychiatry for further eval and management.   - Adult Mental Health  Referral; Future         Continued Complex Management  The longitudinal plan of care for Opioid Use Disorder (OUD) and Alcohol Use Disorder (AUD) was addressed during this visit. Due to the added complexity in care, I will continue to support Carlos in the subsequent management and with ongoing continuity of care.        Last encounter A/P 4/19/24  1. Severe opioid use disorder on maintenance therapy (H)  - Controlled sustained remission.   - Continue Suboxone 4 mg BID, TDD 8 mg. No refill needed today. Revisit Sublocade for maintenance and eventual taper at follow up.   - Drugs of Abuse Screen Urine (POC CUPS) POCT  - naloxone (NARCAN) 4 MG/0.1ML nasal spray; Spray 1 spray (4 mg) into one nostril alternating nostrils as needed for opioid reversal every 2-3 minutes until assistance arrives  Dispense: 0.2 mL; Refill: 11     2. Alcohol use disorder, severe, dependence (H)  - Start Campral 666 mg TID. Reviewed MOA and common SE   - Continue Gabapentin 300- 600 mg TID PRN   - Resume individual psychotherapy  - Resume AA meetings   - Not interested in programmatic care at this time   - Continue thiamine, folic acid, and multivitamin.   - acamprosate (CAMPRAL) 333 MG EC tablet; Take 2 tablets (666 mg) by mouth 3 times daily  Dispense: 180 tablet;  Refill: 0     3. Alcohol withdrawal syndrome without complication (H)  4. Anxiety state  - S/p detox admission from 4/14 - 4/18. Reports persistent tremor, shaking, severe anxiety causing difficulty working and thinking clearly. No issues with balance or coordination. Gait intact. Withdrawal symptoms are very triggering and causing persistent craving/desire to drink since discharge. Pt received Valium for detox 4/15 (70 mg), 4/16 (50 mg), 4/17 (20 mg). Reviewed this is a long acting medication and will continue to be metabolized even after last dose. However given current symptoms, Plan to extend valium taper at low dose for up to 3 days (10 mg x 1 day, 5 mg x 1 days, then 2.5 mg x 1 day). Reviewed risks of taking benzodiazepine in combination with buprenorphine. Pt verbalizes understanding. Do no take medications a the same time. Space doses by at least 4 hours.   - Ok to continue Gabapentin 300 - 600 mg TID PRN   - Continue thiamine, folic acid, and multivitamin.   - diazepam (VALIUM) 5 MG tablet; Take 1 tablet (5 mg) by mouth 2 times daily as needed for anxiety or withdrawal for 1 day, THEN 1 tablet (5 mg) daily as needed for anxiety or withdrawal for 1 day, THEN 0.5 tablets (2.5 mg) daily as needed for anxiety or withdrawal.  Dispense: 4 tablet; Refill: 0     5. Constipation, unspecified constipation type  - increase dietary fiber and daily water intake. Start miralax and colace as below. Once controlled, stop miralax and use only prn. Continue colace prn.   - docusate sodium (COLACE) 100 MG capsule; Take 1 capsule (100 mg) by mouth 2 times daily  Dispense: 60 capsule; Refill: 0  - polyethylene glycol (MIRALAX) 17 GM/Dose powder; Take 17 g (1 Capful) by mouth daily  Dispense: 578 g; Refill: 1        PDMP Review           Value Time User     State PDMP site checked  Yes 5/3/2024  1:48 PM Tiffanie Gonzalez APRN CNP             04/22/2024 01/17/2024 4 Buprenorphine-Nalox 8-2mg Film 30.00 30      RTC  Return in about 4  weeks (around 5/31/2024) for Follow up, with me, in person.    []Medication refilled per  Medication Refill in Ambulatory Care  policy.  [x]Medication unable to be refilled by RN due to criteria not met as indicated below:    []Eligibility - not seen in the last year   [x]Supervision - no future appointment   []Compliance - no shows, cancellations or lapse in therapy   []Verification - order discrepancy   []Controlled medication   []Medication not included in policy   []90-day supply request   []Other

## 2024-05-21 NOTE — TELEPHONE ENCOUNTER
Called patient in attempt to triage patient for symptoms. Unable to reach patient. If patient calls back, please send to nurse to have patient triage for symptoms. Thank you.      Javon Han, MSN, RN   [facility]     
Itchiness in the evening time only  Denies redness, warmth, fever, denies SOB, denies chest pain  Took a benadryl one night but has had a bad reaction to it in the past so hesitant to take it again; patient endorses he feels this did help  Patient unsure if there may be a work environmental allergen or new food/product he's been exposed to, but certainly could be correlated to work environment given sx onset timing. Patient had an appointment for today but this was cancelled due to provider being out of clinic. Patient declined UC visit.    Writer recommended continued monitoring and OTC tx/home cares, as well as made this recommendations in writing via [employer]. At this point the call was dropped, but patient had verbalized understanding and agreement.    Itching - Widespread-A-OH Protocol Used    MARYLU SesayN, RN (she/her)  [facility] RN        
Responded to patient via Teddy Dinero RN  [facility]    
Writer replied to patient via [employer] message.    MARYLU GallagherN, RN   [facility]    
Airway patent, Nasal mucosa clear. Mouth with normal mucosa. Throat has no vesicles, no oropharyngeal exudates and uvula is midline.

## 2024-05-22 RX ORDER — ADALIMUMAB 40MG/0.4ML
KIT SUBCUTANEOUS
Refills: 5 | OUTPATIENT
Start: 2024-05-22

## 2024-06-11 ASSESSMENT — PATIENT HEALTH QUESTIONNAIRE - PHQ9
10. IF YOU CHECKED OFF ANY PROBLEMS, HOW DIFFICULT HAVE THESE PROBLEMS MADE IT FOR YOU TO DO YOUR WORK, TAKE CARE OF THINGS AT HOME, OR GET ALONG WITH OTHER PEOPLE: EXTREMELY DIFFICULT
SUM OF ALL RESPONSES TO PHQ QUESTIONS 1-9: 10
SUM OF ALL RESPONSES TO PHQ QUESTIONS 1-9: 10

## 2024-06-11 ASSESSMENT — ANXIETY QUESTIONNAIRES
6. BECOMING EASILY ANNOYED OR IRRITABLE: SEVERAL DAYS
GAD7 TOTAL SCORE: 12
7. FEELING AFRAID AS IF SOMETHING AWFUL MIGHT HAPPEN: MORE THAN HALF THE DAYS
1. FEELING NERVOUS, ANXIOUS, OR ON EDGE: NEARLY EVERY DAY
8. IF YOU CHECKED OFF ANY PROBLEMS, HOW DIFFICULT HAVE THESE MADE IT FOR YOU TO DO YOUR WORK, TAKE CARE OF THINGS AT HOME, OR GET ALONG WITH OTHER PEOPLE?: EXTREMELY DIFFICULT
GAD7 TOTAL SCORE: 12
4. TROUBLE RELAXING: NEARLY EVERY DAY
5. BEING SO RESTLESS THAT IT IS HARD TO SIT STILL: SEVERAL DAYS
3. WORRYING TOO MUCH ABOUT DIFFERENT THINGS: SEVERAL DAYS
GAD7 TOTAL SCORE: 12
IF YOU CHECKED OFF ANY PROBLEMS ON THIS QUESTIONNAIRE, HOW DIFFICULT HAVE THESE PROBLEMS MADE IT FOR YOU TO DO YOUR WORK, TAKE CARE OF THINGS AT HOME, OR GET ALONG WITH OTHER PEOPLE: EXTREMELY DIFFICULT
7. FEELING AFRAID AS IF SOMETHING AWFUL MIGHT HAPPEN: MORE THAN HALF THE DAYS
2. NOT BEING ABLE TO STOP OR CONTROL WORRYING: SEVERAL DAYS

## 2024-06-12 ENCOUNTER — VIRTUAL VISIT (OUTPATIENT)
Dept: FAMILY MEDICINE | Facility: CLINIC | Age: 44
End: 2024-06-12
Payer: COMMERCIAL

## 2024-06-12 DIAGNOSIS — G89.29 CHRONIC BILATERAL LOW BACK PAIN WITH LEFT-SIDED SCIATICA: Primary | ICD-10-CM

## 2024-06-12 DIAGNOSIS — M54.41 CHRONIC RIGHT-SIDED LOW BACK PAIN WITH RIGHT-SIDED SCIATICA: ICD-10-CM

## 2024-06-12 DIAGNOSIS — F41.1 ANXIETY STATE: ICD-10-CM

## 2024-06-12 DIAGNOSIS — M54.42 CHRONIC BILATERAL LOW BACK PAIN WITH LEFT-SIDED SCIATICA: Primary | ICD-10-CM

## 2024-06-12 DIAGNOSIS — Z12.11 SCREEN FOR COLON CANCER: ICD-10-CM

## 2024-06-12 DIAGNOSIS — G89.29 CHRONIC RIGHT-SIDED LOW BACK PAIN WITH RIGHT-SIDED SCIATICA: ICD-10-CM

## 2024-06-12 DIAGNOSIS — F10.21 ALCOHOL DEPENDENCE IN REMISSION (H): ICD-10-CM

## 2024-06-12 PROCEDURE — 99442 PR PHYSICIAN TELEPHONE EVALUATION 11-20 MIN: CPT | Mod: 95 | Performed by: FAMILY MEDICINE

## 2024-06-12 RX ORDER — BUPRENORPHINE AND NALOXONE 8; 2 MG/1; MG/1
1 FILM, SOLUBLE BUCCAL; SUBLINGUAL DAILY
Qty: 30 FILM | Refills: 0 | Status: SHIPPED | OUTPATIENT
Start: 2024-06-19 | End: 2024-07-21

## 2024-06-12 RX ORDER — LORAZEPAM 0.5 MG/1
0.5 TABLET ORAL EVERY 6 HOURS PRN
Qty: 5 TABLET | Refills: 0 | Status: SHIPPED | OUTPATIENT
Start: 2024-06-12 | End: 2024-08-01

## 2024-06-12 NOTE — PROGRESS NOTES
"Carlos is a 43 year old who is being evaluated via a billable video visit.    How would you like to obtain your AVS? MyChart  If the video visit is dropped, the invitation should be resent by: Text to cell phone: [phone]  Will anyone else be joining your video visit? No      Assessment & Plan     Chronic bilateral low back pain with left-sided sciatica  - buprenorphine HCl-naloxone HCl (SUBOXONE) 8-2 MG per film  Dispense: 30 Film; Refill: 0    Chronic right-sided low back pain with right-sided sciatica  - buprenorphine HCl-naloxone HCl (SUBOXONE) 8-2 MG per film  Dispense: 30 Film; Refill: 0    Screen for colon cancer  - Colonoscopy Screening  Referral    Anxiety state: Gave 5 tablets to be used for flights and for the service.  Provided support and gave him some praise for the tools he is incorporating them by asking for help from others and doing meditation through [employer], also sticking to a meetings schedule. Follow up after he is back from his trip.   - LORazepam (ATIVAN) 0.5 MG tablet  Dispense: 5 tablet; Refill: 0    Alcohol dependence in remission: As mentioned, he is doing regular AA meetings.  He plans to continue following with Brenda Gonzalez from the addiction clinic as well.    BMI  Estimated body mass index is 26.5 kg/m  as calculated from the following:    Height as of 4/15/24: 1.803 m (5' 11\").    Weight as of 4/15/24: 86.2 kg (190 lb).       Subjective   Carlos is a 43 year old, presenting for the following health issues:  Follow up  and Referral (Colonoscopy and other test )      6/12/2024    12:13 PM   Additional Questions   Roomed by MARCE Fischer   Accompanied by Self     HPI     Doing ok overall. Traveling to [place] on 6/20 for his dad's celebration of life. Family coming from all over. He is looking forward to it but is also nervous.  He is wondering if he can have any small amount of benzos for his flights and for the celebration of life service.  He also would like to fill " his Suboxone a couple of days early in order to have it when he is out of town.      His uncle is also sober and said he would have his back during the event.    He's going to lots of meetings. Also doing meditations through YouTube.     Had to pay $4,000 for his detox hospitalization. But now, he made his OOP maximum so wants to get any testing he needs.     Wondering if he can restart Humira now that he's over a month out from his oral surgery. Plans to call them        Objective           Vitals:  No vitals were obtained today due to virtual visit.    Physical Exam   GENERAL: alert and no distress  PSYCH: Appropriate affect, tone, and pace of words          Video-Visit Details    Type of service:  telephone Visit     Duration: 13 minutes  Originating Location (pt. Location): Home    Distant Location (provider location):  On-site  Platform used for Video Visit: Telephone  Signed Electronically by: Lucero Cardenas MD

## 2024-06-18 DIAGNOSIS — K21.9 GASTROESOPHAGEAL REFLUX DISEASE WITHOUT ESOPHAGITIS: ICD-10-CM

## 2024-06-18 DIAGNOSIS — F10.21 ALCOHOL DEPENDENCE IN REMISSION (H): ICD-10-CM

## 2024-06-19 RX ORDER — FOLIC ACID 1 MG/1
1 TABLET ORAL DAILY
Qty: 90 TABLET | Refills: 3 | Status: SHIPPED | OUTPATIENT
Start: 2024-06-19

## 2024-06-19 RX ORDER — OMEPRAZOLE 40 MG/1
40 CAPSULE, DELAYED RELEASE ORAL DAILY
Qty: 90 CAPSULE | Refills: 2 | Status: SHIPPED | OUTPATIENT
Start: 2024-06-19

## 2024-06-20 ENCOUNTER — MYC MEDICAL ADVICE (OUTPATIENT)
Dept: FAMILY MEDICINE | Facility: CLINIC | Age: 44
End: 2024-06-20
Payer: COMMERCIAL

## 2024-06-20 DIAGNOSIS — J32.9 BACTERIAL SINUSITIS: Primary | ICD-10-CM

## 2024-06-20 DIAGNOSIS — B96.89 BACTERIAL SINUSITIS: Primary | ICD-10-CM

## 2024-06-20 NOTE — TELEPHONE ENCOUNTER
Pt called stating that he is heading out of town in 1 hr and needs antibiotics.     - been having symptoms for 3 days now  - he has sinus pressure   - pressure in the head and behind the eyes  - no fever  - coughing up green phlegm     Informed pt that providers usually do not send antibiotics without seeing pt. Pt stated he cannot be seen as he will be out of town in an hour.     - pt requesting rx sent to pharmacy soon.        Ravinder Rolon Cem Say, BSN RN  [facility]

## 2024-06-24 DIAGNOSIS — F10.20 ALCOHOL USE DISORDER, SEVERE, DEPENDENCE (H): ICD-10-CM

## 2024-06-24 RX ORDER — ACAMPROSATE CALCIUM 333 MG/1
666 TABLET, DELAYED RELEASE ORAL 3 TIMES DAILY
Qty: 180 TABLET | Refills: 3 | Status: SHIPPED | OUTPATIENT
Start: 2024-06-24

## 2024-06-26 ENCOUNTER — MYC MEDICAL ADVICE (OUTPATIENT)
Dept: FAMILY MEDICINE | Facility: CLINIC | Age: 44
End: 2024-06-26
Payer: COMMERCIAL

## 2024-06-27 ENCOUNTER — MYC MEDICAL ADVICE (OUTPATIENT)
Dept: FAMILY MEDICINE | Facility: CLINIC | Age: 44
End: 2024-06-27
Payer: COMMERCIAL

## 2024-06-27 ENCOUNTER — TELEPHONE (OUTPATIENT)
Dept: FAMILY MEDICINE | Facility: CLINIC | Age: 44
End: 2024-06-27
Payer: COMMERCIAL

## 2024-06-27 DIAGNOSIS — J45.30 MILD PERSISTENT ASTHMA, UNSPECIFIED WHETHER COMPLICATED: Primary | ICD-10-CM

## 2024-06-27 RX ORDER — ALBUTEROL SULFATE 90 UG/1
2 AEROSOL, METERED RESPIRATORY (INHALATION) ONCE
Status: DISCONTINUED | OUTPATIENT
Start: 2024-06-27 | End: 2024-07-31

## 2024-06-27 RX ORDER — ALBUTEROL SULFATE 90 UG/1
2 AEROSOL, METERED RESPIRATORY (INHALATION) EVERY 6 HOURS PRN
Qty: 18 G | Refills: 11 | Status: SHIPPED | OUTPATIENT
Start: 2024-06-27

## 2024-06-27 NOTE — LETTER
June 28, 2024      Carlos Hamilton  [address] [facility] [address]        To Whom It May Concern:    Carlos Hamilton  was seen on 6/28/24.  Please excuse him until he test negative for COVID or when he no longer has symptoms and is fever free for 24 hours due to COVID.        Sincerely,        [facility]

## 2024-06-27 NOTE — TELEPHONE ENCOUNTER
Called patient. Patient states not having any new or worsening symptoms, he was just worried because he's been around people who had tested positive for COVID. Patient has no symptoms but tested himself and he was negative with two home test kits.     Advise patient that he starts to have symptoms then please do call us back. Patient verbalizes agreement.       Javon Han MSN, RN   [facility]

## 2024-06-27 NOTE — TELEPHONE ENCOUNTER
Patient requesting albuterol inhaler. Last prescribed by Dr. Cardenas back in  with 11 refills. Patient uses it intermittently throughout the years for asthma flare up or coughs. Patient states his previous RX has  and he needs a new one to be sent.     RX pended and routed to refill pool.       Javon aHn, MSN, RN   [facility]

## 2024-06-28 ENCOUNTER — NURSE TRIAGE (OUTPATIENT)
Dept: FAMILY MEDICINE | Facility: CLINIC | Age: 44
End: 2024-06-28

## 2024-06-28 ENCOUNTER — VIRTUAL VISIT (OUTPATIENT)
Dept: URGENT CARE | Facility: CLINIC | Age: 44
End: 2024-06-28
Payer: COMMERCIAL

## 2024-06-28 DIAGNOSIS — R05.1 ACUTE COUGH: ICD-10-CM

## 2024-06-28 DIAGNOSIS — U07.1 INFECTION DUE TO 2019 NOVEL CORONAVIRUS: Primary | ICD-10-CM

## 2024-06-28 PROCEDURE — 99442 PR PHYSICIAN TELEPHONE EVALUATION 11-20 MIN: CPT | Mod: 93

## 2024-06-28 RX ORDER — BENZONATATE 200 MG/1
200 CAPSULE ORAL 3 TIMES DAILY PRN
Qty: 21 CAPSULE | Refills: 0 | Status: SHIPPED | OUTPATIENT
Start: 2024-06-28 | End: 2024-07-31

## 2024-06-28 NOTE — PATIENT INSTRUCTIONS
Temporary change to home medications:   Buspar 2.5mg daily during course of treatment.   Half dose of Suboxone daily during course of treatment. He has tolerated half doses in the past.       How can I take care of myself?  Over the counter medications may help with your symptoms such as runny or stuffy nose, cough, chills, or fever. Talk to your care team about your options.   Some people are at high risk of severe illness (for example, you have a weak immune system, you re 50 years or older, or you have certain medical problems). If your risk is high and your symptoms started in the last 5 days, we strongly recommend for you to get COVID treatment as soon as possible. There are safe and effective medicines that can make you feel better faster, and prevent hospitalization and death.            Get lots of rest. Drink extra fluids (unless a doctor has told you not to)  Take Tylenol (acetaminophen) or ibuprofen for fever or pain. If you have liver or kidney problems, ask your family doctor if it's okay to take Tylenol or ibuprofen  Take over the counter medications for your symptoms, as directed by your doctor. You may also talk to your pharmacist.    If you have other health problems (like cancer, heart failure, an organ transplant or severe kidney disease): Call your specialty clinic if you don't feel better in the next 2 days.  Know when to call 911. Emergency warning signs include:  Trouble breathing or shortness of breath  Pain or pressure in the chest that doesn't go away  Feeling confused like you haven't felt before, or not being able to wake up  Bluish-colored lips or face    PAXLOVID INFORMATION    Paxlovid is no longer free from the government. There are financial assistance programs available. You can learn more at https://paxlovid.Daz 3d.MyWobile/ or 1-155.745.9819, press 2 for patient options. Please look into this before you go to the pharmacy to  your medicine.    Patients with eGFR GREATER than or  EQUAL to 60 mL/min:  Dose: 300 mg Nirmatrelvir (2 x 150 mg) and 100 mg ritonavir twice daily for 5 days.    Patients with eGFR GREATER than or EQUAL to 30 mL/min but LESS than 60 mL/min:  Dose: 150 mg Nirmatrelvir and 100 mg ritonavir twice daily for 5 days.    Patients with eGFR LESS than 30 mL/min: Not recommended.    PAXLOVID is approved by the U.S. Food and Drug Administration (FDA) for the treatment of mild-to-moderate coronavirus disease 2019 (COVID-19) in adults and pediatric patients (12 years of age and older weighing at least 40kg) and who are at high risk for progression to severe COVID-19, including hospitalization or death. PAXLOVID is not approved for pre- or post-exposure prophylaxis for prevention of COVID-19.     Adverse events - bad taste, diarrhea, hypertension, and muscle aches    Missed dose:  If the patient misses a dose of PAXLOVID within 8 hours of the time it is usually taken, the patient should take it as soon as possible and resume the normal dosing schedule. If the patient misses a dose by more than 8 hours, the patient should not take the missed dose and instead take the next 5 dose at the regularly scheduled time.    PREGNANCY:  no good info available

## 2024-06-28 NOTE — TELEPHONE ENCOUNTER
RN COVID TREATMENT VISIT  06/28/24      The patient has been triaged and does not require a higher level of care.    Carlos Hamilton  43 year old  Current weight? 192    Has the patient been seen by a primary care provider at an [facility] or [facility] within the past two years? Yes.   Have you been in close proximity to/do you have a known exposure to a person with a confirmed case of influenza? No.     General treatment eligibility:  Date of positive COVID test (PCR or at home)?  6/27/247    Are you or have you been hospitalized for this COVID-19 infection? No.   Have you received monoclonal antibodies or antiviral treatment for COVID-19 since this positive test? No.   Do you have any of the following conditions that place you at risk of being very sick from COVID-19?   - Mental health disorders including mood disorders, depression, schizophrenia spectrum disorders   - Substance use disorder including alcohol abuse, alcohol dependency, chemical dependency  Yes, patient has at least one high risk condition as noted above.     Current COVID symptoms:   - fever or chills  - cough  - fatigue  - muscle or body aches  - headache  - congestion or runny nose  - nausea or vomiting  Yes. Patient has at least one symptom as selected.     How many days since symptoms started? 5 days or less. Established patient, 12 years or older weighing at least 88.2 lbs, who has symptoms that started in the past 5 days, has not been hospitalized nor received treatment already, and is at risk for being very sick from COVID-19.     Treatment eligibility by RN:  Are you currently pregnant or nursing? No  Do you have a clinically significant hypersensitivity to nirmatrelvir or ritonavir, or toxic epidermal necrolysis (TEN) or Lott-Zach Syndrome? No  Do you have a history of hepatitis, any hepatic impairment on the Problem List (such as Child-Abraham Class C, cirrhosis, fatty liver disease, alcoholic liver  disease), or was the last liver lab (hepatic panel, ALT, AST, ALK Phos, bilirubin) elevated in the past 6 months? No  Do you have any history of severe renal impairment (eGFR < 30mL/min)? No    Is patient eligible to continue? Yes, patient meets all eligibility requirements for the RN COVID treatment (as denoted by all no responses above).     Current Outpatient Medications   Medication Sig Dispense Refill    acamprosate (CAMPRAL) 333 MG EC tablet Take 2 tablets (666 mg) by mouth 3 times daily 180 tablet 3    acetaminophen (TYLENOL) 325 MG tablet Take 650 mg by mouth every 6 hours as needed for mild pain or headaches      adalimumab (HUMIRA *CF*) 40 MG/0.4ML pen kit Inject 0.4 mLs (40 mg) Subcutaneous every 14 days for 30 days Hold for signs of infection, then seek medical attention. 1 each 5    albuterol (PROAIR HFA/PROVENTIL HFA/VENTOLIN HFA) 108 (90 Base) MCG/ACT inhaler Inhale 2 puffs into the lungs every 6 hours as needed for shortness of breath 18 g 11    albuterol (VENTOLIN HFA) 108 (90 Base) MCG/ACT inhaler Inhale 2 puffs into the lungs every 6 hours as needed for wheezing 18 g 3    buprenorphine HCl-naloxone HCl (SUBOXONE) 8-2 MG per film Place 1 Film under the tongue daily Ok to fill slightly early on 6/19. 30 Film 0    busPIRone (BUSPAR) 5 MG tablet Take 1 tablet (5 mg) by mouth 2 times daily 30 tablet 0    diclofenac (VOLTAREN) 1 % topical gel Apply 4 g topically 4 times daily as needed for moderate pain 100 g 0    diclofenac (VOLTAREN) 75 MG EC tablet Take 1 tablet (75 mg) by mouth 2 times daily as needed for moderate pain 180 tablet 3    docusate sodium (COLACE) 100 MG capsule Take 1 capsule (100 mg) by mouth 2 times daily 60 capsule 0    folic acid (FOLVITE) 1 MG tablet Take 1 tablet (1 mg) by mouth daily 90 tablet 3    gabapentin (NEURONTIN) 300 MG capsule Take 2 capsules (600 mg) by mouth 3 times daily 180 capsule 0    Lidocaine (LIDOCARE) 4 % Patch Place 2 patches onto the skin every 24 hours To  prevent lidocaine toxicity, patient should be patch free for 12 hrs daily. 60 patch 0    lisinopril (ZESTRIL) 20 MG tablet Take 1 tablet (20 mg) by mouth daily 30 tablet 0    LORazepam (ATIVAN) 0.5 MG tablet Take 1 tablet (0.5 mg) by mouth every 6 hours as needed for anxiety 5 tablet 0    metFORMIN (GLUCOPHAGE XR) 500 MG 24 hr tablet Take 1 tablet (500 mg) by mouth every morning 30 tablet 0    multivitamin w/minerals (THERA-VIT-M) tablet Take 1 tablet by mouth daily 30 tablet 0    naloxone (NARCAN) 4 MG/0.1ML nasal spray Spray 1 spray (4 mg) into one nostril alternating nostrils as needed for opioid reversal every 2-3 minutes until assistance arrives (Patient not taking: Reported on 6/12/2024) 0.2 mL 11    omeprazole (PRILOSEC) 40 MG DR capsule Take 1 capsule (40 mg) by mouth daily 90 capsule 2    polyethylene glycol (MIRALAX) 17 GM/Dose powder Take 17 g (1 Capful) by mouth daily 578 g 1    propranolol (INDERAL) 10 MG tablet Take 1-2 tablets (10-20 mg) by mouth daily as needed (take 30-60 minutes prior to know anxiety provoking situation) 60 tablet 0    senna-docusate (SENOKOT-S/PERICOLACE) 8.6-50 MG tablet Take 1 tablet by mouth 2 times daily as needed for constipation 60 tablet 0    sodium chloride (OCEAN) 0.65 % nasal spray Spray in each nostril twice daily as needed. 15 mL 0    thiamine (B-1) 100 MG tablet Take 1 tablet (100 mg) by mouth daily 30 tablet 0    tiZANidine (ZANAFLEX) 4 MG tablet TAKE ONE TABLET BY MOUTH EVERY FOUR HOURS (Patient taking differently: Take 4 mg by mouth every 4 hours as needed) 540 tablet 0       Medications from List 1 of the standing order (on medications that exclude the use of Paxlovid) that patient is taking: lidocaine (systemic) (Xylocaine, Lidomark) Is patient taking Jonathan's Wort? No  Is patient taking Jonathan's Wort or any meds from List 1? Yes. Patient informed that they will need a provider visit to discuss treatment options. Assist patient in scheduling a provider visit.  Virtual appt schedule 6/28 at 3 pm.    Ravinder Mckinnon RN

## 2024-06-28 NOTE — TELEPHONE ENCOUNTER
"Reason for Disposition   HIGH RISK patient (e.g., weak immune system, age > 64 years, obesity with BMI of 30 or higher, pregnant, chronic lung disease or other chronic medical condition) and COVID symptoms (e.g., cough, fever)  (Exceptions: Already seen by doctor or NP/PA and no new or worsening symptoms.)    Additional Information   Negative: SEVERE difficulty breathing (e.g., struggling for each breath, speaks in single words)   Negative: Difficult to awaken or acting confused (e.g., disoriented, slurred speech)   Negative: Bluish (or gray) lips or face now   Negative: Shock suspected (e.g., cold/pale/clammy skin, too weak to stand, low BP, rapid pulse)   Negative: Sounds like a life-threatening emergency to the triager   Negative: SEVERE or constant chest pain or pressure  (Exception: Mild central chest pain, present only when coughing.)   Negative: MODERATE difficulty breathing (e.g., speaks in phrases, SOB even at rest, pulse 100-120)   Negative: Headache and stiff neck (can't touch chin to chest)   Negative: Oxygen level (e.g., pulse oximetry) 90% or lower   Negative: Chest pain or pressure  (Exception: MILD central chest pain, present only when coughing.)   Negative: Drinking very little and dehydration suspected (e.g., no urine > 12 hours, very dry mouth, very lightheaded)   Negative: Patient sounds very sick or weak to the triager   Negative: MILD difficulty breathing (e.g., minimal/no SOB at rest, SOB with walking, pulse <100)   Negative: Fever > 103 F (39.4 C)   Negative: Fever > 101 F (38.3 C) and over 60 years of age   Negative: Fever > 100.0 F (37.8 C) and bedridden (e.g., CVA, chronic illness, recovering from surgery)    Answer Assessment - Initial Assessment Questions  1. COVID-19 DIAGNOSIS: \"How do you know that you have COVID?\" (e.g., positive lab test or self-test, diagnosed by doctor or NP/PA, symptoms after exposure).      Home Covid test    2. COVID-19 EXPOSURE: \"Was there any known exposure to " "COVID before the symptoms began?\" CDC Definition of close contact: within 6 feet (2 meters) for a total of 15 minutes or more over a 24-hour period.       Yes, family had Covid     3. ONSET: \"When did the COVID-19 symptoms start?\"       Tuesday 6/25    4. WORST SYMPTOM: \"What is your worst symptom?\" (e.g., cough, fever, shortness of breath, muscle aches)      Headache    5. COUGH: \"Do you have a cough?\" If Yes, ask: \"How bad is the cough?\"        Slight cough    6. FEVER: \"Do you have a fever?\" If Yes, ask: \"What is your temperature, how was it measured, and when did it start?\"      Yes 100.7     7. RESPIRATORY STATUS: \"Describe your breathing?\" (e.g., normal; shortness of breath, wheezing, unable to speak)       Able to breathe    8. BETTER-SAME-WORSE: \"Are you getting better, staying the same or getting worse compared to yesterday?\"  If getting worse, ask, \"In what way?\"      Worse    9. OTHER SYMPTOMS: \"Do you have any other symptoms?\"  (e.g., chills, fatigue, headache, loss of smell or taste, muscle pain, sore throat)     Headache, muscle pain, congestion, fatigue    10. HIGH RISK DISEASE: \"Do you have any chronic medical problems?\" (e.g., asthma, heart or lung disease, weak immune system, obesity, etc.)        Slight asthma     11. VACCINE: \"Have you had the COVID-19 vaccine?\" If Yes, ask: \"Which one, how many shots, when did you get it?\"        Yes    12. PREGNANCY: \"Is there any chance you are pregnant?\" \"When was your last menstrual period?\"        No    13. O2 SATURATION MONITOR:  \"Do you use an oxygen saturation monitor (pulse oximeter) at home?\" If Yes, ask \"What is your reading (oxygen level) today?\" \"What is your usual oxygen saturation reading?\" (e.g., 95%)        N/a    Protocols used: Coronavirus (COVID-19) Diagnosed or [id number]    "

## 2024-06-28 NOTE — PROGRESS NOTES
"Virtual Visit    Assessment/Plan:      ICD-10-CM    1. Infection due to 2019 novel coronavirus  U07.1 nirmatrelvir and ritonavir (PAXLOVID) 300 mg/100 mg therapy pack     benzonatate (TESSALON) 200 MG capsule      2. Acute cough  R05.1 benzonatate (TESSALON) 200 MG capsule          COVID-19 positive patient.  Encounter for consideration of medication intervention.    Patient does qualify for a prescription.   Full discussion with patient including medication options, risks and benefits.   Potential drug interactions reviewed with patient.      Red flag symptoms needing urgent evaluation discussed.         Temporary change to home medications:   Buspar 2.5mg daily during course of treatment.   Half dose of Suboxone daily during course of treatment. He has tolerated half doses in the past.       Follow up with primary care provider with any problems, questions or concerns or if symptoms worsen or fail to improve. Patient verbalized understanding and is agreeable to plan.                Tali Gonzalez  is a 43 year old  who has a confirmed new positive COVID-19 diagnosis.      He has been identified as high risk for complications of this infection and is being evaluated via a billable visit.       Concern for COVID-19  Exactly how many days ago did these symptoms start? 2-3 days ago 6/25     Are any of the following symptoms significant for you?  new or worsening difficulty breathing? No  worsening cough? Yes, it's a dry cough.   Fever or chills? Yes, I felt feverish or had chills.  Headache: YES  Sore throat: YES  Chest pain: No  body aches? YES      COVID positive test: 6/27/24          REVIEW OF SYSTEMS  All systems reviewed and negative except per HPI.    Objective    Vitals:  No vitals were obtained today due to virtual visit.  Estimated body mass index is 26.5 kg/m  as calculated from the following:    Height as of 4/15/24: 1.803 m (5' 11\").    Weight as of 4/15/24: 86.2 kg (190 lb).     General: Alert, " no apparent distress  PSYCH: Alert; coherent speech, normal rate and volume, able to articulate logical thoughts, appropriate insight, no tangential thoughts, no hallucination or delusions.  Affect is appropriate  RESP: No cough, no audible wheezing, able to talk in full sentences  Remainder of exam unable to be completed due to telephone visit    Laboratory and Diagnostics    GFR Estimate   Date Value Ref Range Status   04/14/2024 >90 >60 mL/min/1.73m2 Final   07/15/2020 >90 >60 mL/min/[1.73_m2] Final     Comment:     Non  GFR Calc  Starting 12/18/2018, serum creatinine based estimated GFR (eGFR) will be   calculated using the Chronic Kidney Disease Epidemiology Collaboration   (CKD-EPI) equation.       GFR, ESTIMATED POCT   Date Value Ref Range Status   11/17/2022 >60 >60 mL/min/1.73m2 Final              }    Phone call duration:  20 minutes    20 minutes spent by me on the date of the encounter doing patient visit and documentation.

## 2024-06-30 ENCOUNTER — MYC MEDICAL ADVICE (OUTPATIENT)
Dept: FAMILY MEDICINE | Facility: CLINIC | Age: 44
End: 2024-06-30
Payer: COMMERCIAL

## 2024-07-01 ENCOUNTER — TELEPHONE (OUTPATIENT)
Dept: GASTROENTEROLOGY | Facility: CLINIC | Age: 44
End: 2024-07-01
Payer: COMMERCIAL

## 2024-07-01 DIAGNOSIS — Z12.11 SPECIAL SCREENING FOR MALIGNANT NEOPLASMS, COLON: Primary | ICD-10-CM

## 2024-07-01 NOTE — TELEPHONE ENCOUNTER
"Endoscopy Scheduling Screen    Have you had a positive Covid test in the last 14 days?  Yes (Schedule at least 14 days from symptom onset)    What is your communication preference for Instructions and/or Bowel Prep?   MyChart    What insurance is in the chart?  Other:      Ordering/Referring Provider:     TRINI DAS      (If ordering provider performs procedure, schedule with ordering provider unless otherwise instructed. )    BMI: Estimated body mass index is 26.5 kg/m  as calculated from the following:    Height as of 4/15/24: 1.803 m (5' 11\").    Weight as of 4/15/24: 86.2 kg (190 lb).     Sedation Ordered  MAC/deep sedation.   BMI<= 45 45 < BMI <= 48 48 < BMI < = 50  BMI > 50   No Restrictions No [facility]  No [facility]  [facility] with exceptions Hospital Only OR Only       Do you have a history of malignant hyperthermia?  No    (Females) Are you currently pregnant?        Have you been diagnosed or told you have pulmonary hypertension?   No    Do you have an LVAD?  No    Have you been told you have moderate to severe sleep apnea?  No    Have you been told you have COPD, asthma, or any other lung disease?  Yes     What breathing problems do you have?  Asthma / COPD    Do you use home oxygen?  No    Have your breathing problems required an ED visit or hospitalization in the last year?  No    Do you have any heart conditions?  No     Have you ever had or are you waiting for an organ transplant?  No. Continue scheduling, no site restrictions.    Have you had a stroke or transient ischemic attack (TIA aka \"mini stroke\" in the last 6 months?   No    Have you been diagnosed with or been told you have cirrhosis of the liver?   No    Are you currently on dialysis?   No    Do you need assistance transferring?   No    BMI: Estimated body mass index is 26.5 kg/m  as calculated from the following:    Height as of 4/15/24: 1.803 m (5' 11\").    Weight as of 4/15/24: 86.2 kg (190 lb).     Is patients BMI > 40 and scheduling " location [facility]?  No    Do you take an injectable medication for weight loss or diabetes (excluding insulin)?  No    Do you take the medication Naltrexone?  No    Do you take blood thinners?  No       Prep   Are you currently on dialysis or do you have chronic kidney disease?  No    Do you have a diagnosis of diabetes?  No    Do you have a diagnosis of cystic fibrosis (CF)?  No    On a regular basis do you go 3 -5 days between bowel movements?  No    BMI > 40?  No    Preferred Pharmacy:    [id number] [address] [place]  [address] [facility] [address]  Phone: [phone] Fax: [phone]    Final Scheduling Details     Procedure scheduled  Colonoscopy    Surgeon:  JAIRO     Date of procedure:  8/14     Pre-OP / PAC:   No - Not required for this site.    Location  [facility] - Patient preference.    Sedation   MAC/Deep Sedation - Per order.      Patient Reminders:   You will receive a call from a Nurse to review instructions and health history.  This assessment must be completed prior to your procedure.  Failure to complete the Nurse assessment may result in the procedure being cancelled.      On the day of your procedure, please designate an adult(s) who can drive you home stay with you for the next 24 hours. The medicines used in the exam will make you sleepy. You will not be able to drive.      You cannot take public transportation, ride share services, or non-medical taxi service without a responsible caregiver.  Medical transport services are allowed with the requirement that a responsible caregiver will receive you at your destination.  We require that drivers and caregivers are confirmed prior to your procedure.

## 2024-07-10 ENCOUNTER — MYC MEDICAL ADVICE (OUTPATIENT)
Dept: FAMILY MEDICINE | Facility: CLINIC | Age: 44
End: 2024-07-10
Payer: COMMERCIAL

## 2024-07-11 NOTE — TELEPHONE ENCOUNTER
Dr. Theresa Magallanes I was able to pull in labs and encounter from recent outside UC visit on 7/9. Patient is coming in for a pre-op on 7/25 but wants to know if he should repeat any labs before then (hepatic panel is his primary concern). He also has a future visit with you 8/1.    --------------------    Patient calling after review of MyChart to discuss rebound Covid sx and what he needs to do for tx, if anything. Patient states his fever has subsided this morning and he has been able to keep food and fluids down. Overall feels sx are improving today.    He did ask what follow up may be needed based on labs. Routing to clinician for review and advisement.    JOY Metcalf, BSN, RN (she/her)  [facility] RN

## 2024-07-15 ENCOUNTER — TELEPHONE (OUTPATIENT)
Dept: FAMILY MEDICINE | Facility: CLINIC | Age: 44
End: 2024-07-15
Payer: COMMERCIAL

## 2024-07-15 NOTE — TELEPHONE ENCOUNTER
Patient Quality Outreach    Patient is due for the following:   Colon Cancer Screening  Depression  -  DAP      Topic Date Due    Pneumococcal Vaccine (1 of 2 - PCV) Never done    Hepatitis A Vaccine (1 of 2 - Risk 2-dose series) Never done    Hepatitis B Vaccine (1 of 3 - 19+ 3-dose series) Never done    COVID-19 Vaccine (4 - 2023-24 season) 09/01/2023     Chronic Opioid Use -  Treatment Agreement (CSA)    Next Steps:   Patient has upcoming appointment, these items will be addressed at that time.    Type of outreach:    Chart review performed, no outreach needed.      Questions for provider review:    None           Hollis Barcenas MA  Chart routed to NONE.

## 2024-07-17 ENCOUNTER — MYC MEDICAL ADVICE (OUTPATIENT)
Dept: FAMILY MEDICINE | Facility: CLINIC | Age: 44
End: 2024-07-17
Payer: COMMERCIAL

## 2024-07-17 DIAGNOSIS — I10 ESSENTIAL HYPERTENSION: ICD-10-CM

## 2024-07-18 RX ORDER — LISINOPRIL 20 MG/1
20 TABLET ORAL DAILY
Qty: 90 TABLET | Refills: 3 | Status: SHIPPED | OUTPATIENT
Start: 2024-07-18 | End: 2024-08-12 | Stop reason: DRUGHIGH

## 2024-07-21 ENCOUNTER — MYC REFILL (OUTPATIENT)
Dept: FAMILY MEDICINE | Facility: CLINIC | Age: 44
End: 2024-07-21
Payer: COMMERCIAL

## 2024-07-21 DIAGNOSIS — G89.29 CHRONIC BILATERAL LOW BACK PAIN WITH LEFT-SIDED SCIATICA: ICD-10-CM

## 2024-07-21 DIAGNOSIS — M54.41 CHRONIC RIGHT-SIDED LOW BACK PAIN WITH RIGHT-SIDED SCIATICA: ICD-10-CM

## 2024-07-21 DIAGNOSIS — M54.42 CHRONIC BILATERAL LOW BACK PAIN WITH LEFT-SIDED SCIATICA: ICD-10-CM

## 2024-07-21 DIAGNOSIS — G89.29 CHRONIC RIGHT-SIDED LOW BACK PAIN WITH RIGHT-SIDED SCIATICA: ICD-10-CM

## 2024-07-21 DIAGNOSIS — Z76.0 ENCOUNTER FOR MEDICATION REFILL: ICD-10-CM

## 2024-07-21 DIAGNOSIS — J45.30 MILD PERSISTENT ASTHMA, UNSPECIFIED WHETHER COMPLICATED: ICD-10-CM

## 2024-07-22 RX ORDER — ALBUTEROL SULFATE 90 UG/1
2 AEROSOL, METERED RESPIRATORY (INHALATION) EVERY 6 HOURS PRN
Qty: 18 G | Refills: 3 | OUTPATIENT
Start: 2024-07-22

## 2024-07-23 RX ORDER — BUPRENORPHINE AND NALOXONE 8; 2 MG/1; MG/1
1 FILM, SOLUBLE BUCCAL; SUBLINGUAL DAILY
Qty: 30 FILM | Refills: 0 | Status: SHIPPED | OUTPATIENT
Start: 2024-07-23 | End: 2024-08-01

## 2024-07-23 NOTE — TELEPHONE ENCOUNTER
PDMP Review         Value Time User    State PDMP site checked  Yes 7/23/2024  6:09 PM Heather Byers MD          Last filled 6/19/24, 5/23/24, 4/22/24.  Med refill authorized.

## 2024-07-24 RX ORDER — BISACODYL 5 MG/1
TABLET, DELAYED RELEASE ORAL
Qty: 4 TABLET | Refills: 0 | Status: SHIPPED | OUTPATIENT
Start: 2024-07-24 | End: 2024-08-01

## 2024-07-24 NOTE — TELEPHONE ENCOUNTER
Extended Golytely Bowel Prep  recommended due to chronic pain medication noted in chart.  and constipation noted or reported.  Instructions were sent via [employer]. Bowel prep was sent 7/24/2024 to      [id number] - [place], [place] - [address] [place]

## 2024-07-25 ENCOUNTER — OFFICE VISIT (OUTPATIENT)
Dept: FAMILY MEDICINE | Facility: CLINIC | Age: 44
End: 2024-07-25
Payer: COMMERCIAL

## 2024-07-25 ENCOUNTER — LAB (OUTPATIENT)
Dept: LAB | Facility: CLINIC | Age: 44
End: 2024-07-25
Payer: COMMERCIAL

## 2024-07-25 VITALS
HEART RATE: 77 BPM | RESPIRATION RATE: 16 BRPM | BODY MASS INDEX: 26.07 KG/M2 | HEIGHT: 71 IN | DIASTOLIC BLOOD PRESSURE: 94 MMHG | TEMPERATURE: 98.6 F | OXYGEN SATURATION: 98 % | WEIGHT: 186.2 LBS | SYSTOLIC BLOOD PRESSURE: 148 MMHG

## 2024-07-25 DIAGNOSIS — F11.20 SEVERE OPIOID USE DISORDER ON MAINTENANCE THERAPY (H): ICD-10-CM

## 2024-07-25 DIAGNOSIS — R73.03 PRE-DIABETES: ICD-10-CM

## 2024-07-25 DIAGNOSIS — F10.21 ALCOHOL DEPENDENCE IN REMISSION (H): ICD-10-CM

## 2024-07-25 DIAGNOSIS — Z12.11 COLON CANCER SCREENING: ICD-10-CM

## 2024-07-25 DIAGNOSIS — I10 ESSENTIAL HYPERTENSION: ICD-10-CM

## 2024-07-25 DIAGNOSIS — F41.1 GAD (GENERALIZED ANXIETY DISORDER): ICD-10-CM

## 2024-07-25 DIAGNOSIS — Z01.818 PRE-OP EXAM: Primary | ICD-10-CM

## 2024-07-25 DIAGNOSIS — F33.1 MAJOR DEPRESSIVE DISORDER, RECURRENT EPISODE, MODERATE (H): ICD-10-CM

## 2024-07-25 DIAGNOSIS — J44.9 CHRONIC OBSTRUCTIVE PULMONARY DISEASE, UNSPECIFIED COPD TYPE (H): ICD-10-CM

## 2024-07-25 DIAGNOSIS — M45.8 ANKYLOSING SPONDYLITIS OF SACRAL REGION (H): ICD-10-CM

## 2024-07-25 DIAGNOSIS — Z97.2 WEARS DENTURES: ICD-10-CM

## 2024-07-25 LAB
ALBUMIN SERPL BCG-MCNC: 4.4 G/DL (ref 3.5–5.2)
ALT SERPL W P-5'-P-CCNC: 29 U/L (ref 0–70)
CREAT SERPL-MCNC: 0.63 MG/DL (ref 0.67–1.17)
EGFRCR SERPLBLD CKD-EPI 2021: >90 ML/MIN/1.73M2
ERYTHROCYTE [DISTWIDTH] IN BLOOD BY AUTOMATED COUNT: 14.5 % (ref 10–15)
HCT VFR BLD AUTO: 38.5 % (ref 40–53)
HGB BLD-MCNC: 13.3 G/DL (ref 13.3–17.7)
MCH RBC QN AUTO: 32.5 PG (ref 26.5–33)
MCHC RBC AUTO-ENTMCNC: 34.5 G/DL (ref 31.5–36.5)
MCV RBC AUTO: 94 FL (ref 78–100)
PLATELET # BLD AUTO: 270 10E3/UL (ref 150–450)
RBC # BLD AUTO: 4.09 10E6/UL (ref 4.4–5.9)
WBC # BLD AUTO: 6.6 10E3/UL (ref 4–11)

## 2024-07-25 PROCEDURE — 84460 ALANINE AMINO (ALT) (SGPT): CPT

## 2024-07-25 PROCEDURE — 99214 OFFICE O/P EST MOD 30 MIN: CPT | Performed by: FAMILY MEDICINE

## 2024-07-25 PROCEDURE — 82040 ASSAY OF SERUM ALBUMIN: CPT

## 2024-07-25 PROCEDURE — 36415 COLL VENOUS BLD VENIPUNCTURE: CPT

## 2024-07-25 PROCEDURE — 82565 ASSAY OF CREATININE: CPT

## 2024-07-25 PROCEDURE — 85027 COMPLETE CBC AUTOMATED: CPT

## 2024-07-25 RX ORDER — LANOLIN ALCOHOL/MO/W.PET/CERES
100 CREAM (GRAM) TOPICAL DAILY
Qty: 30 TABLET | Refills: 0 | Status: CANCELLED | OUTPATIENT
Start: 2024-07-25

## 2024-07-25 RX ORDER — METFORMIN HCL 500 MG
500 TABLET, EXTENDED RELEASE 24 HR ORAL EVERY MORNING
Qty: 30 TABLET | Refills: 0 | Status: CANCELLED | OUTPATIENT
Start: 2024-07-25

## 2024-07-25 RX ORDER — GABAPENTIN 300 MG/1
600 CAPSULE ORAL 3 TIMES DAILY
Qty: 180 CAPSULE | Refills: 0 | Status: CANCELLED | OUTPATIENT
Start: 2024-07-25

## 2024-07-25 RX ORDER — MULTIPLE VITAMINS W/ MINERALS TAB 9MG-400MCG
1 TAB ORAL DAILY
Qty: 30 TABLET | Refills: 0 | Status: CANCELLED | OUTPATIENT
Start: 2024-07-25

## 2024-07-25 RX ORDER — BUSPIRONE HYDROCHLORIDE 5 MG/1
5 TABLET ORAL 2 TIMES DAILY
Qty: 30 TABLET | Refills: 0 | Status: CANCELLED | OUTPATIENT
Start: 2024-07-25

## 2024-07-25 NOTE — PROGRESS NOTES
Preoperative Evaluation  [address]  [place] [address]  Phone: [phone]  Fax: [phone]  Primary Provider: Lucero Cardenas MD  Pre-op Performing Provider: Jose Luis Cotton MD  Jul 25, 2024 7/21/2024   Surgical Information   What procedure is being done? Colonoscopy   Facility or Hospital where procedure/surgery will be performed: [facility]   Who is doing the procedure / surgery? Cathy Roach   Date of surgery / procedure: 8/14/24   Time of surgery / procedure: ?   Where do you plan to recover after surgery? at home with family        Fax number for surgical facility: Note does not need to be faxed, will be available electronically in Epic.    Assessment & Plan       Pre-op exam    Colon cancer screening  Colonoscopy scheduled on 8/14/2024.    Wears dentures    Essential hypertension  Blood pressure slightly above target but no medication changes today.    Alcohol dependence in remission (H)  Completed last treatment a few months ago, on Campral.  No alcohol use in the recent months per patient.    Severe opioid use disorder on maintenance therapy (H)  On Suboxone.    Major depressive disorder, recurrent episode, moderate (H)  Continue current medications.    Chronic obstructive pulmonary disease, unspecified COPD type (H)  No acute symptoms.    Pre-diabetes  Last A1c was 5.5.    MARY (generalized anxiety disorder)  At baseline.    Recommendation  Approval given to proceed with proposed procedure, without further diagnostic evaluation.    May take routine medications with a small sip of water on the day of the procedure.  Advised to hold diclofenac tablets especially in the last 3 days before the procedure.    Tali Gonzalez is a 43 year old, presenting for the following:  Pre-Op Exam    Screening colonoscopy scheduled on 8/13/2024.  No known history of anesthesia reaction.  History of hypertension, depression/anxiety,  alcohol use disorder, opioid use disorder on Suboxone.  Not on anticoagulant.        7/25/2024     1:32 PM   Additional Questions   Roomed by grover greenberg   Accompanied by self     HPI related to upcoming procedure: As above'        7/21/2024   Pre-Op Questionnaire   Have you ever had a heart attack or stroke? (!) UNKNOWN    Have you ever had surgery on your heart or blood vessels, such as a stent placement, a coronary artery bypass, or surgery on an artery in your head, neck, heart, or legs? (!) YES    Do you have chest pain with activity? No   Do you have a history of heart failure? No   Do you currently have a cold, bronchitis or symptoms of other infection? No   Do you have a cough, shortness of breath, or wheezing? NO   Do you or anyone in your family have previous history of blood clots? (!) UNKNOWN    Do you or does anyone in your family have a serious bleeding problem such as prolonged bleeding following surgeries or cuts? No   Have you ever had problems with anemia or been told to take iron pills? No   Have you had any abnormal blood loss such as black, tarry or bloody stools? No   Have you ever had a blood transfusion? No   Are you willing to have a blood transfusion if it is medically needed before, during, or after your surgery? Yes   Have you or any of your relatives ever had problems with anesthesia? No   Do you have sleep apnea, excessive snoring or daytime drowsiness? No   Do you have any artifical heart valves or other implanted medical devices like a pacemaker, defibrillator, or continuous glucose monitor? (!) YES   What type of device do you have? Anureyssim clip   Name of the clinic that manages your device [facility]   Do you have artificial joints? (!) YES   Are you allergic to latex? No        Health Care Directive  Patient does not have a Health Care Directive or Living Will:     Preoperative Review of - Unable to review       Patient Active Problem List    Diagnosis Date Noted    Alcohol abuse  04/15/2024     Priority: Medium    Ankylosing spondylitis of sacral region (H) 11/16/2023     Priority: Medium    F11.2 - Continuous opioid dependence (H) 04/03/2023     Priority: Medium    Major depressive disorder, recurrent episode, moderate (H) 01/30/2023     Priority: Medium    Nonintractable headache, unspecified chronicity pattern, unspecified headache type 11/17/2022     Priority: Medium    History of cerebral aneurysm 11/17/2022     Priority: Medium    Brain aneurysm 11/17/2022     Priority: Medium    Prediabetes 07/27/2022     Priority: Medium    Essential hypertension 05/11/2022     Priority: Medium    Adjustment disorder with depressed mood 03/18/2022     Priority: Medium    Chronic obstructive pulmonary disease, unspecified COPD type (H) 02/23/2022     Priority: Medium    Tobacco dependence syndrome 11/10/2021     Priority: Medium    HLA B27 (HLA B27 positive) 07/20/2020     Priority: Medium     Identified on lab testing for acute anterior uveitis left eye in 7/2020      Mixed dyslipidemia 07/10/2019     Priority: Medium    Mild persistent asthma, unspecified whether complicated 07/10/2019     Priority: Medium    Hx of avascular necrosis of capital femoral epiphysis 07/10/2019     Priority: Medium     AVN of his right hip possibly secondary to alcohol use for which he underwent a right JESSICA 7/2017  Hip pain left sided evaluated by Orthopedics 8/2019 with normal left hip MRI      Iron deficiency anemia secondary to inadequate dietary iron intake 07/10/2019     Priority: Medium    MARY (generalized anxiety disorder) 04/29/2019     Priority: Medium    Insomnia, unspecified type 04/29/2019     Priority: Medium    Alcohol dependence in remission (H) 04/29/2019     Priority: Medium    Substance abuse in remission (H) 04/29/2019     Priority: Medium    Anxiety state 03/28/2019     Priority: Medium    Degeneration of lumbar/lumbosacral disc without myelopathy 03/25/2019     Priority: Medium    Chronic right-sided  low back pain with right-sided sciatica 03/25/2019     Priority: Medium      Past Medical History:   Diagnosis Date    Alcohol abuse     Asthma     Brain aneurysm     s/p clip in 2013    Brain aneurysm     Chronic back pain     HTN (hypertension)     Seizures (H)     Stroke (H)      Past Surgical History:   Procedure Laterality Date    aneursym clipping      Brain aneursym in 2013    ARTHROPLASTY HIP Right     ARTHROPLASTY REVISION HIP Right     BRAIN SURGERY      KNEE SURGERY      right knee     Current Outpatient Medications   Medication Sig Dispense Refill    acamprosate (CAMPRAL) 333 MG EC tablet Take 2 tablets (666 mg) by mouth 3 times daily 180 tablet 3    acetaminophen (TYLENOL) 325 MG tablet Take 650 mg by mouth every 6 hours as needed for mild pain or headaches      albuterol (PROAIR HFA/PROVENTIL HFA/VENTOLIN HFA) 108 (90 Base) MCG/ACT inhaler Inhale 2 puffs into the lungs every 6 hours as needed for shortness of breath 18 g 11    albuterol (VENTOLIN HFA) 108 (90 Base) MCG/ACT inhaler Inhale 2 puffs into the lungs every 6 hours as needed for wheezing 18 g 3    buprenorphine HCl-naloxone HCl (SUBOXONE) 8-2 MG per film Place 1 Film under the tongue daily 30 Film 0    busPIRone (BUSPAR) 5 MG tablet Take 1 tablet (5 mg) by mouth 2 times daily 30 tablet 0    diclofenac (VOLTAREN) 1 % topical gel Apply 4 g topically 4 times daily as needed for moderate pain 100 g 0    diclofenac (VOLTAREN) 75 MG EC tablet Take 1 tablet (75 mg) by mouth 2 times daily as needed for moderate pain 180 tablet 3    docusate sodium (COLACE) 100 MG capsule Take 1 capsule (100 mg) by mouth 2 times daily 60 capsule 0    folic acid (FOLVITE) 1 MG tablet Take 1 tablet (1 mg) by mouth daily 90 tablet 3    gabapentin (NEURONTIN) 300 MG capsule Take 2 capsules (600 mg) by mouth 3 times daily 180 capsule 0    lisinopril (ZESTRIL) 20 MG tablet Take 1 tablet (20 mg) by mouth daily 90 tablet 3    LORazepam (ATIVAN) 0.5 MG tablet Take 1 tablet (0.5  mg) by mouth every 6 hours as needed for anxiety 5 tablet 0    metFORMIN (GLUCOPHAGE XR) 500 MG 24 hr tablet Take 1 tablet (500 mg) by mouth every morning 30 tablet 0    multivitamin w/minerals (THERA-VIT-M) tablet Take 1 tablet by mouth daily 30 tablet 0    omeprazole (PRILOSEC) 40 MG DR capsule Take 1 capsule (40 mg) by mouth daily 90 capsule 2    polyethylene glycol (GOLYTELY) 236 g suspension Two days before procedure at 5PM fill first container with water. Mix and drink an 8 oz glass every 10-15 minutes until HALF of the container is gone. Place the remainder in the refrigerator. One day before procedure at 5PM drink second half of bowel prep. Drink an 8 oz glass every 10-15 minutes until it is gone. Day of procedure 6 hours before arrival time fill the 2nd container with water. Mix and drink an 8 oz glass every 10-15 minutes until HALF of the container is gone. Discard the remaining solution. 8000 mL 0    polyethylene glycol (MIRALAX) 17 GM/Dose powder Take 17 g (1 Capful) by mouth daily 578 g 1    propranolol (INDERAL) 10 MG tablet Take 1-2 tablets (10-20 mg) by mouth daily as needed (take 30-60 minutes prior to know anxiety provoking situation) 60 tablet 0    sodium chloride (OCEAN) 0.65 % nasal spray Spray in each nostril twice daily as needed. 15 mL 0    thiamine (B-1) 100 MG tablet Take 1 tablet (100 mg) by mouth daily 30 tablet 0    tiZANidine (ZANAFLEX) 4 MG tablet TAKE ONE TABLET BY MOUTH EVERY FOUR HOURS (Patient taking differently: Take 4 mg by mouth every 4 hours as needed) 540 tablet 0    adalimumab (HUMIRA *CF*) 40 MG/0.4ML pen kit Inject 0.4 mLs (40 mg) Subcutaneous every 14 days for 30 days Hold for signs of infection, then seek medical attention. (Patient not taking: Reported on 7/25/2024) 1 each 5    benzonatate (TESSALON) 200 MG capsule Take 1 capsule (200 mg) by mouth 3 times daily as needed for cough (Patient not taking: Reported on 7/25/2024) 21 capsule 0    bisacodyl (DULCOLAX) 5 MG EC  "tablet Two days prior to exam take two (2) tablets at 4pm. One day prior to exam take two (2) tablets at 4pm (Patient not taking: Reported on 7/25/2024) 4 tablet 0    Lidocaine (LIDOCARE) 4 % Patch Place 2 patches onto the skin every 24 hours To prevent lidocaine toxicity, patient should be patch free for 12 hrs daily. (Patient not taking: Reported on 7/25/2024) 60 patch 0    naloxone (NARCAN) 4 MG/0.1ML nasal spray Spray 1 spray (4 mg) into one nostril alternating nostrils as needed for opioid reversal every 2-3 minutes until assistance arrives (Patient not taking: Reported on 6/12/2024) 0.2 mL 11    senna-docusate (SENOKOT-S/PERICOLACE) 8.6-50 MG tablet Take 1 tablet by mouth 2 times daily as needed for constipation (Patient not taking: Reported on 7/25/2024) 60 tablet 0       Allergies   Allergen Reactions    Diphenhydramine Swelling     Per patient, tongue/lips swelling, itchy eyes with both generic diphenhydramine and brand Benadryl     Naproxen Swelling    Pistachio Nut Extract     Toradol [Ketorolac] Itching        Social History     Tobacco Use    Smoking status: Every Day     Current packs/day: 0.50     Average packs/day: 0.5 packs/day for 27.0 years (13.5 ttl pk-yrs)     Types: Cigarettes     Passive exposure: Current (outside only)    Smokeless tobacco: Former    Tobacco comments:     half a pack a day-1 pack. E-cig/vape not often, but occasionally.    Substance Use Topics    Alcohol use: Not Currently     Comment: last use about a pint of Vodka 04/11/2024       History   Drug Use Unknown     Comment: using medical marijuana occasionally             Review of Systems  CONSTITUTIONAL: NEGATIVE for fever, chills, change in weight  RESP: NEGATIVE for significant cough or SOB  CV: NEGATIVE for chest pain/chest pressure    Objective    BP (!) 155/99   Pulse 77   Temp 98.6  F (37  C) (Oral)   Resp 16   Ht 1.803 m (5' 11\")   Wt 84.5 kg (186 lb 3.2 oz)   SpO2 98%   BMI 25.97 kg/m     Estimated body mass " "index is 25.97 kg/m  as calculated from the following:    Height as of this encounter: 1.803 m (5' 11\").    Weight as of this encounter: 84.5 kg (186 lb 3.2 oz).      GEN-alert,  in no apparent distress.  HEENT-mucous membranes are moist, neck is supple.  CV-regular rate and rhythm with no murmur.   RESP-lungs clear to auscultation .  ABDOMEN- Soft , not tender.  EXTREM- No edema.  SKIN-normal        Jose Luis Cotton MD   7/25/2024    "

## 2024-07-28 ASSESSMENT — ANXIETY QUESTIONNAIRES
8. IF YOU CHECKED OFF ANY PROBLEMS, HOW DIFFICULT HAVE THESE MADE IT FOR YOU TO DO YOUR WORK, TAKE CARE OF THINGS AT HOME, OR GET ALONG WITH OTHER PEOPLE?: VERY DIFFICULT
6. BECOMING EASILY ANNOYED OR IRRITABLE: NEARLY EVERY DAY
GAD7 TOTAL SCORE: 19
5. BEING SO RESTLESS THAT IT IS HARD TO SIT STILL: NEARLY EVERY DAY
7. FEELING AFRAID AS IF SOMETHING AWFUL MIGHT HAPPEN: SEVERAL DAYS
GAD7 TOTAL SCORE: 19
1. FEELING NERVOUS, ANXIOUS, OR ON EDGE: NEARLY EVERY DAY
4. TROUBLE RELAXING: NEARLY EVERY DAY
2. NOT BEING ABLE TO STOP OR CONTROL WORRYING: NEARLY EVERY DAY
IF YOU CHECKED OFF ANY PROBLEMS ON THIS QUESTIONNAIRE, HOW DIFFICULT HAVE THESE PROBLEMS MADE IT FOR YOU TO DO YOUR WORK, TAKE CARE OF THINGS AT HOME, OR GET ALONG WITH OTHER PEOPLE: VERY DIFFICULT
3. WORRYING TOO MUCH ABOUT DIFFERENT THINGS: NEARLY EVERY DAY
7. FEELING AFRAID AS IF SOMETHING AWFUL MIGHT HAPPEN: SEVERAL DAYS

## 2024-07-30 DIAGNOSIS — F10.21 ALCOHOL DEPENDENCE IN REMISSION (H): ICD-10-CM

## 2024-07-31 ENCOUNTER — OFFICE VISIT (OUTPATIENT)
Dept: RHEUMATOLOGY | Facility: CLINIC | Age: 44
End: 2024-07-31
Payer: COMMERCIAL

## 2024-07-31 VITALS
HEART RATE: 69 BPM | DIASTOLIC BLOOD PRESSURE: 68 MMHG | BODY MASS INDEX: 25.91 KG/M2 | SYSTOLIC BLOOD PRESSURE: 138 MMHG | WEIGHT: 185.8 LBS | OXYGEN SATURATION: 97 %

## 2024-07-31 DIAGNOSIS — Z84.0 FAMILY HISTORY OF PSORIASIS: ICD-10-CM

## 2024-07-31 DIAGNOSIS — L40.50 PSORIATIC ARTHRITIS (H): ICD-10-CM

## 2024-07-31 DIAGNOSIS — M46.1 SACROILIITIS (H): ICD-10-CM

## 2024-07-31 DIAGNOSIS — M45.8 ANKYLOSING SPONDYLITIS OF SACRAL REGION (H): Primary | ICD-10-CM

## 2024-07-31 DIAGNOSIS — Z15.89 HLA B27 (HLA B27 POSITIVE): ICD-10-CM

## 2024-07-31 PROCEDURE — 99214 OFFICE O/P EST MOD 30 MIN: CPT | Performed by: INTERNAL MEDICINE

## 2024-07-31 PROCEDURE — G2211 COMPLEX E/M VISIT ADD ON: HCPCS | Performed by: INTERNAL MEDICINE

## 2024-07-31 RX ORDER — SUCRALFATE 1 G/1
TABLET ORAL
COMMUNITY
Start: 2024-02-16

## 2024-07-31 RX ORDER — FLUTICASONE PROPIONATE 50 MCG
2 SPRAY, SUSPENSION (ML) NASAL
COMMUNITY
Start: 2024-07-21

## 2024-07-31 RX ORDER — ONDANSETRON 4 MG/1
4 TABLET, ORALLY DISINTEGRATING ORAL PRN
COMMUNITY
Start: 2024-07-09

## 2024-07-31 RX ORDER — LANOLIN ALCOHOL/MO/W.PET/CERES
100 CREAM (GRAM) TOPICAL DAILY
Qty: 90 TABLET | Refills: 3 | Status: SHIPPED | OUTPATIENT
Start: 2024-07-31

## 2024-07-31 NOTE — PROGRESS NOTES
"      Rheumatology follow-up visit note     Carlos is a 43 year old male presents today for follow-up.    Carlos was seen today for recheck.    Diagnoses and all orders for this visit:    Ankylosing spondylitis of sacral region (H)  -     adalimumab (HUMIRA *CF*) 40 MG/0.4ML pen kit; Inject 0.4 mLs (40 mg) subcutaneously every 14 days Hold for signs of infection, then seek medical attention.    Psoriatic arthritis (H)  -     adalimumab (HUMIRA *CF*) 40 MG/0.4ML pen kit; Inject 0.4 mLs (40 mg) subcutaneously every 14 days Hold for signs of infection, then seek medical attention.    Sacroiliitis (H24)  -     adalimumab (HUMIRA *CF*) 40 MG/0.4ML pen kit; Inject 0.4 mLs (40 mg) subcutaneously every 14 days Hold for signs of infection, then seek medical attention.    Family history of psoriasis    HLA B27 (HLA B27 positive)        This patient with sacroiliitis, ankylosing spondylitis/psoriatic arthritis is here for follow-up.  He was last seen here in January.  Subsequently the reasonable plan to observe how he might do with Humira could not be completed as he unfortunately relapsed drinking and was in rehab, has now been in remission, has taken Humira only intermittently.  At this time I have outlined to him that it would be hard to say that we are any better than we were in January 2024.  He is prepared to \"start again\".  He will take Humira on a regular basis, will meet in 3 months.    Follow up in 3 months.    HPI    Carlos Hamilton is a 43 year old male is here for follow-up of  ankylosing spondylitis/sacroiliitis in the context of family history of psoriasis HLA-B27 positive status, and inflammatory back symptoms, last seen in January this year.  At that time it was unclear if Humira had him any good.  The plan at that time was for him to continue Humira on regular basis.  We will discussing at that time the overlapping symptoms that can come from both sacroiliitis/ankylosing spondylitis/psoriatic arthritis on 1 " hand, and di degenerative, radiologically mild, on the other hand.  Unfortunately he had a relapse of drinking was inpatient at the [facility].  He is now sober.  He has noted ongoing pain especially in the lower back rating it 7.5/10, severe, with morning stiffness of 20 minutes worse activity related.  When he does take Humira he has felt that his painful fingers stiffness in the knees do seem to get better he is not sure if that has had any effect on his back pain so far.  However as noted the Humira intake has been very intermittent.  His original prescription from October 2023, just finished.    He does have mild lumbar spondylosis.  In [place] he used to get epidurals.  Then he developed osteonecrosis and has not had those done since.   .     DETAILED EXAMINATION  07/31/24  :    Vitals:    07/31/24 0936   BP: 138/68   BP Location: Right arm   Patient Position: Sitting   Cuff Size: Adult Regular   Pulse: 69   SpO2: 97%   Weight: 84.3 kg (185 lb 12.8 oz)     Alert oriented. Head including the face is examined for malar rash, heliotropes, scarring, lupus pernio. Eyes examined for redness such as in episcleritis/scleritis, periorbital lesions.   Neck/ Face examined for parotid gland swelling, range of motion of neck.  Left upper and lower and right upper and lower extremities examined for tenderness, swelling, warmth of the appendicular joints, range of motion, edema, rash.  Some of the important findings included: he does not have evidence of synovitis in the palpable joints of the upper extremities.  No significant deformities of the digits.  no Heberden nodes.  Range of motion of the shoulders  show full abduction.  No JLT effusion or warmth of the knees.  he does not have dactylitis of the digits.     Patient Active Problem List    Diagnosis Date Noted    Alcohol abuse 04/15/2024     Priority: Medium    Ankylosing spondylitis of sacral region (H) 11/16/2023     Priority: Medium    F11.2 - Continuous opioid  dependence (H) 04/03/2023     Priority: Medium    Major depressive disorder, recurrent episode, moderate (H) 01/30/2023     Priority: Medium    Nonintractable headache, unspecified chronicity pattern, unspecified headache type 11/17/2022     Priority: Medium    History of cerebral aneurysm 11/17/2022     Priority: Medium    Brain aneurysm 11/17/2022     Priority: Medium    Prediabetes 07/27/2022     Priority: Medium    Essential hypertension 05/11/2022     Priority: Medium    Adjustment disorder with depressed mood 03/18/2022     Priority: Medium    Chronic obstructive pulmonary disease, unspecified COPD type (H) 02/23/2022     Priority: Medium    Tobacco dependence syndrome 11/10/2021     Priority: Medium    HLA B27 (HLA B27 positive) 07/20/2020     Priority: Medium     Identified on lab testing for acute anterior uveitis left eye in 7/2020      Mixed dyslipidemia 07/10/2019     Priority: Medium    Mild persistent asthma, unspecified whether complicated 07/10/2019     Priority: Medium    Hx of avascular necrosis of capital femoral epiphysis 07/10/2019     Priority: Medium     AVN of his right hip possibly secondary to alcohol use for which he underwent a right JESSICA 7/2017  Hip pain left sided evaluated by Orthopedics 8/2019 with normal left hip MRI      Iron deficiency anemia secondary to inadequate dietary iron intake 07/10/2019     Priority: Medium    MARY (generalized anxiety disorder) 04/29/2019     Priority: Medium    Insomnia, unspecified type 04/29/2019     Priority: Medium    Alcohol dependence in remission (H) 04/29/2019     Priority: Medium    Substance abuse in remission (H) 04/29/2019     Priority: Medium    Anxiety state 03/28/2019     Priority: Medium    Degeneration of lumbar/lumbosacral disc without myelopathy 03/25/2019     Priority: Medium    Chronic right-sided low back pain with right-sided sciatica 03/25/2019     Priority: Medium     Past Surgical History:   Procedure Laterality Date    aneursym  clipping      Brain aneursym in 2013    ARTHROPLASTY HIP Right     ARTHROPLASTY REVISION HIP Right     BRAIN SURGERY      KNEE SURGERY      right knee      Past Medical History:   Diagnosis Date    Alcohol abuse     Asthma     Brain aneurysm     s/p clip in 2013    Brain aneurysm     Chronic back pain     HTN (hypertension)     Seizures (H)     Stroke (H)      Allergies   Allergen Reactions    Diphenhydramine Swelling     Per patient, tongue/lips swelling, itchy eyes with both generic diphenhydramine and brand Benadryl     Naproxen Swelling    Pistachio Nut Extract     Toradol [Ketorolac] Itching     Current Outpatient Medications   Medication Sig Dispense Refill    acamprosate (CAMPRAL) 333 MG EC tablet Take 2 tablets (666 mg) by mouth 3 times daily 180 tablet 3    acetaminophen (TYLENOL) 325 MG tablet Take 650 mg by mouth every 6 hours as needed for mild pain or headaches      adalimumab (HUMIRA *CF*) 40 MG/0.4ML pen kit Inject 0.4 mLs (40 mg) Subcutaneous every 14 days for 30 days Hold for signs of infection, then seek medical attention. (Patient not taking: Reported on 7/25/2024) 1 each 5    albuterol (PROAIR HFA/PROVENTIL HFA/VENTOLIN HFA) 108 (90 Base) MCG/ACT inhaler Inhale 2 puffs into the lungs every 6 hours as needed for shortness of breath 18 g 11    albuterol (VENTOLIN HFA) 108 (90 Base) MCG/ACT inhaler Inhale 2 puffs into the lungs every 6 hours as needed for wheezing 18 g 3    benzonatate (TESSALON) 200 MG capsule Take 1 capsule (200 mg) by mouth 3 times daily as needed for cough (Patient not taking: Reported on 7/25/2024) 21 capsule 0    bisacodyl (DULCOLAX) 5 MG EC tablet Two days prior to exam take two (2) tablets at 4pm. One day prior to exam take two (2) tablets at 4pm (Patient not taking: Reported on 7/25/2024) 4 tablet 0    buprenorphine HCl-naloxone HCl (SUBOXONE) 8-2 MG per film Place 1 Film under the tongue daily 30 Film 0    busPIRone (BUSPAR) 5 MG tablet Take 1 tablet (5 mg) by mouth 2  times daily 30 tablet 0    diclofenac (VOLTAREN) 1 % topical gel Apply 4 g topically 4 times daily as needed for moderate pain 100 g 0    diclofenac (VOLTAREN) 75 MG EC tablet Take 1 tablet (75 mg) by mouth 2 times daily as needed for moderate pain 180 tablet 3    docusate sodium (COLACE) 100 MG capsule Take 1 capsule (100 mg) by mouth 2 times daily 60 capsule 0    folic acid (FOLVITE) 1 MG tablet Take 1 tablet (1 mg) by mouth daily 90 tablet 3    gabapentin (NEURONTIN) 300 MG capsule Take 2 capsules (600 mg) by mouth 3 times daily 180 capsule 0    Lidocaine (LIDOCARE) 4 % Patch Place 2 patches onto the skin every 24 hours To prevent lidocaine toxicity, patient should be patch free for 12 hrs daily. (Patient not taking: Reported on 7/25/2024) 60 patch 0    lisinopril (ZESTRIL) 20 MG tablet Take 1 tablet (20 mg) by mouth daily 90 tablet 3    LORazepam (ATIVAN) 0.5 MG tablet Take 1 tablet (0.5 mg) by mouth every 6 hours as needed for anxiety 5 tablet 0    metFORMIN (GLUCOPHAGE XR) 500 MG 24 hr tablet Take 1 tablet (500 mg) by mouth every morning 30 tablet 0    multivitamin w/minerals (THERA-VIT-M) tablet Take 1 tablet by mouth daily 30 tablet 0    naloxone (NARCAN) 4 MG/0.1ML nasal spray Spray 1 spray (4 mg) into one nostril alternating nostrils as needed for opioid reversal every 2-3 minutes until assistance arrives (Patient not taking: Reported on 6/12/2024) 0.2 mL 11    omeprazole (PRILOSEC) 40 MG DR capsule Take 1 capsule (40 mg) by mouth daily 90 capsule 2    polyethylene glycol (GOLYTELY) 236 g suspension Two days before procedure at 5PM fill first container with water. Mix and drink an 8 oz glass every 10-15 minutes until HALF of the container is gone. Place the remainder in the refrigerator. One day before procedure at 5PM drink second half of bowel prep. Drink an 8 oz glass every 10-15 minutes until it is gone. Day of procedure 6 hours before arrival time fill the 2nd container with water. Mix and drink an 8  oz glass every 10-15 minutes until HALF of the container is gone. Discard the remaining solution. 8000 mL 0    polyethylene glycol (MIRALAX) 17 GM/Dose powder Take 17 g (1 Capful) by mouth daily 578 g 1    propranolol (INDERAL) 10 MG tablet Take 1-2 tablets (10-20 mg) by mouth daily as needed (take 30-60 minutes prior to know anxiety provoking situation) 60 tablet 0    senna-docusate (SENOKOT-S/PERICOLACE) 8.6-50 MG tablet Take 1 tablet by mouth 2 times daily as needed for constipation (Patient not taking: Reported on 7/25/2024) 60 tablet 0    sodium chloride (OCEAN) 0.65 % nasal spray Spray in each nostril twice daily as needed. 15 mL 0    thiamine (B-1) 100 MG tablet Take 1 tablet (100 mg) by mouth daily 30 tablet 0    tiZANidine (ZANAFLEX) 4 MG tablet TAKE ONE TABLET BY MOUTH EVERY FOUR HOURS (Patient taking differently: Take 4 mg by mouth every 4 hours as needed) 540 tablet 0     family history includes Glaucoma in his mother; Uveitis in his brother.  Social Connections: Not on file          WBC   Date Value Ref Range Status   07/02/2019 9.0 4.0 - 11.0 10e9/L Final     WBC Count   Date Value Ref Range Status   07/25/2024 6.6 4.0 - 11.0 10e3/uL Final     RBC Count   Date Value Ref Range Status   07/25/2024 4.09 (L) 4.40 - 5.90 10e6/uL Final   07/02/2019 4.51 4.4 - 5.9 10e12/L Final     Hemoglobin   Date Value Ref Range Status   07/25/2024 13.3 13.3 - 17.7 g/dL Final   07/02/2019 13.2 (L) 13.3 - 17.7 g/dL Final     Hematocrit   Date Value Ref Range Status   07/25/2024 38.5 (L) 40.0 - 53.0 % Final   07/02/2019 40.6 40.0 - 53.0 % Final     MCV   Date Value Ref Range Status   07/25/2024 94 78 - 100 fL Final   07/02/2019 90 78 - 100 fl Final     MCH   Date Value Ref Range Status   07/25/2024 32.5 26.5 - 33.0 pg Final   07/02/2019 29.3 26.5 - 33.0 pg Final     Platelet Count   Date Value Ref Range Status   07/25/2024 270 150 - 450 10e3/uL Final   07/02/2019 345 150 - 450 10e9/L Final     % Lymphocytes   Date Value Ref  Range Status   04/14/2024 28 % Final   07/02/2019 33.7 % Final     AST   Date Value Ref Range Status   04/14/2024 21 0 - 45 U/L Final     Comment:     Reference intervals for this test were updated on 6/12/2023 to more accurately reflect our healthy population. There may be differences in the flagging of prior results with similar values performed with this method. Interpretation of those prior results can be made in the context of the updated reference intervals.   07/15/2020 19 0 - 45 U/L Final     ALT   Date Value Ref Range Status   07/25/2024 29 0 - 70 U/L Final   07/15/2020 34 0 - 70 U/L Final     Albumin   Date Value Ref Range Status   07/25/2024 4.4 3.5 - 5.2 g/dL Final   11/17/2022 3.5 3.4 - 5.0 g/dL Final   07/15/2020 3.8 3.4 - 5.0 g/dL Final     Alkaline Phosphatase   Date Value Ref Range Status   04/14/2024 70 40 - 150 U/L Final     Comment:     Reference intervals for this test were updated on 11/14/2023 to more accurately reflect our healthy population. There may be differences in the flagging of prior results with similar values performed with this method. Interpretation of those prior results can be made in the context of the updated reference intervals.   07/15/2020 131 40 - 150 U/L Final     Creatinine   Date Value Ref Range Status   07/25/2024 0.63 (L) 0.67 - 1.17 mg/dL Final   07/15/2020 0.59 (L) 0.66 - 1.25 mg/dL Final     GFR Estimate   Date Value Ref Range Status   07/25/2024 >90 >60 mL/min/1.73m2 Final     Comment:     eGFR calculated using 2021 CKD-EPI equation.   07/15/2020 >90 >60 mL/min/[1.73_m2] Final     Comment:     Non  GFR Calc  Starting 12/18/2018, serum creatinine based estimated GFR (eGFR) will be   calculated using the Chronic Kidney Disease Epidemiology Collaboration   (CKD-EPI) equation.       GFR, ESTIMATED POCT   Date Value Ref Range Status   11/17/2022 >60 >60 mL/min/1.73m2 Final     GFR Estimate If Black   Date Value Ref Range Status   07/15/2020 >90 >60  mL/min/[1.73_m2] Final     Comment:      GFR Calc  Starting 12/18/2018, serum creatinine based estimated GFR (eGFR) will be   calculated using the Chronic Kidney Disease Epidemiology Collaboration   (CKD-EPI) equation.       Sed Rate   Date Value Ref Range Status   07/15/2020 31 (H) 0 - 15 mm/h Final     Erythrocyte Sedimentation Rate   Date Value Ref Range Status   01/09/2024 4 0 - 15 mm/hr Final     CRP Inflammation   Date Value Ref Range Status   07/15/2020 24.7 (H) 0.0 - 8.0 mg/L Final

## 2024-08-01 ENCOUNTER — OFFICE VISIT (OUTPATIENT)
Dept: FAMILY MEDICINE | Facility: CLINIC | Age: 44
End: 2024-08-01
Payer: COMMERCIAL

## 2024-08-01 VITALS
TEMPERATURE: 97.1 F | RESPIRATION RATE: 16 BRPM | DIASTOLIC BLOOD PRESSURE: 75 MMHG | BODY MASS INDEX: 26.06 KG/M2 | HEART RATE: 86 BPM | SYSTOLIC BLOOD PRESSURE: 140 MMHG | OXYGEN SATURATION: 98 % | WEIGHT: 186.12 LBS | HEIGHT: 71 IN

## 2024-08-01 DIAGNOSIS — M45.8 ANKYLOSING SPONDYLITIS OF SACRAL REGION (H): ICD-10-CM

## 2024-08-01 DIAGNOSIS — G89.29 CHRONIC RIGHT-SIDED LOW BACK PAIN WITH RIGHT-SIDED SCIATICA: ICD-10-CM

## 2024-08-01 DIAGNOSIS — M54.41 CHRONIC RIGHT-SIDED LOW BACK PAIN WITH RIGHT-SIDED SCIATICA: ICD-10-CM

## 2024-08-01 DIAGNOSIS — F41.0 PANIC ATTACK: ICD-10-CM

## 2024-08-01 DIAGNOSIS — F41.1 GAD (GENERALIZED ANXIETY DISORDER): ICD-10-CM

## 2024-08-01 DIAGNOSIS — G89.29 CHRONIC BILATERAL LOW BACK PAIN WITH LEFT-SIDED SCIATICA: ICD-10-CM

## 2024-08-01 DIAGNOSIS — J44.9 CHRONIC OBSTRUCTIVE PULMONARY DISEASE, UNSPECIFIED COPD TYPE (H): ICD-10-CM

## 2024-08-01 DIAGNOSIS — M54.42 CHRONIC BILATERAL LOW BACK PAIN WITH LEFT-SIDED SCIATICA: ICD-10-CM

## 2024-08-01 DIAGNOSIS — M51.379 DEGENERATION OF LUMBAR/LUMBOSACRAL DISC WITHOUT MYELOPATHY: ICD-10-CM

## 2024-08-01 DIAGNOSIS — I10 ESSENTIAL HYPERTENSION: ICD-10-CM

## 2024-08-01 DIAGNOSIS — F11.90 CHRONIC, CONTINUOUS USE OF OPIOIDS: Primary | ICD-10-CM

## 2024-08-01 PROCEDURE — G2211 COMPLEX E/M VISIT ADD ON: HCPCS | Performed by: FAMILY MEDICINE

## 2024-08-01 PROCEDURE — 99214 OFFICE O/P EST MOD 30 MIN: CPT | Performed by: FAMILY MEDICINE

## 2024-08-01 PROCEDURE — 96127 BRIEF EMOTIONAL/BEHAV ASSMT: CPT | Performed by: FAMILY MEDICINE

## 2024-08-01 RX ORDER — PROPRANOLOL HCL 60 MG
60 CAPSULE, EXTENDED RELEASE 24HR ORAL DAILY
Qty: 60 CAPSULE | Refills: 3 | Status: SHIPPED | OUTPATIENT
Start: 2024-08-01 | End: 2024-08-12

## 2024-08-01 RX ORDER — BUPRENORPHINE AND NALOXONE 8; 2 MG/1; MG/1
1 FILM, SOLUBLE BUCCAL; SUBLINGUAL DAILY
Qty: 30 FILM | Refills: 2 | Status: SHIPPED | OUTPATIENT
Start: 2024-08-01

## 2024-08-01 ASSESSMENT — PATIENT HEALTH QUESTIONNAIRE - PHQ9: SUM OF ALL RESPONSES TO PHQ QUESTIONS 1-9: 13

## 2024-08-01 NOTE — PROGRESS NOTES
"  Assessment & Plan     Chronic, continuous use of opioids: continuing on 8 mg suboxone. Today, briefly discussed possible transition to injectable sublocade at some point.     Degeneration of lumbar/lumbosacral disc without myelopathy: following with Rheumatology and on Humira for ankylosing spondylitis. Today, briefly discussed re-establishing with spine clinic. He would like to finish paying off some bills first and also finish his upcoming colonoscopy.     Ankylosing spondylitis of sacral region (H)    Chronic bilateral low back pain with left-sided sciatica  - buprenorphine HCl-naloxone HCl (SUBOXONE) 8-2 MG per film  Dispense: 30 Film; Refill: 2    Chronic right-sided low back pain with right-sided sciatica  - buprenorphine HCl-naloxone HCl (SUBOXONE) 8-2 MG per film  Dispense: 30 Film; Refill: 2    MARY (generalized anxiety disorder): quite severe right now. Will try adding daily propranolol 60 mg long-acting to see if this provides better coverage during the day. He has not benefited from SSRIs in the past. Is also on buspirone 5 mg BID- could try increasing that at next visit.  He hopes to resume therapy when he is in a somewhat better financial place.    Panic attack  - propranolol ER (INDERAL LA) 60 MG 24 hr capsule  Dispense: 60 capsule; Refill: 3      Chronic obstructive pulmonary disease, unspecified COPD type (H): well-controlled at this point. Will discuss tobacco cessation again next time.       Essential hypertension: BP slightly elevated today. Will hope that propranolol will help lower it.He will monitor at home and send me results through [facility] [employer].      BMI  Estimated body mass index is 25.96 kg/m  as calculated from the following:    Height as of this encounter: 1.803 m (5' 11\").    Weight as of this encounter: 84.4 kg (186 lb 1.9 oz).             Tali Gonzalez is a 43 year old, presenting for the following health issues:  Follow Up (Chronic pain and anxiety )      8/1/2024     7:52 AM "   Additional Questions   Roomed by JERILYN BRAGA MA     History of Present Illness       Back Pain:  He presents for follow up of back pain. Patient's back pain is a chronic problem.  Location of back pain:  Right lower back, right middle of back, right buttock, left buttock and right hip  Description of back pain: sharp  Back pain spreads: right buttocks, right thigh, right knee and right foot    Since patient first noticed back pain, pain is: gradually worsening  Does back pain interfere with his job:  Yes       COPD:  He presents for follow up of COPD.   Overall, COPD symptoms are slightly worse since last visit. He has more than usual fatigue or shortness of breath with exertion and more than usual shortness of breath at rest.  He sometimes coughs and does have change in sputum. No recent fever. He can walk 1-2 miles without stopping to rest. He can walk 3 or more flights of stairs without resting. The patient has had no ED, urgent care, or hospital admissions because of COPD since the last visit.     Mental Health Follow-up:  Patient presents to follow-up on Depression & Anxiety.Patient's depression since last visit has been:  Worse  The patient is having other symptoms associated with depression.  Patient's anxiety since last visit has been:  Worse  The patient is having other symptoms associated with anxiety.  Any significant life events: job concerns, financial concerns, grief or loss and health concerns  Patient is feeling anxious or having panic attacks.  Patient has no concerns about alcohol or drug use.    Hypertension: He presents for follow up of hypertension.  He does not check blood pressure  regularly outside of the clinic. Outside blood pressures have been over 140/90. He follows a low salt diet.     He eats 0-1 servings of fruits and vegetables daily.He consumes 2 sweetened beverage(s) daily.He exercises with enough effort to increase his heart rate 9 or less minutes per day.  He exercises with enough  "effort to increase his heart rate 5 days per week. He is missing 1 dose(s) of medications per week.  He is not taking prescribed medications regularly due to remembering to take.       Panic attacks. Sometimes has to step away from work and take deep breaths.     Therapy- not able to do right now, financially. Would have $50 copays. Lots of medical bills he owes.     Colonoscopy coming up.     Has to move around constantly to avoid back stiffening up.       Objective    BP (!) 140/75   Pulse 86   Temp 97.1  F (36.2  C) (Temporal)   Resp 16   Ht 1.803 m (5' 11\")   Wt 84.4 kg (186 lb 1.9 oz)   SpO2 98%   BMI 25.96 kg/m    Body mass index is 25.96 kg/m .  Physical Exam   GENERAL: alert and no distress  MS: needs to stand and stretch during visit due to back pain  SKIN: no suspicious lesions or rashes  NEURO: Normal strength and tone, mentation intact and speech normal  PSYCH: mentation appears normal, affect normal/bright            Signed Electronically by: Lucero Cardenas MD    "

## 2024-08-01 NOTE — LETTER

## 2024-08-01 NOTE — LETTER
[facility]  08/01/24  Patient: Carlos Hamilton  YOB: 1980  Medical Record Number: [id number]                                                                                  Non-Opioid Controlled Substance Agreement    This is an agreement between you and your provider regarding safe and appropriate use of controlled substances prescribed by your care team. Controlled substances are?medicines that can cause physical and mental dependence (abuse).     There are strict laws about having and using these medicines. We here at [facility] are  committed to working with you in your efforts to get better. To support you in this work, we'll help you schedule regular office appointments for medicine refills. If we must cancel or change your appointment for any reason, we'll make sure you have enough medicine to last until your next appointment.     As a Provider, I will:   Listen carefully to your concerns while treating you with respect.   Recommend a treatment plan that I believe is in your best interest and may involve therapies other than medicine.    Talk with you often about the possible benefits and the risk of harm of any medicine that we prescribe for you.  Assess the safety of this medicine and check how well it works.    Provide a plan on how to taper (discontinue or go off) using this medicine if the decision is made to stop its use.      ::  As a Patient, I understand controlled substances:     Are prescribed by my care provider to help me function or work and manage my condition(s).?  Are strong medicines and can cause serious side effects.     Need to be taken exactly as prescribed.?Combining controlled substances with certain medicines or chemicals (such as illegal drugs, alcohol, sedatives, sleeping pills, and benzodiazepines) can be dangerous or even fatal.? If I stop taking my medicines suddenly, I may have severe withdrawal symptoms.     The risks, benefits, and  side effects of these medicine(s) were explained to me. I agree that:    I will take part in other treatments as advised by my care team. This may be psychiatry or counseling, physical therapy, behavioral therapy, group treatment or a referral to specialist.    I will keep all my appointments and understand this is part of the monitoring of controlled substances.?My care team may require an office visit for EVERY controlled substance refill. If I miss appointments or don t follow instructions, my care team may stop my medicine    I will take my medicines as prescribed. I will not change the dose or schedule unless my care team tells me to. There will be no refills if I run out early.      I may be asked to come to the clinic and complete a urine drug test or complete a pill count. If I don t give a urine sample or participate in a pill count, the care team may stop my medicine.    I will only receive controlled substance prescriptions from this clinic. If I am treated by another provider, I will tell them that I am taking controlled substances and that I have a treatment agreement with this provider. I will inform my [facility] care team within one business day if I am given a prescription for any controlled substance by another healthcare provider. My [facility] care team can contact other providers and pharmacists about my use of any medicines.    It is up to me to make sure that I don't run out of my medicines on weekends or holidays.?If my care team is willing to refill my prescription without a visit, I must request refills only during office hours. Refills may take up to 3 business days to process. I will use one pharmacy to fill all my controlled substance prescriptions. I will notify the clinic about any changes to my insurance or medicine availability.    I am responsible for my prescriptions. If the medicine/prescription is lost, stolen or destroyed, it will not be replaced.?I also agree not  to share controlled substance medicines with anyone.     I am aware I should not use any illegal or recreational drugs. I agree not to drink alcohol unless my care team says I can.     If I enroll in the [place] Medical Cannabis program, I will tell my care team before my next refill.    I will tell my care team right away if I become pregnant, have a new medical problem treated outside of my regular clinic, or have a change in my medicines.     I understand that this medicine can affect my thinking, judgment and reaction time.? Alcohol and drugs affect the brain and body, which can affect the safety of my driving. Being under the influence of alcohol or drugs can affect my decision-making, behaviors, personal safety and the safety of others. Driving while impaired (DWI) can occur if a person is driving, operating or in physical control of a car, motorcycle, boat, snowmobile, ATV, motorbike, off-road vehicle or any other motor vehicle ([place] Statute 169A.20). I understand the risk if I choose to drive or operate any vehicle or machinery.    I understand that if I do not follow any of the conditions above, my prescriptions or treatment may be stopped or changed.   I agree that my provider, clinic care team and pharmacy may work with any city, state or federal law enforcement agency that investigates the misuse, sale or other diversion of my controlled medicine. I will allow my provider to discuss my care with, or share a copy of, this agreement with any other treating provider, pharmacy or emergency room where I receive care.     I have read this agreement and have asked questions about anything I did not understand.    ________________________________________________________  Patient Signature - Carlos Hamilton     ___________________                   Date     ________________________________________________________  Provider Signature - Lucero Cardenas MD       ___________________                   Date      ________________________________________________________  Witness Signature (required if provider not present while patient signing)          ___________________                   Date

## 2024-08-06 ENCOUNTER — TELEPHONE (OUTPATIENT)
Dept: GASTROENTEROLOGY | Facility: CLINIC | Age: 44
End: 2024-08-06

## 2024-08-06 ENCOUNTER — MYC MEDICAL ADVICE (OUTPATIENT)
Dept: FAMILY MEDICINE | Facility: CLINIC | Age: 44
End: 2024-08-06
Payer: COMMERCIAL

## 2024-08-06 NOTE — TELEPHONE ENCOUNTER
Pre visit planning completed.      Procedure details:    Patient scheduled for Colonoscopy on 8.14.2024.     Arrival time: 0900. Procedure time 1000    Facility location: [facility]; [address] [place] [address]. Check in location: 1st Floor [facility].     Sedation type: MAC    Pre-op completed 7.25.2024 with Jose Luis Cotton MD at [facility]    Indication for procedure: screening colonoscopy      Chart review:     Electronic implanted devices? No    Recent diagnosis of diverticulitis within the last 6 weeks? No      Medication review:    Diabetic? Prediabetic. Oral medications.  Metformin (glucophage): HOLD day of procedure.      Anticoagulants? No    Weight loss medication/injectable? No.    NSAIDS? Yes.  Diclofenac (Voltaren).  Holding interval of 1 day before procedure.    Other medication HOLDING recommendations:  N/A      Prep for procedure:     Bowel prep recommendation: Extended Golytely. Bowel prep prescription sent to    [id number] - [place] - [address] [place]    Due to: chronic pain medication noted in chart.     Prep instructions sent via [employer] by CRC team.         Ena Christian RN  Endoscopy Procedure Pre Assessment RN  [phone] option 4

## 2024-08-06 NOTE — TELEPHONE ENCOUNTER
Attempted to contact patient in order to complete pre assessment questions.     No answer. Left message to return call to [phone] option 4    Callback required communication sent via [employer].      Sigrid Tejada RN  Endoscopy Procedure Pre Assessment

## 2024-08-06 NOTE — TELEPHONE ENCOUNTER
Pre assessment completed for upcoming procedure.   (Please see previous telephone encounter notes for complete details)    Patient  returned call.       Procedure details:    Arrival time and facility location reviewed.    Pre op exam needed? No.    Designated  policy reviewed. Instructed to have someone stay 24  hours post procedure.       Medication review:    Medications reviewed. Please see supporting documentation below. Holding recommendations discussed (if applicable).   Oral diabetic medication(s): Metformin (glucophage): HOLD day of procedure.  NSAID medication(s): Diclofenac (Voltaren): HOLD 1 day before procedure.       Prep for procedure:     Procedure prep instructions reviewed.        Any additional information needed:  N/A      Patient  verbalized understanding and had no questions or concerns at this time.      Melany Singh RN  Endoscopy Procedure Pre Assessment   [phone] option 4

## 2024-08-11 ENCOUNTER — MYC MEDICAL ADVICE (OUTPATIENT)
Dept: FAMILY MEDICINE | Facility: CLINIC | Age: 44
End: 2024-08-11
Payer: COMMERCIAL

## 2024-08-11 DIAGNOSIS — F41.0 PANIC ATTACK: ICD-10-CM

## 2024-08-11 DIAGNOSIS — I10 ESSENTIAL HYPERTENSION: Primary | ICD-10-CM

## 2024-08-12 RX ORDER — LISINOPRIL 20 MG/1
20 TABLET ORAL DAILY
Qty: 90 TABLET | Refills: 3 | Status: SHIPPED | OUTPATIENT
Start: 2024-08-12 | End: 2024-08-16 | Stop reason: DRUGHIGH

## 2024-08-12 RX ORDER — LISINOPRIL 20 MG/1
30 TABLET ORAL DAILY
Qty: 135 TABLET | Refills: 3 | Status: SHIPPED | OUTPATIENT
Start: 2024-08-12 | End: 2024-08-12

## 2024-08-12 RX ORDER — PROPRANOLOL HCL 60 MG
120 CAPSULE, EXTENDED RELEASE 24HR ORAL DAILY
Qty: 180 CAPSULE | Refills: 3 | Status: SHIPPED | OUTPATIENT
Start: 2024-08-12

## 2024-08-13 RX ORDER — ONDANSETRON 2 MG/ML
4 INJECTION INTRAMUSCULAR; INTRAVENOUS
Status: CANCELLED | OUTPATIENT
Start: 2024-08-13

## 2024-08-13 RX ORDER — LIDOCAINE 40 MG/G
CREAM TOPICAL
Status: CANCELLED | OUTPATIENT
Start: 2024-08-13

## 2024-08-14 ENCOUNTER — ANESTHESIA EVENT (OUTPATIENT)
Dept: GASTROENTEROLOGY | Facility: CLINIC | Age: 44
End: 2024-08-14
Payer: COMMERCIAL

## 2024-08-14 ENCOUNTER — PATIENT OUTREACH (OUTPATIENT)
Dept: CARE COORDINATION | Facility: CLINIC | Age: 44
End: 2024-08-14
Payer: COMMERCIAL

## 2024-08-14 ENCOUNTER — ANESTHESIA (OUTPATIENT)
Dept: GASTROENTEROLOGY | Facility: CLINIC | Age: 44
End: 2024-08-14
Payer: COMMERCIAL

## 2024-08-14 ENCOUNTER — HOSPITAL ENCOUNTER (OUTPATIENT)
Facility: CLINIC | Age: 44
Discharge: HOME | End: 2024-08-14
Attending: COLON & RECTAL SURGERY | Admitting: COLON & RECTAL SURGERY
Payer: COMMERCIAL

## 2024-08-14 VITALS
RESPIRATION RATE: 38 BRPM | BODY MASS INDEX: 25.9 KG/M2 | SYSTOLIC BLOOD PRESSURE: 98 MMHG | HEART RATE: 55 BPM | WEIGHT: 185 LBS | HEIGHT: 71 IN | DIASTOLIC BLOOD PRESSURE: 69 MMHG | OXYGEN SATURATION: 99 %

## 2024-08-14 LAB — COLONOSCOPY: NORMAL

## 2024-08-14 PROCEDURE — 250N000011 HC RX IP 250 OP 636: Performed by: NURSE ANESTHETIST, CERTIFIED REGISTERED

## 2024-08-14 PROCEDURE — 370N000017 HC ANESTHESIA TECHNICAL FEE, PER MIN: Performed by: COLON & RECTAL SURGERY

## 2024-08-14 PROCEDURE — 258N000003 HC RX IP 258 OP 636: Performed by: NURSE ANESTHETIST, CERTIFIED REGISTERED

## 2024-08-14 PROCEDURE — 250N000013 HC RX MED GY IP 250 OP 250 PS 637: Performed by: COLON & RECTAL SURGERY

## 2024-08-14 PROCEDURE — 45378 DIAGNOSTIC COLONOSCOPY: CPT | Performed by: NURSE ANESTHETIST, CERTIFIED REGISTERED

## 2024-08-14 PROCEDURE — 45378 DIAGNOSTIC COLONOSCOPY: CPT | Performed by: COLON & RECTAL SURGERY

## 2024-08-14 PROCEDURE — 250N000009 HC RX 250: Performed by: NURSE ANESTHETIST, CERTIFIED REGISTERED

## 2024-08-14 PROCEDURE — 999N000010 HC STATISTIC ANES STAT CODE-CRNA PER MINUTE: Performed by: COLON & RECTAL SURGERY

## 2024-08-14 PROCEDURE — 45378 DIAGNOSTIC COLONOSCOPY: CPT | Performed by: STUDENT IN AN ORGANIZED HEALTH CARE EDUCATION/TRAINING PROGRAM

## 2024-08-14 PROCEDURE — G0121 COLON CA SCRN NOT HI RSK IND: HCPCS | Performed by: COLON & RECTAL SURGERY

## 2024-08-14 RX ORDER — LIDOCAINE HYDROCHLORIDE 20 MG/ML
INJECTION, SOLUTION INFILTRATION; PERINEURAL PRN
Status: DISCONTINUED | OUTPATIENT
Start: 2024-08-14 | End: 2024-08-14

## 2024-08-14 RX ORDER — PROPOFOL 10 MG/ML
INJECTION, EMULSION INTRAVENOUS PRN
Status: DISCONTINUED | OUTPATIENT
Start: 2024-08-14 | End: 2024-08-14

## 2024-08-14 RX ORDER — SODIUM CHLORIDE, SODIUM LACTATE, POTASSIUM CHLORIDE, CALCIUM CHLORIDE 600; 310; 30; 20 MG/100ML; MG/100ML; MG/100ML; MG/100ML
INJECTION, SOLUTION INTRAVENOUS CONTINUOUS PRN
Status: DISCONTINUED | OUTPATIENT
Start: 2024-08-14 | End: 2024-08-14

## 2024-08-14 RX ORDER — DEXMEDETOMIDINE HYDROCHLORIDE 4 UG/ML
INJECTION, SOLUTION INTRAVENOUS PRN
Status: DISCONTINUED | OUTPATIENT
Start: 2024-08-14 | End: 2024-08-14

## 2024-08-14 RX ORDER — PROPOFOL 10 MG/ML
INJECTION, EMULSION INTRAVENOUS CONTINUOUS PRN
Status: DISCONTINUED | OUTPATIENT
Start: 2024-08-14 | End: 2024-08-14

## 2024-08-14 RX ORDER — ONDANSETRON 2 MG/ML
INJECTION INTRAMUSCULAR; INTRAVENOUS PRN
Status: DISCONTINUED | OUTPATIENT
Start: 2024-08-14 | End: 2024-08-14

## 2024-08-14 RX ORDER — SIMETHICONE 40MG/0.6ML
SUSPENSION, DROPS(FINAL DOSAGE FORM)(ML) ORAL PRN
Status: DISCONTINUED | OUTPATIENT
Start: 2024-08-14 | End: 2024-08-14 | Stop reason: HOSPADM

## 2024-08-14 RX ADMIN — SODIUM CHLORIDE, POTASSIUM CHLORIDE, SODIUM LACTATE AND CALCIUM CHLORIDE: 600; 310; 30; 20 INJECTION, SOLUTION INTRAVENOUS at 10:18

## 2024-08-14 RX ADMIN — LIDOCAINE HYDROCHLORIDE 50 MG: 20 INJECTION, SOLUTION INFILTRATION; PERINEURAL at 10:20

## 2024-08-14 RX ADMIN — DEXMEDETOMIDINE HYDROCHLORIDE 8 MCG: 200 INJECTION INTRAVENOUS at 10:26

## 2024-08-14 RX ADMIN — PROPOFOL 150 MCG/KG/MIN: 10 INJECTION, EMULSION INTRAVENOUS at 10:20

## 2024-08-14 RX ADMIN — PROPOFOL 50 MG: 10 INJECTION, EMULSION INTRAVENOUS at 10:22

## 2024-08-14 RX ADMIN — ONDANSETRON 4 MG: 2 INJECTION INTRAMUSCULAR; INTRAVENOUS at 10:20

## 2024-08-14 ASSESSMENT — ACTIVITIES OF DAILY LIVING (ADL)
ADLS_ACUITY_SCORE: 37
ADLS_ACUITY_SCORE: 37
ADLS_ACUITY_SCORE: 35

## 2024-08-14 ASSESSMENT — COPD QUESTIONNAIRES: COPD: 1

## 2024-08-14 ASSESSMENT — LIFESTYLE VARIABLES: TOBACCO_USE: 1

## 2024-08-14 ASSESSMENT — ENCOUNTER SYMPTOMS: SEIZURES: 1

## 2024-08-14 NOTE — ANESTHESIA PREPROCEDURE EVALUATION
Anesthesia Pre-Procedure Evaluation    Patient: Carlos Hamilton   MRN: [id number] : 1980        Procedure : Procedure(s):  Colonoscopy          Past Medical History:   Diagnosis Date    Alcohol abuse     Asthma     Brain aneurysm     s/p clip in     Brain aneurysm     Chronic back pain     HTN (hypertension)     Seizures (H)     Stroke (H)       Past Surgical History:   Procedure Laterality Date    aneursym clipping      Brain aneursym in 2013    ARTHROPLASTY HIP Right     ARTHROPLASTY REVISION HIP Right     BRAIN SURGERY      KNEE SURGERY      right knee      Allergies   Allergen Reactions    Diphenhydramine Swelling     Per patient, tongue/lips swelling, itchy eyes with both generic diphenhydramine and brand Benadryl     Naproxen Swelling    Pistachio Nut Extract     Toradol [Ketorolac] Itching      Social History     Tobacco Use    Smoking status: Every Day     Current packs/day: 0.50     Average packs/day: 0.5 packs/day for 27.0 years (13.5 ttl pk-yrs)     Types: Cigarettes     Passive exposure: Current (outside only)    Smokeless tobacco: Former    Tobacco comments:     half a pack a day-1 pack. E-cig/vape not often, but occasionally.    Substance Use Topics    Alcohol use: Not Currently     Comment: last use about a pint of Vodka 2024      Wt Readings from Last 1 Encounters:   24 84.4 kg (186 lb 1.9 oz)        Anesthesia Evaluation            ROS/MED HX  ENT/Pulmonary:     (+)                tobacco use, Current use,   patient smoked within 24 hours, Mild Persistent, asthma    COPD,              Neurologic: Comment: Brain aneurysm, s/p clipping     (+)       seizures,   CVA, date: ,                     Cardiovascular:     (+)  hypertension- -   -  - -                                      METS/Exercise Tolerance: 3 - Able to walk 1-2 blocks without stopping    Hematologic:       Musculoskeletal: Comment: Ankylosing spondylitis      GI/Hepatic:       Renal/Genitourinary:    (-)  "renal disease   Endo:    (-) Type II DM and obesity   Psychiatric/Substance Use:     (+) psychiatric history depression and anxiety (adjustment disorder) alcohol abuse H/O chronic opiod use . : previous substance abuse.    Infectious Disease:       Malignancy:       Other:            Physical Exam    Airway        Mallampati: II   TM distance: > 3 FB   Neck ROM: full   Mouth opening: > 3 cm    Respiratory Devices and Support         Dental       (+) Multiple crowns, permanant bridges      Cardiovascular   cardiovascular exam normal          Pulmonary           (+) decreased breath sounds           OUTSIDE LABS:  CBC:   Lab Results   Component Value Date    WBC 6.6 07/25/2024    WBC 10.0 04/14/2024    HGB 13.3 07/25/2024    HGB 16.0 04/14/2024    HCT 38.5 (L) 07/25/2024    HCT 46.0 04/14/2024     07/25/2024     04/14/2024     BMP:   Lab Results   Component Value Date     04/14/2024     11/17/2022    POTASSIUM 3.8 04/14/2024    POTASSIUM 3.6 11/17/2022    CHLORIDE 101 04/14/2024    CHLORIDE 108 11/17/2022    CO2 25 04/14/2024    CO2 25 11/17/2022    BUN 12.7 04/14/2024    BUN 7 11/17/2022    CR 0.63 (L) 07/25/2024    CR 0.67 04/14/2024     (H) 04/14/2024     (H) 11/17/2022     COAGS:   Lab Results   Component Value Date    INR 0.92 03/28/2019     POC: No results found for: \"BGM\", \"HCG\", \"HCGS\"  HEPATIC:   Lab Results   Component Value Date    ALBUMIN 4.4 07/25/2024    PROTTOTAL 6.8 04/14/2024    ALT 29 07/25/2024    AST 21 04/14/2024    GGT 76 (H) 04/15/2024    ALKPHOS 70 04/14/2024    BILITOTAL 0.6 04/14/2024    JOSSY 35 02/27/2019     OTHER:   Lab Results   Component Value Date    PH 4.0 (L) 10/11/2021    A1C 5.2 04/14/2024    TERESA 8.7 04/14/2024    MAG 2.0 04/14/2024    LIPASE 91 03/12/2019    TSH 2.34 04/15/2024    CRP 24.7 (H) 07/15/2020    SED 4 01/09/2024       Anesthesia Plan    ASA Status:  3    NPO Status:  NPO Appropriate    Anesthesia Type: MAC.     - Reason for MAC: " "immobility needed, straight local not clinically adequate              Consents    Anesthesia Plan(s) and associated risks, benefits, and realistic alternatives discussed. Questions answered and patient/representative(s) expressed understanding.     - Discussed:     - Discussed with:  Patient            Postoperative Care    Pain management: IV analgesics.   PONV prophylaxis: Ondansetron (or other 5HT-3)     Comments:               Alex Starks MD    I have reviewed the pertinent notes and labs in the chart from the past 30 days and (re)examined the patient.  Any updates or changes from those notes are reflected in this note.              # Overweight: Estimated body mass index is 25.96 kg/m  as calculated from the following:    Height as of 8/1/24: 1.803 m (5' 11\").    Weight as of 8/1/24: 84.4 kg (186 lb 1.9 oz).      "

## 2024-08-14 NOTE — ANESTHESIA CARE TRANSFER NOTE
Patient: Carlos Hamilton    Procedure: Procedure(s):  Colonoscopy       Diagnosis: Screen for colon cancer [Z12.11]  Diagnosis Additional Information: No value filed.    Anesthesia Type:   MAC     Note:    Oropharynx: oropharynx clear of all foreign objects and spontaneously breathing  Level of Consciousness: awake  Oxygen Supplementation: room air    Independent Airway: airway patency satisfactory and stable  Dentition: dentition unchanged  Vital Signs Stable: post-procedure vital signs reviewed and stable  Report to RN Given: handoff report given  Patient transferred to: PACU  Comments: At end of procedure, spontaneous respirations, patient alert to voice, able to follow commands. Patient breathing room air at room air to PACU. Oxygen tubing connected to wall O2 in PACU, SpO2, NiBP, and EKG monitors and alarms on and functioning, Marlon Hugger warmer connected to patient gown, report on patient's clinical status given to PACU RN, RN questions answered.      Handoff Report: Identifed the Patient, Identified the Reponsible Provider, Reviewed the pertinent medical history, Discussed the surgical course, Reviewed Intra-OP anesthesia mangement and issues during anesthesia, Set expectations for post-procedure period and Allowed opportunity for questions and acknowledgement of understanding        Electronically Signed By: DAVID Ca CRNA  August 14, 2024  10:42 AM

## 2024-08-14 NOTE — H&P
History and physical examination reviewed  Will proceed with screening colonoscopy under KATHY GILL MD

## 2024-08-14 NOTE — ANESTHESIA POSTPROCEDURE EVALUATION
Patient: Carlos Hamilton    Procedure: Procedure(s):  Colonoscopy       Anesthesia Type:  MAC    Note:  Disposition: Outpatient   Postop Pain Control: Uneventful            Sign Out: Well controlled pain   PONV: No   Neuro/Psych: Uneventful            Sign Out: Acceptable/Baseline neuro status   Airway/Respiratory: Uneventful            Sign Out: Acceptable/Baseline resp. status   CV/Hemodynamics: Uneventful            Sign Out: Acceptable CV status   Other NRE: NONE   DID A NON-ROUTINE EVENT OCCUR?            Last vitals:  Vitals Value Taken Time   BP 98/69 08/14/24 1100   Temp     Pulse 55 08/14/24 1100   Resp 38 08/14/24 1105   SpO2 99 % 08/14/24 1105       Electronically Signed By: Alex Starks MD  August 14, 2024  2:16 PM

## 2024-08-16 DIAGNOSIS — I10 ESSENTIAL HYPERTENSION: Primary | ICD-10-CM

## 2024-08-16 RX ORDER — LISINOPRIL 10 MG/1
30 TABLET ORAL DAILY
Qty: 270 TABLET | Refills: 3 | Status: SHIPPED | OUTPATIENT
Start: 2024-08-16

## 2024-08-28 ENCOUNTER — PATIENT OUTREACH (OUTPATIENT)
Dept: CARE COORDINATION | Facility: CLINIC | Age: 44
End: 2024-08-28
Payer: COMMERCIAL

## 2024-09-19 ENCOUNTER — VIRTUAL VISIT (OUTPATIENT)
Dept: FAMILY MEDICINE | Facility: CLINIC | Age: 44
End: 2024-09-19
Payer: COMMERCIAL

## 2024-09-19 ENCOUNTER — NURSE TRIAGE (OUTPATIENT)
Dept: FAMILY MEDICINE | Facility: CLINIC | Age: 44
End: 2024-09-19

## 2024-09-19 DIAGNOSIS — F32.2 MAJOR DEPRESSIVE DISORDER, SEVERE (H): Primary | ICD-10-CM

## 2024-09-19 DIAGNOSIS — F10.21 ALCOHOL DEPENDENCE IN REMISSION (H): ICD-10-CM

## 2024-09-19 PROCEDURE — 99443 PR PHYSICIAN TELEPHONE EVALUATION 21-30 MIN: CPT | Mod: 95 | Performed by: FAMILY MEDICINE

## 2024-09-19 NOTE — TELEPHONE ENCOUNTER
Nurse Triage SBAR    Is this a 2nd Level Triage? YES, LICENSED PRACTITIONER REVIEW IS REQUIRED    Situation: Depression. Requesting appointment with PCP.    Background: Pt states for the past 2 years he has been struggling with depression and alcohol use. Pt has been messaging PCP today. Appointment was offered.     Assessment: Pt states he feels like he is having a nervous breakdown. Pt crying during phone call. Denies suicidal thoughts. States he is in no danger. He is in a safe place. He is being badgered by bill collectors and feeling overwhelmed.     Protocol Recommended Disposition:   See in Office Within 3 Days    Recommendation: Pt requesting appointment with PCP. She is the only doctor he trusts. Routing message to PCP for review and recommendations or able to add on virtual visit today?     Call with  on site; provider was with a patient.     Routed to provider    Does the patient meet one of the following criteria for ADS visit consideration? No     Reason for Disposition   Patient wants to be seen   Depression is worsening (e.g.,sleeping poorly, less able to do activities of daily living)   Unhealthy alcohol use, known or suspected    Additional Information   Negative: Patient attempted suicide   Negative: Patient is threatening suicide now   Negative: Violent behavior, or threatening to physically hurt or kill someone   Negative: Patient is very confused (disoriented, slurred speech) and no other adult (e.g., friend or family member) available   Negative: Difficult to awaken or acting very confused (disoriented, slurred speech) and new-onset   Negative: Sounds like a life-threatening emergency to the triager   Negative: Suicide thoughts, threats, attempts, or questions   Negative: Questions or concerns about alcohol use, unhealthy alcohol use, binge drinking, intoxication, or withdrawal   Negative: Questions or concerns about substance use (drug use), unhealthy drug use, intoxication, or  withdrawal   Negative: Anxiety is main problem or symptom   Negative: Depression and unable to do any of normal activities (e.g., self care, school, work; in comparison to baseline).   Negative: Very strange or confused behavior   Negative: Patient sounds very sick or weak to the triager   Negative: Fever > 101 F  (38.3 C)   Negative: Sometimes has thoughts of suicide    Protocols used: Depression-A-[place]

## 2024-09-19 NOTE — PROGRESS NOTES
"Carlos is a 43 year old who is being evaluated via a billable video visit.          Assessment & Plan     Major depressive disorder, severe (H): had a long discussion with Carlos and recommended that he go to the ER since he is having a crisis and is thinking of relapsing on alcohol. He is not feeling actively suicidal right now, though he is wishing he were not alive. We discussed the closest hospital which appears to be [facility] in [place].. He states he will go there. I gave him a lot of praise for asking for help and reaching out when he needed it. I will send a message to our RN's asking that one of them reach out tomorrow to make sure he is doing ok.     We also discussed the [facility] Crisis Line and I gave him this  number. Sent it in a ODIN message also.     Alcohol dependence in remission (H)            Nicotine/Tobacco Cessation  He reports that he has been smoking cigarettes. He has a 13.5 pack-year smoking history. He has been exposed to tobacco smoke. He has quit using smokeless tobacco.  Nicotine/Tobacco Cessation Plan        BMI  Estimated body mass index is 25.8 kg/m  as calculated from the following:    Height as of 8/14/24: 1.803 m (5' 11\").    Weight as of 8/14/24: 83.9 kg (185 lb).             Subjective   Carlos is a 43 year old, presenting for the following health issues:  No chief complaint on file.    HPI     Called Carlos after he reached out, feeling very depressed.     States that he wishes he could just die. Not wanting to kill himself. Has a bottle of liquor in front of him and is trying not to drink it.      He is at home. No roommates are there. Things between him and his roommates have been difficult, which is part of his stress. He is going to get evicted next month. He has a lot of bills.     Doesn't want to go to a AA meeting. Doesn't want to call his sponsor.     Thinking he should go to the hospital.    Has friends he could call but \"doesn't want to be a burden.\"        "   Objective           Vitals:  No vitals were obtained today due to virtual visit.    Physical Exam   No exam- was a phone visit        Video-Visit Details    Type of service:  Telephone Visit     Duration: 21 minutes.     Originating Location (pt. Location): Home    Distant Location (provider location):  On-site  Platform used for Video Visit: Telephone  Signed Electronically by: Lucero Cardenas MD

## 2024-09-25 ENCOUNTER — TELEPHONE (OUTPATIENT)
Dept: RHEUMATOLOGY | Facility: CLINIC | Age: 44
End: 2024-09-25
Payer: COMMERCIAL

## 2024-09-25 NOTE — TELEPHONE ENCOUNTER
Prior Authorization Approval    Medication: HUMIRA *CF* PEN 40 MG/0.4ML SC PNKT  Authorization Effective Date: 9/25/2024  Authorization Expiration Date: 9/25/2025  Approved Dose/Quantity: 2 per 28  Reference #: [id number]   Insurance Company: [employer] - Phone [phone] Fax [phone]  Expected CoPay: $ 0  CoPay Card Available:      Financial Assistance Needed: NA  Which Pharmacy is filling the prescription: [facility] - [facility] [address] ([place]: [id number])

## 2024-10-03 ENCOUNTER — PATIENT OUTREACH (OUTPATIENT)
Dept: CARE COORDINATION | Facility: CLINIC | Age: 44
End: 2024-10-03
Payer: COMMERCIAL

## 2024-11-09 ENCOUNTER — HEALTH MAINTENANCE LETTER (OUTPATIENT)
Age: 44
End: 2024-11-09

## 2024-11-10 DIAGNOSIS — G89.29 CHRONIC BILATERAL LOW BACK PAIN WITH LEFT-SIDED SCIATICA: ICD-10-CM

## 2024-11-10 DIAGNOSIS — G89.29 CHRONIC RIGHT-SIDED LOW BACK PAIN WITH RIGHT-SIDED SCIATICA: ICD-10-CM

## 2024-11-10 DIAGNOSIS — M54.42 CHRONIC BILATERAL LOW BACK PAIN WITH LEFT-SIDED SCIATICA: ICD-10-CM

## 2024-11-10 DIAGNOSIS — M54.41 CHRONIC RIGHT-SIDED LOW BACK PAIN WITH RIGHT-SIDED SCIATICA: ICD-10-CM

## 2024-11-11 ENCOUNTER — VIRTUAL VISIT (OUTPATIENT)
Dept: FAMILY MEDICINE | Facility: CLINIC | Age: 44
End: 2024-11-11
Attending: FAMILY MEDICINE
Payer: COMMERCIAL

## 2024-11-11 DIAGNOSIS — F41.0 PANIC ATTACK: ICD-10-CM

## 2024-11-11 DIAGNOSIS — F32.2 MAJOR DEPRESSIVE DISORDER, SEVERE (H): ICD-10-CM

## 2024-11-11 DIAGNOSIS — F41.1 GAD (GENERALIZED ANXIETY DISORDER): ICD-10-CM

## 2024-11-11 DIAGNOSIS — I67.1 BRAIN ANEURYSM: ICD-10-CM

## 2024-11-11 DIAGNOSIS — J44.9 CHRONIC OBSTRUCTIVE PULMONARY DISEASE, UNSPECIFIED COPD TYPE (H): Primary | ICD-10-CM

## 2024-11-11 PROCEDURE — 99443 PR PHYSICIAN TELEPHONE EVALUATION 21-30 MIN: CPT | Mod: 95 | Performed by: FAMILY MEDICINE

## 2024-11-11 RX ORDER — FLUTICASONE PROPIONATE AND SALMETEROL 100; 50 UG/1; UG/1
1 POWDER RESPIRATORY (INHALATION) EVERY 12 HOURS
Qty: 60 EACH | Refills: 3 | Status: SHIPPED | OUTPATIENT
Start: 2024-11-11

## 2024-11-11 RX ORDER — BUPRENORPHINE AND NALOXONE 8; 2 MG/1; MG/1
1 FILM, SOLUBLE BUCCAL; SUBLINGUAL DAILY
Qty: 30 FILM | Refills: 0 | Status: SHIPPED | OUTPATIENT
Start: 2024-11-11

## 2024-11-11 ASSESSMENT — ANXIETY QUESTIONNAIRES
IF YOU CHECKED OFF ANY PROBLEMS ON THIS QUESTIONNAIRE, HOW DIFFICULT HAVE THESE PROBLEMS MADE IT FOR YOU TO DO YOUR WORK, TAKE CARE OF THINGS AT HOME, OR GET ALONG WITH OTHER PEOPLE: VERY DIFFICULT
6. BECOMING EASILY ANNOYED OR IRRITABLE: NEARLY EVERY DAY
1. FEELING NERVOUS, ANXIOUS, OR ON EDGE: NEARLY EVERY DAY
8. IF YOU CHECKED OFF ANY PROBLEMS, HOW DIFFICULT HAVE THESE MADE IT FOR YOU TO DO YOUR WORK, TAKE CARE OF THINGS AT HOME, OR GET ALONG WITH OTHER PEOPLE?: VERY DIFFICULT
3. WORRYING TOO MUCH ABOUT DIFFERENT THINGS: MORE THAN HALF THE DAYS
5. BEING SO RESTLESS THAT IT IS HARD TO SIT STILL: SEVERAL DAYS
GAD7 TOTAL SCORE: 15
2. NOT BEING ABLE TO STOP OR CONTROL WORRYING: MORE THAN HALF THE DAYS
7. FEELING AFRAID AS IF SOMETHING AWFUL MIGHT HAPPEN: SEVERAL DAYS
7. FEELING AFRAID AS IF SOMETHING AWFUL MIGHT HAPPEN: SEVERAL DAYS
4. TROUBLE RELAXING: NEARLY EVERY DAY

## 2024-11-11 ASSESSMENT — PATIENT HEALTH QUESTIONNAIRE - PHQ9: SUM OF ALL RESPONSES TO PHQ QUESTIONS 1-9: 8
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
## 2025-01-06 DIAGNOSIS — K85.90 ACUTE PANCREATITIS, UNSPECIFIED COMPLICATION STATUS, UNSPECIFIED PANCREATITIS TYPE: Primary | ICD-10-CM

## 2025-01-21 DIAGNOSIS — G89.29 CHRONIC BILATERAL LOW BACK PAIN WITH LEFT-SIDED SCIATICA: ICD-10-CM

## 2025-01-21 DIAGNOSIS — G89.29 CHRONIC RIGHT-SIDED LOW BACK PAIN WITH RIGHT-SIDED SCIATICA: ICD-10-CM

## 2025-01-21 DIAGNOSIS — M54.41 CHRONIC RIGHT-SIDED LOW BACK PAIN WITH RIGHT-SIDED SCIATICA: ICD-10-CM

## 2025-01-21 DIAGNOSIS — M54.42 CHRONIC BILATERAL LOW BACK PAIN WITH LEFT-SIDED SCIATICA: ICD-10-CM

## 2025-01-22 RX ORDER — BUPRENORPHINE AND NALOXONE 8; 2 MG/1; MG/1
1 FILM, SOLUBLE BUCCAL; SUBLINGUAL DAILY
Qty: 30 FILM | Refills: 0 | Status: SHIPPED | OUTPATIENT
Start: 2025-01-22

## 2025-02-05 ENCOUNTER — VIRTUAL VISIT (OUTPATIENT)
Dept: FAMILY MEDICINE | Facility: CLINIC | Age: 45
End: 2025-02-05
Payer: COMMERCIAL

## 2025-02-05 DIAGNOSIS — F11.90 CHRONIC, CONTINUOUS USE OF OPIOIDS: ICD-10-CM

## 2025-02-05 DIAGNOSIS — M54.42 CHRONIC BILATERAL LOW BACK PAIN WITH LEFT-SIDED SCIATICA: ICD-10-CM

## 2025-02-05 DIAGNOSIS — F33.1 MAJOR DEPRESSIVE DISORDER, RECURRENT EPISODE, MODERATE (H): ICD-10-CM

## 2025-02-05 DIAGNOSIS — F10.21 ALCOHOL DEPENDENCE IN REMISSION (H): ICD-10-CM

## 2025-02-05 DIAGNOSIS — F17.200 NICOTINE DEPENDENCE, UNCOMPLICATED, UNSPECIFIED NICOTINE PRODUCT TYPE: ICD-10-CM

## 2025-02-05 DIAGNOSIS — G89.29 CHRONIC RIGHT-SIDED LOW BACK PAIN WITH RIGHT-SIDED SCIATICA: Primary | ICD-10-CM

## 2025-02-05 DIAGNOSIS — G89.29 CHRONIC BILATERAL LOW BACK PAIN WITH LEFT-SIDED SCIATICA: ICD-10-CM

## 2025-02-05 DIAGNOSIS — M45.8 ANKYLOSING SPONDYLITIS OF SACRAL REGION (H): ICD-10-CM

## 2025-02-05 DIAGNOSIS — M54.41 CHRONIC RIGHT-SIDED LOW BACK PAIN WITH RIGHT-SIDED SCIATICA: Primary | ICD-10-CM

## 2025-02-05 DIAGNOSIS — J44.9 CHRONIC OBSTRUCTIVE PULMONARY DISEASE, UNSPECIFIED COPD TYPE (H): ICD-10-CM

## 2025-02-05 PROBLEM — F11.21 OPIOID DEPENDENCE, IN REMISSION (H): Status: RESOLVED | Noted: 2025-02-05 | Resolved: 2025-02-05

## 2025-02-05 PROBLEM — F11.21 OPIOID DEPENDENCE, IN REMISSION (H): Status: ACTIVE | Noted: 2025-02-05

## 2025-02-05 PROCEDURE — 98014 SYNCH AUDIO-ONLY EST MOD 30: CPT | Performed by: FAMILY MEDICINE

## 2025-02-05 RX ORDER — BUPRENORPHINE AND NALOXONE 8; 2 MG/1; MG/1
1 FILM, SOLUBLE BUCCAL; SUBLINGUAL DAILY
Qty: 30 FILM | Refills: 2 | Status: SHIPPED | OUTPATIENT
Start: 2025-02-05

## 2025-02-05 ASSESSMENT — PATIENT HEALTH QUESTIONNAIRE - PHQ9
10. IF YOU CHECKED OFF ANY PROBLEMS, HOW DIFFICULT HAVE THESE PROBLEMS MADE IT FOR YOU TO DO YOUR WORK, TAKE CARE OF THINGS AT HOME, OR GET ALONG WITH OTHER PEOPLE: SOMEWHAT DIFFICULT
SUM OF ALL RESPONSES TO PHQ QUESTIONS 1-9: 6
SUM OF ALL RESPONSES TO PHQ QUESTIONS 1-9: 6

## 2025-02-05 NOTE — PROGRESS NOTES
Carlos is a 44 year old who is being evaluated via a billable video visit.    How would you like to obtain your AVS? MyChart  If the video visit is dropped, the invitation should be resent by: Text to cell phone: [phone]  Will anyone else be joining your video visit? No      Assessment & Plan     Chronic, continuous use of opioids: continue suboxone 8 mg per day    Chronic right-sided low back pain with right-sided sciatica  - diclofenac (VOLTAREN) 1 % topical gel  Dispense: 100 g; Refill: 3  - buprenorphine HCl-naloxone HCl (SUBOXONE) 8-2 MG per film  Dispense: 30 Film; Refill: 2    Ankylosing spondylitis of sacral region (H): declines to continue following with Rheumatology right now. Back pain is at baseline. We have discussed following up with spine clinic but he wants to hold off on that right now due to the expense      Chronic bilateral low back pain with left-sided sciatica  - buprenorphine HCl-naloxone HCl (SUBOXONE) 8-2 MG per film  Dispense: 30 Film; Refill: 2    Chronic obstructive pulmonary disease, unspecified COPD type (H): breathing much better with addition of Advair BID and PRN      Major depressive disorder, recurrent episode, moderate (H): Mood is stable right now.  Might consider starting back with therapy but wants to wait until he has met his deductible later in the year.    Alcohol dependence in remission (H): provided encouragement. He is not interested in formal help right now like formal counseling or programming. He did have pancreatitis one month ago which was likely related. I will continue providing support.     Nicotine dependence, uncomplicated, unspecified nicotine product type: Placed referral as below and reached out about if he might want to try some NRT.  He will let me know.  - [place] [facility] Referral          MED REC REQUIRED  Post Medication Reconciliation Status:     BMI  Estimated body mass index is 25.8 kg/m  as calculated from the following:    Height as of 8/14/24:  "1.803 m (5' 11\").    Weight as of 8/14/24: 83.9 kg (185 lb).             Tali Gonzalez is a 44 year old, presenting for the following health issues:  Hospital F/U and RECHECK (MDD, Jeremy, copd)      2/5/2025    12:10 PM   Additional Questions   Roomed by paw p   Accompanied by self     History of Present Illness       Reason for visit:  Follow up ED and others      He is not able to do a video visit at his work. He is on break and needs to do telephone.     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 Hasn't had the same pain recur since then.     Smoking: trying to cut down.     Alcohol:   Drinking on and off. He keps trying to stop completely but gets overwhelmed. He is not getting any formal support like AA right now.     Back pain: the same. Stopped humira because it didn't seem to help and it was expensive. The pain hasn't gotten any worse since he stopped.       Has a bad insurance plan. He is having to pay a lot for his meds.      Living with friend and the friend's brother who is disabled with autism and fetal alcohol syndrome.           Objective           Vitals:  No vitals were obtained today due to virtual visit.    Physical Exam   GENERAL: alert and no distress  RESP: No audible wheeze, cough. Clears throat sometimes.    PSYCH: Appropriate affect, tone, and pace of words          Video-Visit Details    Type of service:  Video Visit   Originating Location (pt. Location): Home    Distant Location (provider location):  On-site  Platform used for Video Visit: Telephone  Signed Electronically by: Lucero Cardenas MD    "

## 2025-03-08 DIAGNOSIS — K21.9 GASTROESOPHAGEAL REFLUX DISEASE WITHOUT ESOPHAGITIS: ICD-10-CM

## 2025-03-08 RX ORDER — OMEPRAZOLE 40 MG/1
40 CAPSULE, DELAYED RELEASE ORAL DAILY
Qty: 90 CAPSULE | Refills: 2 | Status: SHIPPED | OUTPATIENT
Start: 2025-03-08

## 2025-03-12 ENCOUNTER — PATIENT OUTREACH (OUTPATIENT)
Dept: CARE COORDINATION | Facility: CLINIC | Age: 45
End: 2025-03-12
Payer: COMMERCIAL

## 2025-03-30 DIAGNOSIS — J44.9 CHRONIC OBSTRUCTIVE PULMONARY DISEASE, UNSPECIFIED COPD TYPE (H): ICD-10-CM

## 2025-03-30 RX ORDER — FLUTICASONE PROPIONATE AND SALMETEROL 100; 50 UG/1; UG/1
1 POWDER RESPIRATORY (INHALATION) EVERY 12 HOURS
Qty: 60 EACH | Refills: 7 | Status: SHIPPED | OUTPATIENT
Start: 2025-03-30

## 2025-06-02 NOTE — TELEPHONE ENCOUNTER
[facility] message sent with Addiction Medicine scheduling # and clinic info.    Tish Sullivan RN on 3/27/2024 at 8:24 AM     Car

## 2025-07-09 ENCOUNTER — TELEPHONE (OUTPATIENT)
Dept: FAMILY MEDICINE | Facility: CLINIC | Age: 45
End: 2025-07-09
Payer: COMMERCIAL

## 2025-07-10 NOTE — TELEPHONE ENCOUNTER
Please call and schedule follow up for pt- will likely have to be in November. Ok to use an approval requested spot.     For pain, prediabetes, smoking cessation. Thank you.     Lucero Cardenas MD

## 2025-09-04 ENCOUNTER — TELEPHONE (OUTPATIENT)
Dept: PHARMACY | Facility: OTHER | Age: 45
End: 2025-09-04
Payer: COMMERCIAL